# Patient Record
Sex: MALE | Race: BLACK OR AFRICAN AMERICAN | Employment: OTHER | URBAN - METROPOLITAN AREA
[De-identification: names, ages, dates, MRNs, and addresses within clinical notes are randomized per-mention and may not be internally consistent; named-entity substitution may affect disease eponyms.]

---

## 2017-01-06 ENCOUNTER — OFFICE VISIT (OUTPATIENT)
Dept: CARDIOLOGY CLINIC | Age: 72
End: 2017-01-06

## 2017-01-06 VITALS
HEART RATE: 73 BPM | DIASTOLIC BLOOD PRESSURE: 73 MMHG | HEIGHT: 67 IN | SYSTOLIC BLOOD PRESSURE: 159 MMHG | WEIGHT: 209 LBS | BODY MASS INDEX: 32.8 KG/M2

## 2017-01-06 DIAGNOSIS — I45.2 RBBB (RIGHT BUNDLE BRANCH BLOCK WITH LEFT ANTERIOR FASCICULAR BLOCK): ICD-10-CM

## 2017-01-06 DIAGNOSIS — E78.5 DYSLIPIDEMIA: ICD-10-CM

## 2017-01-06 DIAGNOSIS — I25.10 CAD S/P PERCUTANEOUS CORONARY ANGIOPLASTY: ICD-10-CM

## 2017-01-06 DIAGNOSIS — F17.209 TOBACCO USE DISORDER, CONTINUOUS: ICD-10-CM

## 2017-01-06 DIAGNOSIS — I50.9 CHRONIC CONGESTIVE HEART FAILURE, UNSPECIFIED CONGESTIVE HEART FAILURE TYPE: Primary | ICD-10-CM

## 2017-01-06 DIAGNOSIS — I10 ESSENTIAL HYPERTENSION: ICD-10-CM

## 2017-01-06 DIAGNOSIS — Z98.61 CAD S/P PERCUTANEOUS CORONARY ANGIOPLASTY: ICD-10-CM

## 2017-01-06 DIAGNOSIS — Z79.4 TYPE 2 DIABETES MELLITUS WITHOUT COMPLICATION, WITH LONG-TERM CURRENT USE OF INSULIN (HCC): ICD-10-CM

## 2017-01-06 DIAGNOSIS — E11.9 TYPE 2 DIABETES MELLITUS WITHOUT COMPLICATION, WITH LONG-TERM CURRENT USE OF INSULIN (HCC): ICD-10-CM

## 2017-01-06 RX ORDER — MELATONIN
1000 DAILY
COMMUNITY

## 2017-01-06 NOTE — PATIENT INSTRUCTIONS
After the recommended changes have been made in blood pressure medicines, patient advised to keep BP/HR(pulse rate) chart twice daily and bring us results in next 2 weeks or so. Patient may send the results via \"My Chart\" if desired. Please rest for 5-10 minutes before checking blood pressure  Medications Discontinued During This Encounter   Medication Reason    glimepiride (AMARYL) 4 mg tablet Therapy Completed       Orders Placed This Encounter    SCHEDULE NUCLEAR STUDY     exercise    2D ECHO COMPLETE ADULT (TTE)     Standing Status:   Future     Standing Expiration Date:   7/5/2017     Order Specific Question:   Reason for Exam:     Answer:   chf    AMB POC EKG ROUTINE W/ 12 LEADS, INTER & REP     Order Specific Question:   Reason for Exam:     Answer:   new patient          Stopping Smoking: Care Instructions  Your Care Instructions  Cigarette smokers crave the nicotine in cigarettes. Giving it up is much harder than simply changing a habit. Your body has to stop craving the nicotine. It is hard to quit, but you can do it. There are many tools that people use to quit smoking. You may find that combining tools works best for you. There are several steps to quitting. First you get ready to quit. Then you get support to help you. After that, you learn new skills and behaviors to become a nonsmoker. For many people, a necessary step is getting and using medicine. Your doctor will help you set up the plan that best meets your needs. You may want to attend a smoking cessation program to help you quit smoking. When you choose a program, look for one that has proven success. Ask your doctor for ideas. You will greatly increase your chances of success if you take medicine as well as get counseling or join a cessation program.  Some of the changes you feel when you first quit tobacco are uncomfortable. Your body will miss the nicotine at first, and you may feel short-tempered and grumpy.  You may have trouble sleeping or concentrating. Medicine can help you deal with these symptoms. You may struggle with changing your smoking habits and rituals. The last step is the tricky one: Be prepared for the smoking urge to continue for a time. This is a lot to deal with, but keep at it. You will feel better. Follow-up care is a key part of your treatment and safety. Be sure to make and go to all appointments, and call your doctor if you are having problems. Its also a good idea to know your test results and keep a list of the medicines you take. How can you care for yourself at home? · Ask your family, friends, and coworkers for support. You have a better chance of quitting if you have help and support. · Join a support group, such as Nicotine Anonymous, for people who are trying to quit smoking. · Consider signing up for a smoking cessation program, such as the American Lung Association's Freedom from Smoking program.  · Set a quit date. Pick your date carefully so that it is not right in the middle of a big deadline or stressful time. Once you quit, do not even take a puff. Get rid of all ashtrays and lighters after your last cigarette. Clean your house and your clothes so that they do not smell of smoke. · Learn how to be a nonsmoker. Think about ways you can avoid those things that make you reach for a cigarette. ¨ Avoid situations that put you at greatest risk for smoking. For some people, it is hard to have a drink with friends without smoking. For others, they might skip a coffee break with coworkers who smoke. ¨ Change your daily routine. Take a different route to work or eat a meal in a different place. · Cut down on stress. Calm yourself or release tension by doing an activity you enjoy, such as reading a book, taking a hot bath, or gardening. · Talk to your doctor or pharmacist about nicotine replacement therapy, which replaces the nicotine in your body.  You still get nicotine but you do not use tobacco. Nicotine replacement products help you slowly reduce the amount of nicotine you need. These products come in several forms, many of them available over-the-counter:  ¨ Nicotine patches  ¨ Nicotine gum and lozenges  ¨ Nicotine inhaler  · Ask your doctor about bupropion (Wellbutrin) or varenicline (Chantix), which are prescription medicines. They do not contain nicotine. They help you by reducing withdrawal symptoms, such as stress and anxiety. · Some people find hypnosis, acupuncture, and massage helpful for ending the smoking habit. · Eat a healthy diet and get regular exercise. Having healthy habits will help your body move past its craving for nicotine. · Be prepared to keep trying. Most people are not successful the first few times they try to quit. Do not get mad at yourself if you smoke again. Make a list of things you learned and think about when you want to try again, such as next week, next month, or next year. Where can you learn more? Go to http://jerry-shereen.info/. Enter M321 in the search box to learn more about \"Stopping Smoking: Care Instructions. \"  Current as of: May 26, 2016  Content Version: 11.1  © 6182-6897 [employer]. Care instructions adapted under license by [employer] (which disclaims liability or warranty for this information). If you have questions about a medical condition or this instruction, always ask your healthcare professional. [address] any warranty or liability for your use of this information.

## 2017-01-06 NOTE — PROGRESS NOTES
HISTORY OF PRESENT ILLNESS  Gayle Lackey is a 80 y.o. male. HPI Comments: 1/17 seen for establishing/changing cardiologist   history of coronary disease, status post PCI(2010) and hypertension, along with conduction system disease    New Patient   The history is provided by the patient and medical records. Associated symptoms include shortness of breath. Pertinent negatives include no chest pain and no headaches. Cardiomyopathy   The history is provided by the medical records (ischemic). This is a chronic problem. Associated symptoms include shortness of breath. Pertinent negatives include no chest pain and no headaches. Hypertension   The history is provided by the medical records and patient. This is a chronic problem. Associated symptoms include shortness of breath. Pertinent negatives include no chest pain and no headaches. Cholesterol Problem   The history is provided by the medical records. This is a chronic problem. Associated symptoms include shortness of breath. Pertinent negatives include no chest pain and no headaches. Shortness of Breath   The history is provided by the patient. This is a recurrent problem. The problem occurs intermittently. The current episode started more than 1 week ago. Associated symptoms include leg swelling. Pertinent negatives include no fever, no headaches, no cough, no wheezing, no PND, no orthopnea, no chest pain, no vomiting, no rash and no claudication. The problem's precipitants include exercise (10-15 mt walk). Leg Swelling   The history is provided by the patient. This is a recurrent problem. The current episode started more than 1 week ago (6/16). The problem occurs every several days. Associated symptoms include shortness of breath. Pertinent negatives include no chest pain and no headaches. The symptoms are aggravated by standing. The symptoms are relieved by sleep.        Review of Systems   Constitutional: Negative for chills, fever, malaise/fatigue and weight loss. HENT: Negative for nosebleeds. Eyes: Negative for discharge. Respiratory: Positive for shortness of breath. Negative for cough and wheezing. Cardiovascular: Positive for leg swelling. Negative for chest pain, palpitations, orthopnea, claudication and PND. Gastrointestinal: Negative for diarrhea, nausea and vomiting. Genitourinary: Negative for dysuria and hematuria. Musculoskeletal: Negative for joint pain. Skin: Negative for rash. Neurological: Negative for dizziness, seizures, loss of consciousness and headaches. Endo/Heme/Allergies: Negative for polydipsia. Does not bruise/bleed easily. Psychiatric/Behavioral: Negative for depression and substance abuse. The patient does not have insomnia. No Known Allergies    Past Medical History   Diagnosis Date    ACS (acute coronary syndrome) ([facility] [address]     CAD (coronary artery disease), native coronary artery August 2010     s/p non-ST-elevation myocardial infarction, s/p PCI with BMS (Vision 4x18mm) distal RCA with 45% distal LAD  and mild LCx disease. mild-moderate LV systolic dysfunction (76/50).  Diabetes mellitus type II     Dyslipidemia     ED (erectile dysfunction)     GERD (gastroesophageal reflux disease)     History of BPH     History of echocardiogram 5/18/2011     EF 65%. Mod-marked increased wall thickness. Gr 1 DDfx. No significant valvular heart disease.  Hyperlipidemia     Hypertension     Ischemic cardiomyopathy      recovery of LV syst fct  Echo May 2011 LV EF 65%    MI (myocardial infarction) ([facility] [address]     Obesity     RBBB (right bundle branch block with left anterior fascicular block)     S/P cardiac cath 8/26/2010     LM patent. dLAD 45%. CX mild. dRCA 100% thrombotic (S/P cath thrombectomy & Vision 4 x 18-mm BMS). EF 40%. Posterobasal, diaphrag, apical hypk.       Shingles 4/2012    Tobacco use disorder, continuous        Family History   Problem Relation Age of Onset    Diabetes Mother Social History   Substance Use Topics    Smoking status: Current Every Day Smoker     Packs/day: 0.50     Years: 44.00    Smokeless tobacco: Never Used      Comment: per patient stated 4 a day     Alcohol use No        Current Outpatient Prescriptions   Medication Sig    cholecalciferol, vitamin D3, (VITAMIN D3) 2,000 unit tab Take  by mouth.  dutasteride (AVODART) 0.5 mg capsule Take 0.5 mg by mouth daily.  linagliptin (TRADJENTA) 5 mg tablet Take 5 mg by mouth daily.  hydrALAZINE (APRESOLINE) 100 mg tablet Take 100 mg by mouth three (3) times daily.  esomeprazole (NEXIUM) 40 mg capsule Take 40 mg by mouth as needed.  carvedilol (COREG) 12.5 mg tablet Take 25 mg by mouth two (2) times daily (with meals).  simvastatin (ZOCOR) 40 mg tablet Take 1 Tab by mouth nightly.  nitroglycerin (NITROSTAT) 0.4 mg SL tablet 1 Tab by SubLINGual route every five (5) minutes as needed for Chest Pain.  pantoprazole (PROTONIX) 40 mg tablet Take 40 mg by mouth daily.  insulin glargine (LANTUS SOLOSTAR) 100 unit/mL (3 mL) pen 60 Units by SubCUTAneous route daily.  lisinopril-hydrochlorothiazide (PRINZIDE, ZESTORETIC) 20-25 mg per tablet Take 1 Tab by mouth daily.  aspirin 81 mg tablet Take 81 mg by mouth daily. No current facility-administered medications for this visit. Past Surgical History   Procedure Laterality Date    Hx heart catheterization  08/26/10     1. Normal systemic pressure. 2. Moderate elevation of left sided filling pressures. 3. Mild to moderate left ventricular systolic dysfunction distinct regional wall motion abnormalities. 4. Coronary disease with thrombotic occlusion of the distal right coronary artery and moderate left anterior descending disease. 5. Successful percutaneous coronary intervention with catheter thrombectomy.     Hx hemorrhoidectomy      Hx mohs procedure Left 2002    Hx coronary stent placement  2010    Hx cataract removal      Hx trabeculectomy      Hx coronary artery bypass graft         Diagnostic Studies: Old records reviewed and show as follows  EKG tracings reviewed by me today. No flowsheet data found. Visit Vitals    /73    Pulse 73    Ht 5' 7\" (1.702 m)    Wt 94.8 kg (209 lb)    BMI 32.73 kg/m2       Mr. Reinaldo Lane has a reminder for a \"due or due soon\" health maintenance. I have asked that he contact his primary care provider for follow-up on this health maintenance. 9/10 Card Cath  IMPRESSION  1. Normal systemic pressure. 2. Moderate elevation of left-sided filling pressures. 3. Mild to moderate left ventricular systolic dysfunction with distinct  regional wall motion abnormalities. 4. Coronary disease as described above with a thrombotic occlusion of the  distal right coronary artery and moderate distal left anterior descending  disease. 5. Successful percutaneous coronary intervention with catheter  thrombectomy, followed by bare metal stent deployment with a Vision 4 x 18  mm stent in the distal right coronary artery. Physical Exam   Constitutional: He is oriented to person, place, and time. He appears well-developed and well-nourished. No distress. Obese   HENT:   Head: Normocephalic and atraumatic. Mouth/Throat: Normal dentition. Eyes: Right eye exhibits no discharge. Left eye exhibits no discharge. No scleral icterus. Neck: Neck supple. No JVD present. Carotid bruit is not present. No thyromegaly present. Cardiovascular: Normal rate, regular rhythm, S1 normal, S2 normal, normal heart sounds and intact distal pulses. Exam reveals decreased pulses. Exam reveals no gallop and no friction rub. No murmur heard. Pulmonary/Chest: Effort normal and breath sounds normal. He has no wheezes. He has no rales. Abdominal: Soft. He exhibits no mass. There is no tenderness. Musculoskeletal: He exhibits no edema.    Lymphadenopathy:        Right cervical: No superficial cervical adenopathy present. Left cervical: No superficial cervical adenopathy present. Neurological: He is alert and oriented to person, place, and time. Skin: Skin is warm and dry. No rash noted. Psychiatric: He has a normal mood and affect. His behavior is normal.       HETAL and Ching Martereymundo was seen today for new patient, coronary artery disease, cardiomyopathy, hypertension, cholesterol problem, shortness of breath and leg swelling. Diagnoses and all orders for this visit:    Chronic congestive heart failure, unspecified congestive heart failure type ([address]  -     AMB POC EKG ROUTINE W/ 12 LEADS, INTER & REP  -     2D ECHO COMPLETE ADULT (TTE); Future  -     SCHEDULE NUCLEAR STUDY    CAD S/P percutaneous coronary angioplasty  Comments:  s/p PCI with BMS (Vision 4x18mm) distal RCA  Orders:  -     SCHEDULE NUCLEAR STUDY    RBBB (right bundle branch block with left anterior fascicular block)    Type 2 diabetes mellitus without complication, with long-term current use of insulin (HCC)    Tobacco use disorder, continuous  Comments:  Patient advised to stop smoking to reduce future cardiovascular events. Essential hypertension  Comments:  1/17 high- watch home BPs    Dyslipidemia  Comments:  1/17 get report from PCP          Pertinent laboratory and test data reviewed and discussed with patient.   See patient instructions also for other medical advice given    Medications Discontinued During This Encounter   Medication Reason    glimepiride (AMARYL) 4 mg tablet Therapy Completed       Follow-up Disposition:  Return in about 3 months (around 4/6/2017), or if symptoms worsen or fail to improve, for post test.

## 2017-01-06 NOTE — MR AVS SNAPSHOT
Visit Information Date & Time Provider Department Dept. Phone Encounter #  
 1/6/2017 12:15 PM Jenni Syed MD [facility] [address] [place] [address] [id number] Follow-up Instructions Return in about 3 months (around 4/6/2017), or if symptoms worsen or fail to improve, for post test.  
  
Your Appointments 1/18/2017  9:00 AM  
PROCEDURE with CA NUC [facility] ([address]) Appt Note: est echo pearson debra [address] [facility], [address] [facility] [address]  
[address], [address] [facility] [address] 4/10/2017 11:45 AM  
Office Visit with Jenni Syed MD  
[facility]) Appt Note: 3 month post nuc and echo [address] [facility] [address] [facility] [address]  
[address], [address] [facility] [address] [address] 5/24/2017 10:20 AM  
Follow Up with Vasquez Millan MD  
[facility] ([address]) Appt Note: 1 year follow up with an EKG  
 [facility] [id number]  
[phone] [address] [place] [address] [address] Upcoming Health Maintenance Date Due Hepatitis C Screening 1945 FOOT EXAM Q1 9/12/1956 EYE EXAM RETINAL OR DILATED Q1 9/12/1956 DTaP/Tdap/Td series (1 - Tdap) 9/12/1967 FOBT Q 1 YEAR AGE 50-75 9/12/1996 ZOSTER VACCINE AGE 60> 9/12/2006 MICROALBUMIN Q1 1/12/2011 HEMOGLOBIN A1C Q6M 3/29/2011 GLAUCOMA SCREENING Q2Y 9/12/2011 Pneumococcal 65+ Low/Medium Risk (1 of 2 - PCV13) 9/12/2011 MEDICARE YEARLY EXAM 9/12/2011 LIPID PANEL Q1 10/12/2011 INFLUENZA AGE 9 TO ADULT 8/1/2016 Allergies as of 1/6/2017  Review Complete On: 1/6/2017 By: Jenni Syed MD  
 No Known Allergies Current Immunizations  Never Reviewed No immunizations on file. Not reviewed this visit You Were Diagnosed With   
  
 Codes Comments Chronic congestive heart failure, unspecified congestive heart failure type ([facility]    -  Primary ICD-10-CM: I50.9 ICD-9-CM: 428.0   
 CAD S/P percutaneous coronary angioplasty     ICD-10-CM: I25.10, Z98.61 ICD-9-CM: 414.01, V45.82 s/p PCI with BMS (Vision 4x18mm) distal RCA  
 RBBB (right bundle branch block with left anterior fascicular block)     ICD-10-CM: I45.2 ICD-9-CM: 426.52 Type 2 diabetes mellitus without complication, with long-term current use of insulin (HCC)     ICD-10-CM: E11.9, Z79.4 ICD-9-CM: 250.00, V58.67 Tobacco use disorder, continuous     ICD-10-CM: F17.209 ICD-9-CM: 305.1 Patient advised to stop smoking to reduce future cardiovascular events. Essential hypertension     ICD-10-CM: I10 
ICD-9-CM: 401.9 1/17 high- watch home BPs Dyslipidemia     ICD-10-CM: E78.5 ICD-9-CM: 272.4 1/17 get report from PCP Vitals BP Pulse Height(growth percentile) Weight(growth percentile) BMI Smoking Status 159/73 73 5' 7\" (1.702 m) 209 lb (94.8 kg) 32.73 kg/m2 Current Every Day Smoker Vitals History BMI and BSA Data Body Mass Index Body Surface Area 32.73 kg/m 2 2.12 m 2 Preferred Pharmacy Pharmacy Name Phone [id number] [facility]. Milla Awad, [address] [facility] [phone] Your Updated Medication List  
  
   
This list is accurate as of: 1/6/17  1:03 PM.  Always use your most recent med list.  
  
  
  
  
 aspirin 81 mg tablet Take 81 mg by mouth daily. COREG 12.5 mg tablet Generic drug:  carvedilol Take 25 mg by mouth two (2) times daily (with meals). dutasteride 0.5 mg capsule Commonly known as:  AVODART Take 0.5 mg by mouth daily. hydrALAZINE 100 mg tablet Commonly known as:  APRESOLINE Take 100 mg by mouth three (3) times daily. LANTUS SOLOSTAR 100 unit/mL (3 mL) pen Generic drug:  insulin glargine 60 Units by SubCUTAneous route daily. lisinopril-hydroCHLOROthiazide 20-25 mg per tablet Commonly known as:  Onur Lonny Take 1 Tab by mouth daily. NexIUM 40 mg capsule Generic drug:  esomeprazole Take 40 mg by mouth as needed. nitroglycerin 0.4 mg SL tablet Commonly known as:  NITROSTAT  
1 Tab by SubLINGual route every five (5) minutes as needed for Chest Pain.  
  
 pantoprazole 40 mg tablet Commonly known as:  PROTONIX Take 40 mg by mouth daily. simvastatin 40 mg tablet Commonly known as:  ZOCOR Take 1 Tab by mouth nightly. TRADJENTA 5 mg tablet Generic drug:  linagliptin Take 5 mg by mouth daily. VITAMIN D3 2,000 unit Tab Generic drug:  cholecalciferol (vitamin D3) Take  by mouth. We Performed the Following AMB POC EKG ROUTINE W/ 12 LEADS, INTER & REP [57557 CPT(R)] SCHEDULE NUCLEAR STUDY [UTM8127 Custom] Comments:  
 exercise Follow-up Instructions Return in about 3 months (around 4/6/2017), or if symptoms worsen or fail to improve, for post test.  
  
To-Do List   
 Around 01/09/2017 Card[facility] Services:  2D ECHO COMPLETE ADULT (TTE) Patient Instructions After the recommended changes have been made in blood pressure medicines, patient advised to keep BP/HR(pulse rate) chart twice daily and bring us results in next 2 weeks or so. Patient may send the results via \"My Chart\" if desired. Please rest for 5-10 minutes before checking blood pressure Medications Discontinued During This Encounter Medication Reason  glimepiride (AMARYL) 4 mg tablet Therapy Completed Orders Placed This Encounter  SCHEDULE NUCLEAR STUDY  
  exercise  2D ECHO COMPLETE ADULT (TTE) Standing Status:   Future Standing Expiration Date:   7/5/2017 Order Specific Question:   Reason for Exam:   Answer:   chf  
 AMB POC EKG ROUTINE W/ 12 LEADS, INTER & REP  
 Order Specific Question:   Reason for Exam: Answer:   new patient Stopping Smoking: Care Instructions Your Care Instructions Cigarette smokers crave the nicotine in cigarettes. Giving it up is much harder than simply changing a habit. Your body has to stop craving the nicotine. It is hard to quit, but you can do it. There are many tools that people use to quit smoking. You may find that combining tools works best for you. There are several steps to quitting. First you get ready to quit. Then you get support to help you. After that, you learn new skills and behaviors to become a nonsmoker. For many people, a necessary step is getting and using medicine. Your doctor will help you set up the plan that best meets your needs. You may want to attend a smoking cessation program to help you quit smoking. When you choose a program, look for one that has proven success. Ask your doctor for ideas. You will greatly increase your chances of success if you take medicine as well as get counseling or join a cessation program. 
Some of the changes you feel when you first quit tobacco are uncomfortable. Your body will miss the nicotine at first, and you may feel short-tempered and grumpy. You may have trouble sleeping or concentrating. Medicine can help you deal with these symptoms. You may struggle with changing your smoking habits and rituals. The last step is the tricky one: Be prepared for the smoking urge to continue for a time. This is a lot to deal with, but keep at it. You will feel better. Follow-up care is a key part of your treatment and safety. Be sure to make and go to all appointments, and call your doctor if you are having problems. Its also a good idea to know your test results and keep a list of the medicines you take. How can you care for yourself at home? · Ask your family, friends, and coworkers for support. You have a better chance of quitting if you have help and support. · Join a support group, such as Nicotine Anonymous, for people who are trying to quit smoking. · Consider signing up for a smoking cessation program, such as the American Lung Association's Freedom from Smoking program. 
· Set a quit date. Pick your date carefully so that it is not right in the middle of a big deadline or stressful time. Once you quit, do not even take a puff. Get rid of all ashtrays and lighters after your last cigarette. Clean your house and your clothes so that they do not smell of smoke. · Learn how to be a nonsmoker. Think about ways you can avoid those things that make you reach for a cigarette. ¨ Avoid situations that put you at greatest risk for smoking. For some people, it is hard to have a drink with friends without smoking. For others, they might skip a coffee break with coworkers who smoke. ¨ Change your daily routine. Take a different route to work or eat a meal in a different place. · Cut down on stress. Calm yourself or release tension by doing an activity you enjoy, such as reading a book, taking a hot bath, or gardening. · Talk to your doctor or pharmacist about nicotine replacement therapy, which replaces the nicotine in your body. You still get nicotine but you do not use tobacco. Nicotine replacement products help you slowly reduce the amount of nicotine you need. These products come in several forms, many of them available over-the-counter: ¨ Nicotine patches ¨ Nicotine gum and lozenges ¨ Nicotine inhaler · Ask your doctor about bupropion (Wellbutrin) or varenicline (Chantix), which are prescription medicines. They do not contain nicotine. They help you by reducing withdrawal symptoms, such as stress and anxiety. · Some people find hypnosis, acupuncture, and massage helpful for ending the smoking habit. · Eat a healthy diet and get regular exercise. Having healthy habits will help your body move past its craving for nicotine. · Be prepared to keep trying. Most people are not successful the first few times they try to quit. Do not get mad at yourself if you smoke again. Make a list of things you learned and think about when you want to try again, such as next week, next month, or next year. Where can you learn more? Go to http://jerry-shereen.info/. Enter R399 in the search box to learn more about \"Stopping Smoking: Care Instructions. \" Current as of: May 26, 2016 Content Version: 11.1 © 9322-3777 [employer]. Care instructions adapted under license by [employer] (which disclaims liability or warranty for this information). If you have questions about a medical condition or this instruction, always ask your healthcare professional. [address] any warranty or liability for your use of this information. Introducing \[facility]\"" & HEALTH SERVICES! Dear Francoise Oreilly: Thank you for requesting a [employer] account. Our records indicate that you already have an active [employer] account. You can access your account anytime at [email or web address] Did you know that you can access your hospital and ER discharge instructions at any time in [employer]? You can also review all of your test results from your hospital stay or ER visit. Additional Information If you have questions, please visit the Frequently Asked Questions section of the [employer] website at [email or web address] Remember, [employer] is NOT to be used for urgent needs. For medical emergencies, dial 911. Now available from your iPhone and Android! Please provide this summary of care documentation to your next provider. Your primary care clinician is listed as Krystal Isidro. If you have any questions after today's visit, please call [phone].

## 2017-01-06 NOTE — LETTER
Wei Rice. 
1945 1/6/2017 Dear Saturnino Tovar MD 
 
I had the pleasure of evaluating  Mr. Skip Rosenthal in office today. Below are the relevant portions of my assessment and plan of care. ICD-10-CM ICD-9-CM 1. Chronic congestive heart failure, unspecified congestive heart failure type (HCC) I50.9 428.0 AMB POC EKG ROUTINE W/ 12 LEADS, INTER & REP  
   2D ECHO COMPLETE ADULT (TTE) SCHEDULE NUCLEAR STUDY 2. CAD S/P percutaneous coronary angioplasty I25.10 414.01 SCHEDULE NUCLEAR STUDY  
 Z98.61 V45.82   
 s/p PCI with BMS (Vision 4x18mm) distal RCA 3. RBBB (right bundle branch block with left anterior fascicular block) I45.2 426.52   
4. Type 2 diabetes mellitus without complication, with long-term current use of insulin (HCC) E11.9 250.00   
 Z79.4 V58.67   
5. Tobacco use disorder, continuous F17.209 305.1 Patient advised to stop smoking to reduce future cardiovascular events. 6. Essential hypertension I10 401.9 1/17 high- watch home BPs 7. Dyslipidemia E78.5 272.4   
 1/17 get report from PCP Current Outpatient Prescriptions Medication Sig Dispense Refill  cholecalciferol, vitamin D3, (VITAMIN D3) 2,000 unit tab Take  by mouth.  dutasteride (AVODART) 0.5 mg capsule Take 0.5 mg by mouth daily.  linagliptin (TRADJENTA) 5 mg tablet Take 5 mg by mouth daily.  hydrALAZINE (APRESOLINE) 100 mg tablet Take 100 mg by mouth three (3) times daily.  esomeprazole (NEXIUM) 40 mg capsule Take 40 mg by mouth as needed.  carvedilol (COREG) 12.5 mg tablet Take 25 mg by mouth two (2) times daily (with meals).  simvastatin (ZOCOR) 40 mg tablet Take 1 Tab by mouth nightly. 30 Tab 11  
 nitroglycerin (NITROSTAT) 0.4 mg SL tablet 1 Tab by SubLINGual route every five (5) minutes as needed for Chest Pain. 25 Tab 3  pantoprazole (PROTONIX) 40 mg tablet Take 40 mg by mouth daily.  insulin glargine (LANTUS SOLOSTAR) 100 unit/mL (3 mL) pen 60 Units by SubCUTAneous route daily.  lisinopril-hydrochlorothiazide (PRINZIDE, ZESTORETIC) 20-25 mg per tablet Take 1 Tab by mouth daily.  aspirin 81 mg tablet Take 81 mg by mouth daily. Orders Placed This Encounter  SCHEDULE NUCLEAR STUDY  
  exercise  2D ECHO COMPLETE ADULT (TTE) Standing Status:   Future Standing Expiration Date:   7/5/2017 Order Specific Question:   Reason for Exam: Answer:   chf  
 AMB POC EKG ROUTINE W/ 12 LEADS, INTER & REP Order Specific Question:   Reason for Exam: Answer:   new patient  cholecalciferol, vitamin D3, (VITAMIN D3) 2,000 unit tab Sig: Take  by mouth. If you have questions, please do not hesitate to call me. I look forward to following Mr. Melara Query along with you. Sincerely, Lisa Damon MD

## 2017-01-06 NOTE — PROGRESS NOTES
Medications confirmed by patient's medication list     1. Have you been to the ER, urgent care clinic since your last visit? Hospitalized since your last visit? No    2. Have you seen or consulted any other health care providers outside of the [address] [place] [address] since your last visit? Include any pap smears or colon screening. No     3. Since your last visit, have you had any of the following symptoms? Swelling in legs/ SOB    4. Have you had any blood work, X-rays or cardiac testing? None     5. Where do you normally have your labs drawn?  [address] [place] [address]

## 2017-01-18 ENCOUNTER — CLINICAL SUPPORT (OUTPATIENT)
Dept: CARDIOLOGY CLINIC | Age: 72
End: 2017-01-18

## 2017-01-18 DIAGNOSIS — I10 ESSENTIAL HYPERTENSION: ICD-10-CM

## 2017-01-18 DIAGNOSIS — I45.2 RBBB (RIGHT BUNDLE BRANCH BLOCK WITH LEFT ANTERIOR FASCICULAR BLOCK): ICD-10-CM

## 2017-01-18 DIAGNOSIS — E78.5 DYSLIPIDEMIA: ICD-10-CM

## 2017-01-18 DIAGNOSIS — I25.5 ISCHEMIC CARDIOMYOPATHY: Primary | ICD-10-CM

## 2017-01-18 DIAGNOSIS — I50.9 CHRONIC CONGESTIVE HEART FAILURE, UNSPECIFIED CONGESTIVE HEART FAILURE TYPE: ICD-10-CM

## 2017-04-10 ENCOUNTER — OFFICE VISIT (OUTPATIENT)
Dept: CARDIOLOGY CLINIC | Age: 72
End: 2017-04-10

## 2017-04-10 VITALS
HEART RATE: 70 BPM | DIASTOLIC BLOOD PRESSURE: 53 MMHG | BODY MASS INDEX: 32.65 KG/M2 | HEIGHT: 67 IN | WEIGHT: 208 LBS | SYSTOLIC BLOOD PRESSURE: 106 MMHG

## 2017-04-10 DIAGNOSIS — F17.209 TOBACCO USE DISORDER, CONTINUOUS: ICD-10-CM

## 2017-04-10 DIAGNOSIS — E78.5 DYSLIPIDEMIA: ICD-10-CM

## 2017-04-10 DIAGNOSIS — E66.09 NON MORBID OBESITY DUE TO EXCESS CALORIES: ICD-10-CM

## 2017-04-10 DIAGNOSIS — I10 ESSENTIAL HYPERTENSION: ICD-10-CM

## 2017-04-10 DIAGNOSIS — Z98.61 CAD S/P PERCUTANEOUS CORONARY ANGIOPLASTY: Primary | ICD-10-CM

## 2017-04-10 DIAGNOSIS — I25.10 CAD S/P PERCUTANEOUS CORONARY ANGIOPLASTY: Primary | ICD-10-CM

## 2017-04-10 RX ORDER — GLIMEPIRIDE 4 MG/1
4 TABLET ORAL
COMMUNITY
End: 2019-11-18

## 2017-04-10 NOTE — PATIENT INSTRUCTIONS
Medications Discontinued During This Encounter   Medication Reason    lisinopril-hydrochlorothiazide (PRINZIDE, ZESTORETIC) 20-25 mg per tablet Alternate Therapy       No orders of the defined types were placed in this encounter. Try to exercise regularly like walking for 30 minutes a day      High Blood Pressure: Care Instructions  Your Care Instructions  If your blood pressure is usually above 140/90, you have high blood pressure, or hypertension. That means the top number is 140 or higher or the bottom number is 90 or higher, or both. Despite what a lot of people think, high blood pressure usually doesn't cause headaches or make you feel dizzy or lightheaded. It usually has no symptoms. But it does increase your risk for heart attack, stroke, and kidney or eye damage. The higher your blood pressure, the more your risk increases. Your doctor will give you a goal for your blood pressure. Your goal will be based on your health and your age. An example of a goal is to keep your blood pressure below 140/90. Lifestyle changes, such as eating healthy and being active, are always important to help lower blood pressure. You might also take medicine to reach your blood pressure goal.  Follow-up care is a key part of your treatment and safety. Be sure to make and go to all appointments, and call your doctor if you are having problems. It's also a good idea to know your test results and keep a list of the medicines you take. How can you care for yourself at home? Medical treatment  · If you stop taking your medicine, your blood pressure will go back up. You may take one or more types of medicine to lower your blood pressure. Be safe with medicines. Take your medicine exactly as prescribed. Call your doctor if you think you are having a problem with your medicine. · Talk to your doctor before you start taking aspirin every day. Aspirin can help certain people lower their risk of a heart attack or stroke.  But taking aspirin isn't right for everyone, because it can cause serious bleeding. · See your doctor regularly. You may need to see the doctor more often at first or until your blood pressure comes down. · If you are taking blood pressure medicine, talk to your doctor before you take decongestants or anti-inflammatory medicine, such as ibuprofen. Some of these medicines can raise blood pressure. · Learn how to check your blood pressure at home. Lifestyle changes  · Stay at a healthy weight. This is especially important if you put on weight around the waist. Losing even 10 pounds can help you lower your blood pressure. · If your doctor recommends it, get more exercise. Walking is a good choice. Bit by bit, increase the amount you walk every day. Try for at least 30 minutes on most days of the week. You also may want to swim, bike, or do other activities. · Avoid or limit alcohol. Talk to your doctor about whether you can drink any alcohol. · Try to limit how much sodium you eat to less than 2,300 milligrams (mg) a day. Your doctor may ask you to try to eat less than 1,500 mg a day. · Eat plenty of fruits (such as bananas and oranges), vegetables, legumes, whole grains, and low-fat dairy products. · Lower the amount of saturated fat in your diet. Saturated fat is found in animal products such as milk, cheese, and meat. Limiting these foods may help you lose weight and also lower your risk for heart disease. · Do not smoke. Smoking increases your risk for heart attack and stroke. If you need help quitting, talk to your doctor about stop-smoking programs and medicines. These can increase your chances of quitting for good. When should you call for help? Call 911 anytime you think you may need emergency care. This may mean having symptoms that suggest that your blood pressure is causing a serious heart or blood vessel problem. Your blood pressure may be over 180/110.   For example, call 911 if:  · You have symptoms of a heart attack. These may include:  ¨ Chest pain or pressure, or a strange feeling in the chest.  ¨ Sweating. ¨ Shortness of breath. ¨ Nausea or vomiting. ¨ Pain, pressure, or a strange feeling in the back, neck, jaw, or upper belly or in one or both shoulders or arms. ¨ Lightheadedness or sudden weakness. ¨ A fast or irregular heartbeat. · You have symptoms of a stroke. These may include:  ¨ Sudden numbness, tingling, weakness, or loss of movement in your face, arm, or leg, especially on only one side of your body. ¨ Sudden vision changes. ¨ Sudden trouble speaking. ¨ Sudden confusion or trouble understanding simple statements. ¨ Sudden problems with walking or balance. ¨ A sudden, severe headache that is different from past headaches. · You have severe back or belly pain. Do not wait until your blood pressure comes down on its own. Get help right away. Call your doctor now or seek immediate care if:  · Your blood pressure is much higher than normal (such as 180/110 or higher), but you don't have symptoms. · You think high blood pressure is causing symptoms, such as:  ¨ Severe headache. ¨ Blurry vision. Watch closely for changes in your health, and be sure to contact your doctor if:  · Your blood pressure measures 140/90 or higher at least 2 times. That means the top number is 140 or higher or the bottom number is 90 or higher, or both. · You think you may be having side effects from your blood pressure medicine. · Your blood pressure is usually normal, but it goes above normal at least 2 times. Where can you learn more? Go to http://jerry-shereen.info/. Enter I390 in the search box to learn more about \"High Blood Pressure: Care Instructions. \"  Current as of: August 8, 2016  Content Version: 11.2  © 5471-6456 [employer]. Care instructions adapted under license by [employer] (which disclaims liability or warranty for this information).  If you have questions about a medical condition or this instruction, always ask your healthcare professional. [place] [address] any warranty or liability for your use of this information.

## 2017-04-10 NOTE — LETTER
Greer Escalante. 
1945 
 
4/10/2017 Dear MD Amita Guevara MD 
 
I had the pleasure of evaluating  Mr. Senia Chavarria in office today. Below are the relevant portions of my assessment and plan of care. ICD-10-CM ICD-9-CM 1. CAD S/P percutaneous coronary angioplasty I25.10 414.01   
 Z98.61 V45.82   
 8/2010 s/p non-ST-elevation myocardial infarction, s/p PCI with BMS (Vision 4x18mm) distal RCA with 45% distal LAD  and mild LCx disease. mild-moderate LV systol 2. Essential hypertension I10 401.9 4/17 improving since medications by PCP. 3. Dyslipidemia E78.5 272.4   
 3/17 LDL31;   
4. Non morbid obesity due to excess calories E66.09 278.00 Weight loss has been strongly encouraged by following dietary restrictions and an exercise routine. 5. Tobacco use disorder, continuous F17.209 305.1 Patient advised to stop smoking to reduce future cardiovascular events. Current Outpatient Prescriptions Medication Sig Dispense Refill  glimepiride (AMARYL) 4 mg tablet Take  by mouth every morning.  azilsartan med-chlorthalidone (EDARBYCLOR) 40-25 mg per tablet Take  by mouth daily.  cholecalciferol, vitamin D3, (VITAMIN D3) 2,000 unit tab Take  by mouth.  dutasteride (AVODART) 0.5 mg capsule Take 0.5 mg by mouth daily.  linagliptin (TRADJENTA) 5 mg tablet Take 5 mg by mouth daily.  hydrALAZINE (APRESOLINE) 100 mg tablet Take 100 mg by mouth two (2) times a day.  esomeprazole (NEXIUM) 40 mg capsule Take 40 mg by mouth as needed.  carvedilol (COREG) 12.5 mg tablet Take 25 mg by mouth two (2) times daily (with meals).  simvastatin (ZOCOR) 40 mg tablet Take 1 Tab by mouth nightly. 30 Tab 11  
 nitroglycerin (NITROSTAT) 0.4 mg SL tablet 1 Tab by SubLINGual route every five (5) minutes as needed for Chest Pain. 25 Tab 3  pantoprazole (PROTONIX) 40 mg tablet Take 40 mg by mouth daily.  insulin glargine (LANTUS SOLOSTAR) 100 unit/mL (3 mL) pen 60 Units by SubCUTAneous route daily.  aspirin 81 mg tablet Take 81 mg by mouth daily. Orders Placed This Encounter  glimepiride (AMARYL) 4 mg tablet Sig: Take  by mouth every morning.  azilsartan med-chlorthalidone (EDARBYCLOR) 40-25 mg per tablet Sig: Take  by mouth daily. If you have questions, please do not hesitate to call me. I look forward to following Mr. Solorzano Done along with you. Sincerely, Shannan Hernandez MD

## 2017-04-10 NOTE — PROGRESS NOTES
HISTORY OF PRESENT ILLNESS  Joe Ibrahim is a 80 y.o. male. HPI Comments: 4/17 improving blood pressure since meds adjusted by Dr. Alfredo Lafleur;    1/17 seen for establishing/changing cardiologist   history of coronary disease, status post PCI(2010) and hypertension, along with conduction system disease    Cardiomyopathy   The history is provided by the medical records (ischemic). This is a chronic problem. Associated symptoms include shortness of breath. Pertinent negatives include no chest pain and no headaches. Hypertension   The history is provided by the medical records and patient. This is a chronic problem. Associated symptoms include shortness of breath. Pertinent negatives include no chest pain and no headaches. Cholesterol Problem   The history is provided by the medical records. This is a chronic problem. Associated symptoms include shortness of breath. Pertinent negatives include no chest pain and no headaches. Shortness of Breath   The history is provided by the patient. This is a recurrent problem. The problem occurs intermittently. The current episode started more than 1 week ago. The problem has been gradually improving. Associated symptoms include leg swelling. Pertinent negatives include no fever, no headaches, no cough, no wheezing, no PND, no orthopnea, no chest pain, no vomiting, no rash and no claudication. The problem's precipitants include exercise (10-15 mt walk). Leg Swelling   The history is provided by the patient. This is a recurrent problem. The current episode started more than 1 week ago (6/16). The problem occurs every several days. The problem has been gradually improving. Associated symptoms include shortness of breath. Pertinent negatives include no chest pain and no headaches. The symptoms are aggravated by standing. The symptoms are relieved by sleep. Review of Systems   Constitutional: Negative for chills, fever, malaise/fatigue and weight loss.    HENT: Negative for nosebleeds. Eyes: Negative for discharge. Respiratory: Positive for shortness of breath. Negative for cough and wheezing. Cardiovascular: Positive for leg swelling. Negative for chest pain, palpitations, orthopnea, claudication and PND. Gastrointestinal: Negative for diarrhea, nausea and vomiting. Genitourinary: Negative for dysuria and hematuria. Musculoskeletal: Negative for joint pain. Skin: Negative for rash. Neurological: Negative for dizziness, seizures, loss of consciousness and headaches. Endo/Heme/Allergies: Negative for polydipsia. Does not bruise/bleed easily. Psychiatric/Behavioral: Negative for depression and substance abuse. The patient does not have insomnia. 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 mild-moderate LV systolic dysfunction (37/77).  Diabetes mellitus type II     Dyslipidemia     ED (erectile dysfunction)     GERD (gastroesophageal reflux disease)     History of BPH     History of echocardiogram 5/18/2011    EF 65%. Mod-marked increased wall thickness. Gr 1 DDfx. No significant valvular heart disease.  Hyperlipidemia     Hypertension     Ischemic cardiomyopathy     recovery of LV syst fct  Echo May 2011 LV EF 65%    MI (myocardial infarction) ([facility] [address]     Obesity     RBBB (right bundle branch block with left anterior fascicular block)     S/P cardiac cath 8/26/2010    LM patent. dLAD 45%. CX mild. dRCA 100% thrombotic (S/P cath thrombectomy & Vision 4 x 18-mm BMS). EF 40%. Posterobasal, diaphrag, apical hypk.       Shingles 4/2012    Tobacco use disorder, continuous        Family History   Problem Relation Age of Onset    Diabetes Mother        Social History   Substance Use Topics    Smoking status: Current Every Day Smoker     Packs/day: 0.50     Years: 44.00    Smokeless tobacco: Never Used      Comment: per patient  every now and then      Alcohol use No        Current Outpatient Prescriptions   Medication Sig    cholecalciferol, vitamin D3, (VITAMIN D3) 2,000 unit tab Take  by mouth.  dutasteride (AVODART) 0.5 mg capsule Take 0.5 mg by mouth daily.  linagliptin (TRADJENTA) 5 mg tablet Take 5 mg by mouth daily.  hydrALAZINE (APRESOLINE) 100 mg tablet Take 100 mg by mouth three (3) times daily.  esomeprazole (NEXIUM) 40 mg capsule Take 40 mg by mouth as needed.  carvedilol (COREG) 12.5 mg tablet Take 25 mg by mouth two (2) times daily (with meals).  simvastatin (ZOCOR) 40 mg tablet Take 1 Tab by mouth nightly.  nitroglycerin (NITROSTAT) 0.4 mg SL tablet 1 Tab by SubLINGual route every five (5) minutes as needed for Chest Pain.  pantoprazole (PROTONIX) 40 mg tablet Take 40 mg by mouth daily.  insulin glargine (LANTUS SOLOSTAR) 100 unit/mL (3 mL) pen 60 Units by SubCUTAneous route daily.  lisinopril-hydrochlorothiazide (PRINZIDE, ZESTORETIC) 20-25 mg per tablet Take 1 Tab by mouth daily.  aspirin 81 mg tablet Take 81 mg by mouth daily. No current facility-administered medications for this visit. Past Surgical History:   Procedure Laterality Date    HX CATARACT REMOVAL      HX CORONARY ARTERY BYPASS GRAFT      HX CORONARY STENT PLACEMENT  2010    HX HEART CATHETERIZATION  08/26/10    1. Normal systemic pressure. 2. Moderate elevation of left sided filling pressures. 3. Mild to moderate left ventricular systolic dysfunction distinct regional wall motion abnormalities. 4. Coronary disease with thrombotic occlusion of the distal right coronary artery and moderate left anterior descending disease. 5. Successful percutaneous coronary intervention with catheter thrombectomy.     HX HEMORRHOIDECTOMY      HX MOHS PROCEDURES Left 2002    HX TRABECULECTOMY         Diagnostic Studies: Old records reviewed and show as follows  EKG tracings reviewed by me today. No flowsheet data found. 3/17 Nuc Stress  Conclusion:   1. Nondiagnostic EKG changes of ischemia due to abnormal baseline EKG at 89% of predicted maximal heart rate. 2. Normal functional capacity. 3. Severely hypertensive blood pressure response and appropriate heart rate response. 4. No ischemic symptoms or arrhythmias seen. 5. Perfusion image report to follow. Conclusion:   1. Normal perfusion scan. 2. Normal wall motion and ejection fraction. 3. Low risk scan. 3/17 ECHO  SUMMARY:  Left ventricle: Ejection fraction was estimated to be 65 %. There were no  regional wall motion abnormalities. There was severe concentric  hypertrophy. Features were consistent with a pseudonormal left ventricular  filling pattern, with concomitant abnormal relaxation and increased  filling pressure (grade 2 diastolic dysfunction). Visit Vitals    Ht 5' 7\" (1.702 m)    Wt 94.3 kg (208 lb)    BMI 32.58 kg/m2       Mr. Kamron Brown has a reminder for a \"due or due soon\" health maintenance. I have asked that he contact his primary care provider for follow-up on this health maintenance. 9/10 Card Cath  IMPRESSION  1. Normal systemic pressure. 2. Moderate elevation of left-sided filling pressures. 3. Mild to moderate left ventricular systolic dysfunction with distinct  regional wall motion abnormalities. 4. Coronary disease as described above with a thrombotic occlusion of the  distal right coronary artery and moderate distal left anterior descending  disease. 5. Successful percutaneous coronary intervention with catheter  thrombectomy, followed by bare metal stent deployment with a Vision 4 x 18  mm stent in the distal right coronary artery. Physical Exam   Constitutional: He is oriented to person, place, and time. He appears well-developed and well-nourished. No distress.    Obese   HENT: Head: Normocephalic and atraumatic. Mouth/Throat: Normal dentition. Eyes: Right eye exhibits no discharge. Left eye exhibits no discharge. No scleral icterus. Neck: Neck supple. No JVD present. Carotid bruit is not present. No thyromegaly present. Cardiovascular: Normal rate, regular rhythm, S1 normal, S2 normal, normal heart sounds and intact distal pulses. Exam reveals decreased pulses. Exam reveals no gallop and no friction rub. No murmur heard. Pulmonary/Chest: Effort normal and breath sounds normal. He has no wheezes. He has no rales. Abdominal: Soft. He exhibits no mass. There is no tenderness. Musculoskeletal: He exhibits no edema. Lymphadenopathy:        Right cervical: No superficial cervical adenopathy present. Left cervical: No superficial cervical adenopathy present. Neurological: He is alert and oriented to person, place, and time. Skin: Skin is warm and dry. No rash noted. Psychiatric: He has a normal mood and affect. His behavior is normal.       ASSESSMENT and Cheryl Heimlich was seen today for coronary artery disease, cardiomyopathy, hypertension and cholesterol problem. Diagnoses and all orders for this visit:    CAD S/P percutaneous coronary angioplasty  Comments:  8/2010 s/p non-ST-elevation myocardial infarction, s/p PCI with BMS (Vision 4x18mm) distal RCA with 45% distal LAD  and mild LCx disease. mild-moderate LV systol    Essential hypertension  Comments:  4/17 improving since medications by PCP. Dyslipidemia  Comments:  3/17 LDL31;     Non morbid obesity due to excess calories  Comments:  Weight loss has been strongly encouraged by following dietary restrictions and an exercise routine. Tobacco use disorder, continuous  Comments:  Patient advised to stop smoking to reduce future cardiovascular events. Try to exercise regularly like walking for 30 minutes a day   Discussed regarding lipids, exercise, hypertension and diet.       Pertinent laboratory and test data reviewed and discussed with patient. See patient instructions also for other medical advice given    Medications Discontinued During This Encounter   Medication Reason    lisinopril-hydrochlorothiazide (PRINZIDE, ZESTORETIC) 20-25 mg per tablet Alternate Therapy       Follow-up Disposition:  Return in about 6 months (around 10/10/2017), or if symptoms worsen or fail to improve.

## 2017-04-10 NOTE — MR AVS SNAPSHOT
Visit Information Date & Time Provider Department Dept. Phone Encounter #  
 4/10/2017 11:45 AM Yessica Portillo MD [facility] [address] [place] [address] [id number] Follow-up Instructions Return in about 6 months (around 10/10/2017), or if symptoms worsen or fail to improve. Your Appointments 10/23/2017 12:00 PM  
ESTABLISHED PATIENT with Yessica Portillo MD  
[facility]) Appt Note: 6 months [address] [facility], [address] [place] [address]  
[facility] [place], [address] [place] [address] Upcoming Health Maintenance Date Due Hepatitis C Screening 1945 FOOT EXAM Q1 9/12/1956 EYE EXAM RETINAL OR DILATED Q1 9/12/1956 DTaP/Tdap/Td series (1 - Tdap) 9/12/1967 FOBT Q 1 YEAR AGE 50-75 9/12/1996 ZOSTER VACCINE AGE 60> 9/12/2006 MICROALBUMIN Q1 1/12/2011 HEMOGLOBIN A1C Q6M 3/29/2011 GLAUCOMA SCREENING Q2Y 9/12/2011 Pneumococcal 65+ Low/Medium Risk (1 of 2 - PCV13) 9/12/2011 MEDICARE YEARLY EXAM 9/12/2011 LIPID PANEL Q1 10/12/2011 INFLUENZA AGE 9 TO ADULT 8/1/2016 Allergies as of 4/10/2017  Review Complete On: 4/10/2017 By: Yessica Portillo MD  
 No Known Allergies Current Immunizations  Never Reviewed No immunizations on file. Not reviewed this visit You Were Diagnosed With   
  
 Codes Comments CAD S/P percutaneous coronary angioplasty    -  Primary ICD-10-CM: I25.10, Z98.61 ICD-9-CM: 414.01, V45.82 8/2010 s/p non-ST-elevation myocardial infarction, s/p PCI with BMS (Vision 4x18mm) distal RCA with 45% distal LAD  and mild LCx disease. mild-moderate LV systol Essential hypertension     ICD-10-CM: I10 
ICD-9-CM: 401.9 4/17 improving since medications by PCP. Dyslipidemia     ICD-10-CM: E78.5 ICD-9-CM: 272.4 3/17 LDL31;   
 Non morbid obesity due to excess calories     ICD-10-CM: E66.09 
 ICD-9-CM: 278.00 Weight loss has been strongly encouraged by following dietary restrictions and an exercise routine. Tobacco use disorder, continuous     ICD-10-CM: F17.209 ICD-9-CM: 305.1 Patient advised to stop smoking to reduce future cardiovascular events. Vitals BP Pulse Height(growth percentile) Weight(growth percentile) BMI Smoking Status 106/53 70 5' 7\" (1.702 m) 208 lb (94.3 kg) 32.58 kg/m2 Current Every Day Smoker Vitals History BMI and BSA Data Body Mass Index Body Surface Area 32.58 kg/m 2 2.11 m 2 Preferred Pharmacy Pharmacy Name Phone [id number]. [facility], [address] [facility] [phone] Your Updated Medication List  
  
   
This list is accurate as of: 4/10/17 12:58 PM.  Always use your most recent med list.  
  
  
  
  
 aspirin 81 mg tablet Take 81 mg by mouth daily. COREG 12.5 mg tablet Generic drug:  carvedilol Take 25 mg by mouth two (2) times daily (with meals). dutasteride 0.5 mg capsule Commonly known as:  AVODART Take 0.5 mg by mouth daily. EDARBYCLOR 40-25 mg per tablet Generic drug:  azilsartan med-chlorthalidone Take  by mouth daily. glimepiride 4 mg tablet Commonly known as:  AMARYL Take  by mouth every morning. hydrALAZINE 100 mg tablet Commonly known as:  APRESOLINE Take 100 mg by mouth two (2) times a day. LANTUS SOLOSTAR 100 unit/mL (3 mL) pen Generic drug:  insulin glargine 60 Units by SubCUTAneous route daily. NexIUM 40 mg capsule Generic drug:  esomeprazole Take 40 mg by mouth as needed. nitroglycerin 0.4 mg SL tablet Commonly known as:  NITROSTAT  
1 Tab by SubLINGual route every five (5) minutes as needed for Chest Pain.  
  
 pantoprazole 40 mg tablet Commonly known as:  PROTONIX Take 40 mg by mouth daily. simvastatin 40 mg tablet Commonly known as:  ZOCOR Take 1 Tab by mouth nightly. TRADJENTA 5 mg tablet Generic drug:  linagliptin Take 5 mg by mouth daily. VITAMIN D3 2,000 unit Tab Generic drug:  cholecalciferol (vitamin D3) Take  by mouth. Follow-up Instructions Return in about 6 months (around 10/10/2017), or if symptoms worsen or fail to improve. Patient Instructions Medications Discontinued During This Encounter Medication Reason  lisinopril-hydrochlorothiazide (PRINZIDE, ZESTORETIC) 20-25 mg per tablet Alternate Therapy No orders of the defined types were placed in this encounter. Try to exercise regularly like walking for 30 minutes a day High Blood Pressure: Care Instructions Your Care Instructions If your blood pressure is usually above 140/90, you have high blood pressure, or hypertension. That means the top number is 140 or higher or the bottom number is 90 or higher, or both. Despite what a lot of people think, high blood pressure usually doesn't cause headaches or make you feel dizzy or lightheaded. It usually has no symptoms. But it does increase your risk for heart attack, stroke, and kidney or eye damage. The higher your blood pressure, the more your risk increases. Your doctor will give you a goal for your blood pressure. Your goal will be based on your health and your age. An example of a goal is to keep your blood pressure below 140/90. Lifestyle changes, such as eating healthy and being active, are always important to help lower blood pressure. You might also take medicine to reach your blood pressure goal. 
Follow-up care is a key part of your treatment and safety. Be sure to make and go to all appointments, and call your doctor if you are having problems. It's also a good idea to know your test results and keep a list of the medicines you take. How can you care for yourself at home? Medical treatment · If you stop taking your medicine, your blood pressure will go back up. You may take one or more types of medicine to lower your blood pressure. Be safe with medicines. Take your medicine exactly as prescribed. Call your doctor if you think you are having a problem with your medicine. · Talk to your doctor before you start taking aspirin every day. Aspirin can help certain people lower their risk of a heart attack or stroke. But taking aspirin isn't right for everyone, because it can cause serious bleeding. · See your doctor regularly. You may need to see the doctor more often at first or until your blood pressure comes down. · If you are taking blood pressure medicine, talk to your doctor before you take decongestants or anti-inflammatory medicine, such as ibuprofen. Some of these medicines can raise blood pressure. · Learn how to check your blood pressure at home. Lifestyle changes · Stay at a healthy weight. This is especially important if you put on weight around the waist. Losing even 10 pounds can help you lower your blood pressure. · If your doctor recommends it, get more exercise. Walking is a good choice. Bit by bit, increase the amount you walk every day. Try for at least 30 minutes on most days of the week. You also may want to swim, bike, or do other activities. · Avoid or limit alcohol. Talk to your doctor about whether you can drink any alcohol. · Try to limit how much sodium you eat to less than 2,300 milligrams (mg) a day. Your doctor may ask you to try to eat less than 1,500 mg a day. · Eat plenty of fruits (such as bananas and oranges), vegetables, legumes, whole grains, and low-fat dairy products. · Lower the amount of saturated fat in your diet. Saturated fat is found in animal products such as milk, cheese, and meat. Limiting these foods may help you lose weight and also lower your risk for heart disease. · Do not smoke. Smoking increases your risk for heart attack and stroke.  If you need help quitting, talk to your doctor about stop-smoking programs and medicines. These can increase your chances of quitting for good. When should you call for help? Call 911 anytime you think you may need emergency care. This may mean having symptoms that suggest that your blood pressure is causing a serious heart or blood vessel problem. Your blood pressure may be over 180/110. For example, call 911 if: 
· You have symptoms of a heart attack. These may include: ¨ Chest pain or pressure, or a strange feeling in the chest. 
¨ Sweating. ¨ Shortness of breath. ¨ Nausea or vomiting. ¨ Pain, pressure, or a strange feeling in the back, neck, jaw, or upper belly or in one or both shoulders or arms. ¨ Lightheadedness or sudden weakness. ¨ A fast or irregular heartbeat. · You have symptoms of a stroke. These may include: 
¨ Sudden numbness, tingling, weakness, or loss of movement in your face, arm, or leg, especially on only one side of your body. ¨ Sudden vision changes. ¨ Sudden trouble speaking. ¨ Sudden confusion or trouble understanding simple statements. ¨ Sudden problems with walking or balance. ¨ A sudden, severe headache that is different from past headaches. · You have severe back or belly pain. Do not wait until your blood pressure comes down on its own. Get help right away. Call your doctor now or seek immediate care if: 
· Your blood pressure is much higher than normal (such as 180/110 or higher), but you don't have symptoms. · You think high blood pressure is causing symptoms, such as: ¨ Severe headache. ¨ Blurry vision. Watch closely for changes in your health, and be sure to contact your doctor if: 
· Your blood pressure measures 140/90 or higher at least 2 times. That means the top number is 140 or higher or the bottom number is 90 or higher, or both. · You think you may be having side effects from your blood pressure medicine. · Your blood pressure is usually normal, but it goes above normal at least 2 times. Where can you learn more? Go to http://jerry-shereen.info/. Enter W685 in the search box to learn more about \"High Blood Pressure: Care Instructions. \" Current as of: August 8, 2016 Content Version: 11.2 © 3014-9473 [employer]iii[employer]. Care instructions adapted under license by [employer] (which disclaims liability or warranty for this information). If you have questions about a medical condition or this instruction, always ask your healthcare professional. [address] any warranty or liability for your use of this information. Introducing [address]! Dear Sj Ba: Thank you for requesting a [employer] account. Our records indicate that you already have an active [employer] account. You can access your account anytime at [email or web address] Did you know that you can access your hospital and ER discharge instructions at any time in Jotky? You can also review all of your test results from your hospital stay or ER visit. Additional Information If you have questions, please visit the Frequently Asked Questions section of the [employer] website at [email or web address] Remember, [employer]otky is NOT to be used for urgent needs. For medical emergencies, dial 911. Now available from your iPhone and Android! Please provide this summary of care documentation to your next provider. Your primary care clinician is listed as Ulysses Gaskins. If you have any questions after today's visit, please call [phone].

## 2017-04-10 NOTE — PROGRESS NOTES
1. Have you been to the ER, urgent care clinic since your last visit? Hospitalized since your last visit? No    2. Have you seen or consulted any other health care providers outside of the [address] [place] [address] since your last visit? Include any pap smears or colon screening. No     3. Since your last visit, have you had any of the following symptoms? None     4. Have you had any blood work, X-rays or cardiac testing? Lipid and BMP   3/30/17 from PCP / patient has a copy       Requested: NO     In [facility]: NO    5. Where do you normally have your labs drawn? PCP     6. Do you need any refills today?  yes       Medications confirmed by patient verbally and by medication bottles      1/18/17  Echocardiogram   SUMMARY:  Left ventricle: Ejection fraction was estimated to be 65 %. There were no  regional wall motion abnormalities. There was severe concentric  hypertrophy. Features were consistent with a pseudonormal left ventricular  filling pattern, with concomitant abnormal relaxation and increased  filling pressure (grade 2 diastolic dysfunction). Right ventricle: The size was normal.    Left atrium: Size was normal.    Right atrium: Size was normal.    Mitral valve: Normal valve structure. Aortic valve: The valve was trileaflet. Leaflets exhibited sclerosis. Tricuspid valve: Normal valve structure. Pulmonic valve: Leaflets exhibited normal thickness, no calcification, and  normal cuspal separation. There was mild regurgitation. COMPARISONS:  There has been no significant change. Comparison was made with the  previous study of 18-May-2011.

## 2017-10-03 ENCOUNTER — HOSPITAL ENCOUNTER (OUTPATIENT)
Dept: VASCULAR SURGERY | Age: 72
Discharge: HOME | End: 2017-10-03
Attending: INTERNAL MEDICINE
Payer: MEDICARE

## 2017-10-03 DIAGNOSIS — I73.9 PERIPHERAL VASCULAR DISEASE (HCC): ICD-10-CM

## 2017-10-03 PROCEDURE — 93880 EXTRACRANIAL BILAT STUDY: CPT

## 2017-10-03 NOTE — PROCEDURES
[facility]  *** FINAL REPORT ***    Name: Shaw Cade  MRN: [id number]    Outpatient  : 12 Sep 1945  HIS Order #: [id number]  [id number] [facility] Visit #: [id number]  Date: 03 Oct 2017    TYPE OF TEST: Cerebrovascular Duplex    REASON FOR TEST    Right Carotid:-             Proximal               Mid                 Distal  cm/s  Systolic  Diastolic  Systolic  Diastolic  Systolic  Diastolic  CCA:    909.9      21.0       97.0      21.0       68.0      12.0  Bulb:  ECA:    125.0  ICA:     70.0      19.0       91.0      26.0       69.0      22.0  ICA/CCA:  0.8       1.2    ICA Stenosis: <50%    Right Vertebral:-  Finding: Antegrade  Sys:       64.0  Judith:       25.0    Right Subclavian: Normal    Left Carotid:-            Proximal                Mid                 Distal  cm/s  Systolic  Diastolic  Systolic  Diastolic  Systolic  Diastolic  CCA:    554.4      12.0      116.0      16.0       96.0      16.0  Bulb:  ECA:    112.0       0.0  ICA:     61.0      14.0       67.0      14.0       69.0      16.0  ICA/CCA:  0.6       1.0    ICA Stenosis: <50%    Left Vertebral:-  Finding: Antegrade  Sys:       69.0  Judith:       15.0    Left Subclavian: Normal    INTERPRETATION/FINDINGS  Duplex images were obtained using 2-D gray scale, color flow, and  spectral Doppler analysis. 1. Bilateral <50% stenosis of the internal carotid arteries. 2. No significant stenosis in the external carotid arteries  bilaterally. 3. Antegrade flow in both vertebral arteries. 4. Normal flow in both subclavian arteries. Plaque Morphology:  1. Hyperechoic plaque in the bulb and right ICA. 2. Hyperechoic plaque in the bulb and left ICA. ADDITIONAL COMMENTS    I have personally reviewed the data relevant to the interpretation of  this  study. TECHNOLOGIST: Angeline Braswell, [facility], RVT/  Signed: 10/03/2017 09:15 AM    PHYSICIAN: Nancy Avalos D.O.   Signed: 10/03/2017 01:12 PM

## 2017-10-03 NOTE — MR AVS SNAPSHOT
Visit Information Date & Time Provider Department Dept. Phone Encounter #  
 10/3/2017  9:00 AM [facility] 1 [facility] [phone] [id number] Your Appointments 11/6/2017 11:15 AM  
ESTABLISHED PATIENT with Key Georges MD  
[facility]) Appt Note: 6 months; 6 months [address] [facility], [address] [place] [id number], [address] [place], [place] [address] [place] Upcoming Health Maintenance Date Due Hepatitis C Screening 1945 FOOT EXAM Q1 9/12/1956 EYE EXAM RETINAL OR DILATED Q1 9/12/1956 DTaP/Tdap/Td series (1 - Tdap) 9/12/1967 FOBT Q 1 YEAR AGE 50-75 9/12/1996 ZOSTER VACCINE AGE 60> 7/12/2006 MICROALBUMIN Q1 1/12/2011 HEMOGLOBIN A1C Q6M 3/29/2011 GLAUCOMA SCREENING Q2Y 9/12/2011 Pneumococcal 65+ Low/Medium Risk (1 of 2 - PCV13) 9/12/2011 MEDICARE YEARLY EXAM 9/12/2011 LIPID PANEL Q1 10/12/2011 INFLUENZA AGE 9 TO ADULT 8/1/2017 Allergies as of 10/3/2017  Review Complete On: 4/10/2017 By: Key Georges MD  
 No Known Allergies Current Immunizations  Never Reviewed No immunizations on file. Not reviewed this visit Vitals Smoking Status Current Every Day Smoker Your Updated Medication List  
  
   
This list is accurate as of: 10/3/17  8:05 AM.  Always use your most recent med list.  
  
  
  
  
 aspirin 81 mg tablet Take 81 mg by mouth daily. COREG 12.5 mg tablet Generic drug:  carvedilol Take 25 mg by mouth two (2) times daily (with meals). dutasteride 0.5 mg capsule Commonly known as:  AVODART Take 0.5 mg by mouth daily. EDARBYCLOR 40-25 mg per tablet Generic drug:  azilsartan med-chlorthalidone Take  by mouth daily. glimepiride 4 mg tablet Commonly known as:  AMARYL Take  by mouth every morning. hydrALAZINE 100 mg tablet Commonly known as:  APRESOLINE Take 100 mg by mouth two (2) times a day. LANTUS SOLOSTAR 100 unit/mL (3 mL) Inpn Generic drug:  insulin glargine 60 Units by SubCUTAneous route daily. NexIUM 40 mg capsule Generic drug:  esomeprazole Take 40 mg by mouth as needed. nitroglycerin 0.4 mg SL tablet Commonly known as:  NITROSTAT  
1 Tab by SubLINGual route every five (5) minutes as needed for Chest Pain.  
  
 pantoprazole 40 mg tablet Commonly known as:  PROTONIX Take 40 mg by mouth daily. simvastatin 40 mg tablet Commonly known as:  ZOCOR Take 1 Tab by mouth nightly. TRADJENTA 5 mg tablet Generic drug:  linagliptin Take 5 mg by mouth daily. VITAMIN D3 2,000 unit Tab Generic drug:  cholecalciferol (vitamin D3) Take  by mouth. To-Do List   
 10/03/2017 9:00 AM  
  Appointment with [facility] 1 at [facility] ([phone]) No preparation is required for this study. Patient can have their meals and take their medications. Patient should not wear a turtleneck or high neck shirt for this study. Please report to the main location @ [address] [place] [address] approximately 30 minutes prior to your appointment time. The entrance is located on the RIGHT side of the street, immediately adjacent to the Emergency Room. Introducing [facility] & HEALTH SERVICES! Dear Kan Triplett: Thank you for requesting a [employer] account. Our records indicate that you already have an active [employer] account. You can access your account anytime at https://[employer]) Did you know that you can access your hospital and ER discharge instructions at any time in [employer]? You can also review all of your test results from your hospital stay or ER visit. Additional Information If you have questions, please visit the Frequently Asked Questions section of the [employer] website at https://[employer])/. Remember, MyChart is NOT to be used for urgent needs. For medical emergencies, dial 911. Now available from your iPhone and Android! Please provide this summary of care documentation to your next provider. Your primary care clinician is listed as Ralph Self. If you have any questions after today's visit, please call [phone].

## 2017-11-06 ENCOUNTER — OFFICE VISIT (OUTPATIENT)
Dept: CARDIOLOGY CLINIC | Age: 72
End: 2017-11-06

## 2017-11-06 VITALS
HEART RATE: 68 BPM | SYSTOLIC BLOOD PRESSURE: 124 MMHG | DIASTOLIC BLOOD PRESSURE: 53 MMHG | HEIGHT: 67 IN | BODY MASS INDEX: 33.74 KG/M2 | WEIGHT: 215 LBS

## 2017-11-06 DIAGNOSIS — I25.5 ISCHEMIC CARDIOMYOPATHY: ICD-10-CM

## 2017-11-06 DIAGNOSIS — F17.209 TOBACCO USE DISORDER, CONTINUOUS: ICD-10-CM

## 2017-11-06 DIAGNOSIS — I25.10 CORONARY ARTERY DISEASE INVOLVING NATIVE CORONARY ARTERY OF NATIVE HEART WITHOUT ANGINA PECTORIS: Primary | ICD-10-CM

## 2017-11-06 DIAGNOSIS — I10 ESSENTIAL HYPERTENSION: ICD-10-CM

## 2017-11-06 DIAGNOSIS — E78.5 DYSLIPIDEMIA: ICD-10-CM

## 2017-11-06 DIAGNOSIS — E66.09 CLASS 1 OBESITY DUE TO EXCESS CALORIES WITH SERIOUS COMORBIDITY AND BODY MASS INDEX (BMI) OF 33.0 TO 33.9 IN ADULT: ICD-10-CM

## 2017-11-06 RX ORDER — AMLODIPINE BESYLATE 5 MG/1
5 TABLET ORAL DAILY
COMMUNITY
End: 2019-11-18 | Stop reason: SDUPTHER

## 2017-11-06 RX ORDER — NITROGLYCERIN 0.4 MG/1
0.4 TABLET SUBLINGUAL
Qty: 25 TAB | Refills: 0 | Status: ON HOLD | OUTPATIENT
Start: 2017-11-06 | End: 2021-09-10

## 2017-11-06 NOTE — LETTER
Lj Tabor. 
1945 11/6/2017 Dear Tal Dowell MD 
 
I had the pleasure of evaluating  Mr. Nette Sierra in office today. Below are the relevant portions of my assessment and plan of care. ICD-10-CM ICD-9-CM 1. Coronary artery disease involving native coronary artery of native heart without angina pectoris I25.10 414.01 nitroglycerin (NITROSTAT) 0.4 mg SL tablet 2. Ischemic cardiomyopathy I25.5 414.8   
 3/17 65%EF; recovery of LV syst fct  Echo May 2011 LV EF 65% 3. Essential hypertension I10 401.9 amLODIPine (NORVASC) 5 mg tablet 11/17 controlled;   
4. Dyslipidemia E78.5 272.4   
 3/17 LDL31;   
5. Tobacco use disorder, continuous F17.209 305.1 MO SMOKING AND TOBACCO USE CESSATION 3 - 10 MINUTES Patient advised to stop smoking to reduce future cardiovascular events. 6. Class 1 obesity due to excess calories with serious comorbidity and body mass index (BMI) of 33.0 to 33.9 in adult E66.09 278.00   
 Z68.33 V85.33 Weight loss has been strongly encouraged by following dietary restrictions and an exercise routine. Current Outpatient Prescriptions Medication Sig Dispense Refill  amLODIPine (NORVASC) 5 mg tablet Take 5 mg by mouth daily.  nitroglycerin (NITROSTAT) 0.4 mg SL tablet 1 Tab by SubLINGual route every five (5) minutes as needed for Chest Pain for up to 3 doses. Indications: Angina 25 Tab 0  
 glimepiride (AMARYL) 4 mg tablet Take 4 mg by mouth every morning.  azilsartan med-chlorthalidone (EDARBYCLOR) 40-25 mg per tablet Take 1 Tab by mouth daily.  cholecalciferol, vitamin D3, (VITAMIN D3) 2,000 unit tab Take  by mouth.  linagliptin (TRADJENTA) 5 mg tablet Take 5 mg by mouth daily.  hydrALAZINE (APRESOLINE) 100 mg tablet Take 100 mg by mouth two (2) times a day.  esomeprazole (NEXIUM) 40 mg capsule Take 40 mg by mouth as needed.     
 carvedilol (COREG) 12.5 mg tablet Take 25 mg by mouth two (2) times daily (with meals).  simvastatin (ZOCOR) 40 mg tablet Take 1 Tab by mouth nightly. 30 Tab 11  
 pantoprazole (PROTONIX) 40 mg tablet Take 40 mg by mouth daily.  insulin glargine (LANTUS SOLOSTAR) 100 unit/mL (3 mL) pen 60 Units by SubCUTAneous route daily.  aspirin 81 mg tablet Take 81 mg by mouth daily.  dutasteride (AVODART) 0.5 mg capsule Take 0.5 mg by mouth daily. Orders Placed This Encounter  MA SMOKING AND TOBACCO USE CESSATION 3 - 10 MINUTES  amLODIPine (NORVASC) 5 mg tablet Sig: Take 5 mg by mouth daily.  nitroglycerin (NITROSTAT) 0.4 mg SL tablet Si Tab by SubLINGual route every five (5) minutes as needed for Chest Pain for up to 3 doses. Indications: Angina Dispense:  25 Tab Refill:  0 If you have questions, please do not hesitate to call me. I look forward to following Mr. Asencion Nageotte along with you. Sincerely, Jenni Syed MD

## 2017-11-06 NOTE — PATIENT INSTRUCTIONS
Medications Discontinued During This Encounter   Medication Reason    nitroglycerin (NITROSTAT) 0.4 mg SL tablet Not A Current Medication       Orders Placed This Encounter    DE SMOKING AND TOBACCO USE CESSATION 3 - 10 MINUTES    nitroglycerin (NITROSTAT) 0.4 mg SL tablet     Si Tab by SubLINGual route every five (5) minutes as needed for Chest Pain for up to 3 doses. Indications: Angina     Dispense:  25 Tab     Refill:  0          Body Mass Index: Care Instructions  Your Care Instructions    Body mass index (BMI) can help you see if your weight is raising your risk for health problems. It uses a formula to compare how much you weigh with how tall you are. · A BMI lower than 18.5 is considered underweight. · A BMI between 18.5 and 24.9 is considered healthy. · A BMI between 25 and 29.9 is considered overweight. A BMI of 30 or higher is considered obese. If your BMI is in the normal range, it means that you have a lower risk for weight-related health problems. If your BMI is in the overweight or obese range, you may be at increased risk for weight-related health problems, such as high blood pressure, heart disease, stroke, arthritis or joint pain, and diabetes. If your BMI is in the underweight range, you may be at increased risk for health problems such as fatigue, lower protection (immunity) against illness, muscle loss, bone loss, hair loss, and hormone problems. BMI is just one measure of your risk for weight-related health problems. You may be at higher risk for health problems if you are not active, you eat an unhealthy diet, or you drink too much alcohol or use tobacco products. Follow-up care is a key part of your treatment and safety. Be sure to make and go to all appointments, and call your doctor if you are having problems. It's also a good idea to know your test results and keep a list of the medicines you take. How can you care for yourself at home? · Practice healthy eating habits.  This includes eating plenty of fruits, vegetables, whole grains, lean protein, and low-fat dairy. · If your doctor recommends it, get more exercise. Walking is a good choice. Bit by bit, increase the amount you walk every day. Try for at least 30 minutes on most days of the week. · Do not smoke. Smoking can increase your risk for health problems. If you need help quitting, talk to your doctor about stop-smoking programs and medicines. These can increase your chances of quitting for good. · Limit alcohol to 2 drinks a day for men and 1 drink a day for women. Too much alcohol can cause health problems. If you have a BMI higher than 25  · Your doctor may do other tests to check your risk for weight-related health problems. This may include measuring the distance around your waist. A waist measurement of more than 40 inches in men or 35 inches in women can increase the risk of weight-related health problems. · Talk with your doctor about steps you can take to stay healthy or improve your health. You may need to make lifestyle changes to lose weight and stay healthy, such as changing your diet and getting regular exercise. If you have a BMI lower than 18.5  · Your doctor may do other tests to check your risk for health problems. · Talk with your doctor about steps you can take to stay healthy or improve your health. You may need to make lifestyle changes to gain or maintain weight and stay healthy, such as getting more healthy foods in your diet and doing exercises to build muscle. Where can you learn more? Go to http://jerry-shereen.info/. Enter S176 in the search box to learn more about \"Body Mass Index: Care Instructions. \"  Current as of: October 13, 2016  Content Version: 11.4  © 7641-9633 [employer]. Care instructions adapted under license by [employer] (which disclaims liability or warranty for this information).  If you have questions about a medical condition or this instruction, always ask your healthcare professional. [place] [address] any warranty or liability for your use of this information.

## 2017-11-06 NOTE — PROGRESS NOTES
HISTORY OF PRESENT ILLNESS  Ova Credit. is a 71 y.o. male. HPI Comments: 4/17 improving blood pressure since meds adjusted by Dr. John Downing;    1/17 seen for establishing/changing cardiologist   history of coronary disease, status post PCI(2010) and hypertension, along with conduction system disease    Hypertension   The history is provided by the medical records and patient. This is a chronic problem. Associated symptoms include shortness of breath. Pertinent negatives include no chest pain and no headaches. Cardiomyopathy   The history is provided by the medical records (ischemic). This is a chronic problem. Associated symptoms include shortness of breath. Pertinent negatives include no chest pain and no headaches. Cholesterol Problem   The history is provided by the medical records. This is a chronic problem. Associated symptoms include shortness of breath. Pertinent negatives include no chest pain and no headaches. Shortness of Breath   The history is provided by the patient. This is a recurrent problem. The problem occurs intermittently. The current episode started more than 1 week ago. The problem has not changed since onset. Associated symptoms include leg swelling. Pertinent negatives include no fever, no headaches, no cough, no wheezing, no PND, no orthopnea, no chest pain, no vomiting, no rash and no claudication. The problem's precipitants include exercise (10-15 mt walk). Leg Swelling   The history is provided by the patient. This is a recurrent problem. The current episode started more than 1 week ago (6/16). The problem occurs every several days. The problem has been gradually improving. Associated symptoms include shortness of breath. Pertinent negatives include no chest pain and no headaches. The symptoms are aggravated by standing. The symptoms are relieved by sleep. Review of Systems   Constitutional: Negative for chills, fever, malaise/fatigue and weight loss.    HENT: Negative for nosebleeds. Eyes: Negative for discharge. Respiratory: Positive for shortness of breath. Negative for cough and wheezing. Cardiovascular: Positive for leg swelling. Negative for chest pain, palpitations, orthopnea, claudication and PND. Gastrointestinal: Negative for diarrhea, nausea and vomiting. Genitourinary: Negative for dysuria and hematuria. Musculoskeletal: Negative for joint pain. Skin: Negative for rash. Neurological: Negative for dizziness, seizures, loss of consciousness and headaches. Endo/Heme/Allergies: Negative for polydipsia. Does not bruise/bleed easily. Psychiatric/Behavioral: Negative for depression and substance abuse. The patient does not have insomnia. No Known Allergies    Past Medical History:   Diagnosis Date    ACS (acute coronary syndrome) ([facility] [address]     CAD (coronary artery disease), native coronary artery August 2010    s/p non-ST-elevation myocardial infarction, s/p PCI with BMS (Vision 4x18mm) distal RCA with 45% distal LAD  and mild LCx disease. mild-moderate LV systolic dysfunction (04/18).  Diabetes mellitus type II     Dyslipidemia     ED (erectile dysfunction)     GERD (gastroesophageal reflux disease)     History of BPH     History of echocardiogram 5/18/2011    EF 65%. Mod-marked increased wall thickness. Gr 1 DDfx. No significant valvular heart disease.  Hyperlipidemia     Hypertension     Ischemic cardiomyopathy     recovery of LV syst fct  Echo May 2011 LV EF 65%    MI (myocardial infarction)     Obesity     RBBB (right bundle branch block with left anterior fascicular block)     S/P cardiac cath 8/26/2010    LM patent. dLAD 45%. CX mild. dRCA 100% thrombotic (S/P cath thrombectomy & Vision 4 x 18-mm BMS). EF 40%. Posterobasal, diaphrag, apical hypk.       Shingles 4/2012    Tobacco use disorder, continuous        Family History   Problem Relation Age of Onset    Diabetes Mother        Social History   Substance Use Topics    Smoking status: Current Every Day Smoker     Packs/day: 0.15     Years: 44.00    Smokeless tobacco: Never Used      Comment: 0-2 cigs per day     Alcohol use No        Current Outpatient Prescriptions   Medication Sig    amLODIPine (NORVASC) 5 mg tablet Take 5 mg by mouth daily.  glimepiride (AMARYL) 4 mg tablet Take 4 mg by mouth every morning.  azilsartan med-chlorthalidone (EDARBYCLOR) 40-25 mg per tablet Take 1 Tab by mouth daily.  cholecalciferol, vitamin D3, (VITAMIN D3) 2,000 unit tab Take  by mouth.  linagliptin (TRADJENTA) 5 mg tablet Take 5 mg by mouth daily.  hydrALAZINE (APRESOLINE) 100 mg tablet Take 100 mg by mouth two (2) times a day.  esomeprazole (NEXIUM) 40 mg capsule Take 40 mg by mouth as needed.  carvedilol (COREG) 12.5 mg tablet Take 25 mg by mouth two (2) times daily (with meals).  simvastatin (ZOCOR) 40 mg tablet Take 1 Tab by mouth nightly.  pantoprazole (PROTONIX) 40 mg tablet Take 40 mg by mouth daily.  insulin glargine (LANTUS SOLOSTAR) 100 unit/mL (3 mL) pen 60 Units by SubCUTAneous route daily.  aspirin 81 mg tablet Take 81 mg by mouth daily.  dutasteride (AVODART) 0.5 mg capsule Take 0.5 mg by mouth daily. No current facility-administered medications for this visit. Past Surgical History:   Procedure Laterality Date    HX CATARACT REMOVAL      HX CORONARY ARTERY BYPASS GRAFT      HX CORONARY STENT PLACEMENT  2010    HX HEART CATHETERIZATION  08/26/10    1. Normal systemic pressure. 2. Moderate elevation of left sided filling pressures. 3. Mild to moderate left ventricular systolic dysfunction distinct regional wall motion abnormalities. 4. Coronary disease with thrombotic occlusion of the distal right coronary artery and moderate left anterior descending disease. 5. Successful percutaneous coronary intervention with catheter thrombectomy.     HX HEMORRHOIDECTOMY      HX MOHS PROCEDURES Left 2002    HX TRABECULECTOMY Diagnostic Studies: Old records reviewed and show as follows  EKG tracings reviewed by me today. No flowsheet data found. 3/17 Nuc Stress  Conclusion:   1. Nondiagnostic EKG changes of ischemia due to abnormal baseline EKG at 89% of predicted maximal heart rate. 2. Normal functional capacity. 3. Severely hypertensive blood pressure response and appropriate heart rate response. 4. No ischemic symptoms or arrhythmias seen. 5. Perfusion image report to follow. Conclusion:   1. Normal perfusion scan. 2. Normal wall motion and ejection fraction. 3. Low risk scan. 3/17 ECHO  SUMMARY:  Left ventricle: Ejection fraction was estimated to be 65 %. There were no  regional wall motion abnormalities. There was severe concentric  hypertrophy. Features were consistent with a pseudonormal left ventricular  filling pattern, with concomitant abnormal relaxation and increased  filling pressure (grade 2 diastolic dysfunction). Visit Vitals    /53 (BP 1 Location: Left arm, BP Patient Position: Sitting)    Pulse 68    Ht 5' 7\" (1.702 m)    Wt 97.5 kg (215 lb)    BMI 33.67 kg/m2       Mr. Hellen Johnson has a reminder for a \"due or due soon\" health maintenance. I have asked that he contact his primary care provider for follow-up on this health maintenance. 9/10 Card Cath  IMPRESSION  1. Normal systemic pressure. 2. Moderate elevation of left-sided filling pressures. 3. Mild to moderate left ventricular systolic dysfunction with distinct  regional wall motion abnormalities. 4. Coronary disease as described above with a thrombotic occlusion of the  distal right coronary artery and moderate distal left anterior descending  disease. 5. Successful percutaneous coronary intervention with catheter  thrombectomy, followed by bare metal stent deployment with a Vision 4 x 18  mm stent in the distal right coronary artery. Physical Exam   Constitutional: He is oriented to person, place, and time.  He appears well-developed and well-nourished. No distress. Obese   HENT:   Head: Normocephalic and atraumatic. Mouth/Throat: Normal dentition. Eyes: Right eye exhibits no discharge. Left eye exhibits no discharge. No scleral icterus. Neck: Neck supple. No JVD present. Carotid bruit is not present. No thyromegaly present. Cardiovascular: Normal rate, regular rhythm, S1 normal, S2 normal, normal heart sounds and intact distal pulses. Exam reveals decreased pulses. Exam reveals no gallop and no friction rub. No murmur heard. Pulmonary/Chest: Effort normal and breath sounds normal. He has no wheezes. He has no rales. Abdominal: Soft. He exhibits no mass. There is no tenderness. Musculoskeletal: He exhibits no edema. Lymphadenopathy:        Right cervical: No superficial cervical adenopathy present. Left cervical: No superficial cervical adenopathy present. Neurological: He is alert and oriented to person, place, and time. Skin: Skin is warm and dry. No rash noted. Psychiatric: He has a normal mood and affect. His behavior is normal.       ASSESSMENT and PLAN          Diagnoses and all orders for this visit:    1. Coronary artery disease involving native coronary artery of native heart without angina pectoris  -     nitroglycerin (NITROSTAT) 0.4 mg SL tablet; 1 Tab by SubLINGual route every five (5) minutes as needed for Chest Pain for up to 3 doses. Indications: Angina    2. Ischemic cardiomyopathy  Comments:  3/17 65%EF; recovery of LV syst fct  Echo May 2011 LV EF 65%      3. Essential hypertension  Comments:  11/17 controlled;     4. Dyslipidemia  Comments:  3/17 LDL31;     5. Tobacco use disorder, continuous  Comments:  Patient advised to stop smoking to reduce future cardiovascular events.     Orders:  -     RI SMOKING AND TOBACCO USE CESSATION 3 - 10 MINUTES    6. Class 1 obesity due to excess calories with serious comorbidity and body mass index (BMI) of 33.0 to 33.9 in adult  Comments:  Weight loss has been strongly encouraged by following dietary restrictions and an exercise routine. Pertinent laboratory and test data reviewed and discussed with patient. See patient instructions also for other medical advice given    Medications Discontinued During This Encounter   Medication Reason    nitroglycerin (NITROSTAT) 0.4 mg SL tablet Not A Current Medication       Follow-up Disposition:  Return in about 1 year (around 11/6/2018), or if symptoms worsen or fail to improve.

## 2017-11-06 NOTE — PROGRESS NOTES
1. Have you been to the ER, urgent care clinic since your last visit? Hospitalized since your last visit? No     2. Have you seen or consulted any other health care providers outside of the [address] [place] [address] since your last visit? Include any pap smears or colon screening. Yes pcp     3. Since your last visit, have you had any of the following symptoms? No symptoms reported by patient    4. Have you had any blood work, X-rays or cardiac testing? None reported     5. Where do you normally have your labs drawn? PCP     6. Do you need any refills today?   No

## 2017-11-06 NOTE — MR AVS SNAPSHOT
Visit Information Date & Time Provider Department Dept. Phone Encounter #  
 11/6/2017 11:15 AM Lula Carter MD [facility] [address] [place] [address] [id number] Follow-up Instructions Return in about 1 year (around 11/6/2018), or if symptoms worsen or fail to improve. Upcoming Health Maintenance Date Due Hepatitis C Screening 1945 FOOT EXAM Q1 9/12/1956 EYE EXAM RETINAL OR DILATED Q1 9/12/1956 DTaP/Tdap/Td series (1 - Tdap) 9/12/1967 FOBT Q 1 YEAR AGE 50-75 9/12/1996 ZOSTER VACCINE AGE 60> 7/12/2006 MICROALBUMIN Q1 1/12/2011 HEMOGLOBIN A1C Q6M 3/29/2011 GLAUCOMA SCREENING Q2Y 9/12/2011 Pneumococcal 65+ Low/Medium Risk (1 of 2 - PCV13) 9/12/2011 MEDICARE YEARLY EXAM 9/12/2011 LIPID PANEL Q1 10/12/2011 INFLUENZA AGE 9 TO ADULT 8/1/2017 Allergies as of 11/6/2017  Review Complete On: 11/6/2017 By: Lula Carter MD  
 No Known Allergies Current Immunizations  Never Reviewed No immunizations on file. Not reviewed this visit You Were Diagnosed With   
  
 Codes Comments Coronary artery disease involving native coronary artery of native heart without angina pectoris    -  Primary ICD-10-CM: I25.10 ICD-9-CM: 414.01 Ischemic cardiomyopathy     ICD-10-CM: I25.5 ICD-9-CM: 414.8 3/17 65%EF; recovery of LV syst fct  Echo May 2011 LV EF 65% Essential hypertension     ICD-10-CM: I10 
ICD-9-CM: 401.9 11/17 controlled; Dyslipidemia     ICD-10-CM: E78.5 ICD-9-CM: 272.4 3/17 LDL31;   
 Tobacco use disorder, continuous     ICD-10-CM: F17.209 ICD-9-CM: 305.1 Patient advised to stop smoking to reduce future cardiovascular events. Class 1 obesity due to excess calories with serious comorbidity and body mass index (BMI) of 33.0 to 33.9 in adult     ICD-10-CM: E66.09, Z68.33 
ICD-9-CM: 278.00, V85.33 Weight loss has been strongly encouraged by following dietary restrictions and an exercise routine. Vitals BP Pulse Height(growth percentile) Weight(growth percentile) BMI Smoking Status 124/53 (BP 1 Location: Left arm, BP Patient Position: Sitting) 68 5' 7\" (1.702 m) 215 lb (97.5 kg) 33.67 kg/m2 Current Every Day Smoker Vitals History BMI and BSA Data Body Mass Index Body Surface Area  
 33.67 kg/m 2 2.15 m 2 Preferred Pharmacy Pharmacy Name Phone [id number]. [facility], [address] [facility] [phone] Your Updated Medication List  
  
   
This list is accurate as of: 11/6/17 12:13 PM.  Always use your most recent med list. amLODIPine 5 mg tablet Commonly known as:  Almonte Mullet Take 5 mg by mouth daily. aspirin 81 mg tablet Take 81 mg by mouth daily. COREG 12.5 mg tablet Generic drug:  carvedilol Take 25 mg by mouth two (2) times daily (with meals). dutasteride 0.5 mg capsule Commonly known as:  AVODART Take 0.5 mg by mouth daily. EDARBYCLOR 40-25 mg per tablet Generic drug:  azilsartan med-chlorthalidone Take 1 Tab by mouth daily. glimepiride 4 mg tablet Commonly known as:  AMARYL Take 4 mg by mouth every morning. hydrALAZINE 100 mg tablet Commonly known as:  APRESOLINE Take 100 mg by mouth two (2) times a day. LANTUS SOLOSTAR 100 unit/mL (3 mL) Inpn Generic drug:  insulin glargine 60 Units by SubCUTAneous route daily. NexIUM 40 mg capsule Generic drug:  esomeprazole Take 40 mg by mouth as needed. nitroglycerin 0.4 mg SL tablet Commonly known as:  NITROSTAT  
1 Tab by SubLINGual route every five (5) minutes as needed for Chest Pain for up to 3 doses. Indications: Angina  
  
 pantoprazole 40 mg tablet Commonly known as:  PROTONIX Take 40 mg by mouth daily. simvastatin 40 mg tablet Commonly known as:  ZOCOR Take 1 Tab by mouth nightly. TRADJENTA 5 mg tablet Generic drug:  linagliptin Take 5 mg by mouth daily. VITAMIN D3 2,000 unit Tab Generic drug:  cholecalciferol (vitamin D3) Take  by mouth. Prescriptions Sent to Pharmacy Refills  
 nitroglycerin (NITROSTAT) 0.4 mg SL tablet 0 Si Tab by SubLINGual route every five (5) minutes as needed for Chest Pain for up to 3 doses. Indications: Angina Class: Normal  
 Pharmacy: [facility] [place], [address] [facility] #: [phone] Route: SubLINGual  
  
We Performed the Following ID SMOKING AND TOBACCO USE CESSATION 3 - 10 MINUTES [50331 CPT(R)] Follow-up Instructions Return in about 1 year (around 2018), or if symptoms worsen or fail to improve. Patient Instructions Medications Discontinued During This Encounter Medication Reason  nitroglycerin (NITROSTAT) 0.4 mg SL tablet Not A Current Medication Orders Placed This Encounter  ID SMOKING AND TOBACCO USE CESSATION 3 - 10 MINUTES  nitroglycerin (NITROSTAT) 0.4 mg SL tablet Si Tab by SubLINGual route every five (5) minutes as needed for Chest Pain for up to 3 doses. Indications: Angina Dispense:  25 Tab Refill:  0 Body Mass Index: Care Instructions Your Care Instructions Body mass index (BMI) can help you see if your weight is raising your risk for health problems. It uses a formula to compare how much you weigh with how tall you are. · A BMI lower than 18.5 is considered underweight. · A BMI between 18.5 and 24.9 is considered healthy. · A BMI between 25 and 29.9 is considered overweight. A BMI of 30 or higher is considered obese. If your BMI is in the normal range, it means that you have a lower risk for weight-related health problems. If your BMI is in the overweight or obese range, you may be at increased risk for weight-related health problems, such as high blood pressure, heart disease, stroke, arthritis or joint pain, and diabetes.  If your BMI is in the underweight range, you may be at increased risk for health problems such as fatigue, lower protection (immunity) against illness, muscle loss, bone loss, hair loss, and hormone problems. BMI is just one measure of your risk for weight-related health problems. You may be at higher risk for health problems if you are not active, you eat an unhealthy diet, or you drink too much alcohol or use tobacco products. Follow-up care is a key part of your treatment and safety. Be sure to make and go to all appointments, and call your doctor if you are having problems. It's also a good idea to know your test results and keep a list of the medicines you take. How can you care for yourself at home? · Practice healthy eating habits. This includes eating plenty of fruits, vegetables, whole grains, lean protein, and low-fat dairy. · If your doctor recommends it, get more exercise. Walking is a good choice. Bit by bit, increase the amount you walk every day. Try for at least 30 minutes on most days of the week. · Do not smoke. Smoking can increase your risk for health problems. If you need help quitting, talk to your doctor about stop-smoking programs and medicines. These can increase your chances of quitting for good. · Limit alcohol to 2 drinks a day for men and 1 drink a day for women. Too much alcohol can cause health problems. If you have a BMI higher than 25 · Your doctor may do other tests to check your risk for weight-related health problems. This may include measuring the distance around your waist. A waist measurement of more than 40 inches in men or 35 inches in women can increase the risk of weight-related health problems. · Talk with your doctor about steps you can take to stay healthy or improve your health. You may need to make lifestyle changes to lose weight and stay healthy, such as changing your diet and getting regular exercise. If you have a BMI lower than 18.5 · Your doctor may do other tests to check your risk for health problems. · Talk with your doctor about steps you can take to stay healthy or improve your health. You may need to make lifestyle changes to gain or maintain weight and stay healthy, such as getting more healthy foods in your diet and doing exercises to build muscle. Where can you learn more? Go to http://jerry-shereen.info/. Enter S176 in the search box to learn more about \"Body Mass Index: Care Instructions. \" Current as of: October 13, 2016 Content Version: 11.4 © 6250-3963 [employer]. Care instructions adapted under license by [employer] (which disclaims liability or warranty for this information). If you have questions about a medical condition or this instruction, always ask your healthcare professional. [address] any warranty or liability for your use of this information. Introducing hospitals & HEALTH SERVICES! Dear Edgardo Gamboa: Thank you for requesting a [employer] account. Our records indicate that you already have an active [employer] account. You can access your account anytime at [email or web address] Did you know that you can access your hospital and ER discharge instructions at any time in [employer]? You can also review all of your test results from your hospital stay or ER visit. Additional Information If you have questions, please visit the Frequently Asked Questions section of the [employer] website at [email or web address] Remember, [employer] is NOT to be used for urgent needs. For medical emergencies, dial 911. Now available from your iPhone and Android! Please provide this summary of care documentation to your next provider. Your primary care clinician is listed as Jakob Arguello. If you have any questions after today's visit, please call [phone].

## 2018-11-19 ENCOUNTER — OFFICE VISIT (OUTPATIENT)
Dept: CARDIOLOGY CLINIC | Age: 73
End: 2018-11-19

## 2018-11-19 VITALS
WEIGHT: 200 LBS | BODY MASS INDEX: 31.39 KG/M2 | HEART RATE: 69 BPM | SYSTOLIC BLOOD PRESSURE: 124 MMHG | HEIGHT: 67 IN | DIASTOLIC BLOOD PRESSURE: 58 MMHG

## 2018-11-19 DIAGNOSIS — E66.9 OBESITY (BMI 30.0-34.9): ICD-10-CM

## 2018-11-19 DIAGNOSIS — E78.5 DYSLIPIDEMIA: ICD-10-CM

## 2018-11-19 DIAGNOSIS — I25.10 CORONARY ARTERY DISEASE INVOLVING NATIVE CORONARY ARTERY OF NATIVE HEART WITHOUT ANGINA PECTORIS: ICD-10-CM

## 2018-11-19 DIAGNOSIS — F17.209 TOBACCO USE DISORDER, CONTINUOUS: ICD-10-CM

## 2018-11-19 DIAGNOSIS — I10 HYPERTENSION, UNSPECIFIED TYPE: Primary | ICD-10-CM

## 2018-11-19 DIAGNOSIS — I25.5 ISCHEMIC CARDIOMYOPATHY: ICD-10-CM

## 2018-11-19 NOTE — PATIENT INSTRUCTIONS
There are no discontinued medications. Body Mass Index: Care Instructions Your Care Instructions Body mass index (BMI) can help you see if your weight is raising your risk for health problems. It uses a formula to compare how much you weigh with how tall you are. · A BMI lower than 18.5 is considered underweight. · A BMI between 18.5 and 24.9 is considered healthy. · A BMI between 25 and 29.9 is considered overweight. A BMI of 30 or higher is considered obese. If your BMI is in the normal range, it means that you have a lower risk for weight-related health problems. If your BMI is in the overweight or obese range, you may be at increased risk for weight-related health problems, such as high blood pressure, heart disease, stroke, arthritis or joint pain, and diabetes. If your BMI is in the underweight range, you may be at increased risk for health problems such as fatigue, lower protection (immunity) against illness, muscle loss, bone loss, hair loss, and hormone problems. BMI is just one measure of your risk for weight-related health problems. You may be at higher risk for health problems if you are not active, you eat an unhealthy diet, or you drink too much alcohol or use tobacco products. Follow-up care is a key part of your treatment and safety. Be sure to make and go to all appointments, and call your doctor if you are having problems. It's also a good idea to know your test results and keep a list of the medicines you take. How can you care for yourself at home? · Practice healthy eating habits. This includes eating plenty of fruits, vegetables, whole grains, lean protein, and low-fat dairy. · If your doctor recommends it, get more exercise. Walking is a good choice. Bit by bit, increase the amount you walk every day. Try for at least 30 minutes on most days of the week. · Do not smoke. Smoking can increase your risk for health problems.  If you need help quitting, talk to your doctor about stop-smoking programs and medicines. These can increase your chances of quitting for good. · Limit alcohol to 2 drinks a day for men and 1 drink a day for women. Too much alcohol can cause health problems. If you have a BMI higher than 25 · Your doctor may do other tests to check your risk for weight-related health problems. This may include measuring the distance around your waist. A waist measurement of more than 40 inches in men or 35 inches in women can increase the risk of weight-related health problems. · Talk with your doctor about steps you can take to stay healthy or improve your health. You may need to make lifestyle changes to lose weight and stay healthy, such as changing your diet and getting regular exercise. If you have a BMI lower than 18.5 · Your doctor may do other tests to check your risk for health problems. · Talk with your doctor about steps you can take to stay healthy or improve your health. You may need to make lifestyle changes to gain or maintain weight and stay healthy, such as getting more healthy foods in your diet and doing exercises to build muscle. Where can you learn more? Go to http://jerry-shereen.info/. Enter S176 in the search box to learn more about \"Body Mass Index: Care Instructions. \" Current as of: October 13, 2016 Content Version: 11.4 © 2030-2379 [employer]wise[employer]. Care instructions adapted under license by [employer] (which disclaims liability or warranty for this information). If you have questions about a medical condition or this instruction, always ask your healthcare professional. [place] [address] any warranty or liability for your use of this information. Learning About Benefits From Quitting Smoking How does quitting smoking make you healthier?  
 
If you're thinking about quitting smoking, you may have a few reasons to be smoke-free. Your health may be one of them. · When you quit smoking, you lower your risks for cancer, lung disease, heart attack, stroke, blood vessel disease, and blindness from macular degeneration. · When you're smoke-free, you get sick less often, and you heal faster. You are less likely to get colds, flu, bronchitis, and pneumonia. · As a nonsmoker, you may find that your mood is better and you are less stressed. When and how will you feel healthier? Quitting has real health benefits that start from day 1 of being smoke-free. And the longer you stay smoke-free, the healthier you get and the better you feel. The first hours · After just 20 minutes, your blood pressure and heart rate go down. That means there's less stress on your heart and blood vessels. · Within 12 hours, the level of carbon monoxide in your blood drops back to normal. That makes room for more oxygen. With more oxygen in your body, you may notice that you have more energy than when you smoked. After 2 weeks · Your lungs start to work better. · Your risk of heart attack starts to drop. After 1 month · When your lungs are clear, you cough less and breathe deeper, so it's easier to be active. · Your sense of taste and smell return. That means you can enjoy food more than you have since you started smoking. Over the years · After 1 year, your risk of heart disease is half what it would be if you kept smoking. · After 5 years, your risk of stroke starts to shrink. Within a few years after that, it's about the same as if you'd never smoked. · After 10 years, your risk of dying from lung cancer is cut by about half. And your risk for many other types of cancer is lower too. How would quitting help others in your life? When you quit smoking, you improve the health of everyone who now breathes in your smoke. · Their heart, lung, and cancer risks drop, much like yours. · They are sick less. For babies and small children, living smoke-free means they're less likely to have ear infections, pneumonia, and bronchitis. · If you're a woman who is or will be pregnant someday, quitting smoking means a healthier . · Children who are close to you are less likely to become adult smokers. Where can you learn more? Go to http://jerry-shereen.info/. Enter [id number] in the search box to learn more about \"Learning About Benefits From Quitting Smoking. \" Current as of: 2017 Content Version: 11.8 © 9006-6411 Healthwise, [employer]. Care instructions adapted under license by [employer] (which disclaims liability or warranty for this information). If you have questions about a medical condition or this instruction, always ask your healthcare professional. [address] any warranty or liability for your use of this information.

## 2018-11-19 NOTE — PROGRESS NOTES
Patient didn't bring medications, verbally reviewed 1. Have you been to the ER, urgent care clinic since your last visit? Hospitalized since your last visit? No 
 
2. Have you seen or consulted any other health care providers outside of the [address] [place] [address] since your last visit? Include any pap smears or colon screening. Yes Where: PCP/Opthalmology Reason for visit: Routine Visits 3. Since your last visit, have you had any of the following symptoms? shortness of breath and swelling in legs/arms. 4.  Have you had any blood work, X-rays or cardiac testing? Yes Where: PCP Office Blood Work/CXR Requested: NO In [facility]: NO 
 
5. Where do you normally have your labs drawn? PCP Office 6. Do you need any refills today?    No

## 2018-11-19 NOTE — PROGRESS NOTES
HISTORY OF PRESENT ILLNESS Yajaira Houston is a 68 y.o. male. 4/17 improving blood pressure since meds adjusted by Dr. Nelli Yuen;  
 1/17 seen for establishing/changing cardiologist  
history of coronary disease, status post PCI(2010) and hypertension, along with conduction system disease Hypertension The history is provided by the medical records and patient. This is a chronic problem. Associated symptoms include shortness of breath. Pertinent negatives include no chest pain and no headaches. Cardiomyopathy The history is provided by the medical records (ischemic). This is a chronic problem. Associated symptoms include shortness of breath. Pertinent negatives include no chest pain and no headaches. Cholesterol Problem The history is provided by the medical records. This is a chronic problem. Associated symptoms include shortness of breath. Pertinent negatives include no chest pain and no headaches. Shortness of Breath The history is provided by the patient. This is a recurrent problem. The problem occurs intermittently. The current episode started more than 1 week ago. The problem has not changed since onset. Associated symptoms include leg swelling. Pertinent negatives include no fever, no headaches, no cough, no wheezing, no PND, no orthopnea, no chest pain, no vomiting, no rash and no claudication. The problem's precipitants include exercise (10-15 mt walk). Leg Swelling The history is provided by the patient. This is a recurrent problem. The current episode started more than 1 week ago (6/16). The problem occurs every several days. The problem has not changed since onset. Associated symptoms include shortness of breath. Pertinent negatives include no chest pain and no headaches. The symptoms are aggravated by standing. The symptoms are relieved by sleep. Review of Systems Constitutional: Negative for chills, fever, malaise/fatigue and weight loss. HENT: Negative for nosebleeds. Eyes: Negative for discharge. Respiratory: Positive for shortness of breath. Negative for cough and wheezing. Cardiovascular: Positive for leg swelling. Negative for chest pain, palpitations, orthopnea, claudication and PND. Gastrointestinal: Negative for diarrhea, nausea and vomiting. Genitourinary: Negative for dysuria and hematuria. Musculoskeletal: Negative for joint pain. Skin: Negative for rash. Neurological: Negative for dizziness, seizures, loss of consciousness and headaches. Endo/Heme/Allergies: Negative for polydipsia. Does not bruise/bleed easily. Psychiatric/Behavioral: Negative for depression and substance abuse. The patient does not have insomnia. No Known Allergies Past Medical History:  
Diagnosis Date  ACS (acute coronary syndrome) ([address]  CAD (coronary artery disease), native coronary artery August 2010  
 s/p non-ST-elevation myocardial infarction, s/p PCI with BMS (Vision 4x18mm) distal RCA with 45% distal LAD  and mild LCx disease. mild-moderate LV systolic dysfunction (19/86).  Diabetes mellitus type II   
 Dyslipidemia  ED (erectile dysfunction)  GERD (gastroesophageal reflux disease)  History of BPH   
 History of echocardiogram 5/18/2011 EF 65%. Mod-marked increased wall thickness. Gr 1 DDfx. No significant valvular heart disease.  Hyperlipidemia  Hypertension  Ischemic cardiomyopathy   
 recovery of LV syst fct  Echo May 2011 LV EF 65%  MI (myocardial infarction) ([address]  Obesity  RBBB (right bundle branch block with left anterior fascicular block)  S/P cardiac cath 8/26/2010 LM patent. dLAD 45%. CX mild. dRCA 100% thrombotic (S/P cath thrombectomy & Vision 4 x 18-mm BMS). EF 40%. Posterobasal, diaphrag, apical hypk.  Shingles 4/2012  Tobacco use disorder, continuous Family History Problem Relation Age of Onset  Diabetes Mother Social History Tobacco Use  
  Smoking status: Current Every Day Smoker Packs/day: 0.15 Years: 44.00 Pack years: 6.60 Types: Cigarettes  Smokeless tobacco: Never Used  Tobacco comment: 0-2 cigs per day Substance Use Topics  Alcohol use: No  
 Drug use: No  
  
 
Current Outpatient Medications Medication Sig  
 amLODIPine (NORVASC) 5 mg tablet Take 5 mg by mouth daily.  nitroglycerin (NITROSTAT) 0.4 mg SL tablet 1 Tab by SubLINGual route every five (5) minutes as needed for Chest Pain for up to 3 doses. Indications: Angina  glimepiride (AMARYL) 4 mg tablet Take 4 mg by mouth every morning.  azilsartan med-chlorthalidone (EDARBYCLOR) 40-25 mg per tablet Take 1 Tab by mouth daily.  cholecalciferol, vitamin D3, (VITAMIN D3) 2,000 unit tab Take  by mouth.  dutasteride (AVODART) 0.5 mg capsule Take 0.5 mg by mouth daily.  linagliptin (TRADJENTA) 5 mg tablet Take 5 mg by mouth daily.  hydrALAZINE (APRESOLINE) 100 mg tablet Take 100 mg by mouth two (2) times a day.  esomeprazole (NEXIUM) 40 mg capsule Take 40 mg by mouth as needed.  carvedilol (COREG) 12.5 mg tablet Take 25 mg by mouth two (2) times daily (with meals).  simvastatin (ZOCOR) 40 mg tablet Take 1 Tab by mouth nightly.  pantoprazole (PROTONIX) 40 mg tablet Take 40 mg by mouth daily.  insulin glargine (LANTUS SOLOSTAR) 100 unit/mL (3 mL) pen 60 Units by SubCUTAneous route daily.  aspirin 81 mg tablet Take 81 mg by mouth daily. No current facility-administered medications for this visit. Past Surgical History:  
Procedure Laterality Date  HX CATARACT REMOVAL    
 HX CORONARY ARTERY BYPASS GRAFT    
 HX CORONARY STENT PLACEMENT  2010  HX HEART CATHETERIZATION  08/26/10 1. Normal systemic pressure. 2. Moderate elevation of left sided filling pressures. 3. Mild to moderate left ventricular systolic dysfunction distinct regional wall motion abnormalities.  4. Coronary disease with thrombotic occlusion of the distal right coronary artery and moderate left anterior descending disease. 5. Successful percutaneous coronary intervention with catheter thrombectomy.  HX HEMORRHOIDECTOMY  HX MOHS PROCEDURES Left 2002  HX TRABECULECTOMY Diagnostic Studies: Old records reviewed and show as follows EKG tracings reviewed by me today. No flowsheet data found. 3/17 Nuc Stress Conclusion: 1. Nondiagnostic EKG changes of ischemia due to abnormal baseline EKG at 89% of predicted maximal heart rate. 2. Normal functional capacity. 3. Severely hypertensive blood pressure response and appropriate heart rate response. 4. No ischemic symptoms or arrhythmias seen. 5. Perfusion image report to follow. Conclusion: 1. Normal perfusion scan. 2. Normal wall motion and ejection fraction. 3. Low risk scan. 3/17 ECHO 
SUMMARY: 
Left ventricle: Ejection fraction was estimated to be 65 %. There were no 
regional wall motion abnormalities. There was severe concentric 
hypertrophy. Features were consistent with a pseudonormal left ventricular 
filling pattern, with concomitant abnormal relaxation and increased 
filling pressure (grade 2 diastolic dysfunction). Visit Vitals /58 Pulse 69 Ht 5' 7\" (1.702 m) Wt 90.7 kg (200 lb) BMI 31.32 kg/m² Mr. Elmer Shearer has a reminder for a \"due or due soon\" health maintenance. I have asked that he contact his primary care provider for follow-up on this health maintenance. 9/10 Card Cath IMPRESSION 1. Normal systemic pressure. 2. Moderate elevation of left-sided filling pressures. 3. Mild to moderate left ventricular systolic dysfunction with distinct 
regional wall motion abnormalities. 4. Coronary disease as described above with a thrombotic occlusion of the 
distal right coronary artery and moderate distal left anterior descending 
disease. 5. Successful percutaneous coronary intervention with catheter thrombectomy, followed by bare metal stent deployment with a Vision 4 x 18 
mm stent in the distal right coronary artery. Physical Exam  
Constitutional: He is oriented to person, place, and time. He appears well-developed and well-nourished. No distress. Obese HENT:  
Head: Normocephalic and atraumatic. Mouth/Throat: Normal dentition. Eyes: Right eye exhibits no discharge. Left eye exhibits no discharge. No scleral icterus. Neck: Neck supple. No JVD present. Carotid bruit is not present. No thyromegaly present. Cardiovascular: Normal rate, regular rhythm, S1 normal, S2 normal, normal heart sounds and intact distal pulses. Exam reveals decreased pulses. Exam reveals no gallop and no friction rub. No murmur heard. Pulmonary/Chest: Effort normal and breath sounds normal. He has no wheezes. He has no rales. Abdominal: Soft. He exhibits no mass. There is no tenderness. Musculoskeletal: He exhibits no edema. Lymphadenopathy:  
     Right cervical: No superficial cervical adenopathy present. Left cervical: No superficial cervical adenopathy present. Neurological: He is alert and oriented to person, place, and time. Skin: Skin is warm and dry. No rash noted. Psychiatric: He has a normal mood and affect. His behavior is normal.  
 
 
ASSESSMENT and PLAN Patient advised to stop smoking to reduce future cardiovascular events. I have reviewed/discussed the above normal BMI with the patient. I have recommended the following interventions: dietary management education, guidance, and counseling, encourage exercise and monitor weight . Mikayla Perdomo HLD: 3/17 LDL31;  
 
Patient is stable clinically without any CHF or angina. Reinforced smoking cessation and explained the benefits. Detailed discussion on diet and weight reduction. He is able to reduce his weight successfully already and will continue to do that. Diagnoses and all orders for this visit: 
 
1. Hypertension, unspecified type -     AMB POC EKG ROUTINE W/ 12 LEADS, INTER & REP 2. Coronary artery disease involving native coronary artery of native heart without angina pectoris 3. Ischemic cardiomyopathy 4. Tobacco use disorder, continuous 5. Dyslipidemia 6. Obesity (BMI 30.0-34. 9) Pertinent laboratory and test data reviewed and discussed with patient. See patient instructions also for other medical advice given There are no discontinued medications. Follow-up Disposition: 
Return in about 1 year (around 11/19/2019) for get recent labs from PCP, with ekg.

## 2019-03-19 ENCOUNTER — NURSE NAVIGATOR (OUTPATIENT)
Dept: OTHER | Age: 74
End: 2019-03-19

## 2019-03-19 NOTE — NURSE NAVIGATOR
Referring Provider: Noelle Jordan MD 
 
 
Lung Cancer Risk Profile:  
Age: 68 Gender: Male Height: 67\" Weight: 200# Smoking History: 
Smoking Status: current use # years smokin 
# years quit: 0 Packs/day: 0.6 Pack years: 28 Patient discussed smoking cessation with PCP: Yes, per patient report Patient participated in shared decision making process with PCP: Unknown Patient is currently experiencing symptoms: No, per patient report If yes what symptoms:  
 
Co-Morbidities:  COPD, CAD Cancer History:   
 
Additional Risk Factors:    Exposure to second hand smoke Patient's smoking history discussed via phone. Patient meets LDCT lung cancer screening criteria. Call transferred to central scheduling to schedule exam. 
 
 
REGAN GunterN, RN, OCN Lung Health Nurse Navigator

## 2019-03-21 ENCOUNTER — HOSPITAL ENCOUNTER (OUTPATIENT)
Dept: VASCULAR SURGERY | Age: 74
Discharge: HOME | End: 2019-03-21
Attending: INTERNAL MEDICINE
Payer: MEDICARE

## 2019-03-21 DIAGNOSIS — I73.9 PERIPHERAL VASCULAR DISEASE, UNSPECIFIED (HCC): ICD-10-CM

## 2019-03-21 LAB
LEFT ABI: 0.96
LEFT ANTERIOR TIBIAL: 134 MMHG
LEFT ARM BP: 144 MMHG
LEFT CALF PRESSURE: 141 MMHG
LEFT CCA DIST DIAS: 11.7 CM/S
LEFT CCA DIST SYS: 79.8 CM/S
LEFT CCA MID DIAS: 13.72 CM/S
LEFT CCA MID SYS: 106.87 CM/S
LEFT CCA PROX DIAS: 11.1 CM/S
LEFT CCA PROX SYS: 118.5 CM/S
LEFT ECA DIAS: 7.43 CM/S
LEFT ECA SYS: 120.5 CM/S
LEFT ICA DIST DIAS: 20.6 CM/S
LEFT ICA DIST SYS: 72.1 CM/S
LEFT ICA MID DIAS: 19.7 CM/S
LEFT ICA MID SYS: 57.2 CM/S
LEFT ICA PROX DIAS: 11.9 CM/S
LEFT ICA PROX SYS: 54.6 CM/S
LEFT ICA/CCA SYS: 0.9
LEFT POSTERIOR TIBIAL: 138 MMHG
LEFT SUBCLAVIAN SYS: 96.4 CM/S
LEFT VERTEBRAL DIAS: 15.35 CM/S
LEFT VERTEBRAL SYS: 48.5 CM/S
RIGHT ABI: 1.08
RIGHT ANTERIOR TIBIAL: 155 MMHG
RIGHT ARM BP: 142 MMHG
RIGHT CALF PRESSURE: 151 MMHG
RIGHT CCA DIST DIAS: 12.4 CM/S
RIGHT CCA DIST SYS: 78.4 CM/S
RIGHT CCA MID DIAS: 12.42 CM/S
RIGHT CCA MID SYS: 99.13 CM/S
RIGHT CCA PROX DIAS: 9.8 CM/S
RIGHT CCA PROX SYS: 82.3 CM/S
RIGHT ECA DIAS: 9.84 CM/S
RIGHT ECA SYS: 103 CM/S
RIGHT ICA DIST DIAS: 15 CM/S
RIGHT ICA DIST SYS: 57.9 CM/S
RIGHT ICA MID DIAS: 20.2 CM/S
RIGHT ICA MID SYS: 72 CM/S
RIGHT ICA PROX DIAS: 16.3 CM/S
RIGHT ICA PROX SYS: 59 CM/S
RIGHT ICA/CCA SYS: 0.9
RIGHT POSTERIOR TIBIAL: 122 MMHG
RIGHT SUBCLAVIAN SYS: 87.9 CM/S
RIGHT VERTEBRAL DIAS: 16.23 CM/S
RIGHT VERTEBRAL SYS: 48.5 CM/S

## 2019-03-21 PROCEDURE — 93880 EXTRACRANIAL BILAT STUDY: CPT

## 2019-03-21 PROCEDURE — 93923 UPR/LXTR ART STDY 3+ LVLS: CPT

## 2019-03-29 ENCOUNTER — HOSPITAL ENCOUNTER (OUTPATIENT)
Dept: CT IMAGING | Age: 74
Discharge: HOME | End: 2019-03-29
Attending: INTERNAL MEDICINE
Payer: MEDICARE

## 2019-03-29 DIAGNOSIS — Z87.891 PERSONAL HISTORY OF TOBACCO USE, PRESENTING HAZARDS TO HEALTH: ICD-10-CM

## 2019-03-29 PROCEDURE — G0297 LDCT FOR LUNG CA SCREEN: HCPCS

## 2019-04-22 ENCOUNTER — ANESTHESIA EVENT (OUTPATIENT)
Dept: ENDOSCOPY | Age: 74
End: 2019-04-22
Payer: MEDICARE

## 2019-04-22 RX ORDER — INSULIN LISPRO 100 [IU]/ML
INJECTION, SOLUTION INTRAVENOUS; SUBCUTANEOUS ONCE
Status: CANCELLED | OUTPATIENT
Start: 2019-04-22 | End: 2019-04-22

## 2019-04-23 ENCOUNTER — ANESTHESIA (OUTPATIENT)
Dept: ENDOSCOPY | Age: 74
End: 2019-04-23
Payer: MEDICARE

## 2019-04-23 ENCOUNTER — HOSPITAL ENCOUNTER (OUTPATIENT)
Age: 74
Setting detail: OUTPATIENT SURGERY
Discharge: HOME | End: 2019-04-23
Attending: INTERNAL MEDICINE | Admitting: INTERNAL MEDICINE
Payer: MEDICARE

## 2019-04-23 VITALS
BODY MASS INDEX: 30.76 KG/M2 | RESPIRATION RATE: 20 BRPM | HEIGHT: 67 IN | TEMPERATURE: 97.9 F | OXYGEN SATURATION: 98 % | HEART RATE: 67 BPM | DIASTOLIC BLOOD PRESSURE: 62 MMHG | SYSTOLIC BLOOD PRESSURE: 144 MMHG | WEIGHT: 196 LBS

## 2019-04-23 LAB — GLUCOSE BLD STRIP.AUTO-MCNC: 110 MG/DL (ref 70–110)

## 2019-04-23 PROCEDURE — 74011250637 HC RX REV CODE- 250/637: Performed by: INTERNAL MEDICINE

## 2019-04-23 PROCEDURE — 74011250636 HC RX REV CODE- 250/636: Performed by: NURSE ANESTHETIST, CERTIFIED REGISTERED

## 2019-04-23 PROCEDURE — 74011250636 HC RX REV CODE- 250/636

## 2019-04-23 PROCEDURE — 82962 GLUCOSE BLOOD TEST: CPT

## 2019-04-23 PROCEDURE — 76060000032 HC ANESTHESIA 0.5 TO 1 HR: Performed by: INTERNAL MEDICINE

## 2019-04-23 PROCEDURE — 76040000007: Performed by: INTERNAL MEDICINE

## 2019-04-23 RX ORDER — FLUMAZENIL 0.1 MG/ML
0.2 INJECTION INTRAVENOUS
Status: CANCELLED | OUTPATIENT
Start: 2019-04-23 | End: 2019-04-23

## 2019-04-23 RX ORDER — SODIUM CHLORIDE 0.9 % (FLUSH) 0.9 %
5-40 SYRINGE (ML) INJECTION EVERY 8 HOURS
Status: DISCONTINUED | OUTPATIENT
Start: 2019-04-23 | End: 2019-04-23 | Stop reason: HOSPADM

## 2019-04-23 RX ORDER — LABETALOL HYDROCHLORIDE 5 MG/ML
INJECTION, SOLUTION INTRAVENOUS AS NEEDED
Status: DISCONTINUED | OUTPATIENT
Start: 2019-04-23 | End: 2019-04-23 | Stop reason: HOSPADM

## 2019-04-23 RX ORDER — PROPOFOL 10 MG/ML
INJECTION, EMULSION INTRAVENOUS AS NEEDED
Status: DISCONTINUED | OUTPATIENT
Start: 2019-04-23 | End: 2019-04-23 | Stop reason: HOSPADM

## 2019-04-23 RX ORDER — LIDOCAINE HYDROCHLORIDE 20 MG/ML
INJECTION, SOLUTION EPIDURAL; INFILTRATION; INTRACAUDAL; PERINEURAL AS NEEDED
Status: DISCONTINUED | OUTPATIENT
Start: 2019-04-23 | End: 2019-04-23 | Stop reason: HOSPADM

## 2019-04-23 RX ORDER — SODIUM CHLORIDE 0.9 % (FLUSH) 0.9 %
5-40 SYRINGE (ML) INJECTION AS NEEDED
Status: DISCONTINUED | OUTPATIENT
Start: 2019-04-23 | End: 2019-04-23 | Stop reason: HOSPADM

## 2019-04-23 RX ORDER — DEXTROMETHORPHAN/PSEUDOEPHED 2.5-7.5/.8
DROPS ORAL AS NEEDED
Status: DISCONTINUED | OUTPATIENT
Start: 2019-04-23 | End: 2019-04-23 | Stop reason: HOSPADM

## 2019-04-23 RX ORDER — DEXTROMETHORPHAN/PSEUDOEPHED 2.5-7.5/.8
1.2 DROPS ORAL
Status: CANCELLED | OUTPATIENT
Start: 2019-04-23

## 2019-04-23 RX ORDER — SODIUM CHLORIDE, SODIUM LACTATE, POTASSIUM CHLORIDE, CALCIUM CHLORIDE 600; 310; 30; 20 MG/100ML; MG/100ML; MG/100ML; MG/100ML
INJECTION, SOLUTION INTRAVENOUS
Status: DISCONTINUED | OUTPATIENT
Start: 2019-04-23 | End: 2019-04-23 | Stop reason: HOSPADM

## 2019-04-23 RX ORDER — EPINEPHRINE 0.1 MG/ML
1 INJECTION INTRACARDIAC; INTRAVENOUS
Status: CANCELLED | OUTPATIENT
Start: 2019-04-23 | End: 2019-04-24

## 2019-04-23 RX ORDER — NALOXONE HYDROCHLORIDE 0.4 MG/ML
0.4 INJECTION, SOLUTION INTRAMUSCULAR; INTRAVENOUS; SUBCUTANEOUS
Status: CANCELLED | OUTPATIENT
Start: 2019-04-23 | End: 2019-04-23

## 2019-04-23 RX ORDER — SODIUM CHLORIDE 0.9 % (FLUSH) 0.9 %
5-40 SYRINGE (ML) INJECTION EVERY 8 HOURS
Status: CANCELLED | OUTPATIENT
Start: 2019-04-23

## 2019-04-23 RX ORDER — LIDOCAINE HYDROCHLORIDE 10 MG/ML
0.1 INJECTION, SOLUTION EPIDURAL; INFILTRATION; INTRACAUDAL; PERINEURAL AS NEEDED
Status: DISCONTINUED | OUTPATIENT
Start: 2019-04-23 | End: 2019-04-23 | Stop reason: HOSPADM

## 2019-04-23 RX ORDER — SODIUM CHLORIDE, SODIUM LACTATE, POTASSIUM CHLORIDE, CALCIUM CHLORIDE 600; 310; 30; 20 MG/100ML; MG/100ML; MG/100ML; MG/100ML
25 INJECTION, SOLUTION INTRAVENOUS CONTINUOUS
Status: DISCONTINUED | OUTPATIENT
Start: 2019-04-23 | End: 2019-04-23 | Stop reason: HOSPADM

## 2019-04-23 RX ORDER — SODIUM CHLORIDE 0.9 % (FLUSH) 0.9 %
5-40 SYRINGE (ML) INJECTION AS NEEDED
Status: CANCELLED | OUTPATIENT
Start: 2019-04-23

## 2019-04-23 RX ORDER — ATROPINE SULFATE 0.1 MG/ML
0.5 INJECTION INTRAVENOUS
Status: CANCELLED | OUTPATIENT
Start: 2019-04-23 | End: 2019-04-24

## 2019-04-23 RX ADMIN — PROPOFOL 50 MG: 10 INJECTION, EMULSION INTRAVENOUS at 10:44

## 2019-04-23 RX ADMIN — LABETALOL HYDROCHLORIDE 5 MG: 5 INJECTION, SOLUTION INTRAVENOUS at 10:32

## 2019-04-23 RX ADMIN — FAMOTIDINE 20 MG: 10 INJECTION INTRAVENOUS at 10:17

## 2019-04-23 RX ADMIN — LIDOCAINE HYDROCHLORIDE 60 MG: 20 INJECTION, SOLUTION EPIDURAL; INFILTRATION; INTRACAUDAL; PERINEURAL at 10:37

## 2019-04-23 RX ADMIN — SODIUM CHLORIDE, SODIUM LACTATE, POTASSIUM CHLORIDE, AND CALCIUM CHLORIDE 25 ML/HR: 600; 310; 30; 20 INJECTION, SOLUTION INTRAVENOUS at 10:17

## 2019-04-23 RX ADMIN — SODIUM CHLORIDE, SODIUM LACTATE, POTASSIUM CHLORIDE, CALCIUM CHLORIDE: 600; 310; 30; 20 INJECTION, SOLUTION INTRAVENOUS at 10:26

## 2019-04-23 RX ADMIN — PROPOFOL 150 MG: 10 INJECTION, EMULSION INTRAVENOUS at 10:37

## 2019-04-23 NOTE — DISCHARGE INSTRUCTIONS
Colonoscopy: What to Expect at [address] [place] [address]  After you have a colonoscopy, you will stay at the clinic for 1 to 2 hours until the medicines wear off. Then you can go home. But you will need to arrange for a ride. Your doctor will tell you when you can eat and do your other usual activities. Your doctor will talk to you about when you will need your next colonoscopy. Your doctor can help you decide how often you need to be checked. This will depend on the results of your test and your risk for colorectal cancer. After the test, you may be bloated or have gas pains. You may need to pass gas. If a biopsy was done or a polyp was removed, you may have streaks of blood in your stool (feces) for a few days. This care sheet gives you a general idea about how long it will take for you to recover. But each person recovers at a different pace. Follow the steps below to get better as quickly as possible. How can you care for yourself at home? Activity  · Rest when you feel tired. · You can do your normal activities when it feels okay to do so. Diet  · Follow your doctor's directions for eating. · Unless your doctor has told you not to, drink plenty of fluids. This helps to replace the fluids that were lost during the colon prep. · Do not drink alcohol. Medicines  · If polyps were removed or a biopsy was done during the test, your doctor may tell you not to take aspirin or other anti-inflammatory medicines for a few days. These include ibuprofen (Advil, Motrin) and naproxen (Aleve). Other instructions  · For your safety, do not drive or operate machinery until the medicine wears off and you can think clearly. Your doctor may tell you not to drive or operate machinery until the day after your test.  · Do not sign legal documents or make major decisions until the medicine wears off and you can think clearly. The anesthesia can make it hard for you to fully understand what you are agreeing to.   Follow-up care is a key part of your treatment and safety. Be sure to make and go to all appointments, and call your doctor if you are having problems. It's also a good idea to know your test results and keep a list of the medicines you take. When should you call for help? Call 911 anytime you think you may need emergency care. For example, call if:  · You passed out (lost consciousness). · You pass maroon or bloody stools. · You have severe belly pain. Call your doctor now or seek immediate medical care if:  · Your stools are black and tarlike. · Your stools have streaks of blood, but you did not have a biopsy or any polyps removed. · You have belly pain, or your belly is swollen and firm. · You vomit. · You have a fever. · You are very dizzy. Watch closely for changes in your health, and be sure to contact your doctor if you have any problems. Where can you learn more? Go to [employer]  Enter E264 in the search box to learn more about \"Colonoscopy: What to Expect at Home. \"   © 2972-7997 [employer], [employer]. Care instructions adapted under license by [facility] (which disclaims liability or warranty for this information). This care instruction is for use with your licensed healthcare professional. If you have questions about a medical condition or this instruction, always ask your healthcare professional. [place] [address] any warranty or liability for your use of this information. Content Version: 65.4.270336; Current as of: November 14, 2014      DISCHARGE SUMMARY from Nurse     POST-PROCEDURE INSTRUCTIONS:    Call your Physician if you:  ? Observe any excess bleeding. ? Develop a temperature over 100.5o F.  ? Experience abdominal, shoulder or chest pain. ? Notice any signs of decreased circulation or nerve impairment to an extremity such as a change in color, persistent numbness, tingling, coldness or increase in pain. ?  Vomit blood or you have nausea and vomiting lasting longer than 4 hours. ? Are unable to take medications. ? Are unable to urinate within 8 hours after discharge following general anesthesia or intravenous sedation. For the next 24 hours after receiving general anesthesia or intravenous sedation, or while taking prescription Narcotics, limit your activities:  ? Do NOT drive a motor vehicle, operate hazard machinery or power tools, or perform tasks that require coordination. The medication you received during your procedure may have some effect on your mental awareness. ? Do NOT make important personal or business decisions. The medication you received during your procedure may have some effect on your mental awareness. ? Do NOT drink alcoholic beverages. These drinks do not mix well with the medications that have been given to you. ? Upon discharge from the hospital, you must be accompanied by a responsible adult. ? Resume your diet as directed by your physician. ? Resume medications as your physician has prescribed. ? Please give a list of your current medications to your Primary Care Provider. ? Please update this list whenever your medications are discontinued, doses are changed, or new medications (including over-the-counter products) are added. ? Please carry medication information at all times in case of emergency situations. These are general instructions for a healthy lifestyle:    No smoking/ No tobacco products/ Avoid exposure to second hand smoke.  Surgeon General's Warning:  Quitting smoking now greatly reduces serious risk to your health. Obesity, smoking, and a sedentary lifestyle greatly increase your risk for illness.    A healthy diet, regular physical exercise & weight monitoring are important for maintaining a healthy lifestyle   You may be retaining fluid if you have a history of heart failure or if you experience any of the following symptoms:  Weight gain of 3 pounds or more overnight or 5 pounds in a week, increased swelling in our hands or feet or shortness of breath while lying flat in bed. Please call your doctor as soon as you notice any of these symptoms; do not wait until your next office visit. Recognize signs and symptoms of STROKE:  F  -  Face looks uneven  A  -  Arms unable to move or move unevenly  S  -  Speech slurred or non-existent  T  -  Time to call 911 - as soon as signs and symptoms begin - DO NOT go back to bed or wait to see If you get better - TIME IS BRAIN. Colorectal Screening   Colorectal cancer almost always develops from precancerous polyps (abnormal growths) in the colon or rectum. Screening tests can find precancerous polyps, so that they can be removed before they turn into cancer. Screening tests can also find colorectal cancer early, when treatment works best.  [facility] Speak with your physician about when you should begin screening and how often you should be tested. Additional Information    If you have questions, please call [phone]. Remember, [facility] is NOT to be used for urgent needs. For medical emergencies, dial 911. APPOINTMENTS:    Please make a follow-up appointment with your physician. Discharge information has been reviewed with the patient and daughter. The patient and daughter verbalized understanding.

## 2019-04-23 NOTE — ANESTHESIA POSTPROCEDURE EVALUATION
Procedure(s): 
COLONOSCOPY. MAC Anesthesia Post Evaluation Patient location during evaluation: PACU Anesthetic complications: no 
Cardiovascular status: acceptable Respiratory status: acceptable Hydration status: acceptable Post anesthesia nausea and vomiting:  none Vitals Value Taken Time /62 4/23/2019 11:20 AM  
Temp Pulse 67 4/23/2019 11:22 AM  
Resp 21 4/23/2019 11:22 AM  
SpO2 97 % 4/23/2019 11:22 AM  
Vitals shown include unvalidated device data.

## 2019-04-23 NOTE — H&P
History and Physical reviewed; I have examined the patient and there are no pertinent changes. Alpa Blackwell MD, MD  
9:45 AM 4/23/2019 [facility], [address] [facility] 
[email or web address]

## 2019-04-23 NOTE — PROCEDURES
[place]  [facility], [address]      Brief Procedure Note    Lawson Nicole.  1945  [id number]    Date of Procedure: 4/23/2019    Preoperative diagnosis: V12.72 - Z86.010,  Personal history of colon polyps    Postoperative diagnosis:  normal colon    Type of Anesthesia: MAC (monitered anesthesia care)    Description of Findings: same as post op dx    Procedure: Procedure(s):  COLONOSCOPY    :  Dr. Phuong hCavez MD    Assistant(s): [unfilled]    Type of Anesthesia:MAC     EBL:None    Specimens: * No specimens in log *    Findings: See printed and scanned procedure note    Complications: None    Dr. Phuong Chavez MD  4/23/2019  11:00 AM

## 2019-04-23 NOTE — ANESTHESIA PREPROCEDURE EVALUATION
Relevant Problems No relevant active problems Anesthetic History No history of anesthetic complications Review of Systems / Medical History Patient summary reviewed and pertinent labs reviewed Pulmonary Smoker Neuro/Psych Within defined limits Cardiovascular Hypertension Dysrhythmias CAD Comments: Pt did not take meds this am  
GI/Hepatic/Renal 
  
GERD Endo/Other Diabetes: type 2 Morbid obesity Other Findings Physical Exam 
 
Airway Mallampati: II 
TM Distance: 4 - 6 cm Neck ROM: normal range of motion Mouth opening: Normal 
 
 Cardiovascular Rhythm: regular Rate: normal 
 
 
 
 Dental 
No notable dental hx Pulmonary Breath sounds clear to auscultation Abdominal 
 
 
 
 Other Findings Anesthetic Plan ASA: 3 Anesthesia type: MAC Induction: Intravenous Anesthetic plan and risks discussed with: Patient

## 2019-07-12 ENCOUNTER — HOSPITAL ENCOUNTER (OUTPATIENT)
Dept: ULTRASOUND IMAGING | Age: 74
Discharge: HOME | End: 2019-07-12
Attending: INTERNAL MEDICINE
Payer: MEDICARE

## 2019-07-12 DIAGNOSIS — N18.2 CHRONIC KIDNEY DISEASE, STAGE II (MILD): ICD-10-CM

## 2019-07-12 PROCEDURE — 76770 US EXAM ABDO BACK WALL COMP: CPT

## 2019-09-18 ENCOUNTER — ANESTHESIA EVENT (OUTPATIENT)
Dept: ENDOSCOPY | Age: 74
End: 2019-09-18
Payer: MEDICARE

## 2019-09-19 ENCOUNTER — ANESTHESIA (OUTPATIENT)
Dept: ENDOSCOPY | Age: 74
End: 2019-09-19
Payer: MEDICARE

## 2019-09-19 ENCOUNTER — HOSPITAL ENCOUNTER (OUTPATIENT)
Age: 74
Setting detail: OUTPATIENT SURGERY
Discharge: HOME | End: 2019-09-19
Attending: INTERNAL MEDICINE | Admitting: INTERNAL MEDICINE
Payer: MEDICARE

## 2019-09-19 VITALS
WEIGHT: 205 LBS | SYSTOLIC BLOOD PRESSURE: 141 MMHG | HEART RATE: 54 BPM | DIASTOLIC BLOOD PRESSURE: 72 MMHG | BODY MASS INDEX: 32.95 KG/M2 | TEMPERATURE: 96.6 F | HEIGHT: 66 IN | OXYGEN SATURATION: 97 % | RESPIRATION RATE: 20 BRPM

## 2019-09-19 LAB
BUN BLD-MCNC: 25 MG/DL (ref 7–18)
CHLORIDE BLD-SCNC: 108 MMOL/L (ref 100–108)
GLUCOSE BLD STRIP.AUTO-MCNC: 104 MG/DL (ref 70–110)
GLUCOSE BLD STRIP.AUTO-MCNC: 67 MG/DL (ref 74–106)
GLUCOSE BLD STRIP.AUTO-MCNC: 71 MG/DL (ref 70–110)
HCT VFR BLD CALC: 35 % (ref 36–49)
HGB BLD-MCNC: 11.9 G/DL (ref 12–16)
POTASSIUM BLD-SCNC: 3.9 MMOL/L (ref 3.5–5.5)
SODIUM BLD-SCNC: 142 MMOL/L (ref 136–145)

## 2019-09-19 PROCEDURE — 74011250636 HC RX REV CODE- 250/636: Performed by: NURSE ANESTHETIST, CERTIFIED REGISTERED

## 2019-09-19 PROCEDURE — 74011000250 HC RX REV CODE- 250: Performed by: ANESTHESIOLOGY

## 2019-09-19 PROCEDURE — 74011250636 HC RX REV CODE- 250/636

## 2019-09-19 PROCEDURE — 76040000019: Performed by: INTERNAL MEDICINE

## 2019-09-19 PROCEDURE — 77030019988 HC FCPS ENDOSC DISP BSC -B: Performed by: INTERNAL MEDICINE

## 2019-09-19 PROCEDURE — 74011000250 HC RX REV CODE- 250

## 2019-09-19 PROCEDURE — 74011000250 HC RX REV CODE- 250: Performed by: NURSE ANESTHETIST, CERTIFIED REGISTERED

## 2019-09-19 PROCEDURE — 88305 TISSUE EXAM BY PATHOLOGIST: CPT

## 2019-09-19 PROCEDURE — 76060000031 HC ANESTHESIA FIRST 0.5 HR: Performed by: INTERNAL MEDICINE

## 2019-09-19 PROCEDURE — 77030008565 HC TBNG SUC IRR ERBE -B: Performed by: INTERNAL MEDICINE

## 2019-09-19 PROCEDURE — 77030018846 HC SOL IRR STRL H20 ICUM -A: Performed by: INTERNAL MEDICINE

## 2019-09-19 PROCEDURE — 82947 ASSAY GLUCOSE BLOOD QUANT: CPT

## 2019-09-19 PROCEDURE — 82962 GLUCOSE BLOOD TEST: CPT

## 2019-09-19 RX ORDER — SODIUM CHLORIDE, SODIUM LACTATE, POTASSIUM CHLORIDE, CALCIUM CHLORIDE 600; 310; 30; 20 MG/100ML; MG/100ML; MG/100ML; MG/100ML
25 INJECTION, SOLUTION INTRAVENOUS CONTINUOUS
Status: DISCONTINUED | OUTPATIENT
Start: 2019-09-19 | End: 2019-09-19 | Stop reason: HOSPADM

## 2019-09-19 RX ORDER — PROPOFOL 10 MG/ML
INJECTION, EMULSION INTRAVENOUS AS NEEDED
Status: DISCONTINUED | OUTPATIENT
Start: 2019-09-19 | End: 2019-09-19 | Stop reason: HOSPADM

## 2019-09-19 RX ORDER — SODIUM CHLORIDE, SODIUM LACTATE, POTASSIUM CHLORIDE, CALCIUM CHLORIDE 600; 310; 30; 20 MG/100ML; MG/100ML; MG/100ML; MG/100ML
50 INJECTION, SOLUTION INTRAVENOUS CONTINUOUS
Status: CANCELLED | OUTPATIENT
Start: 2019-09-19

## 2019-09-19 RX ORDER — LIDOCAINE HYDROCHLORIDE 20 MG/ML
INJECTION, SOLUTION EPIDURAL; INFILTRATION; INTRACAUDAL; PERINEURAL AS NEEDED
Status: DISCONTINUED | OUTPATIENT
Start: 2019-09-19 | End: 2019-09-19 | Stop reason: HOSPADM

## 2019-09-19 RX ORDER — DEXTROSE 50 % IN WATER (D50W) INTRAVENOUS SYRINGE
25-50 AS NEEDED
Status: CANCELLED | OUTPATIENT
Start: 2019-09-19

## 2019-09-19 RX ORDER — INSULIN LISPRO 100 [IU]/ML
INJECTION, SOLUTION INTRAVENOUS; SUBCUTANEOUS ONCE
Status: DISCONTINUED | OUTPATIENT
Start: 2019-09-19 | End: 2019-09-19 | Stop reason: HOSPADM

## 2019-09-19 RX ORDER — SODIUM CHLORIDE 0.9 % (FLUSH) 0.9 %
5-40 SYRINGE (ML) INJECTION EVERY 8 HOURS
Status: CANCELLED | OUTPATIENT
Start: 2019-09-19

## 2019-09-19 RX ORDER — SODIUM CHLORIDE 0.9 % (FLUSH) 0.9 %
5-40 SYRINGE (ML) INJECTION AS NEEDED
Status: CANCELLED | OUTPATIENT
Start: 2019-09-19

## 2019-09-19 RX ORDER — DEXTROSE 50 % IN WATER (D50W) INTRAVENOUS SYRINGE
12.5 ONCE
Status: COMPLETED | OUTPATIENT
Start: 2019-09-19 | End: 2019-09-19

## 2019-09-19 RX ORDER — MAGNESIUM SULFATE 100 %
4 CRYSTALS MISCELLANEOUS AS NEEDED
Status: CANCELLED | OUTPATIENT
Start: 2019-09-19

## 2019-09-19 RX ORDER — INSULIN LISPRO 100 [IU]/ML
INJECTION, SOLUTION INTRAVENOUS; SUBCUTANEOUS ONCE
Status: CANCELLED | OUTPATIENT
Start: 2019-09-19 | End: 2019-09-19

## 2019-09-19 RX ADMIN — SODIUM CHLORIDE, SODIUM LACTATE, POTASSIUM CHLORIDE, AND CALCIUM CHLORIDE 25 ML/HR: 600; 310; 30; 20 INJECTION, SOLUTION INTRAVENOUS at 08:33

## 2019-09-19 RX ADMIN — LIDOCAINE HYDROCHLORIDE 40 MG: 20 INJECTION, SOLUTION EPIDURAL; INFILTRATION; INTRACAUDAL; PERINEURAL at 09:17

## 2019-09-19 RX ADMIN — FAMOTIDINE 20 MG: 10 INJECTION INTRAVENOUS at 08:42

## 2019-09-19 RX ADMIN — DEXTROSE 50 % IN WATER (D50W) INTRAVENOUS SYRINGE 12.5 G: at 08:51

## 2019-09-19 RX ADMIN — PROPOFOL 60 MG: 10 INJECTION, EMULSION INTRAVENOUS at 09:17

## 2019-09-19 NOTE — ANESTHESIA PREPROCEDURE EVALUATION
Relevant Problems   No relevant active problems       Anesthetic History   No history of anesthetic complications            Review of Systems / Medical History  Patient summary reviewed, nursing notes reviewed and pertinent labs reviewed    Pulmonary  Within defined limits                 Neuro/Psych   Within defined limits           Cardiovascular    Hypertension: well controlled        Dysrhythmias   CAD    Exercise tolerance: >4 METS     GI/Hepatic/Renal     GERD: well controlled           Endo/Other    Diabetes: type 2    Morbid obesity     Other Findings              Physical Exam    Airway  Mallampati: III  TM Distance: 4 - 6 cm  Neck ROM: normal range of motion        Cardiovascular  Regular rate and rhythm,  S1 and S2 normal,  no murmur, click, rub, or gallop  Rhythm: regular  Rate: normal         Dental    Dentition: Poor dentition     Pulmonary  Breath sounds clear to auscultation               Abdominal  Abdominal exam normal       Other Findings            Anesthetic Plan    ASA: 3  Anesthesia type: MAC          Induction: Intravenous  Anesthetic plan and risks discussed with: Patient

## 2019-09-19 NOTE — DISCHARGE INSTRUCTIONS
Gastritis: Care Instructions  Your Care Instructions    Gastritis is a sore and upset stomach. It happens when something irritates the stomach lining. Many things can cause it. These include an infection such as the flu or something you ate or drank. Medicines or a sore on the lining of the stomach (ulcer) also can cause it. Your belly may bloat and ache. You may belch, vomit, and feel sick to your stomach. You should be able to relieve the problem by taking medicine. And it may help to change your diet. If gastritis lasts, your doctor may prescribe medicine. Follow-up care is a key part of your treatment and safety. Be sure to make and go to all appointments, and call your doctor if you are having problems. It's also a good idea to know your test results and keep a list of the medicines you take. How can you care for yourself at home? · If your doctor prescribed antibiotics, take them as directed. Do not stop taking them just because you feel better. You need to take the full course of antibiotics. · Be safe with medicines. If your doctor prescribed medicine to decrease stomach acid, take it as directed. Call your doctor if you think you are having a problem with your medicine. · Do not take any other medicine, including over-the-counter pain relievers, without talking to your doctor first.  · If your doctor recommends over-the-counter medicine to reduce stomach acid, such as Pepcid AC, Prilosec, Tagamet HB, or Zantac 75, follow the directions on the label. · Drink plenty of fluids (enough so that your urine is light yellow or clear like water) to prevent dehydration. Choose water and other caffeine-free clear liquids. If you have kidney, heart, or liver disease and have to limit fluids, talk with your doctor before you increase the amount of fluids you drink. · Limit how much alcohol you drink. · Avoid coffee, tea, cola drinks, chocolate, and other foods with caffeine. They increase stomach acid.   When should you call for help? Call 911 anytime you think you may need emergency care. For example, call if:    · You vomit blood or what looks like coffee grounds.     · You pass maroon or very bloody stools.    Call your doctor now or seek immediate medical care if:    · You start breathing fast and have not produced urine in the last 8 hours.     · You cannot keep fluids down.    Watch closely for changes in your health, and be sure to contact your doctor if:    · You do not get better as expected. Where can you learn more? Go to http://jerry-shereen.info/. Enter [id number] in the search box to learn more about \"Gastritis: Care Instructions. \"  Current as of: November 7, 2018  Content Version: 12.1  © 1676-9019 [employer]. Care instructions adapted under license by [employer] (which disclaims liability or warranty for this information). If you have questions about a medical condition or this instruction, always ask your healthcare professional. [place] [address] any warranty or liability for your use of this information. DISCHARGE SUMMARY from Nurse    PATIENT INSTRUCTIONS:    After general anesthesia or intravenous sedation, for 24 hours or while taking prescription Narcotics:  · Limit your activities  · Do not drive and operate hazardous machinery  · Do not make important personal or business decisions  · Do  not drink alcoholic beverages  · If you have not urinated within 8 hours after discharge, please contact your surgeon on call.     Report the following to your surgeon:  · Excessive pain, swelling, redness or odor of or around the surgical area  · Temperature over 100.5  · Nausea and vomiting lasting longer than 4 hours or if unable to take medications  · Any signs of decreased circulation or nerve impairment to extremity: change in color, persistent  numbness, tingling, coldness or increase pain  · Any questions    *  Please give a list of your current medications to your Primary Care Provider. *  Please update this list whenever your medications are discontinued, doses are      changed, or new medications (including over-the-counter products) are added. *  Please carry medication information at all times in case of emergency situations. These are general instructions for a healthy lifestyle:    No smoking/ No tobacco products/ Avoid exposure to second hand smoke  Surgeon General's Warning:  Quitting smoking now greatly reduces serious risk to your health. Obesity, smoking, and sedentary lifestyle greatly increases your risk for illness    A healthy diet, regular physical exercise & weight monitoring are important for maintaining a healthy lifestyle    You may be retaining fluid if you have a history of heart failure or if you experience any of the following symptoms:  Weight gain of 3 pounds or more overnight or 5 pounds in a week, increased swelling in our hands or feet or shortness of breath while lying flat in bed. Please call your doctor as soon as you notice any of these symptoms; do not wait until your next office visit. The discharge information has been reviewed with the patient/family. The patient/family verbalized understanding. Discharge medications reviewed with the patient and appropriate educational materials and side effects teaching were provided.   ___________________________________________________________________________________________________________________________________

## 2019-09-19 NOTE — H&P
[email or web address]  [phone]      GASTROENTEROLOGY Pre-Procedure H and P      Impression/Plan:   1. This patient is consented for an EGD for abn CT      Chief Complaint: abn CT      HPI:  Dez Kwok. is a 69 y.o. male who is being is having an EGD for abn CT  PMH:   Past Medical History:   Diagnosis Date    ACS [facility]acute coronary syndrome) ([facility] [address]     CAD (coronary artery disease), native coronary artery August 2010    s/p non-ST-elevation myocardial infarction, s/p PCI with BMS (Vision 4x18mm) distal RCA with 45% distal LAD  and mild LCx disease. mild-moderate LV systolic dysfunction (21/04).  Diabetes mellitus type II     Dyslipidemia     ED (erectile dysfunction)     GERD (gastroesophageal reflux disease)     History of BPH     History of echocardiogram 5/18/2011    EF 65%. Mod-marked increased wall thickness. Gr 1 DDfx. No significant valvular heart disease.  Hyperlipidemia     Hypertension     Ischemic cardiomyopathy     recovery of LV syst fct  Echo May 2011 LV EF 65%    MI (myocardial infarction) ([facility] [address]     Obesity     RBBB (right bundle branch block with left anterior fascicular block)     S/P cardiac cath 8/26/2010    LM patent. dLAD 45%. CX mild. dRCA 100% thrombotic (S/P cath thrombectomy & Vision 4 x 18-mm BMS). EF 40%. Posterobasal, diaphrag, apical hypk.  Shingles 4/2012    Tobacco use disorder, continuous        PSH:   Past Surgical History:   Procedure Laterality Date    COLONOSCOPY N/A 4/23/2019    COL[facility] at [facility] ENDOSCOPY    HX CATARACT REMOVAL      HX CORONARY ARTERY BYPASS GRAFT      HX CORONARY STENT PLACEMENT  2010    HX HEART CATHETERIZATION  08/26/10    1. Normal systemic pressure. 2. Moderate elevation of left sided filling pressures. 3. Mild to moderate left ventricular systolic dysfunction distinct regional wall motion abnormalities.  4. Coronary disease with thrombotic occlusion of the distal right coronary artery and moderate left anterior descending disease. 5. Successful percutaneous coronary intervention with catheter thrombectomy.     HX HEMORRHOIDECTOMY      HX MOHS PROCEDURES Left 2002    HX TRABECULECTOMY         Social HX:   Social History     Socioeconomic History    Marital status:      Spouse name: Not on file    Number of children: Not on file    Years of education: Not on file    Highest education level: Not on file   Occupational History    Not on file   Social Needs    Financial resource strain: Not on file    Food insecurity:     Worry: Not on file     Inability: Not on file    Transportation needs:     Medical: Not on file     Non-medical: Not on file   Tobacco Use    Smoking status: Current Every Day Smoker     Packs/day: 0.50     Years: 44.00     Pack years: 22.00     Types: Cigarettes    Smokeless tobacco: Never Used    Tobacco comment: 0-2 cigs per day    Substance and Sexual Activity    Alcohol use: No    Drug use: No    Sexual activity: Not on file   Lifestyle    Physical activity:     Days per week: Not on file     Minutes per session: Not on file    Stress: Not on file   Relationships    Social connections:     Talks on phone: Not on file     Gets together: Not on file     Attends [employer] service: Not on file     Active member of club or organization: Not on file     Attends meetings of clubs or organizations: Not on file     Relationship status: Not on file    Intimate partner violence:     Fear of current or ex partner: Not on file     Emotionally abused: Not on file     Physically abused: Not on file     Forced sexual activity: Not on file   Other Topics Concern    Not on file   Social History Narrative    Not on file       FHX:   Family History   Problem Relation Age of Onset    Diabetes Mother        Allergy:   No Known Allergies    Home Medications:     Medications Prior to Admission   Medication Sig    amLODIPine (NORVASC) 5 mg tablet Take 5 mg by mouth daily.    glimepiride (AMARYL) 4 mg tablet Take 4 mg by mouth every morning.  azilsartan med-chlorthalidone (EDARBYCLOR) 40-25 mg per tablet Take 1 Tab by mouth daily.  cholecalciferol, vitamin D3, (VITAMIN D3) 2,000 unit tab Take  by mouth.  dutasteride (AVODART) 0.5 mg capsule Take 0.5 mg by mouth daily.  linagliptin (TRADJENTA) 5 mg tablet Take 5 mg by mouth daily.  hydrALAZINE (APRESOLINE) 100 mg tablet Take 100 mg by mouth three (3) times daily.  carvedilol (COREG) 12.5 mg tablet Take 25 mg by mouth two (2) times daily (with meals).  simvastatin (ZOCOR) 40 mg tablet Take 1 Tab by mouth nightly.  pantoprazole (PROTONIX) 40 mg tablet Take 40 mg by mouth daily.  insulin glargine (LANTUS SOLOSTAR) 100 unit/mL (3 mL) pen 60 Units by SubCUTAneous route daily.  aspirin 81 mg tablet Take 81 mg by mouth daily.  nitroglycerin (NITROSTAT) 0.4 mg SL tablet 1 Tab by SubLINGual route every five (5) minutes as needed for Chest Pain for up to 3 doses. Indications: Angina       Review of Systems:     Constitutional: No fevers, chills, weight loss, fatigue. Skin: No rashes, pruritis, jaundice, ulcerations, erythema. HENT: No headaches, nosebleeds, sinus pressure, rhinorrhea, sore throat. Eyes: No visual changes, blurred vision, eye pain, photophobia, jaundice. Cardiovascular: No chest pain, heart palpitations. Respiratory: No cough, SOB, wheezing, chest discomfort, orthopnea. Gastrointestinal:    Genitourinary: No dysuria, bleeding, discharge, pyuria. Musculoskeletal: No weakness, arthralgias, wasting. Endo: No sweats. Heme: No bruising, easy bleeding. Allergies: As noted. Neurological: Cranial nerves intact. Alert and oriented. Gait not assessed. Psychiatric:  No anxiety, depression, hallucinations.           Visit Vitals  /75   Pulse 60   Temp 97.7 °F (36.5 °C)   Resp 20   Ht 5' 6\" (1.676 m)   Wt 93 kg (205 lb)   SpO2 99%   BMI 33.09 kg/m²       Physical Assessment:     constitutional: appearance: well developed, well nourished, normal habitus, no deformities, in no acute distress. skin: inspection: no rashes, ulcers, icterus or other lesions; no clubbing or telangiectasias. palpation: no induration or subcutaneos nodules. eyes: inspection: normal conjunctivae and lids; no jaundice pupils: normal  ENMT: mouth: normal oral mucosa,lips and gums; good dentition. oropharynx: normal tongue, hard and soft palate; posterior pharynx without erithema, exudate or lesions. neck: thyroid: normal size, consistency and position; no masses or tenderness. respiratory: effort: normal chest excursion; no intercostal retraction or accessory muscle use. cardiovascular: abdominal aorta: normal size and position; no bruits. palpation: PMI of normal size and position; normal rhythm; no thrill or murmurs. abdominal: abdomen: normal consistency; no tenderness or masses. hernias: no hernias appreciated. liver: normal size and consistency. spleen: not palpable. rectal: hemoccult/guaiac: not performed. musculoskeletal: digits and nails: no clubbing, cyanosis, petechiae or other inflammatory conditions. gait: normal gait and station head and neck: normal range of motion; no pain, crepitation or contracture. spine/ribs/pelvis: normal range of motion; no pain, deformity or contracture. neurologic: cranial nerves: II-XII normal.   psychiatric: judgement/insight: within normal limits. memory: within normal limits for recent and remote events. mood and affect: no evidence of depression, anxiety or agitation. orientation: oriented to time, space and person. Basic Metabolic Profile   No results for input(s): NA, K, CL, CO2, BUN, GLU, CA, MG, PHOS in the last 72 hours. No lab exists for component: CREAT      CBC w/Diff    No results for input(s): WBC, RBC, HGB, HCT, MCV, MCH, MCHC, RDW, PLT, HGBEXT, HCTEXT, PLTEXT in the last 72 hours.     No lab exists for component: MPV No results for input(s): GRANS, LYMPH, EOS, PRO, MYELO, METAS, BLAST in the last 72 hours. No lab exists for component: MONO, BASO     Hepatic Function   No results for input(s): ALB, TP, TBILI, GPT, SGOT, AP, AML, LPSE in the last 72 hours. No lab exists for component: DBILI     Coags   No results for input(s): PTP, INR, APTT in the last 72 hours. No lab exists for component: INREXT        Ravi Salinas MD  [facility] [address]  Cell: [phone]  Direct pager: [phone]  [email or web address]  [email or web address]

## 2019-09-19 NOTE — ANESTHESIA POSTPROCEDURE EVALUATION
Procedure(s):  UPPER ENDOSCOPY, biopsies. MAC    Anesthesia Post Evaluation      Multimodal analgesia: multimodal analgesia not used between 6 hours prior to anesthesia start to PACU discharge  Patient location during evaluation: PACU  Patient participation: complete - patient participated  Level of consciousness: awake and alert  Pain score: 0  Pain management: satisfactory to patient  Airway patency: patent  Anesthetic complications: no  Cardiovascular status: acceptable  Respiratory status: acceptable  Hydration status: acceptable  Post anesthesia nausea and vomiting:  none      Vitals Value Taken Time   /50 9/19/2019  9:48 AM   Temp 36.8 °C (98.2 °F) 9/19/2019  9:30 AM   Pulse 57 9/19/2019  9:54 AM   Resp 21 9/19/2019  9:54 AM   SpO2 100 % 9/19/2019  9:54 AM   Vitals shown include unvalidated device data.

## 2019-11-18 ENCOUNTER — OFFICE VISIT (OUTPATIENT)
Dept: CARDIOLOGY CLINIC | Age: 74
End: 2019-11-18

## 2019-11-18 VITALS
BODY MASS INDEX: 32.82 KG/M2 | HEIGHT: 66 IN | DIASTOLIC BLOOD PRESSURE: 76 MMHG | WEIGHT: 204.2 LBS | SYSTOLIC BLOOD PRESSURE: 153 MMHG | HEART RATE: 80 BPM

## 2019-11-18 DIAGNOSIS — I25.5 ISCHEMIC CARDIOMYOPATHY: ICD-10-CM

## 2019-11-18 DIAGNOSIS — E78.5 DYSLIPIDEMIA: ICD-10-CM

## 2019-11-18 DIAGNOSIS — I10 HYPERTENSION, UNSPECIFIED TYPE: Primary | ICD-10-CM

## 2019-11-18 DIAGNOSIS — I25.10 CORONARY ARTERY DISEASE INVOLVING NATIVE CORONARY ARTERY OF NATIVE HEART WITHOUT ANGINA PECTORIS: ICD-10-CM

## 2019-11-18 DIAGNOSIS — I10 ESSENTIAL HYPERTENSION: ICD-10-CM

## 2019-11-18 DIAGNOSIS — F17.209 TOBACCO USE DISORDER, CONTINUOUS: ICD-10-CM

## 2019-11-18 DIAGNOSIS — I45.2 RBBB (RIGHT BUNDLE BRANCH BLOCK WITH LEFT ANTERIOR FASCICULAR BLOCK): ICD-10-CM

## 2019-11-18 RX ORDER — GABAPENTIN 100 MG/1
100 CAPSULE ORAL 2 TIMES DAILY
COMMUNITY
End: 2022-05-02 | Stop reason: ALTCHOICE

## 2019-11-18 RX ORDER — AMLODIPINE BESYLATE 2.5 MG/1
2.5 TABLET ORAL DAILY
Qty: 90 TAB | Refills: 3 | Status: SHIPPED | OUTPATIENT
Start: 2019-11-18 | End: 2020-04-24

## 2019-11-18 NOTE — PATIENT INSTRUCTIONS

## 2019-11-18 NOTE — PROGRESS NOTES
1. Have you been to the ER, urgent care clinic since your last visit? Hospitalized since your last visit? No    2. Have you seen or consulted any other health care providers outside of the [address] [place] [address] since your last visit? Include any pap smears or colon screening. No     3. Since your last visit, have you had any of the following symptoms? shortness of breath and swelling in legs/arms. Explain: bilateral foot sweilling    4. Have you had any blood work, X-rays or cardiac testing? Yes Where: PCP Reason for visit: Labs/CXR     Requested: NO     In [facility]: YES    5. Where do you normally have your labs drawn? [facility]    6. Do you need any refills today?    No

## 2019-11-18 NOTE — PROGRESS NOTES
HISTORY OF PRESENT ILLNESS  Bettina Crowe is a 69 y.o. male. 4/17 improving blood pressure since meds adjusted by Dr. Katiana Salcido;    1/17 seen for establishing/changing cardiologist   history of coronary disease, status post PCI(2010) and hypertension, along with conduction system disease    Cardiomyopathy   The history is provided by the medical records (ischemic). This is a chronic problem. Associated symptoms include shortness of breath. Pertinent negatives include no chest pain and no headaches. Hypertension   The history is provided by the medical records and patient. This is a chronic problem. Associated symptoms include shortness of breath. Pertinent negatives include no chest pain and no headaches. Cholesterol Problem   The history is provided by the medical records. This is a chronic problem. Associated symptoms include shortness of breath. Pertinent negatives include no chest pain and no headaches. Shortness of Breath   The history is provided by the patient. This is a recurrent problem. The problem occurs intermittently. The current episode started more than 1 week ago. The problem has not changed since onset. Associated symptoms include leg swelling. Pertinent negatives include no fever, no headaches, no cough, no wheezing, no PND, no orthopnea, no chest pain, no vomiting, no rash and no claudication. The problem's precipitants include exercise (10-15 mt walk). Leg Swelling   The history is provided by the patient. This is a recurrent problem. The current episode started more than 1 week ago (6/16). The problem occurs every several days. The problem has not changed since onset. Associated symptoms include shortness of breath. Pertinent negatives include no chest pain and no headaches. The symptoms are aggravated by standing. The symptoms are relieved by sleep. Review of Systems   Constitutional: Negative for chills, fever, malaise/fatigue and weight loss. HENT: Negative for nosebleeds. Eyes: Negative for discharge. Respiratory: Positive for shortness of breath. Negative for cough and wheezing. Cardiovascular: Positive for leg swelling. Negative for chest pain, palpitations, orthopnea, claudication and PND. Gastrointestinal: Negative for diarrhea, nausea and vomiting. Genitourinary: Negative for dysuria and hematuria. Musculoskeletal: Negative for joint pain. Skin: Negative for rash. Neurological: Negative for dizziness, seizures, loss of consciousness and headaches. Endo/Heme/Allergies: Negative for polydipsia. Does not bruise/bleed easily. Psychiatric/Behavioral: Negative for depression and substance abuse. The patient does not have insomnia. No Known Allergies    Past Medical History:   Diagnosis Date    ACS [facility] coronary syndrome) [address] [address]     CAD (coronary artery disease), native coronary artery August 2010    s/p non-ST-elevation myocardial infarction, s/p PCI with BMS (Vision 4x18mm) distal RCA with 45% distal LAD  and mild LCx disease. mild-moderate LV systolic dysfunction (87/48).  Diabetes mellitus type II     Dyslipidemia     ED (erectile dysfunction)     GERD (gastroesophageal reflux disease)     History of BPH     History of echocardiogram 5/18/2011    EF 65%. Mod-marked increased wall thickness. Gr 1 DDfx. No significant valvular heart disease.  Hyperlipidemia     Hypertension     Ischemic cardiomyopathy     recovery of LV syst fct  Echo May 2011 LV EF 65%    MI (myocardial infarction) ([facility] [address]     Obesity     RBBB (right bundle branch block with left anterior fascicular block)     S/P cardiac cath 8/26/2010    LM patent. dLAD 45%. CX mild. dRCA 100% thrombotic (S/P cath thrombectomy & Vision 4 x 18-mm BMS). EF 40%. Posterobasal, diaphrag, apical hypk.       Shingles 4/2012    Tobacco use disorder, continuous        Family History   Problem Relation Age of Onset    Diabetes Mother        Social History     Tobacco Use    Smoking status: Current Every Day Smoker     Packs/day: 0.50     Years: 44.00     Pack years: 22.00     Types: Cigarettes    Smokeless tobacco: Never Used    Tobacco comment: 2-3 cigs per day   Substance Use Topics    Alcohol use: No    Drug use: No        Current Outpatient Medications   Medication Sig    gabapentin (NEURONTIN) 100 mg capsule Take 100 mg by mouth two (2) times a day.  nitroglycerin (NITROSTAT) 0.4 mg SL tablet 1 Tab by SubLINGual route every five (5) minutes as needed for Chest Pain for up to 3 doses. Indications: Angina    azilsartan med-chlorthalidone (EDARBYCLOR) 40-25 mg per tablet Take 1 Tab by mouth daily.  cholecalciferol, vitamin D3, (VITAMIN D3) 2,000 unit tab Take  by mouth.  dutasteride (AVODART) 0.5 mg capsule Take 0.5 mg by mouth daily.  linagliptin (TRADJENTA) 5 mg tablet Take 5 mg by mouth daily.  hydrALAZINE (APRESOLINE) 100 mg tablet Take 100 mg by mouth three (3) times daily.  carvedilol (COREG) 12.5 mg tablet Take 25 mg by mouth two (2) times daily (with meals).  simvastatin (ZOCOR) 40 mg tablet Take 1 Tab by mouth nightly.  pantoprazole (PROTONIX) 40 mg tablet Take 40 mg by mouth daily.  insulin glargine (LANTUS SOLOSTAR) 100 unit/mL (3 mL) pen 60 Units by SubCUTAneous route daily.  aspirin 81 mg tablet Take 81 mg by mouth daily.  amLODIPine (NORVASC) 5 mg tablet Take 5 mg by mouth daily. No current facility-administered medications for this visit. Past Surgical History:   Procedure Laterality Date    COLONOSCOPY N/A 4/23/2019    COLONOSCOPY performed by Genie Desouza MD at [facility] ENDOSCOPY    HX CATARACT REMOVAL      HX CORONARY ARTERY BYPASS GRAFT      HX CORONARY STENT PLACEMENT  2010    HX HEART CATHETERIZATION  08/26/10    1. Normal systemic pressure. 2. Moderate elevation of left sided filling pressures. 3. Mild to moderate left ventricular systolic dysfunction distinct regional wall motion abnormalities.  4. Coronary disease with thrombotic occlusion of the distal right coronary artery and moderate left anterior descending disease. 5. Successful percutaneous coronary intervention with catheter thrombectomy.  HX HEMORRHOIDECTOMY      HX MOHS PROCEDURES Left 2002    HX TRABECULECTOMY         Diagnostic Studies: Old records reviewed and show as follows  EKG tracings reviewed by me today. No flowsheet data found. 3/17 Nuc Stress  Conclusion:   1. Nondiagnostic EKG changes of ischemia due to abnormal baseline EKG at 89% of predicted maximal heart rate. 2. Normal functional capacity. 3. Severely hypertensive blood pressure response and appropriate heart rate response. 4. No ischemic symptoms or arrhythmias seen. 5. Perfusion image report to follow. Conclusion:   1. Normal perfusion scan. 2. Normal wall motion and ejection fraction. 3. Low risk scan. 3/17 ECHO  SUMMARY:  Left ventricle: Ejection fraction was estimated to be 65 %. There were no  regional wall motion abnormalities. There was severe concentric  hypertrophy. Features were consistent with a pseudonormal left ventricular  filling pattern, with concomitant abnormal relaxation and increased  filling pressure (grade 2 diastolic dysfunction). Visit Vitals  /76   Pulse 80   Ht 5' 6\" (1.676 m)   Wt 92.6 kg (204 lb 3.2 oz)   BMI 32.96 kg/m²       Mr. Juliet Wills has a reminder for a \"due or due soon\" health maintenance. I have asked that he contact his primary care provider for follow-up on this health maintenance. 9/10 Card Cath  IMPRESSION  1. Normal systemic pressure. 2. Moderate elevation of left-sided filling pressures. 3. Mild to moderate left ventricular systolic dysfunction with distinct  regional wall motion abnormalities. 4. Coronary disease as described above with a thrombotic occlusion of the  distal right coronary artery and moderate distal left anterior descending  disease.   5. Successful percutaneous coronary intervention with catheter  thrombectomy, followed by bare metal stent deployment with a Vision 4 x 18  mm stent in the distal right coronary artery. Physical Exam   Constitutional: He is oriented to person, place, and time. He appears well-developed and well-nourished. No distress. Obese   HENT:   Head: Normocephalic and atraumatic. Mouth/Throat: Normal dentition. Eyes: Right eye exhibits no discharge. Left eye exhibits no discharge. No scleral icterus. Neck: Neck supple. No JVD present. Carotid bruit is not present. No thyromegaly present. Cardiovascular: Normal rate, regular rhythm, S1 normal, S2 normal, normal heart sounds and intact distal pulses. Exam reveals decreased pulses. Exam reveals no gallop and no friction rub. No murmur heard. Pulmonary/Chest: Effort normal and breath sounds normal. He has no wheezes. He has no rales. Abdominal: Soft. He exhibits no mass. There is no tenderness. Musculoskeletal: He exhibits no edema. Lymphadenopathy:        Right cervical: No superficial cervical adenopathy present. Left cervical: No superficial cervical adenopathy present. Neurological: He is alert and oriented to person, place, and time. Skin: Skin is warm and dry. No rash noted. Psychiatric: He has a normal mood and affect. His behavior is normal.       ASSESSMENT and PLAN    Patient advised to stop smoking to reduce future cardiovascular events. I have reviewed/discussed the above normal BMI with the patient. I have recommended the following interventions: dietary management education, guidance, and counseling, encourage exercise and monitor weight . [facility] HLD: 3/17 LDL31;             Diagnoses and all orders for this visit:    1. Hypertension, unspecified type  -     AMB POC EKG ROUTINE W/ 12 LEADS, INTER & REP    2. Coronary artery disease involving native coronary artery of native heart without angina pectoris    3. Ischemic cardiomyopathy    4. Dyslipidemia    5.  Tobacco use disorder, continuous    6. RBBB (right bundle branch block with left anterior fascicular block)    7. Essential hypertension  Comments:  11/17 controlled;         Pertinent laboratory and test data reviewed and discussed with patient. See patient instructions also for other medical advice given    Medications Discontinued During This Encounter   Medication Reason    glimepiride (AMARYL) 4 mg tablet        Follow-up and Dispositions    · Return in about 1 year (around 11/18/2020), or if symptoms worsen or fail to improve, for Obtain labs with lipids from PCP. I have independently evaluated and examined the patient. Patient is stable clinically without any CHF or angina. Reinforced smoking cessation and explained the benefits. Detailed discussion on diet and weight reduction. Blood pressure is mildly elevated and do not know why Norvasc was missing. It will be represcribed in the lower dose. Follow home chart. Regular exercise and diet was discussed in detail. All relevant labs and testing data's are reviewed. Care plan discussed and updated after review.   Renetta Padron MD

## 2020-02-19 ENCOUNTER — HOSPITAL ENCOUNTER (OUTPATIENT)
Dept: LAB | Age: 75
Discharge: HOME | End: 2020-02-19
Payer: MEDICARE

## 2020-02-19 DIAGNOSIS — N18.2 CHRONIC KIDNEY DISEASE, STAGE II (MILD): ICD-10-CM

## 2020-02-19 LAB
ALBUMIN SERPL-MCNC: 3.2 G/DL (ref 3.4–5)
ANION GAP SERPL CALC-SCNC: 5 MMOL/L (ref 3–18)
BUN SERPL-MCNC: 27 MG/DL (ref 7–18)
BUN/CREAT SERPL: 12 (ref 12–20)
CALCIUM SERPL-MCNC: 8.5 MG/DL (ref 8.5–10.1)
CALCIUM SERPL-MCNC: 8.7 MG/DL (ref 8.5–10.1)
CHLORIDE SERPL-SCNC: 106 MMOL/L (ref 100–111)
CO2 SERPL-SCNC: 27 MMOL/L (ref 21–32)
CREAT SERPL-MCNC: 2.3 MG/DL (ref 0.6–1.3)
CREAT UR-MCNC: 122 MG/DL (ref 30–125)
ERYTHROCYTE [DISTWIDTH] IN BLOOD BY AUTOMATED COUNT: 14.3 % (ref 11.6–14.5)
GLUCOSE SERPL-MCNC: 173 MG/DL (ref 74–99)
HCT VFR BLD AUTO: 36.4 % (ref 36–48)
HGB BLD-MCNC: 12.3 G/DL (ref 13–16)
MCH RBC QN AUTO: 26.2 PG (ref 24–34)
MCHC RBC AUTO-ENTMCNC: 33.8 G/DL (ref 31–37)
MCV RBC AUTO: 77.6 FL (ref 74–97)
PHOSPHATE SERPL-MCNC: 3.9 MG/DL (ref 2.5–4.9)
PLATELET # BLD AUTO: 254 K/UL (ref 135–420)
PMV BLD AUTO: 11 FL (ref 9.2–11.8)
POTASSIUM SERPL-SCNC: 4.9 MMOL/L (ref 3.5–5.5)
PROT UR-MCNC: 351 MG/DL
PTH-INTACT SERPL-MCNC: 125.1 PG/ML (ref 18.4–88)
RBC # BLD AUTO: 4.69 M/UL (ref 4.7–5.5)
SODIUM SERPL-SCNC: 138 MMOL/L (ref 136–145)
WBC # BLD AUTO: 8.4 K/UL (ref 4.6–13.2)

## 2020-02-19 PROCEDURE — 80069 RENAL FUNCTION PANEL: CPT

## 2020-02-19 PROCEDURE — 83970 ASSAY OF PARATHORMONE: CPT

## 2020-02-19 PROCEDURE — 85027 COMPLETE CBC AUTOMATED: CPT

## 2020-02-19 PROCEDURE — 82570 ASSAY OF URINE CREATININE: CPT

## 2020-02-19 PROCEDURE — 36415 COLL VENOUS BLD VENIPUNCTURE: CPT

## 2020-02-19 PROCEDURE — 84156 ASSAY OF PROTEIN URINE: CPT

## 2020-04-20 ENCOUNTER — APPOINTMENT (OUTPATIENT)
Dept: GENERAL RADIOLOGY | Age: 75
DRG: 247 | End: 2020-04-20
Attending: EMERGENCY MEDICINE
Payer: MEDICARE

## 2020-04-20 ENCOUNTER — HOSPITAL ENCOUNTER (INPATIENT)
Age: 75
LOS: 4 days | Discharge: HOME | DRG: 247 | End: 2020-04-24
Attending: EMERGENCY MEDICINE | Admitting: INTERNAL MEDICINE
Payer: MEDICARE

## 2020-04-20 DIAGNOSIS — I21.4 NSTEMI (NON-ST ELEVATED MYOCARDIAL INFARCTION) (HCC): Primary | ICD-10-CM

## 2020-04-20 PROBLEM — I16.0 HYPERTENSIVE URGENCY: Status: ACTIVE | Noted: 2020-04-20

## 2020-04-20 PROBLEM — I24.9 ACS (ACUTE CORONARY SYNDROME) (HCC): Status: ACTIVE | Noted: 2020-04-20

## 2020-04-20 LAB
ALBUMIN SERPL-MCNC: 2.9 G/DL (ref 3.4–5)
ALBUMIN/GLOB SERPL: 0.6 {RATIO} (ref 0.8–1.7)
ALP SERPL-CCNC: 98 U/L (ref 45–117)
ALT SERPL-CCNC: 17 U/L (ref 16–61)
AMORPH CRY URNS QL MICRO: ABNORMAL
ANION GAP SERPL CALC-SCNC: 7 MMOL/L (ref 3–18)
APPEARANCE UR: CLEAR
APTT PPP: 31.9 SEC (ref 23–36.4)
APTT PPP: 53.2 SEC (ref 23–36.4)
APTT PPP: 73.5 SEC (ref 23–36.4)
AST SERPL-CCNC: 20 U/L (ref 10–38)
ATRIAL RATE: 64 BPM
ATRIAL RATE: 79 BPM
ATRIAL RATE: 80 BPM
BACTERIA URNS QL MICRO: ABNORMAL /HPF
BASOPHILS # BLD: 0 K/UL (ref 0–0.1)
BASOPHILS # BLD: 0 K/UL (ref 0–0.1)
BASOPHILS NFR BLD: 0 % (ref 0–2)
BASOPHILS NFR BLD: 0 % (ref 0–2)
BILIRUB SERPL-MCNC: 0.2 MG/DL (ref 0.2–1)
BILIRUB UR QL: NEGATIVE
BNP SERPL-MCNC: 1958 PG/ML (ref 0–900)
BUN SERPL-MCNC: 27 MG/DL (ref 7–18)
BUN/CREAT SERPL: 10 (ref 12–20)
CALCIUM SERPL-MCNC: 8.2 MG/DL (ref 8.5–10.1)
CALCULATED P AXIS, ECG09: 47 DEGREES
CALCULATED P AXIS, ECG09: 66 DEGREES
CALCULATED P AXIS, ECG09: 68 DEGREES
CALCULATED R AXIS, ECG10: -84 DEGREES
CALCULATED R AXIS, ECG10: -84 DEGREES
CALCULATED R AXIS, ECG10: -86 DEGREES
CALCULATED T AXIS, ECG11: 169 DEGREES
CALCULATED T AXIS, ECG11: 90 DEGREES
CALCULATED T AXIS, ECG11: 91 DEGREES
CHLORIDE SERPL-SCNC: 104 MMOL/L (ref 100–111)
CHOLEST SERPL-MCNC: 122 MG/DL
CK MB CFR SERPL CALC: 3.9 % (ref 0–4)
CK MB SERPL-MCNC: 5.7 NG/ML (ref 5–25)
CK SERPL-CCNC: 146 U/L (ref 39–308)
CO2 SERPL-SCNC: 23 MMOL/L (ref 21–32)
COLOR UR: YELLOW
CREAT SERPL-MCNC: 2.59 MG/DL (ref 0.6–1.3)
DIAGNOSIS, 93000: NORMAL
DIFFERENTIAL METHOD BLD: ABNORMAL
DIFFERENTIAL METHOD BLD: ABNORMAL
EOSINOPHIL # BLD: 0.8 K/UL (ref 0–0.4)
EOSINOPHIL # BLD: 0.9 K/UL (ref 0–0.4)
EOSINOPHIL NFR BLD: 9 % (ref 0–5)
EOSINOPHIL NFR BLD: 9 % (ref 0–5)
EPITH CASTS URNS QL MICRO: ABNORMAL /LPF (ref 0–5)
ERYTHROCYTE [DISTWIDTH] IN BLOOD BY AUTOMATED COUNT: 14.6 % (ref 11.6–14.5)
ERYTHROCYTE [DISTWIDTH] IN BLOOD BY AUTOMATED COUNT: 14.6 % (ref 11.6–14.5)
GLOBULIN SER CALC-MCNC: 5.1 G/DL (ref 2–4)
GLUCOSE BLD STRIP.AUTO-MCNC: 116 MG/DL (ref 70–110)
GLUCOSE BLD STRIP.AUTO-MCNC: 183 MG/DL (ref 70–110)
GLUCOSE BLD STRIP.AUTO-MCNC: 76 MG/DL (ref 70–110)
GLUCOSE BLD STRIP.AUTO-MCNC: 80 MG/DL (ref 70–110)
GLUCOSE BLD STRIP.AUTO-MCNC: 88 MG/DL (ref 70–110)
GLUCOSE SERPL-MCNC: 266 MG/DL (ref 74–99)
GLUCOSE UR STRIP.AUTO-MCNC: NEGATIVE MG/DL
HBA1C MFR BLD: 8 % (ref 4.2–5.6)
HCT VFR BLD AUTO: 33.9 % (ref 36–48)
HCT VFR BLD AUTO: 34.5 % (ref 36–48)
HDLC SERPL-MCNC: 23 MG/DL (ref 40–60)
HDLC SERPL: 5.3 {RATIO} (ref 0–5)
HGB BLD-MCNC: 11.7 G/DL (ref 13–16)
HGB BLD-MCNC: 12 G/DL (ref 13–16)
HGB UR QL STRIP: NEGATIVE
KETONES UR QL STRIP.AUTO: NEGATIVE MG/DL
LDLC SERPL CALC-MCNC: 62.2 MG/DL (ref 0–100)
LEUKOCYTE ESTERASE UR QL STRIP.AUTO: NEGATIVE
LIPID PROFILE,FLP: ABNORMAL
LYMPHOCYTES # BLD: 1.3 K/UL (ref 0.9–3.6)
LYMPHOCYTES # BLD: 1.7 K/UL (ref 0.9–3.6)
LYMPHOCYTES NFR BLD: 14 % (ref 21–52)
LYMPHOCYTES NFR BLD: 16 % (ref 21–52)
MCH RBC QN AUTO: 26.3 PG (ref 24–34)
MCH RBC QN AUTO: 26.6 PG (ref 24–34)
MCHC RBC AUTO-ENTMCNC: 34.5 G/DL (ref 31–37)
MCHC RBC AUTO-ENTMCNC: 34.8 G/DL (ref 31–37)
MCV RBC AUTO: 76.2 FL (ref 74–97)
MCV RBC AUTO: 76.5 FL (ref 74–97)
MONOCYTES # BLD: 0.8 K/UL (ref 0.05–1.2)
MONOCYTES # BLD: 0.9 K/UL (ref 0.05–1.2)
MONOCYTES NFR BLD: 10 % (ref 3–10)
MONOCYTES NFR BLD: 8 % (ref 3–10)
NEUTS SEG # BLD: 6.1 K/UL (ref 1.8–8)
NEUTS SEG # BLD: 6.9 K/UL (ref 1.8–8)
NEUTS SEG NFR BLD: 67 % (ref 40–73)
NEUTS SEG NFR BLD: 67 % (ref 40–73)
NITRITE UR QL STRIP.AUTO: NEGATIVE
P-R INTERVAL, ECG05: 296 MS
P-R INTERVAL, ECG05: 304 MS
P-R INTERVAL, ECG05: 304 MS
PH UR STRIP: 5.5 [PH] (ref 5–8)
PLATELET # BLD AUTO: 239 K/UL (ref 135–420)
PLATELET # BLD AUTO: 245 K/UL (ref 135–420)
PMV BLD AUTO: 10.3 FL (ref 9.2–11.8)
PMV BLD AUTO: 10.3 FL (ref 9.2–11.8)
POTASSIUM SERPL-SCNC: 3.9 MMOL/L (ref 3.5–5.5)
PROT SERPL-MCNC: 8 G/DL (ref 6.4–8.2)
PROT UR STRIP-MCNC: 100 MG/DL
Q-T INTERVAL, ECG07: 416 MS
Q-T INTERVAL, ECG07: 418 MS
Q-T INTERVAL, ECG07: 434 MS
QRS DURATION, ECG06: 178 MS
QRS DURATION, ECG06: 178 MS
QRS DURATION, ECG06: 182 MS
QTC CALCULATION (BEZET), ECG08: 447 MS
QTC CALCULATION (BEZET), ECG08: 479 MS
QTC CALCULATION (BEZET), ECG08: 479 MS
RBC # BLD AUTO: 4.45 M/UL (ref 4.7–5.5)
RBC # BLD AUTO: 4.51 M/UL (ref 4.7–5.5)
RBC #/AREA URNS HPF: ABNORMAL /HPF (ref 0–5)
SODIUM SERPL-SCNC: 134 MMOL/L (ref 136–145)
SP GR UR REFRACTOMETRY: 1.01 (ref 1–1.03)
TRIGL SERPL-MCNC: 184 MG/DL (ref ?–150)
TROPONIN I SERPL-MCNC: 1.6 NG/ML (ref 0–0.04)
TROPONIN I SERPL-MCNC: 4.34 NG/ML (ref 0–0.04)
TROPONIN I SERPL-MCNC: 5.48 NG/ML (ref 0–0.04)
UROBILINOGEN UR QL STRIP.AUTO: 0.2 EU/DL (ref 0.2–1)
VENTRICULAR RATE, ECG03: 64 BPM
VENTRICULAR RATE, ECG03: 79 BPM
VENTRICULAR RATE, ECG03: 80 BPM
VLDLC SERPL CALC-MCNC: 36.8 MG/DL
WBC # BLD AUTO: 10.3 K/UL (ref 4.6–13.2)
WBC # BLD AUTO: 9.1 K/UL (ref 4.6–13.2)
WBC URNS QL MICRO: ABNORMAL /HPF (ref 0–4)

## 2020-04-20 PROCEDURE — 82550 ASSAY OF CK (CPK): CPT

## 2020-04-20 PROCEDURE — 93005 ELECTROCARDIOGRAM TRACING: CPT

## 2020-04-20 PROCEDURE — 74011636637 HC RX REV CODE- 636/637: Performed by: INTERNAL MEDICINE

## 2020-04-20 PROCEDURE — 36415 COLL VENOUS BLD VENIPUNCTURE: CPT

## 2020-04-20 PROCEDURE — 83036 HEMOGLOBIN GLYCOSYLATED A1C: CPT

## 2020-04-20 PROCEDURE — 77030027138 HC INCENT SPIROMETER -A

## 2020-04-20 PROCEDURE — 99285 EMERGENCY DEPT VISIT HI MDM: CPT

## 2020-04-20 PROCEDURE — 74011250636 HC RX REV CODE- 250/636: Performed by: EMERGENCY MEDICINE

## 2020-04-20 PROCEDURE — 80053 COMPREHEN METABOLIC PANEL: CPT

## 2020-04-20 PROCEDURE — 77010033678 HC OXYGEN DAILY

## 2020-04-20 PROCEDURE — 74011000250 HC RX REV CODE- 250: Performed by: INTERNAL MEDICINE

## 2020-04-20 PROCEDURE — 80061 LIPID PANEL: CPT

## 2020-04-20 PROCEDURE — 74011250637 HC RX REV CODE- 250/637: Performed by: INTERNAL MEDICINE

## 2020-04-20 PROCEDURE — 82962 GLUCOSE BLOOD TEST: CPT

## 2020-04-20 PROCEDURE — 65660000004 HC RM CVT STEPDOWN

## 2020-04-20 PROCEDURE — 94762 N-INVAS EAR/PLS OXIMTRY CONT: CPT

## 2020-04-20 PROCEDURE — 85025 COMPLETE CBC W/AUTO DIFF WBC: CPT

## 2020-04-20 PROCEDURE — 74011250636 HC RX REV CODE- 250/636: Performed by: INTERNAL MEDICINE

## 2020-04-20 PROCEDURE — 74011250637 HC RX REV CODE- 250/637: Performed by: NURSE PRACTITIONER

## 2020-04-20 PROCEDURE — 71045 X-RAY EXAM CHEST 1 VIEW: CPT

## 2020-04-20 PROCEDURE — 74011250636 HC RX REV CODE- 250/636: Performed by: HOSPITALIST

## 2020-04-20 PROCEDURE — 85730 THROMBOPLASTIN TIME PARTIAL: CPT

## 2020-04-20 PROCEDURE — 83880 ASSAY OF NATRIURETIC PEPTIDE: CPT

## 2020-04-20 PROCEDURE — 81001 URINALYSIS AUTO W/SCOPE: CPT

## 2020-04-20 RX ORDER — INSULIN GLARGINE 100 [IU]/ML
30 INJECTION, SOLUTION SUBCUTANEOUS DAILY
Status: DISCONTINUED | OUTPATIENT
Start: 2020-04-20 | End: 2020-04-24 | Stop reason: HOSPADM

## 2020-04-20 RX ORDER — DUTASTERIDE 0.5 MG/1
0.5 CAPSULE, LIQUID FILLED ORAL DAILY
Status: DISCONTINUED | OUTPATIENT
Start: 2020-04-20 | End: 2020-04-24 | Stop reason: HOSPADM

## 2020-04-20 RX ORDER — LOSARTAN POTASSIUM AND HYDROCHLOROTHIAZIDE 25; 100 MG/1; MG/1
1 TABLET ORAL DAILY
Status: DISCONTINUED | OUTPATIENT
Start: 2020-04-20 | End: 2020-04-20

## 2020-04-20 RX ORDER — AMLODIPINE BESYLATE 5 MG/1
5 TABLET ORAL
Status: COMPLETED | OUTPATIENT
Start: 2020-04-20 | End: 2020-04-20

## 2020-04-20 RX ORDER — ASPIRIN 81 MG/1
81 TABLET ORAL DAILY
Status: DISCONTINUED | OUTPATIENT
Start: 2020-04-20 | End: 2020-04-24 | Stop reason: HOSPADM

## 2020-04-20 RX ORDER — NITROGLYCERIN 40 MG/100ML
0-20 INJECTION INTRAVENOUS
Status: DISCONTINUED | OUTPATIENT
Start: 2020-04-20 | End: 2020-04-20

## 2020-04-20 RX ORDER — LOSARTAN POTASSIUM 50 MG/1
100 TABLET ORAL DAILY
Status: DISCONTINUED | OUTPATIENT
Start: 2020-04-20 | End: 2020-04-20

## 2020-04-20 RX ORDER — HEPARIN SODIUM 10000 [USP'U]/100ML
10-25 INJECTION, SOLUTION INTRAVENOUS
Status: DISCONTINUED | OUTPATIENT
Start: 2020-04-20 | End: 2020-04-22

## 2020-04-20 RX ORDER — INSULIN LISPRO 100 [IU]/ML
INJECTION, SOLUTION INTRAVENOUS; SUBCUTANEOUS EVERY 6 HOURS
Status: DISCONTINUED | OUTPATIENT
Start: 2020-04-20 | End: 2020-04-20

## 2020-04-20 RX ORDER — LOSARTAN POTASSIUM 25 MG/1
100 TABLET ORAL DAILY
Status: DISCONTINUED | OUTPATIENT
Start: 2020-04-20 | End: 2020-04-20

## 2020-04-20 RX ORDER — HEPARIN SODIUM 1000 [USP'U]/ML
INJECTION, SOLUTION INTRAVENOUS; SUBCUTANEOUS
Status: DISPENSED
Start: 2020-04-20 | End: 2020-04-20

## 2020-04-20 RX ORDER — PANTOPRAZOLE SODIUM 40 MG/1
40 TABLET, DELAYED RELEASE ORAL DAILY
Status: DISCONTINUED | OUTPATIENT
Start: 2020-04-20 | End: 2020-04-24 | Stop reason: HOSPADM

## 2020-04-20 RX ORDER — ENALAPRILAT 1.25 MG/ML
0.62 INJECTION INTRAVENOUS
Status: DISCONTINUED | OUTPATIENT
Start: 2020-04-20 | End: 2020-04-20

## 2020-04-20 RX ORDER — CARVEDILOL 25 MG/1
25 TABLET ORAL 2 TIMES DAILY WITH MEALS
Status: DISCONTINUED | OUTPATIENT
Start: 2020-04-20 | End: 2020-04-24 | Stop reason: HOSPADM

## 2020-04-20 RX ORDER — SODIUM CHLORIDE 9 MG/ML
60 INJECTION, SOLUTION INTRAVENOUS CONTINUOUS
Status: DISCONTINUED | OUTPATIENT
Start: 2020-04-20 | End: 2020-04-24 | Stop reason: HOSPADM

## 2020-04-20 RX ORDER — HYDRALAZINE HYDROCHLORIDE 50 MG/1
100 TABLET, FILM COATED ORAL 3 TIMES DAILY
Status: DISCONTINUED | OUTPATIENT
Start: 2020-04-20 | End: 2020-04-24 | Stop reason: HOSPADM

## 2020-04-20 RX ORDER — HEPARIN SODIUM 1000 [USP'U]/ML
3000 INJECTION, SOLUTION INTRAVENOUS; SUBCUTANEOUS
Status: COMPLETED | OUTPATIENT
Start: 2020-04-20 | End: 2020-04-20

## 2020-04-20 RX ORDER — GABAPENTIN 100 MG/1
100 CAPSULE ORAL 2 TIMES DAILY
Status: DISCONTINUED | OUTPATIENT
Start: 2020-04-20 | End: 2020-04-24 | Stop reason: HOSPADM

## 2020-04-20 RX ORDER — MAGNESIUM SULFATE 100 %
4 CRYSTALS MISCELLANEOUS AS NEEDED
Status: DISCONTINUED | OUTPATIENT
Start: 2020-04-20 | End: 2020-04-24 | Stop reason: HOSPADM

## 2020-04-20 RX ORDER — ATORVASTATIN CALCIUM 40 MG/1
40 TABLET, FILM COATED ORAL
Status: DISCONTINUED | OUTPATIENT
Start: 2020-04-20 | End: 2020-04-24 | Stop reason: HOSPADM

## 2020-04-20 RX ORDER — AMLODIPINE BESYLATE 10 MG/1
10 TABLET ORAL DAILY
Status: DISCONTINUED | OUTPATIENT
Start: 2020-04-21 | End: 2020-04-24 | Stop reason: HOSPADM

## 2020-04-20 RX ORDER — HYDRALAZINE HYDROCHLORIDE 20 MG/ML
10 INJECTION INTRAMUSCULAR; INTRAVENOUS
Status: DISCONTINUED | OUTPATIENT
Start: 2020-04-20 | End: 2020-04-24 | Stop reason: HOSPADM

## 2020-04-20 RX ORDER — AMLODIPINE BESYLATE 5 MG/1
2.5 TABLET ORAL DAILY
Status: DISCONTINUED | OUTPATIENT
Start: 2020-04-20 | End: 2020-04-20

## 2020-04-20 RX ORDER — HEPARIN SODIUM 1000 [USP'U]/ML
4000 INJECTION, SOLUTION INTRAVENOUS; SUBCUTANEOUS ONCE
Status: COMPLETED | OUTPATIENT
Start: 2020-04-20 | End: 2020-04-20

## 2020-04-20 RX ORDER — DEXTROSE 50 % IN WATER (D50W) INTRAVENOUS SYRINGE
25-50 AS NEEDED
Status: DISCONTINUED | OUTPATIENT
Start: 2020-04-20 | End: 2020-04-22

## 2020-04-20 RX ADMIN — GABAPENTIN 100 MG: 100 CAPSULE ORAL at 08:45

## 2020-04-20 RX ADMIN — LOSARTAN POTASSIUM 100 MG: 50 TABLET, FILM COATED ORAL at 09:00

## 2020-04-20 RX ADMIN — CARVEDILOL 25 MG: 25 TABLET, FILM COATED ORAL at 08:46

## 2020-04-20 RX ADMIN — AMLODIPINE BESYLATE 5 MG: 5 TABLET ORAL at 12:56

## 2020-04-20 RX ADMIN — GABAPENTIN 100 MG: 100 CAPSULE ORAL at 17:34

## 2020-04-20 RX ADMIN — ASPIRIN 81 MG: 81 TABLET, COATED ORAL at 08:47

## 2020-04-20 RX ADMIN — AMLODIPINE BESYLATE 2.5 MG: 5 TABLET ORAL at 08:47

## 2020-04-20 RX ADMIN — HEPARIN SODIUM 10 UNITS/KG/HR: 10000 INJECTION, SOLUTION INTRAVENOUS at 01:55

## 2020-04-20 RX ADMIN — SODIUM CHLORIDE 50 ML/HR: 900 INJECTION, SOLUTION INTRAVENOUS at 03:55

## 2020-04-20 RX ADMIN — INSULIN LISPRO 2 UNITS: 100 INJECTION, SOLUTION INTRAVENOUS; SUBCUTANEOUS at 06:27

## 2020-04-20 RX ADMIN — PANTOPRAZOLE SODIUM 40 MG: 40 TABLET, DELAYED RELEASE ORAL at 08:46

## 2020-04-20 RX ADMIN — SODIUM CHLORIDE 50 ML/HR: 900 INJECTION, SOLUTION INTRAVENOUS at 14:11

## 2020-04-20 RX ADMIN — HEPARIN SODIUM 4000 UNITS: 1000 INJECTION, SOLUTION INTRAVENOUS; SUBCUTANEOUS at 01:54

## 2020-04-20 RX ADMIN — HYDRALAZINE HYDROCHLORIDE 100 MG: 50 TABLET, FILM COATED ORAL at 22:12

## 2020-04-20 RX ADMIN — CARVEDILOL 25 MG: 25 TABLET, FILM COATED ORAL at 17:34

## 2020-04-20 RX ADMIN — ATORVASTATIN CALCIUM 40 MG: 40 TABLET, FILM COATED ORAL at 22:12

## 2020-04-20 RX ADMIN — NITROGLYCERIN 10 MCG/MIN: 40 INJECTION INTRAVENOUS at 03:53

## 2020-04-20 RX ADMIN — HYDRALAZINE HYDROCHLORIDE 100 MG: 50 TABLET, FILM COATED ORAL at 17:34

## 2020-04-20 RX ADMIN — HYDRALAZINE HYDROCHLORIDE 100 MG: 50 TABLET, FILM COATED ORAL at 08:46

## 2020-04-20 RX ADMIN — HEPARIN SODIUM 3000 UNITS: 1000 INJECTION, SOLUTION INTRAVENOUS; SUBCUTANEOUS at 09:57

## 2020-04-20 RX ADMIN — HEPARIN SODIUM 1130 UNITS/HR: 10000 INJECTION, SOLUTION INTRAVENOUS at 09:59

## 2020-04-20 RX ADMIN — DUTASTERIDE 0.5 MG: 0.5 CAPSULE, LIQUID FILLED ORAL at 10:25

## 2020-04-20 NOTE — PROGRESS NOTES
Patient admitted this morning, seen for follow-up. Patient feeling better, denies any chest pain or shortness of breath. No fevers or cough at home. Visit Vitals  /86   Pulse 65   Temp 98.2 °F (36.8 °C)   Resp 16   Ht 5' 6\" (1.676 m)   Wt 93 kg (205 lb)   SpO2 94%   BMI 33.09 kg/m²       Exam  General:  Alert, cooperative, no acute distress    Pulmonary:  CTA Bilaterally. No Wheezing/Rales. Cardiovascular: Regular rate and Rhythm. GI:  Soft, Non distended, Non tender. + Bowel sounds. Extremities:  No edema. No calf tenderness. Psych: Good insight. Not anxious or agitated. Neurologic: Alert and oriented X 3. No acute neuro deficits. Labs, chart, and vitals noted    1. NSTEMI: Continue IV heparin, aspirin, BB, and statin. Discussed with cardiology team, plan for cardiac cath once creatinine slightly improves, given CKD and no chest pain. 2. Hypertensive urgency: BP stable, off nitroglycerin drip, continue p.o. medications amlodipine, Coreg, hydralazine. We will also continue IV hydralazine PRN. 3. CKD stage III: We will monitor creatinine, nephrology consult, continue IV fluids per nephrology. 4. Diabetes mellitus type 2: BS stable, continue Lantus and SSI. Discussed with the patient  Discussed with RN. Disclaimer: Sections of this note are dictated using utilizing voice recognition software. Minor typographical errors may be present. If questions arise, please do not hesitate to contact me or call our department.

## 2020-04-20 NOTE — Clinical Note
TRANSFER - IN REPORT:     Verbal report received from: Shawn Dos Santos RN. Report consisted of patient's Situation, Background, Assessment and   Recommendations(SBAR). Opportunity for questions and clarification was provided. Assessment completed upon patient's arrival to unit and care assumed. Patient transported with a Registered Nurse.

## 2020-04-20 NOTE — H&P
Consult Note    Consult requested by: Yudelka Cardenas MD    ADMIT DATE: 4/20/2020    CONSULT DATE: April 20, 2020           Admission diagnosis: chest pain   Reason for Nephrology Consultation: CKD3 , risk for CANDIE    Assessment: 1. 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 High risk for progression of disease. Volume status appears okay. Electrolytes and acid-base also in good range. Baseline creatinine is in 2 range. Noted plans for cardiac cath per cardiology due to NSTEMI, moderate risk for contrast-induced nephropathy. Discussed with patient. #2 NSTEMI, per cardiology team  #3 hypertensive urgency, overnight was on nitro drip but now transition to p.o. antihypertensives, better controlled, goal would be systolic 306U to 380D in the first 24 hours #4  #4 nephrotic proteinuria with hypoalbuminemia  #5 diabetes mellitus with complications  #6 mild hyponatremia  #7 dyslipidemia    Plan:  1) continue NS @ 50 cc/hrs until 12 hrs after cath   Risk for CANDIE discussed with patient with contrast exposure   2) hold ACE/ARB  3) continue home BP med , goal SBP as above     Please call with questions    Roxy Cisse MD FASN  Cell [phone]  Pager: [phone]    HPI:   Patient is a 60-year-old -American male with longstanding history of diabetes inadequately controlled with hemoglobin A1c of 8.3, hypertension for the last 10 years uncontrolled, history of diastolic dysfunction, obesity, also has ischemic cardiomyopathy, tobacco abuse, chronic kidney disease stage IV who presented to my review emergency room with complaints of left-sided chest pain over the last couple of days. Patient noted that he was feeling palpitations over the last few days. He also had pain after eating and thought that it was his stomach and therefore took antacid with no significant improvement.   Over the weekend his pain worsened and therefore he was brought into the emergency room by EMS. On presentation to the emergency room patient had blood pressures in the range of 021C to 969K systolic. He was not hypoxic. Patient had blood tests which showed white count of 10,300 hemoglobin of 11.7 platelet count of 2 43,161. Sodium was 134 potassium 3.9 BUN of 27 creatinine of 2.59 EGFR of 30. Liver enzymes were in normal range. Patient's troponins were elevated at 1.6 which trended up to 5.4 this morning. BNP was elevated at 1958. Cardiology team was consulted and  and is planning to do a cardiac cath. Nephrology was consulted due to the risk for contrast-induced nephropathy, his CKD 4 as well as his uncontrolled hypertension. Past Medical History:   Diagnosis Date    ACS (acute coronary syndrome) ([address]     CAD (coronary artery disease), native coronary artery August 2010    s/p non-ST-elevation myocardial infarction, s/p PCI with BMS (Vision 4x18mm) distal RCA with 45% distal LAD  and mild LCx disease. mild-moderate LV systolic dysfunction (37/80).  Diabetes mellitus type II     Dyslipidemia     ED (erectile dysfunction)     GERD (gastroesophageal reflux disease)     History of BPH     History of echocardiogram 5/18/2011    EF 65%. Mod-marked increased wall thickness. Gr 1 DDfx. No significant valvular heart disease.  Hyperlipidemia     Hypertension     Ischemic cardiomyopathy     recovery of LV syst fct  Echo May 2011 LV EF 65%    MI (myocardial infarction) ([facility] [address]     Obesity     RBBB (right bundle branch block with left anterior fascicular block)     S/P cardiac cath 8/26/2010    LM patent. dLAD 45%. CX mild. dRCA 100% thrombotic (S/P cath thrombectomy & Vision 4 x 18-mm BMS). EF 40%. Posterobasal, diaphrag, apical hypk.       Shingles 4/2012    Tobacco use disorder, continuous       Past Surgical History:   Procedure Laterality Date    COLONOSCOPY N/A 4/23/2019    COLONOSCOPY performed by Alaina Nunez MD at [facility] ENDOSCOPY    HX CATARACT REMOVAL      HX CORONARY ARTERY BYPASS GRAFT      HX CORONARY STENT PLACEMENT  2010    HX HEART CATHETERIZATION  08/26/10    1. Normal systemic pressure. 2. Moderate elevation of left sided filling pressures. 3. Mild to moderate left ventricular systolic dysfunction distinct regional wall motion abnormalities. 4. Coronary disease with thrombotic occlusion of the distal right coronary artery and moderate left anterior descending disease. 5. Successful percutaneous coronary intervention with catheter thrombectomy.     HX HEMORRHOIDECTOMY      HX MOHS PROCEDURES Left 2002    HX TRABECULECTOMY         Social History     Socioeconomic History    Marital status:      Spouse name: Not on file    Number of children: Not on file    Years of education: Not on file    Highest education level: Not on file   Occupational History    Not on file   Social Needs    Financial resource strain: Not on file    Food insecurity     Worry: Not on file     Inability: Not on file    Transportation needs     Medical: Not on file     Non-medical: Not on file   Tobacco Use    Smoking status: Current Every Day Smoker     Packs/day: 0.50     Years: 44.00     Pack years: 22.00     Types: Cigarettes    Smokeless tobacco: Never Used    Tobacco comment: 2-3 cigs per day   Substance and Sexual Activity    Alcohol use: No    Drug use: No    Sexual activity: Not on file   Lifestyle    Physical activity     Days per week: Not on file     Minutes per session: Not on file    Stress: Not on file   Relationships    Social connections     Talks on phone: Not on file     Gets together: Not on file     Attends Shinto service: Not on file     Active member of club or organization: Not on file     Attends meetings of clubs or organizations: Not on file     Relationship status: Not on file    Intimate partner violence     Fear of current or ex partner: Not on file Emotionally abused: Not on file     Physically abused: Not on file     Forced sexual activity: Not on file   Other Topics Concern    Not on file   Social History Narrative    Not on file       Family History   Problem Relation Age of Onset    Diabetes Mother      No Known Allergies     Home Medications:     Medications Prior to Admission   Medication Sig    gabapentin (NEURONTIN) 100 mg capsule Take 100 mg by mouth two (2) times a day.  amLODIPine (NORVASC) 2.5 mg tablet Take 1 Tab by mouth daily.  nitroglycerin (NITROSTAT) 0.4 mg SL tablet 1 Tab by SubLINGual route every five (5) minutes as needed for Chest Pain for up to 3 doses. Indications: Angina    azilsartan med-chlorthalidone (EDARBYCLOR) 40-25 mg per tablet Take 1 Tab by mouth daily.  cholecalciferol, vitamin D3, (VITAMIN D3) 2,000 unit tab Take  by mouth.  dutasteride (AVODART) 0.5 mg capsule Take 0.5 mg by mouth daily.  linagliptin (TRADJENTA) 5 mg tablet Take 5 mg by mouth daily.  hydrALAZINE (APRESOLINE) 100 mg tablet Take 100 mg by mouth three (3) times daily.  carvedilol (COREG) 12.5 mg tablet Take 25 mg by mouth two (2) times daily (with meals).  simvastatin (ZOCOR) 40 mg tablet Take 1 Tab by mouth nightly.  pantoprazole (PROTONIX) 40 mg tablet Take 40 mg by mouth daily.  insulin glargine (LANTUS SOLOSTAR) 100 unit/mL (3 mL) pen 60 Units by SubCUTAneous route daily.  aspirin 81 mg tablet Take 81 mg by mouth daily.        Current Inpatient Medications:     Current Facility-Administered Medications   Medication Dose Route Frequency    heparin 25,000 units in D5W 250 ml infusion  10-25 Units/kg/hr IntraVENous TITRATE    aspirin delayed-release tablet 81 mg  81 mg Oral DAILY    pantoprazole (PROTONIX) tablet 40 mg  40 mg Oral DAILY    insulin glargine (LANTUS) injection 30 Units  30 Units SubCUTAneous DAILY    hydrALAZINE (APRESOLINE) tablet 100 mg  100 mg Oral TID    carvediloL (COREG) tablet 25 mg  25 mg Oral BID WITH MEALS    atorvastatin (LIPITOR) tablet 40 mg  40 mg Oral QHS    dutasteride (AVODART) capsule 0.5 mg  0.5 mg Oral DAILY    gabapentin (NEURONTIN) capsule 100 mg  100 mg Oral BID    insulin lispro (HUMALOG) injection   SubCUTAneous Q6H    glucose chewable tablet 16 g  4 Tab Oral PRN    glucagon (GLUCAGEN) injection 1 mg  1 mg IntraMUSCular PRN    dextrose (D50W) injection syrg 12.5-25 g  25-50 mL IntraVENous PRN    0.9% sodium chloride infusion  50 mL/hr IntraVENous CONTINUOUS    heparin (porcine) 1,000 unit/mL injection        hydrALAZINE (APRESOLINE) 20 mg/mL injection 10 mg  10 mg IntraVENous Q6H PRN    [START ON 4/21/2020] amLODIPine (NORVASC) tablet 10 mg  10 mg Oral DAILY       Review of Systems:   No fever or chills. No sore throat. No cough or hemoptysis. . No nausea, vomiting, abdominal pain, melena or hematochezia. No constipation or diarrhea. No dysuria, no gross hematuria of voiding difficulties. No ankle swelling, no joint paints. No muscle aches. No skin changes. No dizziness or lightheadedness. No headaches. Physical Assessment:     Vitals:    04/20/20 1245 04/20/20 1245 04/20/20 1315 04/20/20 1354   BP: 159/82  163/62 140/86   Pulse: (!) 54  (!) 57 65   Resp:  16     Temp:    98.2 °F (36.8 °C)   SpO2: 100%  100% 94%   Weight:       Height:         Last 3 Recorded Weights in this Encounter    04/20/20 0045   Weight: 93 kg (205 lb)     Admission weight: Weight: 93 kg (205 lb) (04/20/20 0045)      Intake/Output Summary (Last 24 hours) at 4/20/2020 1444  Last data filed at 4/20/2020 1409  Gross per 24 hour   Intake 7.43 ml   Output 2000 ml   Net -1992.57 ml     Mild distress  HEENT: mmm  Lungs: good air entry, clear to auscultation bilaterally. Cardiovascular system: S1, S2, wnl tachy  Abdomen: soft, non tender, non distended. Positive bowel sounds. Extremities: no clubbing, cyanosis or edema. Integumentary: skin is grossly intact. Neurologic: Alert, oriented time three. Data Review:    Labs: Results:       Chemistry Recent Labs     04/20/20  0030   *   *   K 3.9      CO2 23   BUN 27*   CREA 2.59*   CA 8.2*   AGAP 7   BUCR 10*   AP 98   TP 8.0   ALB 2.9*   GLOB 5.1*   AGRAT 0.6*         CBC w/Diff Recent Labs     04/20/20  0554 04/20/20  0030   WBC 10.3 9.1   RBC 4.45* 4.51*   HGB 11.7* 12.0*   HCT 33.9* 34.5*    245   GRANS 67 67   LYMPH 16* 14*   EOS 9* 9*         Iron/Ferritin No results for input(s): IRON in the last 72 hours. No lab exists for component: TIBCCALC   PTH/VIT D No results for input(s): PTH in the last 72 hours.     No lab exists for component: JERMAINE Villasenor MD  4/20/2020  2:44 PM      April 20, 2020

## 2020-04-20 NOTE — H&P
GENERAL GENERIC H&P/CONSULT    Subjective:  81-year-old male with past medical history including DM type II, CKD stage III, hypertension, CAD status post PCI. Patient presented to the emergency department with a complaint of intermittent left-sided chest pain that started about 2 to 3 days ago. Usually pain would start about 20 minutes after eating therefore patient felt it is \"gas pain\" and was taking antacid was no relief. Pain continued to persist subsequently called the EMS to bring him to the ED. Patient was given aspirin by EMS and chest pain has eased off in route to the ED. Currently pain-free. Reports longstanding occasional shortness of breath, chronic dry cough which he attributes to smoking. Denies orthopnea, PND, lower extremity edema. Otherwise denies fever, chills, recent travel or sick contact. In the ED elevated blood pressure with peak SBP of 208 and peak DBP of 95. Saturating 100% on room air. Pertinent blood work finding includes elevated troponin of 1.60. EKG is sinus rhythm, first-degree AV block, RBB otherwise no significant ST-T abnormality. Cardiology was consulted in the ED, heparin drip initiated. Past Medical History:   Diagnosis Date    ACS (acute coronary syndrome) ([facility] [address]     CAD (coronary artery disease), native coronary artery August 2010    s/p non-ST-elevation myocardial infarction, s/p PCI with BMS (Vision 4x18mm) distal RCA with 45% distal LAD  and mild LCx disease. mild-moderate LV systolic dysfunction (73/73).  Diabetes mellitus type II     Dyslipidemia     ED (erectile dysfunction)     GERD (gastroesophageal reflux disease)     History of BPH     History of echocardiogram 5/18/2011    EF 65%. Mod-marked increased wall thickness. Gr 1 DDfx. No significant valvular heart disease.     Hyperlipidemia     Hypertension     Ischemic cardiomyopathy     recovery of LV syst fct  Echo May 2011 LV EF 65%    MI (myocardial infarction) ([facility] [address]     Obesity     RBBB (right bundle branch block with left anterior fascicular block)     S/P cardiac cath 8/26/2010    LM patent. dLAD 45%. CX mild. dRCA 100% thrombotic (S/P cath thrombectomy & Vision 4 x 18-mm BMS). EF 40%. Posterobasal, diaphrag, apical hypk.  Shingles 4/2012    Tobacco use disorder, continuous       Past Surgical History:   Procedure Laterality Date    COLONOSCOPY N/A 4/23/2019    COLONOSCOPY performed by Toan Mix MD at [facility] ENDOSCOPY    HX CATARACT REMOVAL      HX CORONARY ARTERY BYPASS GRAFT      HX CORONARY STENT PLACEMENT  2010    HX HEART CATHETERIZATION  08/26/10    1. Normal systemic pressure. 2. Moderate elevation of left sided filling pressures. 3. Mild to moderate left ventricular systolic dysfunction distinct regional wall motion abnormalities. 4. Coronary disease with thrombotic occlusion of the distal right coronary artery and moderate left anterior descending disease. 5. Successful percutaneous coronary intervention with catheter thrombectomy.  HX HEMORRHOIDECTOMY      HX MOHS PROCEDURES Left 2002    HX TRABECULECTOMY        Prior to Admission medications    Medication Sig Start Date End Date Taking? Authorizing Provider   gabapentin (NEURONTIN) 100 mg capsule Take 100 mg by mouth two (2) times a day. Provider, Historical   amLODIPine (NORVASC) 2.5 mg tablet Take 1 Tab by mouth daily. 11/18/19   Iris Guillen NP   nitroglycerin (NITROSTAT) 0.4 mg SL tablet 1 Tab by SubLINGual route every five (5) minutes as needed for Chest Pain for up to 3 doses. Indications: Angina 11/6/17   Julio Brice MD   azilsartan med-chlorthalidone (EDARBYCLOR) 40-25 mg per tablet Take 1 Tab by mouth daily. Provider, Historical   cholecalciferol, vitamin D3, (VITAMIN D3) 2,000 unit tab Take  by mouth. Provider, Historical   dutasteride (AVODART) 0.5 mg capsule Take 0.5 mg by mouth daily. Provider, Historical   linagliptin (TRADJENTA) 5 mg tablet Take 5 mg by mouth daily. Provider, Historical   hydrALAZINE (APRESOLINE) 100 mg tablet Take 100 mg by mouth three (3) times daily. Provider, Historical   carvedilol (COREG) 12.5 mg tablet Take 25 mg by mouth two (2) times daily (with meals). Provider, Historical   simvastatin (ZOCOR) 40 mg tablet Take 1 Tab by mouth nightly. 5/27/15   Vasquez Wynn MD   pantoprazole (PROTONIX) 40 mg tablet Take 40 mg by mouth daily. Provider, Historical   insulin glargine (LANTUS SOLOSTAR) 100 unit/mL (3 mL) pen 60 Units by SubCUTAneous route daily. Provider, Historical   aspirin 81 mg tablet Take 81 mg by mouth daily. Provider, Historical     No Known Allergies   Social History     Tobacco Use    Smoking status: Current Every Day Smoker     Packs/day: 0.50     Years: 44.00     Pack years: 22.00     Types: Cigarettes    Smokeless tobacco: Never Used    Tobacco comment: 2-3 cigs per day   Substance Use Topics    Alcohol use: No      Family History   Problem Relation Age of Onset    Diabetes Mother       Review of Systems   Constitutional: Negative for appetite change, chills, diaphoresis and fever. HENT: Negative. Eyes: Negative for pain and visual disturbance. Respiratory:        As stated in the HPI   Cardiovascular: Negative. As stated in the HPI   Gastrointestinal: Negative. Endocrine: Negative. Genitourinary: Negative. Musculoskeletal: Negative. Skin: Negative. Neurological: Negative. Psychiatric/Behavioral: Negative. Objective:    No intake/output data recorded. No intake/output data recorded.   Patient Vitals for the past 8 hrs:   BP Temp Pulse Resp SpO2 Height Weight   04/20/20 0215 (!) 208/95    100 %     04/20/20 0200 (!) 198/91  64 24 98 %     04/20/20 0130 182/87  68 20 99 %     04/20/20 0115 182/83  69 25 99 %     04/20/20 0100 181/82  74 24 99 %     04/20/20 0046 (!) 203/85  79 19 100 %     04/20/20 0045 (!) 206/91 98.3 °F (36.8 °C) 79 23 99 % 5' 6\" (1.676 m) 93 kg (205 lb)     Physical Exam  Constitutional:       General: He is not in acute distress. Appearance: Normal appearance. HENT:      Head: Normocephalic and atraumatic. Mouth/Throat:      Mouth: Mucous membranes are moist.      Pharynx: Oropharynx is clear. Eyes:      Extraocular Movements: Extraocular movements intact. Conjunctiva/sclera: Conjunctivae normal.      Pupils: Pupils are equal, round, and reactive to light. Neck:      Musculoskeletal: Neck supple. Cardiovascular:      Rate and Rhythm: Normal rate. Pulses: Normal pulses. Heart sounds: Normal heart sounds. No murmur. No friction rub. No gallop. Pulmonary:      Effort: Pulmonary effort is normal.      Breath sounds: Normal breath sounds. Abdominal:      General: Bowel sounds are normal.      Palpations: Abdomen is soft. Musculoskeletal: Normal range of motion. Skin:     General: Skin is warm and dry. Neurological:      General: No focal deficit present. Mental Status: He is alert and oriented to person, place, and time. Mental status is at baseline. Cranial Nerves: No cranial nerve deficit. Motor: No weakness. Psychiatric:         Mood and Affect: Mood normal.         Thought Content:  Thought content normal.          Labs:    Recent Results (from the past 24 hour(s))   EKG, 12 LEAD, INITIAL    Collection Time: 04/20/20 12:19 AM   Result Value Ref Range    Ventricular Rate 80 BPM    Atrial Rate 80 BPM    P-R Interval 304 ms    QRS Duration 178 ms    Q-T Interval 416 ms    QTC Calculation (Bezet) 479 ms    Calculated P Axis 66 degrees    Calculated R Axis -84 degrees    Calculated T Axis 91 degrees    Diagnosis       Sinus rhythm with 1st degree AV block  Right bundle branch block  Left anterior fascicular block  Bifascicular block  Left ventricular hypertrophy with repolarization abnormality  Cannot rule out Septal infarct , age undetermined  Abnormal ECG  When compared with ECG of 27-AUG-2010 03:37,  T wave inversion no longer evident in Inferior leads  T wave inversion now evident in Lateral leads     CBC WITH AUTOMATED DIFF    Collection Time: 04/20/20 12:30 AM   Result Value Ref Range    WBC 9.1 4.6 - 13.2 K/uL    RBC 4.51 (L) 4.70 - 5.50 M/uL    HGB 12.0 (L) 13.0 - 16.0 g/dL    HCT 34.5 (L) 36.0 - 48.0 %    MCV 76.5 74.0 - 97.0 FL    MCH 26.6 24.0 - 34.0 PG    MCHC 34.8 31.0 - 37.0 g/dL    RDW 14.6 (H) 11.6 - 14.5 %    PLATELET 855 678 - 938 K/uL    MPV 10.3 9.2 - 11.8 FL    NEUTROPHILS 67 40 - 73 %    LYMPHOCYTES 14 (L) 21 - 52 %    MONOCYTES 10 3 - 10 %    EOSINOPHILS 9 (H) 0 - 5 %    BASOPHILS 0 0 - 2 %    ABS. NEUTROPHILS 6.1 1.8 - 8.0 K/UL    ABS. LYMPHOCYTES 1.3 0.9 - 3.6 K/UL    ABS. MONOCYTES 0.9 0.05 - 1.2 K/UL    ABS. EOSINOPHILS 0.8 (H) 0.0 - 0.4 K/UL    ABS. BASOPHILS 0.0 0.0 - 0.1 K/UL    DF AUTOMATED     METABOLIC PANEL, COMPREHENSIVE    Collection Time: 04/20/20 12:30 AM   Result Value Ref Range    Sodium 134 (L) 136 - 145 mmol/L    Potassium 3.9 3.5 - 5.5 mmol/L    Chloride 104 100 - 111 mmol/L    CO2 23 21 - 32 mmol/L    Anion gap 7 3.0 - 18 mmol/L    Glucose 266 (H) 74 - 99 mg/dL    BUN 27 (H) 7.0 - 18 MG/DL    Creatinine 2.59 (H) 0.6 - 1.3 MG/DL    BUN/Creatinine ratio 10 (L) 12 - 20      GFR est AA 30 (L) >60 ml/min/1.73m2    GFR est non-AA 24 (L) >60 ml/min/1.73m2    Calcium 8.2 (L) 8.5 - 10.1 MG/DL    Bilirubin, total 0.2 0.2 - 1.0 MG/DL    ALT (SGPT) 17 16 - 61 U/L    AST (SGOT) 20 10 - 38 U/L    Alk.  phosphatase 98 45 - 117 U/L    Protein, total 8.0 6.4 - 8.2 g/dL    Albumin 2.9 (L) 3.4 - 5.0 g/dL    Globulin 5.1 (H) 2.0 - 4.0 g/dL    A-G Ratio 0.6 (L) 0.8 - 1.7     CARDIAC PANEL,(CK, CKMB & TROPONIN)    Collection Time: 04/20/20 12:30 AM   Result Value Ref Range     39 - 308 U/L    CK - MB 5.7 (H) <3.6 ng/ml    CK-MB Index 3.9 0.0 - 4.0 %    Troponin-I, QT 1.60 (HH) 0.0 - 0.045 NG/ML   PTT    Collection Time: 04/20/20 12:30 AM   Result Value Ref Range    aPTT 31.9 23.0 - 36.4 SEC       ECG: First-degree AV block, RBBB  Chest X-Ray: normal    Assessment:  Active Problems:    NSTEMI (non-ST elevated myocardial infarction) ([facility]) (4/20/2020)    NSTE-ASC  -Chest pain, elevated troponin  -Type I MI versus demand ischemia from elevated BP  -Serial troponin, telemetry monitoring, repeat EKG  -Lipid panel, hemoglobin A1c  -Heparin drip  -Continue aspirin, statin, beta-blocker  -BP control  -Dr. Feliciano is consulted, cardiology service will follow      Hypertensive urgency rule out emergency  -Elevated BP, troponin leak  -Small dose of IV enalaprilat followed by home p.o. antihypertensives including ACE/thiazide, amlodipine, carvedilol    CAD  -PCI in 2010  -See above    CKD stage III  -Noted gradual progression over the past several years, heading to stage IV CKD  -May benefit from follow-up with nephrology    DM type II  -Basal and bolus insulin.   Fingerstick monitoring    DVT prophylaxis: Therapeutically anticoagulated    Full code        Signed:  Chuy Noonan MD 4/20/2020

## 2020-04-20 NOTE — Clinical Note
TRANSFER - OUT REPORT:     Verbal report given to: Odalys Farfan RN. Report consisted of patient's Situation, Background, Assessment and   Recommendations(SBAR). Opportunity for questions and clarification was provided. Patient transported with a Registered Nurse. Patient transported to: [address]

## 2020-04-20 NOTE — Clinical Note
Balloon inflated using multiple inflations inflation technique. Lesion #1: Pressure = 18 syd; Duration = 13 sec. Inflation 2: Pressure = 15 syd; Duration = 8 sec. Inflation 3: Pressure = 18 syd; Duration = 8 sec.

## 2020-04-20 NOTE — PROGRESS NOTES
Problem: Diabetes Self-Management  Goal: *Disease process and treatment process  Description: Define diabetes and identify own type of diabetes; list 3 options for treating diabetes. Outcome: Progressing Towards Goal  Goal: *Incorporating nutritional management into lifestyle  Description: Describe effect of type, amount and timing of food on blood glucose; list 3 methods for planning meals. Outcome: Progressing Towards Goal  Goal: *Incorporating physical activity into lifestyle  Description: State effect of exercise on blood glucose levels. Outcome: Progressing Towards Goal  Goal: *Developing strategies to promote health/change behavior  Description: Define the ABC's of diabetes; identify appropriate screenings, schedule and personal plan for screenings. Outcome: Progressing Towards Goal  Goal: *Using medications safely  Description: State effect of diabetes medications on diabetes; name diabetes medication taking, action and side effects. Outcome: Progressing Towards Goal  Goal: *Monitoring blood glucose, interpreting and using results  Description: Identify recommended blood glucose targets  and personal targets. Outcome: Progressing Towards Goal  Goal: *Prevention, detection, treatment of acute complications  Description: List symptoms of hyper- and hypoglycemia; describe how to treat low blood sugar and actions for lowering  high blood glucose level. Outcome: Progressing Towards Goal  Goal: *Prevention, detection and treatment of chronic complications  Description: Define the natural course of diabetes and describe the relationship of blood glucose levels to long term complications of diabetes.   Outcome: Progressing Towards Goal  Goal: *Developing strategies to address psychosocial issues  Description: Describe feelings about living with diabetes; identify support needed and support network  Outcome: Progressing Towards Goal  Goal: *Insulin pump training  Outcome: Progressing Towards Goal  Goal: *Sick day guidelines  Outcome: Progressing Towards Goal  Goal: *Patient Specific Goal (EDIT GOAL, INSERT TEXT)  Outcome: Progressing Towards Goal

## 2020-04-20 NOTE — Clinical Note
Lesion located in the Mid LAD. Balloon inflated using single inflation technique. Lesion #1: Pressure = 12 syd; Duration = 14 sec.

## 2020-04-20 NOTE — ED PROVIDER NOTES
Dariel Kumar. is a 69 y.o. male with past medical history of diabetes, hypertension, hyperlipidemia, and coronary disease coming in complaining of chest pain. Patient states for last 2 to 3 days he been having intermittent chest pain. He states is on the left side of his chest and is a constant achy pain. He states that he thought it was heartburn and because of this he took an acid reducer after the symptoms to start. He states it would generally resolve after about 20 to 30 minutes. He states tonight after dinner the pain was much worse. He states he last about 30 minutes and then resolved. He states he was brought here by an ambulance. He states that he received 324 mg of chewable aspirin in route by EMS. He denies taking any blood thinners. He denies any cough, shortness of breath, hemoptysis, or syncope. He states he does get diaphoretic when the symptoms start. He denies any nausea or vomiting. Denies any leg swelling orthopnea. Patient has no other complaints at this time. States currently his pain is 0 out of 10 and he feels much better. Past Medical History:   Diagnosis Date    ACS (acute coronary syndrome) ([facility]     CAD (coronary artery disease), native coronary artery August 2010    s/p non-ST-elevation myocardial infarction, s/p PCI with BMS (Vision 4x18mm) distal RCA with 45% distal LAD  and mild LCx disease. mild-moderate LV systolic dysfunction (93/73).  Diabetes mellitus type II     Dyslipidemia     ED (erectile dysfunction)     GERD (gastroesophageal reflux disease)     History of BPH     History of echocardiogram 5/18/2011    EF 65%. Mod-marked increased wall thickness. Gr 1 DDfx. No significant valvular heart disease.     Hyperlipidemia     Hypertension     Ischemic cardiomyopathy     recovery of LV syst fct  Echo May 2011 LV EF 65%    MI (myocardial infarction) ([facility]     Obesity     RBBB (right bundle branch block with left anterior fascicular block)  S/P cardiac cath 8/26/2010    LM patent. dLAD 45%. CX mild. dRCA 100% thrombotic (S/P cath thrombectomy & Vision 4 x 18-mm BMS). EF 40%. Posterobasal, diaphrag, apical hypk.  Shingles 4/2012    Tobacco use disorder, continuous        Past Surgical History:   Procedure Laterality Date    COLONOSCOPY N/A 4/23/2019    COLONOSCOPY performed by Chitra Tobin MD at [facility]    HX CATARACT REMOVAL      HX CORONARY ARTERY BYPASS GRAFT      HX CORONARY STENT PLACEMENT  2010    HX HEART CATHETERIZATION  08/26/10    1. Normal systemic pressure. 2. Moderate elevation of left sided filling pressures. 3. Mild to moderate left ventricular systolic dysfunction distinct regional wall motion abnormalities. 4. Coronary disease with thrombotic occlusion of the distal right coronary artery and moderate left anterior descending disease. 5. Successful percutaneous coronary intervention with catheter thrombectomy.     HX HEMORRHOIDECTOMY      HX MOHS PROCEDURES Left 2002    HX TRABECULECTOMY           Family History:   Problem Relation Age of Onset    Diabetes Mother        Social History     Socioeconomic History    Marital status:      Spouse name: Not on file    Number of children: Not on file    Years of education: Not on file    Highest education level: Not on file   Occupational History    Not on file   Social Needs    Financial resource strain: Not on file    Food insecurity     Worry: Not on file     Inability: Not on file    Transportation needs     Medical: Not on file     Non-medical: Not on file   Tobacco Use    Smoking status: Current Every Day Smoker     Packs/day: 0.50     Years: 44.00     Pack years: 22.00     Types: Cigarettes    Smokeless tobacco: Never Used    Tobacco comment: 2-3 cigs per day   Substance and Sexual Activity    Alcohol use: No    Drug use: No    Sexual activity: Not on file   Lifestyle    Physical activity     Days per week: Not on file     Minutes per session: Not on file    Stress: Not on file   Relationships    Social connections     Talks on phone: Not on file     Gets together: Not on file     Attends Restorationist service: Not on file     Active member of club or organization: Not on file     Attends meetings of clubs or organizations: Not on file     Relationship status: Not on file    Intimate partner violence     Fear of current or ex partner: Not on file     Emotionally abused: Not on file     Physically abused: Not on file     Forced sexual activity: Not on file   Other Topics Concern    Not on file   Social History Narrative    Not on file         ALLERGIES: Patient has no known allergies. Review of Systems   Constitutional: Positive for diaphoresis. Negative for chills and fever. Respiratory: Negative. Negative for shortness of breath. Cardiovascular: Positive for chest pain. Negative for leg swelling. Gastrointestinal: Negative. Negative for nausea and vomiting. Musculoskeletal: Negative. Negative for myalgias. Skin: Negative. Negative for rash. Neurological: Negative. Negative for dizziness, weakness and light-headedness. All other systems reviewed and are negative. Vitals:    04/20/20 0046 04/20/20 0100 04/20/20 0115 04/20/20 0130   BP: (!) 203/85 181/82 182/83 182/87   Pulse: 79 74 69 68   Resp: 19 24 25 20   Temp:       SpO2: 100% 99% 99% 99%   Weight:       Height:                Physical Exam  Vitals signs reviewed. Constitutional:       General: He is not in acute distress. Appearance: Normal appearance. He is well-developed. He is obese. HENT:      Head: Normocephalic and atraumatic. Eyes:      Extraocular Movements: Extraocular movements intact. Conjunctiva/sclera: Conjunctivae normal.      Pupils: Pupils are equal, round, and reactive to light. Neck:      Musculoskeletal: Normal range of motion and neck supple. Cardiovascular:      Rate and Rhythm: Normal rate and regular rhythm. Heart sounds: S1 normal and S2 normal. No murmur. No friction rub. No gallop. Pulmonary:      Effort: Pulmonary effort is normal. No accessory muscle usage or respiratory distress. Breath sounds: Normal breath sounds. Abdominal:      General: There is no distension. Palpations: Abdomen is not rigid. Musculoskeletal: Normal range of motion. General: No tenderness. Skin:     General: Skin is warm. Findings: No rash. Neurological:      General: No focal deficit present. Mental Status: He is alert and oriented to person, place, and time. Psychiatric:         Speech: Speech normal.          MDM  Number of Diagnoses or Management Options  NSTEMI (non-ST elevated myocardial infarction) [facility]):   Diagnosis management comments: Felecia Javier is a 69 y.o. male coming in complaining of intermittent chest pain for last 2 to 3 days. Patient currently asymptomatic. Did receive aspirin in route. Patient's EKG shows a bifascicular block consistent with previous EKGs. No obvious acute ischemic changes. Patient's initial troponin elevated, concerning for non-ST segment elevation MI. Will start on heparin and plan for admission for serial cardiac enzymes and cardiology evaluation.          Procedures      Vitals:  Patient Vitals for the past 12 hrs:   Temp Pulse Resp BP SpO2   04/20/20 0130  68 20 182/87 99 %   04/20/20 0115  69 25 182/83 99 %   04/20/20 0100  74 24 181/82 99 %   04/20/20 0046  79 19 (!) 203/85 100 %   04/20/20 0045 98.3 °F (36.8 °C) 79 23 (!) 206/91 99 %       Medications ordered:   Medications   heparin (porcine) 1,000 unit/mL injection 4,000 Units (has no administration in time range)   heparin 25,000 units in D5W 250 ml infusion (has no administration in time range)         Lab findings:  Recent Results (from the past 12 hour(s))   EKG, 12 LEAD, INITIAL    Collection Time: 04/20/20 12:19 AM   Result Value Ref Range    Ventricular Rate 80 BPM    Atrial Rate 80 BPM    P-R Interval 304 ms    QRS Duration 178 ms    Q-T Interval 416 ms    QTC Calculation (Bezet) 479 ms    Calculated P Axis 66 degrees    Calculated R Axis -84 degrees    Calculated T Axis 91 degrees    Diagnosis       Sinus rhythm with 1st degree AV block  Right bundle branch block  Left anterior fascicular block  Bifascicular block  Left ventricular hypertrophy with repolarization abnormality  Cannot rule out Septal infarct , age undetermined  Abnormal ECG  When compared with ECG of 27-AUG-2010 03:37,  T wave inversion no longer evident in Inferior leads  T wave inversion now evident in Lateral leads     CBC WITH AUTOMATED DIFF    Collection Time: 04/20/20 12:30 AM   Result Value Ref Range    WBC 9.1 4.6 - 13.2 K/uL    RBC 4.51 (L) 4.70 - 5.50 M/uL    HGB 12.0 (L) 13.0 - 16.0 g/dL    HCT 34.5 (L) 36.0 - 48.0 %    MCV 76.5 74.0 - 97.0 FL    MCH 26.6 24.0 - 34.0 PG    MCHC 34.8 31.0 - 37.0 g/dL    RDW 14.6 (H) 11.6 - 14.5 %    PLATELET 941 859 - 058 K/uL    MPV 10.3 9.2 - 11.8 FL    NEUTROPHILS 67 40 - 73 %    LYMPHOCYTES 14 (L) 21 - 52 %    MONOCYTES 10 3 - 10 %    EOSINOPHILS 9 (H) 0 - 5 %    BASOPHILS 0 0 - 2 %    ABS. NEUTROPHILS 6.1 1.8 - 8.0 K/UL    ABS. LYMPHOCYTES 1.3 0.9 - 3.6 K/UL    ABS. MONOCYTES 0.9 0.05 - 1.2 K/UL    ABS. EOSINOPHILS 0.8 (H) 0.0 - 0.4 K/UL    ABS.  BASOPHILS 0.0 0.0 - 0.1 K/UL    DF AUTOMATED     METABOLIC PANEL, COMPREHENSIVE    Collection Time: 04/20/20 12:30 AM   Result Value Ref Range    Sodium 134 (L) 136 - 145 mmol/L    Potassium 3.9 3.5 - 5.5 mmol/L    Chloride 104 100 - 111 mmol/L    CO2 23 21 - 32 mmol/L    Anion gap 7 3.0 - 18 mmol/L    Glucose 266 (H) 74 - 99 mg/dL    BUN 27 (H) 7.0 - 18 MG/DL    Creatinine 2.59 (H) 0.6 - 1.3 MG/DL    BUN/Creatinine ratio 10 (L) 12 - 20      GFR est AA 30 (L) >60 ml/min/1.73m2    GFR est non-AA 24 (L) >60 ml/min/1.73m2    Calcium 8.2 (L) 8.5 - 10.1 MG/DL    Bilirubin, total 0.2 0.2 - 1.0 MG/DL    ALT (SGPT) 17 16 - 61 U/L    AST (SGOT) 20 10 - 38 U/L    Alk. phosphatase 98 45 - 117 U/L    Protein, total 8.0 6.4 - 8.2 g/dL    Albumin 2.9 (L) 3.4 - 5.0 g/dL    Globulin 5.1 (H) 2.0 - 4.0 g/dL    A-G Ratio 0.6 (L) 0.8 - 1.7     CARDIAC PANEL,(CK, CKMB & TROPONIN)    Collection Time: 04/20/20 12:30 AM   Result Value Ref Range     39 - 308 U/L    CK - MB 5.7 (H) <3.6 ng/ml    CK-MB Index 3.9 0.0 - 4.0 %    Troponin-I, QT 1.60 (HH) 0.0 - 0.045 NG/ML       EKG interpretation by ED Physician: Sinus rhythm rate of 80 bpm.  Bifascicular block with repolarization abnormality consistent with prior EKGs. No STEMI. X-Ray, CT or other radiology findings or impressions:  XR CHEST PORT    (Results Pending)       Progress notes, Consult notes or additional Procedure notes:     Consult: Spoke to Dr. Katherine Arroyo, cardiology, regarding patient's symptoms, results, and concern for NSTEMI. Agrees with plan for heparinization and admission. Recommended adding Dr. Milton Augustin to do the treatment team and they will consult. Consult: Spoke to Dr. Elvira Erazo, hospitalist, regarding patient symptoms, elevated troponin, need for admission. He agrees to admit to telemetry for non-ST segment elevation MI. Critical Care Time:  The services I provided to this patient were to treat and/or prevent clinically significant deterioration that could result in the failure of one or more body systems and/or organ systems due to acute myocardial infarction. Services included the following:  -reviewing nursing notes and old charts  -vital sign assessments  -direct patient care  -medication orders and management  -interpreting and reviewing diagnostic studies/labs  -re-evaluations  -documentation time    Aggregate critical care time was 30 minutes, which includes only time during which I was engaged in work directly related to the patient's care as described above, whether I was at bedside or elsewhere in the Emergency Department.  It did not include time spent performing other reported procedures or the services of residents, students, nurses, or advance practice providers. Slade Soriano MD    1:45 AM        Disposition:  Diagnosis:   1. [facility]STEM[facility] (non[facility]ST elevated [facility] ([facility]        Disposition: Admit to telemetry    Follow-up Information    None          Patient's Medications   Start Taking    No medications on file   Continue Taking    AMLODIPINE (NORVASC) 2.5 MG TABLET    Take 1 Tab by mouth daily. ASPIRIN 81 MG TABLET    Take 81 mg by mouth daily. AZILSARTAN MED-CHLORTHALIDONE (EDARBYCLOR) 40-25 MG PER TABLET    Take 1 Tab by mouth daily. CARVEDILOL (COREG) 12.5 MG TABLET    Take 25 mg by mouth two (2) times daily (with meals). CHOLECALCIFEROL, VITAMIN D3, (VITAMIN D3) 2,000 UNIT TAB    Take  by mouth. DUTASTERIDE (AVODART) 0.5 MG CAPSULE    Take 0.5 mg by mouth daily. GABAPENTIN (NEURONTIN) 100 MG CAPSULE    Take 100 mg by mouth two (2) times a day. HYDRALAZINE (APRESOLINE) 100 MG TABLET    Take 100 mg by mouth three (3) times daily. INSULIN GLARGINE (LANTUS SOLOSTAR) 100 UNIT/ML (3 ML) PEN    60 Units by SubCUTAneous route daily. LINAGLIPTIN (TRADJENTA) 5 MG TABLET    Take 5 mg by mouth daily. NITROGLYCERIN (NITROSTAT) 0.4 MG SL TABLET    1 Tab by SubLINGual route every five (5) minutes as needed for Chest Pain for up to 3 doses. Indications: Angina    PANTOPRAZOLE (PROTONIX) 40 MG TABLET    Take 40 mg by mouth daily. SIMVASTATIN (ZOCOR) 40 MG TABLET    Take 1 Tab by mouth nightly.    These Medications have changed    No medications on file   Stop Taking    No medications on file

## 2020-04-20 NOTE — Clinical Note
Bilateral groin prepped with ChloraPrep and draped. Wet prep solution applied at: 1055. Wet prep solution dried at: 1058. Wet prep elapsed drying time: 3 mins.

## 2020-04-20 NOTE — CONSULTS
[facility] - Consult Note    Date of  Admission: 4/20/2020 12:33 AM     Primary Care Physician:  Rajni Baxter MD     Plan:       1. NSTEMI- with positive troponin and up trending- ekg showed leadsT wave inversion in Lateral leads- currently chest pain free,no worsening in SOB. No BLE edema. Continue Heparin drip, asa, statin, bb. Follow up to reassess lvf, rvf wma. Needs cardiac w/u LHC when creatinine improves/stablizes. Per renal note moderate risk for contrast-induced nephropathy. 2. Uncontrolled Hypertension- on admission with 's  Was placed on NTG drip- BP is improving now on home medications. Increased Norvasc. 3. Coronary- Cath 2010-  Showed thrombotic occlusion of the distal right coronary artery and moderate distal left anterior descending disease. Successful percutaneous coronary intervention with catheter thrombectomy, followed by bare metal stent deployment with a Vision 4 x 18  mm stent in the distal RCA  4. Hx of Ischemic cardiomyopathy last echo 2017 revealed EF% 65 %  5. Dyslipidemia- continue statin   6. Tobacco use disorder-discussed cessation   7. 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 Diabetes- poorly controlled with A1c 8.0   9. JOHNIE on CKD- baseline 1.8 -2.0 since 2018 - on gently hydration Renal following      Patient with NSTEMI- currently chest pain free. Will continue gentle hydration and proceed with LHC once renal indices improve. Continue heparin drip.               XR Results (most recent):  Results from Hospital Encounter encounter on 04/20/20   XR CHEST PORT    Narrative Examination: Portable AP chest     History: Chest pain    Comparison: August 26, 2010    Findings: Lungs are clear. Heart is top normal in size. There is no acute  osseous abnormality. Impression Impression:  1. No acute process.               Assessment:     Hospital Problems  Date Reviewed: 9/19/2019          Codes Class Noted POA    NSTEMI (non-ST elevated myocardial infarction) [facility]) ICD-10-CM: I21.4  ICD-9-CM: 410.70  4/20/2020 Unknown        ACS (acute coronary syndrome) ([facility] ICD-10-CM: I24.9  ICD-9-CM: 411.1  4/20/2020 Unknown        Hypertensive urgency ICD-10-CM: I16.0  ICD-9-CM: 401.9  4/20/2020 Unknown                   History of Present Illness: This is a 69 y.o. male admitted for NSTEMI (non-ST elevated myocardial infarction) ([facility] [I21.4]. patient with PMHx of DM type II, CKD stage III, hypertension, CAD status post PCI. Patient presented to the emergency department with a complaint of intermittent left-sided chest pain that started about 2 to 3 days ago. Usually pain would start about 20 minutes after eating therefore patient felt it is \"gas pain\" and was taking antacid was no relief. Pain continued to persist subsequently called the EMS to bring him to the ED. Patient was given aspirin by EMS and chest pain has eased off in route to the ED. Currently pain-free. Reports longstanding occasional shortness of breath, chronic dry cough which he attributes to smoking. Denies orthopnea, PND, lower extremity edema. In the ED elevated blood pressure with peak SBP of 208 and peak DBP of 95. Saturating 100% on room air. Pertinent blood work finding includes elevated troponin of 1.60. EKG is sinus rhythm, first-degree AV block, RBB. On my exam patient resting in bed comfortable no chest pain no chest pressure no palpitations. No recent CVA. Has chronic HURT htat is stable. Reports compliance with medications. Continues to smoke. Denies any alcohol use. Past Medical History:     Past Medical History:   Diagnosis Date    ACS (acute coronary syndrome) ([facility]     CAD (coronary artery disease), native coronary artery August 2010    s/p non-ST-elevation myocardial infarction, s/p PCI with BMS (Vision 4x18mm) distal RCA with 45% distal LAD  and mild LCx disease. mild-moderate LV systolic dysfunction (98/46).     Diabetes mellitus type II     Dyslipidemia     ED (erectile dysfunction)     GERD (gastroesophageal reflux disease)     History of BPH     History of echocardiogram 5/18/2011    EF 65%. Mod-marked increased wall thickness. Gr 1 DDfx. No significant valvular heart disease.  Hyperlipidemia     Hypertension     Ischemic cardiomyopathy     recovery of LV syst fct  Echo May 2011 LV EF 65%    [facility]     Obesity     RBBB (right bundle branch block with left anterior fascicular block)     S/P cardiac cath 8/26/2010    LM patent. dLAD 45%. CX mild. dRCA 100% thrombotic (S/P cath thrombectomy & Vision 4 x 18-mm BMS). EF 40%. Posterobasal, diaphrag, apical hypk.       Shingles 4/2012    Tobacco use disorder, continuous          Social History:     Social History     Socioeconomic History    Marital status:      Spouse name: Not on file    Number of children: Not on file    Years of education: Not on file    Highest education level: Not on file   Tobacco Use    Smoking status: Current Every Day Smoker     Packs/day: 0.50     Years: 44.00     Pack years: 22.00     Types: Cigarettes    Smokeless tobacco: Never Used    Tobacco comment: 2-3 cigs per day   Substance and Sexual Activity    Alcohol use: No    Drug use: No        Family History:     Family History   Problem Relation Age of Onset    Diabetes Mother         Medications:   No Known Allergies     Current Facility-Administered Medications   Medication Dose Route Frequency    heparin 25,000 units in D5W 250 ml infusion  10-25 Units/kg/hr IntraVENous TITRATE    aspirin delayed-release tablet 81 mg  81 mg Oral DAILY    pantoprazole (PROTONIX) tablet 40 mg  40 mg Oral DAILY    insulin glargine (LANTUS) injection 30 Units  30 Units SubCUTAneous DAILY    hydrALAZINE (APRESOLINE) tablet 100 mg  100 mg Oral TID    carvediloL (COREG) tablet 25 mg  25 mg Oral BID WITH MEALS    atorvastatin (LIPITOR) tablet 40 mg  40 mg Oral QHS  dutasteride (AVODART) capsule 0.5 mg  0.5 mg Oral DAILY    gabapentin (NEURONTIN) capsule 100 mg  100 mg Oral BID    amLODIPine (NORVASC) tablet 2.5 mg  2.5 mg Oral DAILY    insulin lispro (HUMALOG) injection   SubCUTAneous Q6H    glucose chewable tablet 16 g  4 Tab Oral PRN    glucagon (GLUCAGEN) injection 1 mg  1 mg IntraMUSCular PRN    dextrose (D50W) injection syrg 12.5-25 g  25-50 mL IntraVENous PRN    nitroglycerin (TRIDIL) 400 mcg/ml infusion  0-20 mcg/min IntraVENous TITRATE    0.9% sodium chloride infusion  50 mL/hr IntraVENous CONTINUOUS    losartan (COZAAR) tablet 100 mg  100 mg Oral DAILY    heparin (porcine) 1,000 unit/mL injection         Current Outpatient Medications   Medication Sig    gabapentin (NEURONTIN) 100 mg capsule Take 100 mg by mouth two (2) times a day.  amLODIPine (NORVASC) 2.5 mg tablet Take 1 Tab by mouth daily.  nitroglycerin (NITROSTAT) 0.4 mg SL tablet 1 Tab by SubLINGual route every five (5) minutes as needed for Chest Pain for up to 3 doses. Indications: Angina    azilsartan med-chlorthalidone (EDARBYCLOR) 40-25 mg per tablet Take 1 Tab by mouth daily.  cholecalciferol, vitamin D3, (VITAMIN D3) 2,000 unit tab Take  by mouth.  dutasteride (AVODART) 0.5 mg capsule Take 0.5 mg by mouth daily.  linagliptin (TRADJENTA) 5 mg tablet Take 5 mg by mouth daily.  hydrALAZINE (APRESOLINE) 100 mg tablet Take 100 mg by mouth three (3) times daily.  carvedilol (COREG) 12.5 mg tablet Take 25 mg by mouth two (2) times daily (with meals).  simvastatin (ZOCOR) 40 mg tablet Take 1 Tab by mouth nightly.  pantoprazole (PROTONIX) 40 mg tablet Take 40 mg by mouth daily.  insulin glargine (LANTUS SOLOSTAR) 100 unit/mL (3 mL) pen 60 Units by SubCUTAneous route daily.  aspirin 81 mg tablet Take 81 mg by mouth daily.         Review Of Systems:         Constitutional: No fever, no chills, no weight loss, no night sweats   HEENT: No epistaxis, no nasal drainage, no difficulty in swallowing, no redness in eyes  Respiratory:dyspnea on exertion   Cardiovascular:  chest pain,  chest pressure,dyspnea,  Gastrointestinal: no abd pain, no vomiting, no diarrhea, no bleeding symptoms  Genitourinary: No urinary symptoms or hematuria  Integument/breast: No ulcers or rashes  Musculoskeletal: no muscle pain, no weakness  Neurological: No focal weakness, no seizures, no headaches  Behvioral/Psych: No anxiety, no depression         Physical Exam:     Visit Vitals  /72   Pulse 61   Temp 98.3 °F (36.8 °C)   Resp 22   Ht 5' 6\" (1.676 m)   Wt 93 kg (205 lb)   SpO2 99%   BMI 33.09 kg/m²     BP Readings from Last 3 Encounters:   04/20/20 177/72   11/18/19 153/76   09/19/19 141/72     Pulse Readings from Last 3 Encounters:   04/20/20 61   11/18/19 80   09/19/19 (!) 54     Wt Readings from Last 3 Encounters:   04/20/20 93 kg (205 lb)   11/18/19 92.6 kg (204 lb 3.2 oz)   09/19/19 93 kg (205 lb)       General:  alert, cooperative, no distress, appears stated age, moderately obese  Skin: Warm and dry, acyanotic, normal color. Head: Normocephalic, atraumatic. Eyes: Sclerae anicteric, conjunctivae without injection. Neck:  nontender, no nuchal rigidity, no masses, no stridor, no carotid bruit, no JVD  Lungs: diminished breath sounds b/l. Heart:  regular rate and rhythm, S1, S2 normal, no S3 or S4, no click  Abdomen:  abdomen is soft without significant tenderness, masses, organomegaly or guarding  Extremities:  extremities normal, atraumatic, no cyanosis or edema. Peripheral pulses present   Neurological: grossly intact. No focal abnormalities, moves all extremities well. Psychiatric Affect: The patient is awake, alert and oriented x3. Jason Camera is interactive and appropriate.      Data Review:     Recent Results (from the past 48 hour(s))   EKG, 12 LEAD, INITIAL    Collection Time: 04/20/20 12:19 AM   Result Value Ref Range    Ventricular Rate 80 BPM    Atrial Rate 80 BPM    P-R Interval 304 ms    QRS Duration 178 ms    Q-T Interval 416 ms    QTC Calculation (Bezet) 479 ms    Calculated P Axis 66 degrees    Calculated R Axis -84 degrees    Calculated T Axis 91 degrees    Diagnosis       Sinus rhythm with 1st degree AV block  Right bundle branch block  Left anterior fascicular block  Bifascicular block  Left ventricular hypertrophy with repolarization abnormality  Cannot rule out Septal infarct , age undetermined  Abnormal ECG  When compared with ECG of 27-AUG-2010 03:37,  T wave inversion no longer evident in Inferior leads  T wave inversion now evident in Lateral leads     EKG, 12 LEAD, SUBSEQUENT    Collection Time: 04/20/20 12:20 AM   Result Value Ref Range    Ventricular Rate 79 BPM    Atrial Rate 79 BPM    P-R Interval 304 ms    QRS Duration 178 ms    Q-T Interval 418 ms    QTC Calculation (Bezet) 479 ms    Calculated P Axis 68 degrees    Calculated R Axis -84 degrees    Calculated T Axis 90 degrees    Diagnosis       Sinus rhythm with 1st degree AV block with occasional premature ventricular   complexes  Right bundle branch block  Left anterior fascicular block  Bifascicular block  Left ventricular hypertrophy with repolarization abnormality  Anteroseptal infarct (cited on or before 20-APR-2020)  Abnormal ECG  When compared with ECG of 20-APR-2020 00:19,  premature ventricular complexes are now present  Serial changes of Anteroseptal infarct present     CBC WITH AUTOMATED DIFF    Collection Time: 04/20/20 12:30 AM   Result Value Ref Range    WBC 9.1 4.6 - 13.2 K/uL    RBC 4.51 (L) 4.70 - 5.50 M/uL    HGB 12.0 (L) 13.0 - 16.0 g/dL    HCT 34.5 (L) 36.0 - 48.0 %    MCV 76.5 74.0 - 97.0 FL    MCH 26.6 24.0 - 34.0 PG    MCHC 34.8 31.0 - 37.0 g/dL    RDW 14.6 (H) 11.6 - 14.5 %    PLATELET 415 568 - 536 K/uL    MPV 10.3 9.2 - 11.8 FL    NEUTROPHILS 67 40 - 73 %    LYMPHOCYTES 14 (L) 21 - 52 %    MONOCYTES 10 3 - 10 %    EOSINOPHILS 9 (H) 0 - 5 %    BASOPHILS 0 0 - 2 %    ABS.  NEUTROPHILS 6.1 1.8 - 8.0 K/UL    ABS. LYMPHOCYTES 1.3 0.9 - 3.6 K/UL    ABS. MONOCYTES 0.9 0.05 - 1.2 K/UL    ABS. EOSINOPHILS 0.8 (H) 0.0 - 0.4 K/UL    ABS. BASOPHILS 0.0 0.0 - 0.1 K/UL    DF AUTOMATED     METABOLIC PANEL, COMPREHENSIVE    Collection Time: 04/20/20 12:30 AM   Result Value Ref Range    Sodium 134 (L) 136 - 145 mmol/L    Potassium 3.9 3.5 - 5.5 mmol/L    Chloride 104 100 - 111 mmol/L    CO2 23 21 - 32 mmol/L    Anion gap 7 3.0 - 18 mmol/L    Glucose 266 (H) 74 - 99 mg/dL    BUN 27 (H) 7.0 - 18 MG/DL    Creatinine 2.59 (H) 0.6 - 1.3 MG/DL    BUN/Creatinine ratio 10 (L) 12 - 20      GFR est AA 30 (L) >60 ml/min/1.73m2    GFR est non-AA 24 (L) >60 ml/min/1.73m2    Calcium 8.2 (L) 8.5 - 10.1 MG/DL    Bilirubin, total 0.2 0.2 - 1.0 MG/DL    ALT (SGPT) 17 16 - 61 U/L    AST (SGOT) 20 10 - 38 U/L    Alk.  phosphatase 98 45 - 117 U/L    Protein, total 8.0 6.4 - 8.2 g/dL    Albumin 2.9 (L) 3.4 - 5.0 g/dL    Globulin 5.1 (H) 2.0 - 4.0 g/dL    A-G Ratio 0.6 (L) 0.8 - 1.7     CARDIAC PANEL,(CK, CKMB & TROPONIN)    Collection Time: 04/20/20 12:30 AM   Result Value Ref Range     39 - 308 U/L    CK - MB 5.7 (H) <3.6 ng/ml    CK-MB Index 3.9 0.0 - 4.0 %    Troponin-I, QT 1.60 (HH) 0.0 - 0.045 NG/ML   PTT    Collection Time: 04/20/20 12:30 AM   Result Value Ref Range    aPTT 31.9 23.0 - 36.4 SEC   HEMOGLOBIN A1C W/O EAG    Collection Time: 04/20/20 12:30 AM   Result Value Ref Range    Hemoglobin A1c 8.0 (H) 4.2 - 5.6 %   TROPONIN I    Collection Time: 04/20/20  4:54 AM   Result Value Ref Range    Troponin-I, QT 4.34 (HH) 0.0 - 0.045 NG/ML   LIPID PANEL    Collection Time: 04/20/20  4:54 AM   Result Value Ref Range    LIPID PROFILE          Cholesterol, total 122 <200 MG/DL    Triglyceride 184 (H) <150 MG/DL    HDL Cholesterol 23 (L) 40 - 60 MG/DL    LDL, calculated 62.2 0 - 100 MG/DL    VLDL, calculated 36.8 MG/DL    CHOL/HDL Ratio 5.3 (H) 0 - 5.0     CBC WITH AUTOMATED DIFF    Collection Time: 04/20/20  5:54 AM   Result Value Ref Range    WBC 10.3 4.6 - 13.2 K/uL    RBC 4.45 (L) 4.70 - 5.50 M/uL    HGB 11.7 (L) 13.0 - 16.0 g/dL    HCT 33.9 (L) 36.0 - 48.0 %    MCV 76.2 74.0 - 97.0 FL    MCH 26.3 24.0 - 34.0 PG    MCHC 34.5 31.0 - 37.0 g/dL    RDW 14.6 (H) 11.6 - 14.5 %    PLATELET 775 559 - 505 K/uL    MPV 10.3 9.2 - 11.8 FL    NEUTROPHILS 67 40 - 73 %    LYMPHOCYTES 16 (L) 21 - 52 %    MONOCYTES 8 3 - 10 %    EOSINOPHILS 9 (H) 0 - 5 %    BASOPHILS 0 0 - 2 %    ABS. NEUTROPHILS 6.9 1.8 - 8.0 K/UL    ABS. LYMPHOCYTES 1.7 0.9 - 3.6 K/UL    ABS. MONOCYTES 0.8 0.05 - 1.2 K/UL    ABS. EOSINOPHILS 0.9 (H) 0.0 - 0.4 K/UL    ABS.  BASOPHILS 0.0 0.0 - 0.1 K/UL    DF AUTOMATED     GLUCOSE, POC    Collection Time: 04/20/20  6:25 AM   Result Value Ref Range    Glucose (POC) 183 (H) 70 - 110 mg/dL   PTT    Collection Time: 04/20/20  8:00 AM   Result Value Ref Range    aPTT 53.2 (H) 23.0 - 36.4 SEC   GLUCOSE, POC    Collection Time: 04/20/20  9:05 AM   Result Value Ref Range    Glucose (POC) 116 (H) 70 - 110 mg/dL         Intake/Output Summary (Last 24 hours) at 4/20/2020 1008  Last data filed at 4/20/2020 0359  Gross per 24 hour   Intake    Output 600 ml   Net -600 ml       Cardiographics:       EKG Results     Procedure 720 Value Units Date/Time    EKG, 12 LEAD, SUBSEQUENT [[id number]] Collected:  04/20/20 0020    Order Status:  Completed Updated:  04/20/20 0731     Ventricular Rate 79 BPM      Atrial Rate 79 BPM      P-R Interval 304 ms      QRS Duration 178 ms      Q-T Interval 418 ms      QTC Calculation (Bezet) 479 ms      Calculated P Axis 68 degrees      Calculated R Axis -84 degrees      Calculated T Axis 90 degrees      Diagnosis --     Sinus rhythm with 1st degree AV block with occasional premature ventricular   complexes  Right bundle branch block  Left anterior fascicular block  Bifascicular block  Left ventricular hypertrophy with repolarization abnormality  Anteroseptal infarct (cited on or before 20-APR-2020)  Abnormal ECG  When compared with ECG of 20-APR-2020 00:19,  premature ventricular complexes are now present  Serial changes of Anteroseptal infarct present      EKG, 12 LEAD, INITIAL [[id number]] Collected:  04/20/20 0019    Order Status:  Completed Updated:  04/20/20 0023     Ventricular Rate 80 BPM      Atrial Rate 80 BPM      P-R Interval 304 ms      QRS Duration 178 ms      Q-T Interval 416 ms      QTC Calculation (Bezet) 479 ms      Calculated P Axis 66 degrees      Calculated R Axis -84 degrees      Calculated T Axis 91 degrees      Diagnosis --     Sinus rhythm with 1st degree AV block  Right bundle branch block  Left anterior fascicular block  Bifascicular block  Left ventricular hypertrophy with repolarization abnormality  Cannot rule out Septal infarct , age undetermined  Abnormal ECG  When compared with ECG of 27-AUG-2010 03:37,  T wave inversion no longer evident in Inferior leads  T wave inversion now evident in Lateral leads                 Signed By: Genevieve LEACH Phone [phone] supervised    April 20, 2020      I have independently evaluated and examined the patient. All relevant labs and testing data's are reviewed. Care plan discussed and updated after review.     Miladis Stark MD

## 2020-04-20 NOTE — ED NOTES
TRANSFER - OUT REPORT:    Verbal report given to Minal(name) on Twana Cheadle.  being transferred to [facility](unit) for routine progression of care       Report consisted of patients Situation, Background, Assessment and   Recommendations(SBAR). Information from the following report(s) SBAR, ED Summary and MAR was reviewed with the receiving nurse. Lines:   Peripheral IV 04/20/20 Right Antecubital (Active)        Opportunity for questions and clarification was provided.       Patient transported with:   Monitor  O2 @ 2 liters  Registered Nurse

## 2020-04-20 NOTE — PROGRESS NOTES
TRANSFER - IN REPORT:    Verbal report received from [facility] AT [facility]) on Alissa Montano.  being received from ED(unit) for routine progression of care      Report consisted of patients Situation, Background, Assessment and   Recommendations(SBAR). Information from the following report(s) SBAR, Kardex and Cardiac Rhythm SR on monitor was reviewed with the receiving nurse. Opportunity for questions and clarification was provided. Assessment completed upon patients arrival to unit and care assumed. 1600:BS reported as 76, gave patient 118ml of OJ and rechecked BS in 15mins. Will continue to monitor. 1620: repeated BS was 88.

## 2020-04-20 NOTE — Clinical Note
Contrast Dose Calculator:   Patient's age: 69.   Patient's sex: Male. Patient weight (kg) = 95.3. Creatinine level (mg/dL) = 2.19. Creatinine clearance (mL/min): 40.   Max Contrast dose per Creatinine Cl calculator = 90 mL.

## 2020-04-20 NOTE — ED NOTES
Received report from outgoing RN; pt chest pain free currently; NTG drip stopped and pt to be monitored; pt notified to let nurse know if having chest pain

## 2020-04-20 NOTE — Clinical Note
Lesion: Located in the Mid LAD. Single technique used. First inflation pressure = 13 syd; inflation time: 10 sec.

## 2020-04-21 ENCOUNTER — APPOINTMENT (OUTPATIENT)
Dept: NON INVASIVE DIAGNOSTICS | Age: 75
DRG: 247 | End: 2020-04-21
Attending: NURSE PRACTITIONER
Payer: MEDICARE

## 2020-04-21 LAB
ALBUMIN SERPL-MCNC: 2.6 G/DL (ref 3.4–5)
ALBUMIN/GLOB SERPL: 0.6 {RATIO} (ref 0.8–1.7)
ALP SERPL-CCNC: 92 U/L (ref 45–117)
ALT SERPL-CCNC: 13 U/L (ref 16–61)
ANION GAP SERPL CALC-SCNC: 4 MMOL/L (ref 3–18)
APTT PPP: 81.1 SEC (ref 23–36.4)
AST SERPL-CCNC: 20 U/L (ref 10–38)
BASOPHILS # BLD: 0 K/UL (ref 0–0.1)
BASOPHILS NFR BLD: 0 % (ref 0–2)
BILIRUB SERPL-MCNC: 0.4 MG/DL (ref 0.2–1)
BUN SERPL-MCNC: 27 MG/DL (ref 7–18)
BUN/CREAT SERPL: 13 (ref 12–20)
CALCIUM SERPL-MCNC: 8.5 MG/DL (ref 8.5–10.1)
CHLORIDE SERPL-SCNC: 111 MMOL/L (ref 100–111)
CO2 SERPL-SCNC: 24 MMOL/L (ref 21–32)
CREAT SERPL-MCNC: 2.02 MG/DL (ref 0.6–1.3)
DIFFERENTIAL METHOD BLD: ABNORMAL
ECHO AO ROOT DIAM: 3.37 CM
ECHO LA AREA 4C: 14.4 CM2
ECHO LA VOL 2C: 36.44 ML (ref 18–58)
ECHO LA VOL 4C: 35.45 ML (ref 18–58)
ECHO LA VOL BP: 38.78 ML (ref 18–58)
ECHO LA VOL/BSA BIPLANE: 19.23 ML/M2 (ref 16–28)
ECHO LA VOLUME INDEX A2C: 18.07 ML/M2 (ref 16–28)
ECHO LA VOLUME INDEX A4C: 17.58 ML/M2 (ref 16–28)
ECHO LV EDV TEICHHOLZ: 0.41 ML
ECHO LV ESV TEICHHOLZ: 0.15 ML
ECHO LV GLOBAL LONGITUDINAL STRAIN (GLS): -14.8 %
ECHO LV INTERNAL DIMENSION DIASTOLIC: 3.97 CM (ref 4.2–5.9)
ECHO LV INTERNAL DIMENSION SYSTOLIC: 2.66 CM
ECHO LV IVSD: 1.46 CM (ref 0.6–1)
ECHO LV MASS 2D: 263.3 G (ref 88–224)
ECHO LV MASS INDEX 2D: 130.5 G/M2 (ref 49–115)
ECHO LV POSTERIOR WALL DIASTOLIC: 1.47 CM (ref 0.6–1)
ECHO LVOT DIAM: 2.14 CM
ECHO LVOT PEAK GRADIENT: 4.2 MMHG
ECHO LVOT PEAK VELOCITY: 102.18 CM/S
ECHO LVOT VTI: 21.37 CM
ECHO MV A VELOCITY: 84.83 CM/S
ECHO MV E DECELERATION TIME (DT): 244.7 MS
ECHO MV E VELOCITY: 101.33 CM/S
ECHO MV E/A RATIO: 1.19
ECHO RV INTERNAL DIMENSION: 3.54 CM
EOSINOPHIL # BLD: 0.9 K/UL (ref 0–0.4)
EOSINOPHIL NFR BLD: 11 % (ref 0–5)
ERYTHROCYTE [DISTWIDTH] IN BLOOD BY AUTOMATED COUNT: 14.7 % (ref 11.6–14.5)
GLOBULIN SER CALC-MCNC: 4.6 G/DL (ref 2–4)
GLUCOSE BLD STRIP.AUTO-MCNC: 106 MG/DL (ref 70–110)
GLUCOSE BLD STRIP.AUTO-MCNC: 160 MG/DL (ref 70–110)
GLUCOSE BLD STRIP.AUTO-MCNC: 163 MG/DL (ref 70–110)
GLUCOSE BLD STRIP.AUTO-MCNC: 234 MG/DL (ref 70–110)
GLUCOSE BLD STRIP.AUTO-MCNC: 251 MG/DL (ref 70–110)
GLUCOSE SERPL-MCNC: 96 MG/DL (ref 74–99)
HCT VFR BLD AUTO: 33.6 % (ref 36–48)
HGB BLD-MCNC: 11.3 G/DL (ref 13–16)
LVFS 2D: 33.11 %
LVOT MG: 2.17 MMHG
LVOT MV: 0.69 CM/S
LVSV (TEICH): 20.61 ML
LYMPHOCYTES # BLD: 1.5 K/UL (ref 0.9–3.6)
LYMPHOCYTES NFR BLD: 17 % (ref 21–52)
MCH RBC QN AUTO: 25.5 PG (ref 24–34)
MCHC RBC AUTO-ENTMCNC: 33.6 G/DL (ref 31–37)
MCV RBC AUTO: 75.8 FL (ref 74–97)
MONOCYTES # BLD: 0.6 K/UL (ref 0.05–1.2)
MONOCYTES NFR BLD: 7 % (ref 3–10)
MV DEC SLOPE: 4.14
NEUTS SEG # BLD: 5.6 K/UL (ref 1.8–8)
NEUTS SEG NFR BLD: 65 % (ref 40–73)
PLATELET # BLD AUTO: 249 K/UL (ref 135–420)
PMV BLD AUTO: 10.3 FL (ref 9.2–11.8)
POTASSIUM SERPL-SCNC: 4 MMOL/L (ref 3.5–5.5)
PROT SERPL-MCNC: 7.2 G/DL (ref 6.4–8.2)
RBC # BLD AUTO: 4.43 M/UL (ref 4.7–5.5)
SODIUM SERPL-SCNC: 139 MMOL/L (ref 136–145)
TROPONIN I SERPL-MCNC: 2.2 NG/ML (ref 0–0.04)
WBC # BLD AUTO: 8.6 K/UL (ref 4.6–13.2)

## 2020-04-21 PROCEDURE — 94762 N-INVAS EAR/PLS OXIMTRY CONT: CPT

## 2020-04-21 PROCEDURE — 74011250636 HC RX REV CODE- 250/636: Performed by: INTERNAL MEDICINE

## 2020-04-21 PROCEDURE — 84484 ASSAY OF TROPONIN QUANT: CPT

## 2020-04-21 PROCEDURE — 74011636637 HC RX REV CODE- 636/637: Performed by: INTERNAL MEDICINE

## 2020-04-21 PROCEDURE — 85025 COMPLETE CBC W/AUTO DIFF WBC: CPT

## 2020-04-21 PROCEDURE — 77010033678 HC OXYGEN DAILY

## 2020-04-21 PROCEDURE — 74011250637 HC RX REV CODE- 250/637: Performed by: INTERNAL MEDICINE

## 2020-04-21 PROCEDURE — 85730 THROMBOPLASTIN TIME PARTIAL: CPT

## 2020-04-21 PROCEDURE — 36415 COLL VENOUS BLD VENIPUNCTURE: CPT

## 2020-04-21 PROCEDURE — 65660000004 HC RM CVT STEPDOWN

## 2020-04-21 PROCEDURE — 74011250637 HC RX REV CODE- 250/637: Performed by: NURSE PRACTITIONER

## 2020-04-21 PROCEDURE — 80053 COMPREHEN METABOLIC PANEL: CPT

## 2020-04-21 PROCEDURE — 82962 GLUCOSE BLOOD TEST: CPT

## 2020-04-21 PROCEDURE — 93306 TTE W/DOPPLER COMPLETE: CPT

## 2020-04-21 RX ORDER — DEXTROSE 50 % IN WATER (D50W) INTRAVENOUS SYRINGE
25-50 AS NEEDED
Status: DISCONTINUED | OUTPATIENT
Start: 2020-04-21 | End: 2020-04-24 | Stop reason: HOSPADM

## 2020-04-21 RX ORDER — INSULIN LISPRO 100 [IU]/ML
INJECTION, SOLUTION INTRAVENOUS; SUBCUTANEOUS
Status: DISCONTINUED | OUTPATIENT
Start: 2020-04-21 | End: 2020-04-24 | Stop reason: HOSPADM

## 2020-04-21 RX ADMIN — GABAPENTIN 100 MG: 100 CAPSULE ORAL at 17:41

## 2020-04-21 RX ADMIN — CARVEDILOL 25 MG: 25 TABLET, FILM COATED ORAL at 09:36

## 2020-04-21 RX ADMIN — ASPIRIN 81 MG: 81 TABLET, COATED ORAL at 09:36

## 2020-04-21 RX ADMIN — INSULIN LISPRO 6 UNITS: 100 INJECTION, SOLUTION INTRAVENOUS; SUBCUTANEOUS at 17:41

## 2020-04-21 RX ADMIN — DUTASTERIDE 0.5 MG: 0.5 CAPSULE, LIQUID FILLED ORAL at 09:46

## 2020-04-21 RX ADMIN — SODIUM CHLORIDE 100 ML/HR: 900 INJECTION, SOLUTION INTRAVENOUS at 14:39

## 2020-04-21 RX ADMIN — ATORVASTATIN CALCIUM 40 MG: 40 TABLET, FILM COATED ORAL at 21:16

## 2020-04-21 RX ADMIN — CARVEDILOL 25 MG: 25 TABLET, FILM COATED ORAL at 17:41

## 2020-04-21 RX ADMIN — HYDRALAZINE HYDROCHLORIDE 100 MG: 50 TABLET, FILM COATED ORAL at 09:36

## 2020-04-21 RX ADMIN — HYDRALAZINE HYDROCHLORIDE 100 MG: 50 TABLET, FILM COATED ORAL at 21:16

## 2020-04-21 RX ADMIN — PANTOPRAZOLE SODIUM 40 MG: 40 TABLET, DELAYED RELEASE ORAL at 09:36

## 2020-04-21 RX ADMIN — AMLODIPINE BESYLATE 10 MG: 10 TABLET ORAL at 09:36

## 2020-04-21 RX ADMIN — INSULIN GLARGINE 30 UNITS: 100 INJECTION, SOLUTION SUBCUTANEOUS at 09:39

## 2020-04-21 RX ADMIN — HYDRALAZINE HYDROCHLORIDE 100 MG: 50 TABLET, FILM COATED ORAL at 17:41

## 2020-04-21 RX ADMIN — GABAPENTIN 100 MG: 100 CAPSULE ORAL at 09:36

## 2020-04-21 RX ADMIN — INSULIN LISPRO 4 UNITS: 100 INJECTION, SOLUTION INTRAVENOUS; SUBCUTANEOUS at 21:56

## 2020-04-21 NOTE — PROGRESS NOTES
Problem: Diabetes Self-Management  Goal: *Disease process and treatment process  Description: Define diabetes and identify own type of diabetes; list 3 options for treating diabetes. Outcome: Progressing Towards Goal  Goal: *Incorporating nutritional management into lifestyle  Description: Describe effect of type, amount and timing of food on blood glucose; list 3 methods for planning meals. Outcome: Progressing Towards Goal  Goal: *Incorporating physical activity into lifestyle  Description: State effect of exercise on blood glucose levels. Outcome: Progressing Towards Goal  Goal: *Developing strategies to promote health/change behavior  Description: Define the ABC's of diabetes; identify appropriate screenings, schedule and personal plan for screenings. Outcome: Progressing Towards Goal  Goal: *Using medications safely  Description: State effect of diabetes medications on diabetes; name diabetes medication taking, action and side effects. Outcome: Progressing Towards Goal  Goal: *Monitoring blood glucose, interpreting and using results  Description: Identify recommended blood glucose targets  and personal targets. Outcome: Progressing Towards Goal  Goal: *Prevention, detection, treatment of acute complications  Description: List symptoms of hyper- and hypoglycemia; describe how to treat low blood sugar and actions for lowering  high blood glucose level. Outcome: Progressing Towards Goal  Goal: *Prevention, detection and treatment of chronic complications  Description: Define the natural course of diabetes and describe the relationship of blood glucose levels to long term complications of diabetes.   Outcome: Progressing Towards Goal  Goal: *Developing strategies to address psychosocial issues  Description: Describe feelings about living with diabetes; identify support needed and support network  Outcome: Progressing Towards Goal  Goal: *Insulin pump training  Outcome: Progressing Towards Goal  Goal: *Sick day guidelines  Outcome: Progressing Towards Goal  Goal: *Patient Specific Goal (EDIT GOAL, INSERT TEXT)  Outcome: Progressing Towards Goal     Problem: Patient Education: Go to Patient Education Activity  Goal: Patient/Family Education  Outcome: Progressing Towards Goal     Problem: Falls - Risk of  Goal: *Absence of Falls  Description: Document Leafy Warner Fall Risk and appropriate interventions in the flowsheet.   Outcome: Progressing Towards Goal  Note: Fall Risk Interventions:            Medication Interventions: Evaluate medications/consider consulting pharmacy, Patient to call before getting OOB, Teach patient to arise slowly                   Problem: Patient Education: Go to Patient Education Activity  Goal: Patient/Family Education  Outcome: Progressing Towards Goal

## 2020-04-21 NOTE — CDMP QUERY
Pt admitted with  an  NSTEMI   noted to have elevated creat levels . Alondra Toledo If possible, please document in the progress notes and d/c summary if you are evaluating and / or treating any of the following:  Acute kidney failure  Acute kidney injury  Other type of acute kidney failure  Other cause (please specify)  Unable to determine   Unknown The medical record reflects the following: 
 
  Risk Factors: HTN;    CKD  stage 3-4  per renal  
 
  Clinical Indicators:  Creat  2.59 on admit     today  2.02  
 per renal  Baseline creatinine is in 2 range 
hypertensive urgency Treatment: renal consult 
                    daily  BMP Thank you,    Trevor Monzon RN  CCDS  [phone]

## 2020-04-21 NOTE — PROGRESS NOTES
conducted an initial consultation and Spiritual Assessment for Adrianne Barragan, who is a 69 y. o.,male. Patient's Primary Language is: [place]. According to the patient's EMR Sabianist Affiliation is: No Oriental orthodox. The reason the Patient came to the hospital is:   Patient Active Problem List    Diagnosis Date Noted    NSTEMI (non-ST elevated myocardial infarction) ([facility] [address] 04/20/2020    [facility]acute coronary syndrome) [facility] 04/20/2020    Hypertensive urgency 04/20/2020    Obesity (BMI 30.0-34.9) 11/19/2018    Ischemic cardiomyopathy     Hypertension     Dyslipidemia     Diabetes mellitus, type 2 ([facility] [address]     Obesity     RBBB (right bundle branch block with left anterior fascicular block)     Tobacco use disorder, continuous     CAD S/P percutaneous coronary angioplasty 08/01/2010        The  provided the following Interventions:  Initiated a relationship of care and support. Explored issues of yesenia, belief, spirituality and Taoist/ritual needs while hospitalized. Listened empathically. Provided chaplaincy education. Provided information about Spiritual Care Services. Offered assurance of continued prayers on patient's behalf. Chart reviewed. The following outcomes where achieved:  Patient shared limited information about both their medical narrative and spiritual journey/beliefs. Patient processed feeling about current hospitalization. Patient expressed gratitude for 's visit. Assessment:  Patient does not have any Taoist/cultural needs that will affect patient's preferences in health care. There are no spiritual or Taoist issues which require intervention at this time. Plan:  Chaplains will continue to follow and will provide pastoral care on an as needed/requested basis.  recommends bedside caregivers page  on duty if patient shows signs of acute spiritual or emotional distress. Chaplain Maya MOSELEY  [address]   [phone]

## 2020-04-21 NOTE — CDMP QUERY
After further study, do you concur with the findings of  noted in the    Chronic kidney disease stage III/IV  in the renal Consultation note? CKD  stage 3  only CKD  stage 3-4 Other Explanation of the clinical findings Clinically Undetermined (no explanation for clinical findings) The medical record reflects the following clinical findings, risk factors and treatment:  
 
Risk Factors:  HTN; smoker; diabetes Clinical Indicators:   per renal  \" . 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  
orders  for  kidney protection  after cath Thank you,   Romana Cornelia RN   CCDS  [phone]

## 2020-04-21 NOTE — PROGRESS NOTES
Reason for Admission:  NSTEMI (non-ST elevated myocardial infarction) ([facility] [address] [I21.4]                 RRAT Score:    13%            Plan for utilizing home health:    None ordered at this time                      Likelihood of Readmission:   LOW                         Transition of Care Plan:              Initial assessment completed with patient. Cognitive status of patient: oriented to time, place, person and situation. Face sheet information confirmed:  yes. The patient designates wife Becky Costello to participate in his discharge plan and to receive any needed information. This patient lives in a single family home with wife. Patient is able to navigate steps as needed. Prior to hospitalization, patient was considered to be independent with ADLs/IADLS : yes . Patient has a current ACP document on file: no  The wife or daughter will be available to transport patient home upon discharge. The patient does not have any medical equipment available in the home. Patient is not currently active with home health. Patient has not stayed in a skilled nursing facility or rehab. This patient is on dialysis :no    Currently, the discharge plan is Home. The patient states that he can obtain his medications from the pharmacy, and take his medications as directed. Patient's current insurance is Medicare and [employer]. Care Management Interventions  PCP Verified by CM:  Yes  Mode of Transport at Discharge: Self  Transition of Care Consult (CM Consult): Discharge Planning  Discharge Durable Medical Equipment: No  Physical Therapy Consult: No  Occupational Therapy Consult: No  Current Support Network: Lives with Spouse  Confirm Follow Up Transport: Family(wife or daughter will transport pt home)  Discharge Location  Discharge Placement: Home with family assistance        Beverley Drummond RN BSN  Care Manager  [phone]

## 2020-04-21 NOTE — PROGRESS NOTES
[facility]      CARDIOLOGY PROGRESS NOTE  RECS:    1. NSTEMI- with positive troponin and up trending-currently chest pain free. Continue Heparin drip, asa, statin, bb. Follow up Echo to  reassess lvf, rvf wma. Needs cardiac w/u LHC when creatinine improves/stablizes. Per renal note moderate risk for contrast-induced nephropathy. 2. Uncontrolled Hypertension- on admission with 's  Was placed on NTG drip- BP is improving now on home medications. 3. Coronary- Cath 2010-  Showed thrombotic occlusion of the distal right coronary artery and moderate distal left anterior descending disease. Successful percutaneous coronary intervention with catheter thrombectomy, followed by bare metal stent deployment with a Vision 4 x 18  mm stent in the distal RCA  4. Hx of Ischemic cardiomyopathy last echo 2017 revealed EF% 65 %  5. Dyslipidemia- continue statin   6. Tobacco use disorder-discussed cessation   7. 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 Diabetes- poorly controlled with A1c 8.0   9. JOHNIE on CKD- baseline 1.8 -2.0 since 2018 - on gently hydration Renal following      Echo showing normal overall ejection fraction. Increase IV fluids to 100 cc an hour. Will proceed with LHC once renal indices improve, hopefully by tomorrow. Discussed with patient.       ASSESSMENT:  Hospital Problems  Date Reviewed: 9/19/2019          Codes Class Noted POA    NSTEMI (non-ST elevated myocardial infarction) [facility]) ICD-10-CM: I21.4  ICD-9-CM: 410.70  4/20/2020 Unknown        ACS (acute coronary syndrome) ([facility] [address] ICD-10-CM: I24.9  ICD-9-CM: 411.1  4/20/2020 Unknown        Hypertensive urgency ICD-10-CM: I16.0  ICD-9-CM: 401.9  4/20/2020 Unknown                SUBJECTIVE:  No CP  Had chronic HURT that is stable      OBJECTIVE:    VS:   Visit Vitals  /73   Pulse 62   Temp 98.4 °F (36.9 °C)   Resp 18   Ht 5' 6\" (1.676 m)   Wt 92.5 kg (204 lb)   SpO2 98%   BMI 32.93 kg/m²         Intake/Output Summary (Last 24 hours) at 4/21/2020 1004  Last data filed at 4/21/2020 0943  Gross per 24 hour   Intake 2232.36 ml   Output 2850 ml   Net -617.64 ml     TELE: normal sinus rhythm    General: alert, well nourished, pleasant and in no apparent distress  HENT: Normocephalic, atraumatic. Normal external eye. Neck :  no bruit, no JVD  Cardiac:  regular rate and rhythm, S1, S2 normal, no murmur, click, rub or gallop  Lungs: rales R base, L base, wheezes upper lung area diminished breath sounds b/l. Abdomen: Soft, nontender, no masses  Extremities:  No c/c/e, peripheral pulses present      Labs: Results:       Chemistry Recent Labs     04/21/20  0536 04/20/20  0030   GLU 96 266*    134*   K 4.0 3.9    104   CO2 24 23   BUN 27* 27*   CREA 2.02* 2.59*   CA 8.5 8.2*   AGAP 4 7   BUCR 13 10*   AP 92 98   TP 7.2 8.0   ALB 2.6* 2.9*   GLOB 4.6* 5.1*   AGRAT 0.6* 0.6*      CBC w/Diff Recent Labs     04/21/20  0536 04/20/20  0554 04/20/20  0030   WBC 8.6 10.3 9.1   RBC 4.43* 4.45* 4.51*   HGB 11.3* 11.7* 12.0*   HCT 33.6* 33.9* 34.5*    239 245   GRANS 65 67 67   LYMPH 17* 16* 14*   EOS 11* 9* 9*      Cardiac Enzymes Recent Labs     04/20/20  0030      CKND1 3.9      Coagulation Recent Labs     04/21/20  0536 04/20/20  1600   APTT 81.1* 73.5*       Lipid Panel Lab Results   Component Value Date/Time    Cholesterol, total 122 04/20/2020 04:54 AM    HDL Cholesterol 23 (L) 04/20/2020 04:54 AM    LDL, calculated 62.2 04/20/2020 04:54 AM    VLDL, calculated 36.8 04/20/2020 04:54 AM    Triglyceride 184 (H) 04/20/2020 04:54 AM    CHOL/HDL Ratio 5.3 (H) 04/20/2020 04:54 AM      BNP No results for input(s): BNPP in the last 72 hours.    Liver Enzymes Recent Labs     04/21/20  0536   TP 7.2   ALB 2.6*   AP 92   SGOT 20      Thyroid Studies No results found for: T4, T3U, TSH, TSHEXT           Jsesica Alvarez NP-C Yessi:[phone]  I have independently evaluated and examined the patient. All relevant labs and testing data are reviewed. Care plan discussed and updated after review.   Sakshi Kuhn MD

## 2020-04-21 NOTE — PROGRESS NOTES
Problem: Diabetes Self-Management  Goal: *Disease process and treatment process  Description: Define diabetes and identify own type of diabetes; list 3 options for treating diabetes. Outcome: Progressing Towards Goal  Goal: *Incorporating nutritional management into lifestyle  Description: Describe effect of type, amount and timing of food on blood glucose; list 3 methods for planning meals. Outcome: Progressing Towards Goal  Goal: *Incorporating physical activity into lifestyle  Description: State effect of exercise on blood glucose levels. Outcome: Progressing Towards Goal  Goal: *Developing strategies to promote health/change behavior  Description: Define the ABC's of diabetes; identify appropriate screenings, schedule and personal plan for screenings. Outcome: Progressing Towards Goal  Goal: *Using medications safely  Description: State effect of diabetes medications on diabetes; name diabetes medication taking, action and side effects. Outcome: Progressing Towards Goal  Goal: *Monitoring blood glucose, interpreting and using results  Description: Identify recommended blood glucose targets  and personal targets. Outcome: Progressing Towards Goal  Goal: *Prevention, detection, treatment of acute complications  Description: List symptoms of hyper- and hypoglycemia; describe how to treat low blood sugar and actions for lowering  high blood glucose level. Outcome: Progressing Towards Goal  Goal: *Prevention, detection and treatment of chronic complications  Description: Define the natural course of diabetes and describe the relationship of blood glucose levels to long term complications of diabetes.   Outcome: Progressing Towards Goal  Goal: *Developing strategies to address psychosocial issues  Description: Describe feelings about living with diabetes; identify support needed and support network  Outcome: Progressing Towards Goal  Goal: *Insulin pump training  Outcome: Progressing Towards Goal  Goal: *Sick day guidelines  Outcome: Progressing Towards Goal  Goal: *Patient Specific Goal (EDIT GOAL, INSERT TEXT)  Outcome: Progressing Towards Goal     Problem: Patient Education: Go to Patient Education Activity  Goal: Patient/Family Education  Outcome: Progressing Towards Goal     Problem: Falls - Risk of  Goal: *Absence of Falls  Description: Document Clydene Bone Fall Risk and appropriate interventions in the flowsheet.   Outcome: Progressing Towards Goal  Note: Fall Risk Interventions:            Medication Interventions: Evaluate medications/consider consulting pharmacy, Patient to call before getting OOB, Teach patient to arise slowly                   Problem: Patient Education: Go to Patient Education Activity  Goal: Patient/Family Education  Outcome: Progressing Towards Goal

## 2020-04-21 NOTE — PROGRESS NOTES
0715: Bedside and Verbal shift change report given to Ananya RN (oncoming nurse) by Tony Durán RN (offgoing nurse). Report included the following information SBAR, Kardex, Intake/Output, MAR, Recent Results and Cardiac Rhythm Sinus Bearl Hoist. Dual RN verify of heparin drip and rate of 1130 units/hr. 7029: Pt left the floor with transport for Echo.     0906: Pt returned to room via transport. 8999: RN admin morning medications and pt ate all of breakfast. No c/o pain at this time. 1015: RN assist pt to bathroom. Pt did produce a bowel moment. PT assisted back to the chair. PIV LAC site unocclusive. 1150: Dr. Buck at bedside with pt. Cardiology discuss plan of care with pt and RN. 1200: RN piggyback NS IVF and heparin drip in RAC IV site per Cardiology until new access is gained. Will continue to monitor. PT sitting up to chair, eating his lunch. No c/o pain at this time. 1443: Pt resting in recliner at this time. No signs of distress noted. 1915: Bedside and Verbal shift change report given to Lesa Burkett RN (oncoming nurse) by Maria Antonia Yeager RN (offgoing nurse). Report included the following information SBAR, Kardex, Intake/Output, MAR, Recent Results, Med Rec Status and Cardiac Rhythm NSR.

## 2020-04-21 NOTE — PROGRESS NOTES
Admit Date: 2020  Date of Service: 2020    Reason for follow-up: chest pain      Assessment:         NSTEMI: prior CAD and RBBB; on heparin drip; plans for cardiac cath when Cr improves. Uncontrolled HTN  Acute on chronic renal failure stage 3-4: nephrotic range proteinuria  Ischemic cardiomyopathy  DM type 2: Hb A1c 8.0    Plan:   Discussed with cardiology- possible cardiac cath when Cr improves. - continue with Norvasc, Hydralazine, lipitor, Coreg, ASA   -continue heparin drip  Discussed with Nephrology- continue gentle hydration. CBC, BMP, Mg in am  Tight glucose control. Accuchecks and SSI    Patient did not want me to call and update family today. Current Antibtiocs:   none    Lines:   Peripheral    Case discussed with:  [x]Patient  []Family  [x]Nursing  [x]Case Management  DVT Prophylaxis:  []Lovenox  []Hep SQ  []SCDs  []Coumadin   [x]On Heparin gtt    I have independently examined the patient and reviewed all lab studies and imgaing as well as review of nursing notes and physican notes from the past 24 hours. Domingo Davidson D.O. Pager [phone]      No Known Allergies        Subjective:      Pt seen and examined. OOB in chair and eating lunch at the time of by evaluation. No current chest pain. No SOB. NO n.v.d. No particular complaints or questions about his plan of care.     Objective:        Visit Vitals  /84   Pulse 61   Temp 98.4 °F (36.9 °C)   Resp 18   Ht 5' 6\" (1.676 m)   Wt 92.5 kg (204 lb)   SpO2 98%   BMI 32.93 kg/m²     Temp (24hrs), Av.2 °F (36.8 °C), Min:97.8 °F (36.6 °C), Max:98.6 °F (37 °C)        General:   awake alert and oriented, non-toxic   Skin:   no rashes or skin lesions noted on limited exam, dry and warm   HEENT:  No scleral icterus or pallor; oral mucosa moist, lips moist   Lymph Nodes:   not assessed today   Lungs:   non, labored; bilaterally clear to aspiration- no crackles wheezes rales or rhonchi   Heart:  RRR, s1 and s2; no murmurs rubs or gallops; no edema, + pedal pulses   Abdomen:  soft, non-distended, active bowel sounds, non-tender   Genitourinary:  deferred   Extremities:   average muscle tone; no contractures, no joint effusions   Neurologic:  No gross focal motor or sensory abnormalities; CN 2-12 intact; Follows commands. Psychiatric:   appropriate and interactive. Labs: Results:   Chemistry Recent Labs     04/21/20  0536 04/20/20  0030   GLU 96 266*    134*   K 4.0 3.9    104   CO2 24 23   BUN 27* 27*   CREA 2.02* 2.59*   CA 8.5 8.2*   AGAP 4 7   BUCR 13 10*   AP 92 98   TP 7.2 8.0   ALB 2.6* 2.9*   GLOB 4.6* 5.1*   AGRAT 0.6* 0.6*      CBC w/Diff Recent Labs     04/21/20  0536 04/20/20  0554 04/20/20  0030   WBC 8.6 10.3 9.1   RBC 4.43* 4.45* 4.51*   HGB 11.3* 11.7* 12.0*   HCT 33.6* 33.9* 34.5*    239 245   GRANS 65 67 67   LYMPH 17* 16* 14*   EOS 11* 9* 9*        No results found for: SDES No results found for: CULT     Results     ** No results found for the last 336 hours. **          Imaging:     Xr Chest Port    Result Date: 4/20/2020  Impression: 1. No acute process.

## 2020-04-21 NOTE — DIABETES MGMT
Diabetes Patient/Family Education Record  Factors That  May Influence Patients Ability  to Learn or  Comply with Recommendations   []   Language barrier    []   Cultural needs   []   Motivation    []   Cognitive limitation    []   Physical   [x]   Education    []   Physiological factors   []   Hearing/vision/speaking impairment   []   Nondenominational beliefs    []   Financial factors   []  Other:   []  No factors identified at this time. Person Instructed:   [x]   Patient   []   Family   []  Other     Preference for Learning:   [x]   Verbal   []   Written   []  Demonstration     Level of Comprehension & Competence:    [x]  Good                                      [] Fair                                     []  Poor                             []  Needs Reinforcement   [x]  Teachback completed    Education Component:   [x]  Medication management, including how to administer insulin (if appropriate) and potential medication interactions: Yes. Patient reported list of home diabetes medications:  Tradjenta (linagliptin) 5 mg yoly. Lantus (SoloStar) pen insulin 50 units daily. Patient cannot remember his last A1c. Informed patient that his current A1c level is 8.0% (4/20/2020) and stated, \"Oh, that's high. It has never been that high before. \"     [x]  Nutritional management -obtain usual meal pattern: Yes. Patient stated, \"I have been very bad lately. I have been eating sugary snacks in the last couple months and it's not right because I have diabetes. \"  Patient is willing change eating habits by not consuming cakes, pies, cookies on a daily basis to help lower his A1c back to 7% or even lower. [x]  Exercise: Yes. [x]  Signs, symptoms, and treatment of hyperglycemia and hypoglycemia: Yes. Patient verbalized understanding about the importance of diabetes treatment compliance by including healthy eating. He knows that diabetes mediations alone will not work.      [x] Prevention, recognition and treatment of hyperglycemia and hypoglycemia: Yes. Patient verbalized understanding including symptoms of low blood sugar, treatment, causes, and prevention. [x]  Importance of blood glucose monitoring and how to obtain a blood glucose meter: Yes.     []  Instruction on use of the blood glucose meter   [x]  Discuss the importance of HbA1C monitoring: Yes. Discussed and explained to patient that his current A1c level of 8.0% (4/20/2020) is equivalent to estimated average blood glucose of 183 mg/dL during the past 2-3 months. See notes above regarding patient's plan to lower his A1c.     []  Sick day guidelines   [x]  Proper use and disposal of lancets, needles, syringes or insulin pens (if appropriate): Yes. [x]  Potential long-term complications (retinopathy, kidney disease, neuropathy, foot care):Yes. [x] Information about whom to contact in case of emergency or for more information: Yes. [x]  Goal:  Patient/family will demonstrate understanding of Diabetes Self Management Skills by: 04/28/2020  Plan for post-discharge education or self-management support:    [x] Outpatient class schedule provided            [] Patient Declined    [] Scheduled for outpatient classes (date) _______  Verify:  Does patient understand how diabetes medications work? Yes. Does patient know what their most recent A1c is? Yes. Does patient monitor glucose at home? Yes  Does patient have difficulty obtaining diabetes medications and testing supplies?  No.       Tommy Nesbitt RN [facility]  Pager: [phone]

## 2020-04-21 NOTE — PROGRESS NOTES
1900 Bedside and Verbal shift change report given to Pearl Parks RN (oncoming nurse) by My Hagan RN and Imani Johnson RN (offgoing nurse). Report included the following information SBAR, Kardex, Intake/Output, MAR and Cardiac Rhythm sinus/sinus ren. 2000 Patient assessment performed at this time. Patient in bed talking on phone. He has no complaints of SOB or chest pain at this time. Patient is on 3L NC. Bed is in lowest position, wheels are locked and call light is within reach. Patient education provided on the purpose of heparin and NS at this time. Will continue to monitor. 0700 Bedside and Verbal shift change report given to JOHNSON Hare and TXU Ishmael, RN (oncoming nurse) by Pearl Parks RN (offgoing nurse). Report included the following information SBAR, Kardex, Intake/Output, MAR and Cardiac Rhythm 1 Degree AV Block .

## 2020-04-21 NOTE — DIABETES MGMT
Glycemic Control Plan of Care    T2DM with current A1c level of 8.0% (4/20/2020). See separate notes, 04/21/2020, for assessment of home diabetes management and educational needs. Home diabetes medications: Patient reported on 04/21/2020:  Tradjenta (linagliptin) 5 mg yoly. Lantus (SoloStar) pen insulin 50 units daily. POC BG range on 04/20/2020: . POC BG report on 04/21/2020 at time of review: 163, 106, 160, 251. Seen patient this evening and stated, \"my sugar is high. \"    Current hospital diabetes medications:  Basal lantus insulin 30 units daily, first dose ordered 04/21/2020. Called Dr. Campuzano January and obtained order for correctional lispro insulin ACHS. Normal sensitivity dose. Ordered 04/21/2020 starting before dinner. Total daily dose insulin requirement previous day: 04/20/2020:  Lispro: 2 units    Recommendation(s):  1.) cont glycemic monitoring and intervention. Assessment:  Patient is 69year old with past medical history including type 2 diabetes mellitus, CAD, dyslipidemia, GERD, hypertension, ischemic cardiomyopathy, shingles, and tobacco use (continuous) - was admitted on 04/20/2020 with report of chest pain past 3 days. Noted:  NSTEMI. Acute on chronic renal failure stage 3-4. T2DM. Most recent blood glucose values:        Current A1C:      Lab Results   Component Value Date/Time    Hemoglobin A1c 8.0 (H) 04/20/2020 12:30 AM     Patient's  A1c level is elevated. This lab test reflects an estimated average blood blood glucose of 183 mg/dL over the past 3 months. Diet: Cardiac regular consistent carb 1800kcal.    Goals:  Blood glucose will be within target range of  mg/dL by 04/24/2020.     Education:  _X__  Refer to Diabetes Education Record: 04/21/2020.             ___  Education not indicated at this time    Jazmine Medina RN [facility]  Pager: [phone]

## 2020-04-22 ENCOUNTER — HOSPITAL ENCOUNTER (INPATIENT)
Dept: NON INVASIVE DIAGNOSTICS | Age: 75
Discharge: HOME | DRG: 247 | End: 2020-04-22
Attending: NURSE PRACTITIONER | Admitting: INTERNAL MEDICINE
Payer: MEDICARE

## 2020-04-22 VITALS — DIASTOLIC BLOOD PRESSURE: 58 MMHG | SYSTOLIC BLOOD PRESSURE: 126 MMHG | HEART RATE: 71 BPM

## 2020-04-22 LAB
ALBUMIN SERPL-MCNC: 2.5 G/DL (ref 3.4–5)
ANION GAP SERPL CALC-SCNC: 6 MMOL/L (ref 3–18)
APTT PPP: 90.4 SEC (ref 23–36.4)
BASOPHILS # BLD: 0 K/UL (ref 0–0.1)
BASOPHILS NFR BLD: 0 % (ref 0–2)
BUN SERPL-MCNC: 28 MG/DL (ref 7–18)
BUN/CREAT SERPL: 13 (ref 12–20)
CALCIUM SERPL-MCNC: 8.4 MG/DL (ref 8.5–10.1)
CHLORIDE SERPL-SCNC: 114 MMOL/L (ref 100–111)
CO2 SERPL-SCNC: 24 MMOL/L (ref 21–32)
CREAT SERPL-MCNC: 2.16 MG/DL (ref 0.6–1.3)
DIFFERENTIAL METHOD BLD: ABNORMAL
EOSINOPHIL # BLD: 0.7 K/UL (ref 0–0.4)
EOSINOPHIL NFR BLD: 9 % (ref 0–5)
ERYTHROCYTE [DISTWIDTH] IN BLOOD BY AUTOMATED COUNT: 14.7 % (ref 11.6–14.5)
GLUCOSE BLD STRIP.AUTO-MCNC: 182 MG/DL (ref 70–110)
GLUCOSE BLD STRIP.AUTO-MCNC: 71 MG/DL (ref 70–110)
GLUCOSE BLD STRIP.AUTO-MCNC: 86 MG/DL (ref 70–110)
GLUCOSE BLD STRIP.AUTO-MCNC: 94 MG/DL (ref 70–110)
GLUCOSE SERPL-MCNC: 87 MG/DL (ref 74–99)
HCT VFR BLD AUTO: 32.5 % (ref 36–48)
HGB BLD-MCNC: 11 G/DL (ref 13–16)
LYMPHOCYTES # BLD: 1.4 K/UL (ref 0.9–3.6)
LYMPHOCYTES NFR BLD: 18 % (ref 21–52)
MCH RBC QN AUTO: 25.9 PG (ref 24–34)
MCHC RBC AUTO-ENTMCNC: 33.8 G/DL (ref 31–37)
MCV RBC AUTO: 76.5 FL (ref 74–97)
MONOCYTES # BLD: 0.6 K/UL (ref 0.05–1.2)
MONOCYTES NFR BLD: 8 % (ref 3–10)
NEUTS SEG # BLD: 5 K/UL (ref 1.8–8)
NEUTS SEG NFR BLD: 65 % (ref 40–73)
PHOSPHATE SERPL-MCNC: 3.2 MG/DL (ref 2.5–4.9)
PLATELET # BLD AUTO: 238 K/UL (ref 135–420)
PMV BLD AUTO: 10.5 FL (ref 9.2–11.8)
POTASSIUM SERPL-SCNC: 3.9 MMOL/L (ref 3.5–5.5)
RBC # BLD AUTO: 4.25 M/UL (ref 4.7–5.5)
SODIUM SERPL-SCNC: 144 MMOL/L (ref 136–145)
STRESS BASELINE DIAS BP: 66 MMHG
STRESS BASELINE HR: 59 BPM
STRESS BASELINE SYS BP: 155 MMHG
STRESS ESTIMATED WORKLOAD: 1 METS
STRESS EXERCISE DUR MIN: NORMAL
STRESS PEAK DIAS BP: 66 MMHG
STRESS PEAK SYS BP: 155 MMHG
STRESS PERCENT HR ACHIEVED: 51 %
STRESS POST PEAK HR: 75 BPM
STRESS RATE PRESSURE PRODUCT: NORMAL BPM*MMHG
STRESS ST DEPRESSION: 0 MM
STRESS ST ELEVATION: 0 MM
STRESS TARGET HR: 147 BPM
WBC # BLD AUTO: 7.7 K/UL (ref 4.6–13.2)

## 2020-04-22 PROCEDURE — 74011250636 HC RX REV CODE- 250/636: Performed by: NURSE PRACTITIONER

## 2020-04-22 PROCEDURE — 74011250637 HC RX REV CODE- 250/637: Performed by: INTERNAL MEDICINE

## 2020-04-22 PROCEDURE — 82040 ASSAY OF SERUM ALBUMIN: CPT

## 2020-04-22 PROCEDURE — 84100 ASSAY OF PHOSPHORUS: CPT

## 2020-04-22 PROCEDURE — 74011636637 HC RX REV CODE- 636/637: Performed by: INTERNAL MEDICINE

## 2020-04-22 PROCEDURE — 82962 GLUCOSE BLOOD TEST: CPT

## 2020-04-22 PROCEDURE — 36415 COLL VENOUS BLD VENIPUNCTURE: CPT

## 2020-04-22 PROCEDURE — 85730 THROMBOPLASTIN TIME PARTIAL: CPT

## 2020-04-22 PROCEDURE — 85025 COMPLETE CBC W/AUTO DIFF WBC: CPT

## 2020-04-22 PROCEDURE — 93017 CV STRESS TEST TRACING ONLY: CPT

## 2020-04-22 PROCEDURE — 80048 BASIC METABOLIC PNL TOTAL CA: CPT

## 2020-04-22 PROCEDURE — 74011250637 HC RX REV CODE- 250/637: Performed by: NURSE PRACTITIONER

## 2020-04-22 PROCEDURE — 93005 ELECTROCARDIOGRAM TRACING: CPT

## 2020-04-22 PROCEDURE — 74011250637 HC RX REV CODE- 250/637

## 2020-04-22 PROCEDURE — 65660000004 HC RM CVT STEPDOWN

## 2020-04-22 PROCEDURE — 74011250636 HC RX REV CODE- 250/636: Performed by: EMERGENCY MEDICINE

## 2020-04-22 PROCEDURE — 74011250636 HC RX REV CODE- 250/636: Performed by: INTERNAL MEDICINE

## 2020-04-22 RX ORDER — SODIUM CHLORIDE 9 MG/ML
250 INJECTION, SOLUTION INTRAVENOUS CONTINUOUS
Status: DISCONTINUED | OUTPATIENT
Start: 2020-04-22 | End: 2020-04-26 | Stop reason: HOSPADM

## 2020-04-22 RX ORDER — CLONIDINE HYDROCHLORIDE 0.1 MG/1
0.1 TABLET ORAL 2 TIMES DAILY
Status: DISCONTINUED | OUTPATIENT
Start: 2020-04-22 | End: 2020-04-22

## 2020-04-22 RX ORDER — NITROGLYCERIN 0.4 MG/1
TABLET SUBLINGUAL
Status: COMPLETED
Start: 2020-04-22 | End: 2020-04-22

## 2020-04-22 RX ORDER — DOXAZOSIN 4 MG/1
2 TABLET ORAL EVERY EVENING
Status: DISCONTINUED | OUTPATIENT
Start: 2020-04-22 | End: 2020-04-24 | Stop reason: HOSPADM

## 2020-04-22 RX ADMIN — DOXAZOSIN 2 MG: 4 TABLET ORAL at 18:06

## 2020-04-22 RX ADMIN — GABAPENTIN 100 MG: 100 CAPSULE ORAL at 08:44

## 2020-04-22 RX ADMIN — HYDRALAZINE HYDROCHLORIDE 100 MG: 50 TABLET, FILM COATED ORAL at 21:46

## 2020-04-22 RX ADMIN — HYDRALAZINE HYDROCHLORIDE 100 MG: 50 TABLET, FILM COATED ORAL at 08:44

## 2020-04-22 RX ADMIN — REGADENOSON 0.4 MG: 0.08 INJECTION, SOLUTION INTRAVENOUS at 13:00

## 2020-04-22 RX ADMIN — CARVEDILOL 25 MG: 25 TABLET, FILM COATED ORAL at 08:44

## 2020-04-22 RX ADMIN — HEPARIN SODIUM 1130 UNITS/HR: 10000 INJECTION, SOLUTION INTRAVENOUS at 04:20

## 2020-04-22 RX ADMIN — SODIUM CHLORIDE 60 ML/HR: 900 INJECTION, SOLUTION INTRAVENOUS at 16:48

## 2020-04-22 RX ADMIN — DUTASTERIDE 0.5 MG: 0.5 CAPSULE, LIQUID FILLED ORAL at 08:50

## 2020-04-22 RX ADMIN — PANTOPRAZOLE SODIUM 40 MG: 40 TABLET, DELAYED RELEASE ORAL at 08:49

## 2020-04-22 RX ADMIN — NITROGLYCERIN 0.8 MG: 0.4 TABLET SUBLINGUAL at 13:46

## 2020-04-22 RX ADMIN — SODIUM CHLORIDE 100 ML/HR: 900 INJECTION, SOLUTION INTRAVENOUS at 02:04

## 2020-04-22 RX ADMIN — HYDRALAZINE HYDROCHLORIDE 10 MG: 20 INJECTION INTRAMUSCULAR; INTRAVENOUS at 04:19

## 2020-04-22 RX ADMIN — SODIUM CHLORIDE 250 ML/HR: 900 INJECTION, SOLUTION INTRAVENOUS at 13:00

## 2020-04-22 RX ADMIN — AMLODIPINE BESYLATE 10 MG: 10 TABLET ORAL at 08:44

## 2020-04-22 RX ADMIN — GABAPENTIN 100 MG: 100 CAPSULE ORAL at 18:06

## 2020-04-22 RX ADMIN — INSULIN LISPRO 2 UNITS: 100 INJECTION, SOLUTION INTRAVENOUS; SUBCUTANEOUS at 21:44

## 2020-04-22 RX ADMIN — CARVEDILOL 25 MG: 25 TABLET, FILM COATED ORAL at 18:07

## 2020-04-22 RX ADMIN — HYDRALAZINE HYDROCHLORIDE 100 MG: 50 TABLET, FILM COATED ORAL at 18:08

## 2020-04-22 RX ADMIN — ATORVASTATIN CALCIUM 40 MG: 40 TABLET, FILM COATED ORAL at 21:46

## 2020-04-22 RX ADMIN — INSULIN GLARGINE 30 UNITS: 100 INJECTION, SOLUTION SUBCUTANEOUS at 08:45

## 2020-04-22 NOTE — CDMP QUERY
Pt admitted with  an   NSTEMI    Pt noted to have    BNP   1957. Pt noted on echo this admit  to have  EF  60-65%  with h/o cardiomyopathy. . If possible, please document in progress notes and d/c summary if you are evaluating and /or treating any of the following:  Acute on Chronic Diastolic CHF  Acute Diastolic CHF  Chronic Diastolic CHF  Other, please specify  Clinically unable to determine The medical record reflects the following: 
 
  Risk Factors:   CAD;   ischemic cardiomyopathy; dyslipidemia Clinical Indicators: BNP   1957 Treatment:    coreg   25 mg po BID 
                        coreg  25 mg po in ER Thank you,   Thaddeus Spatz RN   CCDS   [phone]

## 2020-04-22 NOTE — ROUTINE PROCESS
2000 - Bedside shift change report given to Dedra Bosworth, RN (oncoming nurse) by Jeaneth Kendrick RN (offgoing nurse). Report included the following information SBAR, Kardex, Intake/Output, MAR, Recent Results and Cardiac Rhythm NSR.  
 
0700 - Bedside shift change report given to Tono Fong RN (oncoming nurse) by Dedra Bosworth, RN (offgoing nurse). Report included the following information SBAR, Kardex, Intake/Output, MAR, Recent Results and Cardiac Rhythm NSR.

## 2020-04-22 NOTE — PROGRESS NOTES
Admit Date: 2020  Date of Service: 2020    Reason for follow-up: chest pain      Assessment:         NSTEMI: prior CAD and RBBB; on heparin drip; plans for cardiac cath when Cr improves. 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 Further decision regarding cath pending renal function   -continue with Norvasc, Hydralazine, lipitor, Coreg, ASA   -continue heparin drip  Discussed with Nephrology- continue gentle hydration. CBC, BMP, Mg in am  Tight glucose control. Accuchecks and SSI    Patient did not want me to call and update family today. Current Antibtiocs:   none    Lines:   Peripheral    Case discussed with:  [x]Patient  []Family  [x]Nursing  [x]Case Management  DVT Prophylaxis:  []Lovenox  []Hep SQ  []SCDs  []Coumadin   [x]On Heparin gtt    I have independently examined the patient and reviewed all lab studies and imgaing as well as review of nursing notes and physican notes from the past 24 hours. Robert Gamble D.O. Pager [phone]      No Known Allergies        Subjective:      Pt seen and examined. Walking around in room. Brushing his teeth. Hungry because he was NPO. No current CP or SOB. No other complaints today.     Objective:        Visit Vitals  /73   Pulse 66   Temp 98.2 °F (36.8 °C)   Resp 20   Ht 5' 6\" (1.676 m)   Wt 95.3 kg (210 lb 3.2 oz)   SpO2 99%   BMI 33.93 kg/m²     Temp (24hrs), Av.1 °F (36.7 °C), Min:97.7 °F (36.5 °C), Max:98.3 °F (36.8 °C)        General:   awake alert and oriented, non-toxic   Skin:   no rashes or skin lesions noted on limited exam, dry and warm   HEENT:  No scleral icterus or pallor; oral mucosa moist, lips moist   Lymph Nodes:   not assessed today   Lungs:   non, labored; bilaterally clear to aspiration- no crackles wheezes rales or rhonchi   Heart: RRR, s1 and s2; no murmurs rubs or gallops; no edema, + pedal pulses   Abdomen:  soft, non-distended, active bowel sounds, non-tender   Genitourinary:  deferred   Extremities:   average muscle tone; no contractures, no joint effusions   Neurologic:  No gross focal motor or sensory abnormalities; CN 2-12 intact; Follows commands. Psychiatric:   appropriate and interactive. Labs: Results:   Chemistry Recent Labs     04/22/20  0529 04/21/20  0536 04/20/20  0030   GLU 87 96 266*    139 134*   K 3.9 4.0 3.9   * 111 104   CO2 24 24 23   BUN 28* 27* 27*   CREA 2.16* 2.02* 2.59*   CA 8.4* 8.5 8.2*   AGAP 6 4 7   BUCR 13 13 10*   AP  --  92 98   TP  --  7.2 8.0   ALB 2.5* 2.6* 2.9*   GLOB  --  4.6* 5.1*   AGRAT  --  0.6* 0.6*      CBC w/Diff Recent Labs     04/22/20  0529 04/21/20  0536 04/20/20  0554   WBC 7.7 8.6 10.3   RBC 4.25* 4.43* 4.45*   HGB 11.0* 11.3* 11.7*   HCT 32.5* 33.6* 33.9*    249 239   GRANS 65 65 67   LYMPH 18* 17* 16*   EOS 9* 11* 9*        No results found for: SDES No results found for: CULT     Results     ** No results found for the last 336 hours. **          Imaging:     Xr Chest Port    Result Date: 4/20/2020  Impression: 1. No acute process.

## 2020-04-22 NOTE — PROGRESS NOTES
Interdisciplinary Round Note   Patient Information: Patient Information: Octavio Mooney.                                      [birth year]   Reason for Admission: NSTEMI (non-ST elevated myocardial infarction) [facility]) [I21.4]   Attending Provider:   Willard Sierra MD   Past Medical History:   Past Medical History:   Diagnosis Date    ACS [facility]acute [facility] syndrome) [facility] [address]     CAD (coronary artery disease), native coronary artery August 2010    s/p non-ST-elevation myocardial infarction, s/p PCI with BMS (Vision 4x18mm) distal RCA with 45% distal LAD  and mild LCx disease. mild-moderate LV systolic dysfunction (80/52).  Diabetes mellitus type II     Dyslipidemia     ED (erectile dysfunction)     GERD (gastroesophageal reflux disease)     History of BPH     History of echocardiogram 5/18/2011    EF 65%. Mod-marked increased wall thickness. Gr 1 DDfx. No significant valvular heart disease.  Hyperlipidemia     Hypertension     Ischemic cardiomyopathy     recovery of LV syst fct  Echo May 2011 LV EF 65%    MI (myocardial infarction) ([facility] [address]     Obesity     RBBB (right bundle branch block with left anterior fascicular block)     S/P cardiac cath 8/26/2010    LM patent. dLAD 45%. CX mild. dRCA 100% thrombotic (S/P cath thrombectomy & Vision 4 x 18-mm BMS). EF 40%. Posterobasal, diaphrag, apical hypk.  Shingles 4/2012    Tobacco use disorder, continuous       Hospital day: 2  Estimated discharge date:   >2 days  RRAT Score: Medium Risk            15       Total Score        2 . Living with Significant Other. Assisted Living. LTAC. SNF. or   Rehab    5 Pt.  Coverage (Medicare=5 , Medicaid, or Self-Pay=4)    8 Charlson Comorbidity Score (Age + Comorbid Conditions)        Criteria that do not apply:    Has Seen PCP in Last 6 Months (Yes=3, No=0)    Patient Length of Stay (>5 days = 3)    IP Visits Last 12 Months (1-3=4, 4=9, >4=11)       Goals for Today: Stress test           VITAL SIGNS  Vitals: 04/22/20 0000 04/22/20 0415 04/22/20 0721 04/22/20 1148   BP: 143/68 170/74 164/73 149/61   Pulse: 70 63 66 (!) 56   Resp: 18 18 20 19   Temp: 98.1 °F (36.7 °C) 98.3 °F (36.8 °C) 98.2 °F (36.8 °C) 96.8 °F (36 °C)   SpO2: 95% 95% 99% 98%   Weight:  95.3 kg (210 lb 3.2 oz)     Height:              Lines, Drains, & Airways    Peripheral IV 04/20/20 Right Antecubital-Site Assessment: Clean, dry, & intact  [REMOVED] Peripheral IV 04/20/20 Left Antecubital-Site Assessment: Other (Comment)(site discontinued)  Peripheral IV 04/21/20 Anterior;Right Wrist-Site Assessment: Clean, dry, & intact       VTE Prophylaxis                                   Intake and Output:   04/20 1901 - 04/22 0700  In: 2573.1 [P.O.:1080; I.V.:1493.1]  Out: 2150 [Urine:2150]  04/22 0701 - 04/22 1900  In: -   Out: 800 [Urine:800] Current Diet: DIET NPO       Abdominal   Last Bowel Movement Date: 04/21/20  Stool Appearance: Formed     Recent Glucose Results:   Lab Results   Component Value Date/Time    GLU 87 04/22/2020 05:29 AM    GLUCPOC 94 04/22/2020 11:44 AM    GLUCPOC 71 04/22/2020 07:33 AM    GLUCPOC 234 (H) 04/21/2020 09:56 PM          IV Antibiotics? no         Activity Level: Activity Level: Up with Assistance  Needs assistance with ADLs: no  PT Consult Status: NO Current Immunizations: There is no immunization history on file for this patient.    Recommendations:   Discharge Disposition: Home    Medication Reconciliation Completed: NO    Cardiac/Pulmonary Rehab Ordered:  NO    Needs for Discharge: none currently identified Recommendations from IDR team: awaiting stress test and cath       TRISTAN Melgar  Case Management  [phone]

## 2020-04-22 NOTE — DIABETES MGMT
Glycemic Control Plan of Care    T2DM with current A1c level of 8.0% (4/20/2020). See separate notes, 04/21/2020, for assessment of home diabetes management and educational needs. Home diabetes medications: Patient reported on 04/21/2020:  Tradjenta (linagliptin) 5 mg yoly. Lantus (SoloStar) pen insulin 50 units daily. POC BG range on 04/21/2020: 106-251. Started lantus insulin 30 units daily and added sliding scale insulin. POC BG report on 04/22/2020 at time of review: 70.    Patient NPO since past midnight for testing. Current hospital diabetes medications:  Basal lantus insulin 30 units daily. Correctional lispro insulin ACHS. Normal sensitivity dose. Total daily dose insulin requirement previous day: 04/21/2020:  Lantus: 30 units  Lispro: 10 units  TDD insulin: 40 units    Recommendation(s):  1.) cont glycemic monitoring and intervention. Assessment:  Patient is 69year old with past medical history including type 2 diabetes mellitus, CAD, dyslipidemia, GERD, hypertension, ischemic cardiomyopathy, shingles, and tobacco use (continuous) - was admitted on 04/20/2020 with report of chest pain past 3 days. Noted:  NSTEMI. Acute on chronic renal failure stage 3-4. T2DM. Most recent blood glucose values:        Current A1C:      Lab Results   Component Value Date/Time    Hemoglobin A1c 8.0 (H) 04/20/2020 12:30 AM     Patient's  A1c level is elevated. This lab test reflects an estimated average blood blood glucose of 183 mg/dL over the past 3 months. Diet: NPO. Goals:  Blood glucose will be within target range of  mg/dL by 04/25/2020.     Education:  _X__  Refer to Diabetes Education Record: 04/21/2020.             ___  Education not indicated at this time    Alma Delia Le RN [facility] [place]  Pager: [phone]

## 2020-04-22 NOTE — ROUTINE PROCESS
Bedside and Verbal shift change report given to 2[address] [facility] (oncoming nurse) by Trisha Rico (offgoing nurse). Report included the following information SBAR, Kardex, Intake/Output, MAR, Recent Results and Cardiac Rhythm SR with 1st degree AVB. 1000 Pt resting in bed with no complaints of pain and no change to condition. Cardiac cath has been cancelled. Pt to have stress test instead. 1200 Pt transported for Stress test. Pt stable at time of transport. 1400 Pt returned to room. Pt is stable at this time. Will continue to monitor. 1650 Pt resting in bed with no complaints of pain and no change to condition. Will continue to monitor. Bedside and Verbal shift change report given to Trisha Rico  (oncoming nurse) by Jonathon Madrid RN (offgoing nurse). Report included the following information SBAR, Kardex, Intake/Output, MAR, Recent Results and Cardiac Rhythm SR with 1st degree AVB.

## 2020-04-22 NOTE — PROGRESS NOTES
[facility]      CARDIOLOGY PROGRESS NOTE  RECS:    1. NSTEMI- with positive troponin and up trending-currently chest pain free. Continue Heparin drip, asa, statin, bb. Echo showing normal overall ejection fraction. NUC stress test today as creatinine remains elevated. 2. Uncontrolled Hypertension- on admission with 's- High BP this am.  added Clonidine 0.1 mg bid. 3. Coronary- Cath 2010-  Showed thrombotic occlusion of the distal right coronary artery and moderate distal left anterior descending disease. Successful percutaneous coronary intervention with catheter thrombectomy, followed by bare metal stent deployment with a Vision 4 x 18  mm stent in the distal RCA  4. Hx of Ischemic cardiomyopathy last echo 2017 revealed EF% 65 %  5. Dyslipidemia- continue statin   6. Tobacco use disorder-discussed cessation   7. 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 Diabetes- poorly controlled with A1c 8.0   9. JOHNIE on CKD- baseline 1.8 -2.0 since 2018 - creatinine levels slightly increased today. on gently hydration Renal following     Creatinine is not improving and patient may have CKD. Would recommend a stress test to evaluate ischemia prior to cardiac catheterization. Discussed with patient and he agrees. If stress test does not show any significant ischemia, possible discharge on medications. Add clonidine for hypertension but watch for bradycardia.     ASSESSMENT:  Hospital Problems  Date Reviewed: 9/19/2019          Codes Class Noted POA    NSTEMI (non-ST elevated myocardial infarction) [facility]) ICD-10-CM: I21.4  ICD-9-CM: 410.70  4/20/2020 Unknown        ACS (acute coronary syndrome) ([facility] [address] ICD-10-CM: I24.9  ICD-9-CM: 411.1  4/20/2020 Unknown        Hypertensive urgency ICD-10-CM: I16.0  ICD-9-CM: 401.9  4/20/2020 Unknown                SUBJECTIVE:  No CP  Has chronic HURT that is stable      OBJECTIVE:    VS:   Visit Vitals  /73   Pulse 66 Temp 98.2 °F (36.8 °C)   Resp 20   Ht 5' 6\" (1.676 m)   Wt 95.3 kg (210 lb 3.2 oz)   SpO2 99%   BMI 33.93 kg/m²         Intake/Output Summary (Last 24 hours) at 4/22/2020 0946  Last data filed at 4/22/2020 5154  Gross per 24 hour   Intake 1852.58 ml   Output 1700 ml   Net 152.58 ml     TELE: normal sinus rhythm    General: alert, well nourished, pleasant and in no apparent distress  HENT: Normocephalic, atraumatic. Normal external eye. Neck :  no bruit, no JVD  Cardiac:  regular rate and rhythm, S1, S2 normal, no murmur, click, rub or gallop  Lungs: Few scattered rales R base, L base, wheezes upper lung area diminished breath sounds b/l. Abdomen: Soft, nontender, no masses  Extremities:  No c/c/e, peripheral pulses present      Labs: Results:       Chemistry Recent Labs     04/22/20  0529 04/21/20  0536 04/20/20  0030   GLU 87 96 266*    139 134*   K 3.9 4.0 3.9   * 111 104   CO2 24 24 23   BUN 28* 27* 27*   CREA 2.16* 2.02* 2.59*   CA 8.4* 8.5 8.2*   AGAP 6 4 7   BUCR 13 13 10*   AP  --  92 98   TP  --  7.2 8.0   ALB 2.5* 2.6* 2.9*   GLOB  --  4.6* 5.1*   AGRAT  --  0.6* 0.6*      CBC w/Diff Recent Labs     04/22/20  0529 04/21/20  0536 04/20/20  0554   WBC 7.7 8.6 10.3   RBC 4.25* 4.43* 4.45*   HGB 11.0* 11.3* 11.7*   HCT 32.5* 33.6* 33.9*    249 239   GRANS 65 65 67   LYMPH 18* 17* 16*   EOS 9* 11* 9*      Cardiac Enzymes Recent Labs     04/20/20  0030      CKND1 3.9      Coagulation Recent Labs     04/22/20  0529 04/21/20  0536   APTT 90.4* 81.1*       Lipid Panel Lab Results   Component Value Date/Time    Cholesterol, total 122 04/20/2020 04:54 AM    HDL Cholesterol 23 (L) 04/20/2020 04:54 AM    LDL, calculated 62.2 04/20/2020 04:54 AM    VLDL, calculated 36.8 04/20/2020 04:54 AM    Triglyceride 184 (H) 04/20/2020 04:54 AM    CHOL/HDL Ratio 5.3 (H) 04/20/2020 04:54 AM      BNP No results for input(s): BNPP in the last 72 hours.    Liver Enzymes Recent Labs     04/22/20  0512 04/21/20  0536   TP  --  7.2   ALB 2.5* 2.6*   AP  --  92   SGOT  --  20      Thyroid Studies No results found for: T4, T3U, TSH, TSHEXT, TSHEXT           Jessica LEACH Yessi:[phone]  I have independently evaluated and examined the patient. All relevant labs and testing data are reviewed. Care plan discussed and updated after review.   Ariadne Griggs MD

## 2020-04-22 NOTE — CDMP QUERY
Pt admitted with   an acute  MI .    noted to have   elevated  SBP's Joselyn Vazquez If possible, please document in progress notes and d/c summary if you are evaluating and /or treating any of the following:  Hypertensive Emergency  Other, please specify  Clinically unable to determine The medical record reflects the following: 
 
  Risk Factors:    uncontrolled  HTN;   acute  NSTEMI;    CKD  stage 3-4 Clinical Indicators: BP's in ER   206/91;    203/85;   198/91;   208/95;   201/73 Treatment: nitro gtt begun in ER Norvasc  5mg po  
                    losartan   100mg po 
                    apresoline  100mg po Thank you   Denisse Castle  RN  CCDS   [phone]

## 2020-04-22 NOTE — PROGRESS NOTES
In Patient Progress note      Admit Date: 4/20/2020     Impression: 1. 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 High risk for progression of disease. Volume status appears okay. Electrolytes and acid-base also in good range. Baseline creatinine is in 2 range.   Noted plans for possible cardiac cath based on his renal functions  #2 NSTEMI, stress test per cardiology team  #3 hypertensive urgency, overnight was on nitro drip but now transition to p.o. antihypertensives, better controlled, goal would be systolic 205K to 601J in the first 24 hours #4  #4 nephrotic proteinuria with hypoalbuminemia  #5 diabetes mellitus with complications  #6 mild hyponatremia  #7 dyslipidemia     Plan:  1) continue NS @ 50 cc/hrs  Risk for CANDIE discussed with patient with contrast exposure   2) hold ACE/ARB  3) continue home BP med , goal SBP as above      Please call with questions     Nora Wan MD FASN  Cell [phone]  Pager: [phone]    Subjective:     Feels better  Denies SOB/CP      Current Facility-Administered Medications:     doxazosin (CARDURA) tablet 2 mg, 2 mg, Oral, QPM, Jessica Alvarez, NP    insulin lispro (HUMALOG) injection, , SubCUTAneous, AC&HS, Sander Palma MD, Stopped at 04/22/20 0844    dextrose (D50W) injection syrg 12.5-25 g, 25-50 mL, IntraVENous, PRN, Sander Palma MD    aspirin delayed-release tablet 81 mg, 81 mg, Oral, DAILY, Grant Cool MD, Stopped at 04/22/20 0900    pantoprazole (PROTONIX) tablet 40 mg, 40 mg, Oral, DAILY, Grant Cool MD, 40 mg at 04/22/20 0849    insulin glargine (LANTUS) injection 30 Units, 30 Units, SubCUTAneous, DAILY, Grant Cool MD, 30 Units at 04/22/20 0845    hydrALAZINE (APRESOLINE) tablet 100 mg, 100 mg, Oral, TID, Grant Cool MD, 100 mg at 04/22/20 0844    carvediloL (COREG) tablet 25 mg, 25 mg, Oral, BID WITH MEALS, Grant Cool MD, 25 mg at 04/22/20 0844    atorvastatin (LIPITOR) tablet 40 mg, 40 mg, Oral, QHS, Grant Cool MD, 40 mg at 04/21/20 2116    dutasteride (AVODART) capsule 0.5 mg, 0.5 mg, Oral, DAILY, Grant Cool MD, 0.5 mg at 04/22/20 0850    gabapentin (NEURONTIN) capsule 100 mg, 100 mg, Oral, BID, Grant Cool MD, 100 mg at 04/22/20 0844    glucose chewable tablet 16 g, 4 Tab, Oral, PRN, Grant Cool MD    glucagon (GLUCAGEN) injection 1 mg, 1 mg, IntraMUSCular, PRN, Grant Cool MD    0.9% sodium chloride infusion, 60 mL/hr, IntraVENous, CONTINUOUS, Jessica Alvarez, NP, Last Rate: 60 mL/hr at 04/22/20 1649, 60 mL/hr at 04/22/20 1649    hydrALAZINE (APRESOLINE) 20 mg/mL injection 10 mg, 10 mg, IntraVENous, Q6H PRN, Jessica Alvarez, NP, 10 mg at 04/22/20 0419    amLODIPine (NORVASC) tablet 10 mg, 10 mg, Oral, DAILY, Jessica Alvarez, NP, 10 mg at 04/22/20 0844    Facility-Administered Medications Ordered in Other Encounters:     0.9% sodium chloride infusion, 250 mL/hr, IntraVENous, CONTINUOUS, Jessica Alvarez, NP, Stopped at 04/22/20 1330        Objective:     Visit Vitals  /82 Comment: 156/76 repeat   Pulse (!) 58   Temp 97.3 °F (36.3 °C)   Resp 20   Ht 5' 6\" (1.676 m)   Wt 95.3 kg (210 lb 3.2 oz)   SpO2 97%   BMI 33.93 kg/m²         Intake/Output Summary (Last 24 hours) at 4/22/2020 1656  Last data filed at 4/22/2020 1040  Gross per 24 hour   Intake 240 ml   Output 1900 ml   Net -1660 ml       Physical Exam:     Mild distress  HEENT: mmm  Lungs: good air entry, clear to auscultation bilaterally. Cardiovascular system: S1, S2, wnl tachy  Abdomen: soft, non tender, non distended. Positive bowel sounds. Extremities: no clubbing, cyanosis or edema. Integumentary: skin is grossly intact. Neurologic: Alert, oriented time three.      Data Review:    Recent Labs     04/22/20  0529   WBC 7.7   RBC 4.25*   HCT 32.5*   MCV 76.5   MCH 25.9   MCHC 33.8   RDW 14.7*     Recent Labs     04/22/20  0529 04/21/20  0536 04/20/20  0030   BUN 28* 27* 27*   CREA 2.16* 2.02* 2.59*   CA 8.4* 8.5 8.2*   ALB 2.5* 2.6* 2.9*   K 3.9 4.0 3.9    139 134*   * 111 104   CO2 24 24 23   PHOS 3.2  --   --    GLU 87 96 266*       Shabnam Stinson MD

## 2020-04-22 NOTE — PROGRESS NOTES
In Patient Progress note      Admit Date: 4/20/2020     Impression: 1. 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 High risk for progression of disease. Volume status appears okay. Electrolytes and acid-base also in good range. Baseline creatinine is in 2 range.   Noted plans for stress test per cardiology  , decision on cath based on the stress test  #2 NSTEMI, stress test per cardiology team  #3 hypertensive urgency, overnight was on nitro drip but now transition to p.o. antihypertensives, better controlled, goal would be systolic 054R to 999C in the first 24 hours #4  #4 nephrotic proteinuria with hypoalbuminemia  #5 diabetes mellitus with complications  #6 mild hyponatremia  #7 dyslipidemia     Plan:  1) continue NS @ 60 cc/hrs  Risk for CANDIE discussed with patient with contrast exposure   2) hold ACE/ARB  3) continue home BP med , goal SBP as above      Please call with questions     Roxy Cisse MD FASN  Cell [phone]  Pager: [phone]    Subjective:     Feels better  Denies SOB/CP      Current Facility-Administered Medications:     doxazosin (CARDURA) tablet 2 mg, 2 mg, Oral, QPM, Jessica Alvarez, NP    insulin lispro (HUMALOG) injection, , SubCUTAneous, AC&HS, Dilshad Roberts MD, Stopped at 04/22/20 0844    dextrose (D50W) injection syrg 12.5-25 g, 25-50 mL, IntraVENous, PRN, Dilshad Roberts MD    aspirin delayed-release tablet 81 mg, 81 mg, Oral, DAILY, Grant Cool MD, Stopped at 04/22/20 0900    pantoprazole (PROTONIX) tablet 40 mg, 40 mg, Oral, DAILY, Grant Cool MD, 40 mg at 04/22/20 0849    insulin glargine (LANTUS) injection 30 Units, 30 Units, SubCUTAneous, DAILY, Grant Cool MD, 30 Units at 04/22/20 0845    hydrALAZINE (APRESOLINE) tablet 100 mg, 100 mg, Oral, TID, Grant Cool MD, 100 mg at 04/22/20 0844    carvediloL (COREG) tablet 25 mg, 25 mg, Oral, BID WITH MEALS, Aletha Brooke MD, 25 mg at 04/22/20 0844    atorvastatin (LIPITOR) tablet 40 mg, 40 mg, Oral, QHS, Grant Cool MD, 40 mg at 04/21/20 2116    dutasteride (AVODART) capsule 0.5 mg, 0.5 mg, Oral, DAILY, Grant Cool MD, 0.5 mg at 04/22/20 0850    gabapentin (NEURONTIN) capsule 100 mg, 100 mg, Oral, BID, Grant Cool MD, 100 mg at 04/22/20 0844    glucose chewable tablet 16 g, 4 Tab, Oral, PRN, Grant Cool MD    glucagon (GLUCAGEN) injection 1 mg, 1 mg, IntraMUSCular, PRN, Grant Cool MD    0.9% sodium chloride infusion, 60 mL/hr, IntraVENous, CONTINUOUS, Jessica Alvarez, NP, Last Rate: 60 mL/hr at 04/22/20 1649, 60 mL/hr at 04/22/20 1649    hydrALAZINE (APRESOLINE) 20 mg/mL injection 10 mg, 10 mg, IntraVENous, Q6H PRN, Jessica Alvarez, NP, 10 mg at 04/22/20 0419    amLODIPine (NORVASC) tablet 10 mg, 10 mg, Oral, DAILY, HaleckJessica dominguez, NP, 10 mg at 04/22/20 0844    Facility-Administered Medications Ordered in Other Encounters:     0.9% sodium chloride infusion, 250 mL/hr, IntraVENous, CONTINUOUS, Jessica Alvarez, NP, Stopped at 04/22/20 1330        Objective:     Visit Vitals  /82 Comment: 156/76 repeat   Pulse (!) 58   Temp 97.3 °F (36.3 °C)   Resp 20   Ht 5' 6\" (1.676 m)   Wt 95.3 kg (210 lb 3.2 oz)   SpO2 97%   BMI 33.93 kg/m²         Intake/Output Summary (Last 24 hours) at 4/22/2020 1653  Last data filed at 4/22/2020 1040  Gross per 24 hour   Intake 240 ml   Output 1900 ml   Net -1660 ml       Physical Exam:     Mild distress  HEENT: mmm  Lungs: good air entry, clear to auscultation bilaterally. Cardiovascular system: S1, S2, wnl tachy  Abdomen: soft, non tender, non distended. Positive bowel sounds. Extremities: no clubbing, cyanosis or edema. Integumentary: skin is grossly intact. Neurologic: Alert, oriented time three.      Data Review:    Recent Labs     04/22/20  0529   WBC 7.7   RBC 4.25*   HCT 32.5*   MCV 76.5   MCH 25.9   MCHC 33.8   RDW 14.7*     Recent Labs 04/22/20  0529 04/21/20  0536 04/20/20  0030   BUN 28* 27* 27*   CREA 2.16* 2.02* 2.59*   CA 8.4* 8.5 8.2*   ALB 2.5* 2.6* 2.9*   K 3.9 4.0 3.9    139 134*   * 111 104   CO2 24 24 23   PHOS 3.2  --   --    GLU 87 96 266*       Suzy Chawla MD

## 2020-04-22 NOTE — PROGRESS NOTES
Interdisciplinary team rounds were held 4/22/2020 with the following team members:Care Management, Nursing and Physician and the patient. Plan of care discussed. See clinical pathway and/or care plan for interventions and desired outcomes. No needs at this time, awaiting stress test.     Plan: return home when work up is completed.

## 2020-04-22 NOTE — PROGRESS NOTES
Patient was given 11. 0mCi of 99mTc Sestamibi for the resting images. Patient was also given 33. 0mCi 99mTc Sestamibi for the stress images  Injected 0.4mg Lexiscan.

## 2020-04-23 LAB
ACT BLD: 268 SECS (ref 79–138)
ALBUMIN SERPL-MCNC: 2.7 G/DL (ref 3.4–5)
ANION GAP SERPL CALC-SCNC: 6 MMOL/L (ref 3–18)
APTT PPP: 32.2 SEC (ref 23–36.4)
ATRIAL RATE: 71 BPM
BUN SERPL-MCNC: 25 MG/DL (ref 7–18)
BUN/CREAT SERPL: 11 (ref 12–20)
CALCIUM SERPL-MCNC: 8.1 MG/DL (ref 8.5–10.1)
CALCULATED P AXIS, ECG09: 43 DEGREES
CALCULATED R AXIS, ECG10: -83 DEGREES
CALCULATED T AXIS, ECG11: 100 DEGREES
CHLORIDE SERPL-SCNC: 115 MMOL/L (ref 100–111)
CO2 SERPL-SCNC: 23 MMOL/L (ref 21–32)
CREAT SERPL-MCNC: 2.19 MG/DL (ref 0.6–1.3)
DIAGNOSIS, 93000: NORMAL
GLUCOSE BLD STRIP.AUTO-MCNC: 105 MG/DL (ref 70–110)
GLUCOSE BLD STRIP.AUTO-MCNC: 107 MG/DL (ref 70–110)
GLUCOSE BLD STRIP.AUTO-MCNC: 255 MG/DL (ref 70–110)
GLUCOSE BLD STRIP.AUTO-MCNC: 276 MG/DL (ref 70–110)
GLUCOSE BLD STRIP.AUTO-MCNC: 70 MG/DL (ref 70–110)
GLUCOSE SERPL-MCNC: 121 MG/DL (ref 74–99)
P-R INTERVAL, ECG05: 294 MS
PHOSPHATE SERPL-MCNC: 3 MG/DL (ref 2.5–4.9)
POTASSIUM SERPL-SCNC: 3.9 MMOL/L (ref 3.5–5.5)
Q-T INTERVAL, ECG07: 444 MS
QRS DURATION, ECG06: 168 MS
QTC CALCULATION (BEZET), ECG08: 482 MS
SODIUM SERPL-SCNC: 144 MMOL/L (ref 136–145)
VENTRICULAR RATE, ECG03: 71 BPM

## 2020-04-23 PROCEDURE — 77030012468 HC VLV BLEEDBK CNTRL ABBT -B: Performed by: INTERNAL MEDICINE

## 2020-04-23 PROCEDURE — 80069 RENAL FUNCTION PANEL: CPT

## 2020-04-23 PROCEDURE — 85730 THROMBOPLASTIN TIME PARTIAL: CPT

## 2020-04-23 PROCEDURE — 77030013797 HC KT TRNSDUC PRSSR EDWD -A: Performed by: INTERNAL MEDICINE

## 2020-04-23 PROCEDURE — 74011250637 HC RX REV CODE- 250/637: Performed by: INTERNAL MEDICINE

## 2020-04-23 PROCEDURE — 74011636637 HC RX REV CODE- 636/637: Performed by: INTERNAL MEDICINE

## 2020-04-23 PROCEDURE — C1874 STENT, COATED/COV W/DEL SYS: HCPCS | Performed by: INTERNAL MEDICINE

## 2020-04-23 PROCEDURE — 65660000004 HC RM CVT STEPDOWN

## 2020-04-23 PROCEDURE — 93454 CORONARY ARTERY ANGIO S&I: CPT | Performed by: INTERNAL MEDICINE

## 2020-04-23 PROCEDURE — B2111ZZ FLUOROSCOPY OF MULTIPLE CORONARY ARTERIES USING LOW OSMOLAR CONTRAST: ICD-10-PCS | Performed by: INTERNAL MEDICINE

## 2020-04-23 PROCEDURE — C1887 CATHETER, GUIDING: HCPCS | Performed by: INTERNAL MEDICINE

## 2020-04-23 PROCEDURE — 99153 MOD SED SAME PHYS/QHP EA: CPT | Performed by: INTERNAL MEDICINE

## 2020-04-23 PROCEDURE — C1760 CLOSURE DEV, VASC: HCPCS | Performed by: INTERNAL MEDICINE

## 2020-04-23 PROCEDURE — 99152 MOD SED SAME PHYS/QHP 5/>YRS: CPT | Performed by: INTERNAL MEDICINE

## 2020-04-23 PROCEDURE — 74011000250 HC RX REV CODE- 250: Performed by: INTERNAL MEDICINE

## 2020-04-23 PROCEDURE — 027034Z DILATION OF CORONARY ARTERY, ONE ARTERY WITH DRUG-ELUTING INTRALUMINAL DEVICE, PERCUTANEOUS APPROACH: ICD-10-PCS | Performed by: INTERNAL MEDICINE

## 2020-04-23 PROCEDURE — 74011250637 HC RX REV CODE- 250/637: Performed by: NURSE PRACTITIONER

## 2020-04-23 PROCEDURE — 36415 COLL VENOUS BLD VENIPUNCTURE: CPT

## 2020-04-23 PROCEDURE — 4A023N7 MEASUREMENT OF CARDIAC SAMPLING AND PRESSURE, LEFT HEART, PERCUTANEOUS APPROACH: ICD-10-PCS | Performed by: INTERNAL MEDICINE

## 2020-04-23 PROCEDURE — 74011636320 HC RX REV CODE- 636/320: Performed by: INTERNAL MEDICINE

## 2020-04-23 PROCEDURE — 77030016699 HC CATH ANGI DX INFN1 CARD -A: Performed by: INTERNAL MEDICINE

## 2020-04-23 PROCEDURE — C1769 GUIDE WIRE: HCPCS | Performed by: INTERNAL MEDICINE

## 2020-04-23 PROCEDURE — 82962 GLUCOSE BLOOD TEST: CPT

## 2020-04-23 PROCEDURE — C1894 INTRO/SHEATH, NON-LASER: HCPCS | Performed by: INTERNAL MEDICINE

## 2020-04-23 PROCEDURE — 77030013519 HC DEV INFL BASIX MRTM -B: Performed by: INTERNAL MEDICINE

## 2020-04-23 PROCEDURE — 74011250636 HC RX REV CODE- 250/636: Performed by: INTERNAL MEDICINE

## 2020-04-23 PROCEDURE — 85347 COAGULATION TIME ACTIVATED: CPT

## 2020-04-23 PROCEDURE — 92928 PRQ TCAT PLMT NTRAC ST 1 LES: CPT | Performed by: INTERNAL MEDICINE

## 2020-04-23 PROCEDURE — C1725 CATH, TRANSLUMIN NON-LASER: HCPCS | Performed by: INTERNAL MEDICINE

## 2020-04-23 PROCEDURE — B41F1ZZ FLUOROSCOPY OF RIGHT LOWER EXTREMITY ARTERIES USING LOW OSMOLAR CONTRAST: ICD-10-PCS | Performed by: INTERNAL MEDICINE

## 2020-04-23 DEVICE — XIENCE SIERRA™ EVEROLIMUS ELUTING CORONARY STENT SYSTEM 3.00 MM X 28 MM / RAPID-EXCHANGE
Type: IMPLANTABLE DEVICE | Status: FUNCTIONAL
Brand: XIENCE SIERRA™

## 2020-04-23 RX ORDER — LIDOCAINE HYDROCHLORIDE 10 MG/ML
INJECTION, SOLUTION EPIDURAL; INFILTRATION; INTRACAUDAL; PERINEURAL AS NEEDED
Status: DISCONTINUED | OUTPATIENT
Start: 2020-04-23 | End: 2020-04-23 | Stop reason: HOSPADM

## 2020-04-23 RX ORDER — FENTANYL CITRATE 50 UG/ML
INJECTION, SOLUTION INTRAMUSCULAR; INTRAVENOUS AS NEEDED
Status: DISCONTINUED | OUTPATIENT
Start: 2020-04-23 | End: 2020-04-23 | Stop reason: HOSPADM

## 2020-04-23 RX ORDER — SODIUM CHLORIDE 0.9 % (FLUSH) 0.9 %
5-40 SYRINGE (ML) INJECTION EVERY 8 HOURS
Status: DISCONTINUED | OUTPATIENT
Start: 2020-04-23 | End: 2020-04-24 | Stop reason: HOSPADM

## 2020-04-23 RX ORDER — SODIUM CHLORIDE 0.9 % (FLUSH) 0.9 %
5-40 SYRINGE (ML) INJECTION AS NEEDED
Status: DISCONTINUED | OUTPATIENT
Start: 2020-04-23 | End: 2020-04-24 | Stop reason: HOSPADM

## 2020-04-23 RX ORDER — MIDAZOLAM HYDROCHLORIDE 1 MG/ML
INJECTION, SOLUTION INTRAMUSCULAR; INTRAVENOUS AS NEEDED
Status: DISCONTINUED | OUTPATIENT
Start: 2020-04-23 | End: 2020-04-23 | Stop reason: HOSPADM

## 2020-04-23 RX ORDER — HEPARIN SODIUM 1000 [USP'U]/ML
INJECTION, SOLUTION INTRAVENOUS; SUBCUTANEOUS AS NEEDED
Status: DISCONTINUED | OUTPATIENT
Start: 2020-04-23 | End: 2020-04-23 | Stop reason: HOSPADM

## 2020-04-23 RX ADMIN — INSULIN GLARGINE 30 UNITS: 100 INJECTION, SOLUTION SUBCUTANEOUS at 08:33

## 2020-04-23 RX ADMIN — INSULIN LISPRO 6 UNITS: 100 INJECTION, SOLUTION INTRAVENOUS; SUBCUTANEOUS at 21:55

## 2020-04-23 RX ADMIN — PANTOPRAZOLE SODIUM 40 MG: 40 TABLET, DELAYED RELEASE ORAL at 08:33

## 2020-04-23 RX ADMIN — GABAPENTIN 100 MG: 100 CAPSULE ORAL at 18:08

## 2020-04-23 RX ADMIN — ATORVASTATIN CALCIUM 40 MG: 40 TABLET, FILM COATED ORAL at 21:55

## 2020-04-23 RX ADMIN — Medication 10 ML: at 21:00

## 2020-04-23 RX ADMIN — DOXAZOSIN 2 MG: 4 TABLET ORAL at 18:08

## 2020-04-23 RX ADMIN — GABAPENTIN 100 MG: 100 CAPSULE ORAL at 08:33

## 2020-04-23 RX ADMIN — ASPIRIN 81 MG: 81 TABLET, COATED ORAL at 08:33

## 2020-04-23 RX ADMIN — HYDRALAZINE HYDROCHLORIDE 100 MG: 50 TABLET, FILM COATED ORAL at 18:08

## 2020-04-23 RX ADMIN — CARVEDILOL 25 MG: 25 TABLET, FILM COATED ORAL at 08:32

## 2020-04-23 RX ADMIN — CARVEDILOL 25 MG: 25 TABLET, FILM COATED ORAL at 18:08

## 2020-04-23 RX ADMIN — HYDRALAZINE HYDROCHLORIDE 100 MG: 50 TABLET, FILM COATED ORAL at 21:55

## 2020-04-23 RX ADMIN — DUTASTERIDE 0.5 MG: 0.5 CAPSULE, LIQUID FILLED ORAL at 08:40

## 2020-04-23 RX ADMIN — HYDRALAZINE HYDROCHLORIDE 100 MG: 50 TABLET, FILM COATED ORAL at 08:32

## 2020-04-23 RX ADMIN — Medication 10 ML: at 14:00

## 2020-04-23 RX ADMIN — AMLODIPINE BESYLATE 10 MG: 10 TABLET ORAL at 08:33

## 2020-04-23 RX ADMIN — INSULIN LISPRO 6 UNITS: 100 INJECTION, SOLUTION INTRAVENOUS; SUBCUTANEOUS at 18:08

## 2020-04-23 NOTE — PROGRESS NOTES
Progressing towards goals. Problem: Diabetes Self-Management  Goal: *Disease process and treatment process  Description: Define diabetes and identify own type of diabetes; list 3 options for treating diabetes. Outcome: Progressing Towards Goal  Goal: *Incorporating nutritional management into lifestyle  Description: Describe effect of type, amount and timing of food on blood glucose; list 3 methods for planning meals. Outcome: Progressing Towards Goal  Goal: *Incorporating physical activity into lifestyle  Description: State effect of exercise on blood glucose levels. Outcome: Progressing Towards Goal  Goal: *Developing strategies to promote health/change behavior  Description: Define the ABC's of diabetes; identify appropriate screenings, schedule and personal plan for screenings. Outcome: Progressing Towards Goal  Goal: *Using medications safely  Description: State effect of diabetes medications on diabetes; name diabetes medication taking, action and side effects. Outcome: Progressing Towards Goal  Goal: *Developing strategies to address psychosocial issues  Description: Describe feelings about living with diabetes; identify support needed and support network  Outcome: Progressing Towards Goal     Problem: Falls - Risk of  Goal: *Absence of Falls  Description: Document Jesse Elizondo Fall Risk and appropriate interventions in the flowsheet.   Outcome: Progressing Towards Goal  Note: Fall Risk Interventions:     Medication Interventions: Patient to call before getting OOB, Teach patient to arise slowly    Problem: Patient Education: Go to Patient Education Activity  Goal: Patient/Family Education  Outcome: Progressing Towards Goal     Problem: Diabetes Maintenance:Admission  Goal: *Blood glucose 80 to 180 mg/dl  Outcome: Progressing Towards Goal     Problem: Pain  Goal: *Control of Pain  Outcome: Progressing Towards Goal     Problem: Patient Education: Go to Patient Education Activity  Goal: Patient/Family Education  Outcome: Progressing Towards Goal

## 2020-04-23 NOTE — PROGRESS NOTES
TRANSFER - IN REPORT:    Verbal report received from [place] (name) on Kiko Gonzalez.  being received from cath lab(unit) for routine post - op      Report consisted of patients Situation, Background, Assessment and   Recommendations(SBAR). Information from the following report(s) SBAR, Procedure Summary and MAR was reviewed with the receiving nurse. Opportunity for questions and clarification was provided. Assessment completed upon patients arrival to unit and care assumed. Angioseal to R groin.

## 2020-04-23 NOTE — PROGRESS NOTES
TRANSFER - OUT REPORT:    Verbal report given to Sima Bell RN(name) on Felecia Rodriguez.  being transferred to CVT stepdown(unit) for routine progression of care       Report consisted of patients Situation, Background, Assessment and   Recommendations(SBAR). Information from the following report(s) SBAR, Procedure Summary, Intake/Output, MAR and Recent Results was reviewed with the receiving nurse. Lines:   Peripheral IV 04/20/20 Right Antecubital (Active)   Site Assessment Clean, dry, & intact 4/23/2020  8:19 AM   Phlebitis Assessment 0 4/23/2020  8:19 AM   Infiltration Assessment 0 4/23/2020  8:19 AM   Dressing Status Clean, dry, & intact 4/23/2020  8:19 AM   Dressing Type Transparent 4/23/2020  8:19 AM   Hub Color/Line Status Patent 4/23/2020  8:19 AM   Action Taken Open ports on tubing capped 4/23/2020  8:19 AM   Alcohol Cap Used Yes 4/23/2020  8:19 AM       Peripheral IV 04/21/20 Anterior;Right Wrist (Active)   Site Assessment Clean, dry, & intact 4/23/2020  8:19 AM   Phlebitis Assessment 0 4/23/2020  8:19 AM   Infiltration Assessment 0 4/23/2020  8:19 AM   Dressing Status Clean, dry, & intact 4/23/2020  8:19 AM   Dressing Type Transparent 4/23/2020  8:19 AM   Hub Color/Line Status Patent 4/23/2020  8:19 AM   Action Taken Open ports on tubing capped 4/23/2020  8:19 AM   Alcohol Cap Used Yes 4/23/2020  8:19 AM        Opportunity for questions and clarification was provided.

## 2020-04-23 NOTE — PROGRESS NOTES
Admit Date: 2020  Date of Service: 2020    Reason for follow-up: chest pain      Assessment:         NSTEMI: prior CAD and RBBB; on heparin drip; prior cath in      - TTE: EF 60-65%, no wall motion abnormalities   -  nuc stress test: + for ischemia  Uncontrolled HTN with hypertensive emergency  Moderate LV diastolic dysfunction:  Noted on echo  Acute on chronic renal failure stage 3-4: nephrotic range proteinuria; Cr stable in last 24 hours  Ischemic cardiomyopathy  DM type 2: Hb A1c 8.0    Plan:   Discussed with cardiology- going for cardiac cath this am   -continue with Norvasc, Hydralazine, lipitor, Coreg, ASA; Cardura added    -continue heparin drip  Discussed with Nephrology- continue gentle hydration. CBC, BMP, Mg in am  Tight glucose control. Accuchecks and SSI    Patient did not want me to call and update family today. Current Antibtiocs:   none    Lines:   Peripheral    Case discussed with:  [x]Patient  []Family  [x]Nursing  [x]Case Management  DVT Prophylaxis:  []Lovenox  []Hep SQ  []SCDs  []Coumadin   [x]On Heparin gtt    I have independently examined the patient and reviewed all lab studies and imgaing as well as review of nursing notes and physican notes from the past 24 hours. Atul Watkins D.O. Pager [phone]      No Known Allergies        Subjective:      Pt seen and examined. Walking around in room. Getting ready to go to cath lab. No current chest pain. No n/v/d. Overnight events and events in stress lab noted and discussed with Cardiology.      Objective:        Visit Vitals  /65 (BP 1 Location: Left arm, BP Patient Position: At rest)   Pulse (!) 59   Temp 97.4 °F (36.3 °C)   Resp 18   Ht 5' 6\" (1.676 m)   Wt 95.3 kg (210 lb 3.2 oz)   SpO2 100%   BMI 33.93 kg/m²     Temp (24hrs), Av.5 °F (36.4 °C), Min:96.8 °F (36 °C), Max:98.3 °F (36.8 °C)        General:   awake alert and oriented, non-toxic   Skin:   no rashes or skin lesions noted on limited exam, dry and warm   HEENT:  No scleral icterus or pallor; oral mucosa moist, lips moist   Lymph Nodes:   not assessed today   Lungs:   non, labored; bilaterally clear to aspiration- no crackles wheezes rales or rhonchi   Heart:  RRR, s1 and s2; no murmurs rubs or gallops; no edema, + pedal pulses   Abdomen:  soft, protuberant, non-distended, active bowel sounds, non-tender   Genitourinary:  deferred   Extremities:   average muscle tone; no contractures, no joint effusions   Neurologic:  No gross focal motor or sensory abnormalities; CN 2-12 intact; Follows commands. Psychiatric:   appropriate and interactive. Labs: Results:   Chemistry Recent Labs     04/23/20 0315 04/22/20  0529 04/21/20  0536   * 87 96    144 139   K 3.9 3.9 4.0   * 114* 111   CO2 23 24 24   BUN 25* 28* 27*   CREA 2.19* 2.16* 2.02*   CA 8.1* 8.4* 8.5   AGAP 6 6 4   BUCR 11* 13 13   AP  --   --  92   TP  --   --  7.2   ALB 2.7* 2.5* 2.6*   GLOB  --   --  4.6*   AGRAT  --   --  0.6*      CBC w/Diff Recent Labs     04/22/20  0529 04/21/20  0536   WBC 7.7 8.6   RBC 4.25* 4.43*   HGB 11.0* 11.3*   HCT 32.5* 33.6*    249   GRANS 65 65   LYMPH 18* 17*   EOS 9* 11*        No results found for: SDES No results found for: CULT     Results     ** No results found for the last 336 hours. **          Imaging:     No results found.

## 2020-04-23 NOTE — PROGRESS NOTES
Cath holding summary     Patient escorted to cath holding from CVT stepdown, alert and oriented x 4, voicing no complaints, and placed on monitor. NPO since MN. Lab results, med rec and H&P reviewed on chart. PIV x 2 flushed without difficulty. Bilateral groins prepped.

## 2020-04-23 NOTE — PROGRESS NOTES
TRANSFER - IN REPORT:    Verbal report received from Cheryle Christians, RN(name) on [facility].  being received from CVT stepdown(unit) for ordered procedure      Report consisted of patients Situation, Background, Assessment and   Recommendations(SBAR). Information from the following report(s) SBAR, Intake/Output, MAR and Recent Results was reviewed with the receiving nurse. Opportunity for questions and clarification was provided. Assessment completed upon patients arrival to unit and care assumed. Patient is A&Ox4, has been NPO since midnight.

## 2020-04-23 NOTE — ROUTINE PROCESS
0730 - Bedside and Verbal shift change report given to Akash Sargent RN (oncoming nurse) by Soraya Chatterjee RN (offgoing nurse). Report included the following information SBAR, Kardex, Procedure Summary, Intake/Output, MAR, Recent Results, Med Rec Status and Cardiac Rhythm Sinus Trey, NSR, AV 1st degree, BBB. 9056 - Administered scheduled meds w/ sips of water. Pt to remain NPO for cardiac cath procedure. NAD, no c/o pain or discomfort. 0935 - Pt off the floor for cardiac catheterization procedure. 1330 - Pt back on the floor. 1409 - BS checked, pt on bed rest until 1700. Assisted pt with getting into bed.  
 
1650 - Vitals taken, BS checked. NAD, pt resting quietly in bed, dressing clean, dry and intact. 1900 - Made multiple unsuccessful attempts to start IV line. Pt appears to have dependent edema on right arm r/t continuous fluids. IV located on [facility] leaking. Will report to noc RN. 1930 - Bedside and Verbal shift change report given to Kiel Julio RN (oncoming nurse) by Brandon Garcia RN (offgoing nurse). Report included the following information SBAR, Kardex, Procedure Summary, Intake/Output, MAR, Recent Results, Med Rec Status and Cardiac Rhythm Sinus trey, NSR, AV 1st degree, BBB. Maria G Jamison

## 2020-04-23 NOTE — PROGRESS NOTES
[facility]      CARDIOLOGY PROGRESS NOTE  RECS:    1. NSTEMI- with positive troponin and up trending-currently chest pain free. Continue Heparin drip, asa, statin, bb. Echo showing normal overall ejection fraction. NUC stress + for ischemia. Will proceed with cardiac cath today discussed with patient risks and benefits   2. Uncontrolled Hypertension- on admission with 's- High BP this am.  Added Cardura. 3. Coronary- Cath 2010-  Showed thrombotic occlusion of the distal right coronary artery and moderate distal left anterior descending disease. Successful percutaneous coronary intervention with catheter thrombectomy, followed by bare metal stent deployment with a Vision 4 x 18  mm stent in the distal RCA  4. Hx of Ischemic cardiomyopathy last echo 2017 revealed EF% 65 %  5. Dyslipidemia- continue statin   6. Tobacco use disorder-discussed cessation   7. 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 Diabetes- poorly controlled with A1c 8.0   9. JOHNIE on CKD- baseline 1.8 -2.0 since 2018 - creatinine levels slightly increased today. on gentle hydration Renal following       The benefits and risks of cardiac catheterization have been discussed in detail with the patient present at the time. Patient understands risk of potential cath complications including but not limited to bleeding, infection, difficulty healing the arteriotomy access site(2 chances in 100) which may require surgical repair, potential thromboembolic complications which could result in stroke, myocardial infarction, loss of limb or organ function and/or death( 1 chance in 1000)and potential allergic reaction to contrast dye or other medication used during the procedure. Patient is also aware of the therapeutic implications for medical management vs coronary artery bypass surgery vs percutaneous coronary intervention in treatment of coronary artery disease.  The additional risks for percutaneous coronary artery intervention include the need for emergent bypass surgery at 1 chance in 100 and for restenosis which may range from 35% for plain balloon angioplasty, 15% for bare metal stent, and 5% for drug eluting stent implants. The need for mandatory uninterrupted dual antiplatelet therapy with lifelong Aspirin combined with Plavix for up to 12 months following drug eluting stents, and minimum 1 month following bare metal stents to prevent stent thrombosis which is the equivalent of acute heart attack has been reviewed in detail. No contraindications to use of drug eluting stents has been discovered.     ASSESSMENT:  Hospital Problems  Date Reviewed: 9/19/2019          Codes Class Noted POA    NSTEMI (non-ST elevated myocardial infarction) [facility]) ICD-10-CM: I21.4  ICD-9-CM: 410.70  4/20/2020 Unknown        ACS (acute coronary syndrome) ([facility] [address] ICD-10-CM: I24.9  ICD-9-CM: 411.1  4/20/2020 Unknown        Hypertensive urgency ICD-10-CM: I16.0  ICD-9-CM: 401.9  4/20/2020 Unknown                SUBJECTIVE:  No CP  Has chronic HURT that is stable      OBJECTIVE:    VS:   Visit Vitals  /66   Pulse (!) 59   Temp 97.4 °F (36.3 °C)   Resp 18   Ht 5' 6\" (1.676 m)   Wt 95.3 kg (210 lb 3.2 oz)   SpO2 98%   BMI 33.93 kg/m²         Intake/Output Summary (Last 24 hours) at 4/23/2020 0859  Last data filed at 4/23/2020 0920  Gross per 24 hour   Intake 2820.06 ml   Output 1875 ml   Net 945.06 ml     TELE: normal sinus rhythm    General: alert, well nourished, pleasant and in no apparent distress  HENT: Normocephalic, atraumatic. Normal external eye. Neck :  no bruit, no JVD  Cardiac:  regular rate and rhythm, S1, S2 normal, no murmur, click, rub or gallop  Lungs: Few scattered rales R base, L base, wheezes upper lung area diminished breath sounds b/l.    Abdomen: Soft, nontender, no masses  Extremities:  No c/c/e, peripheral pulses present      Labs: Results:       Chemistry Recent Labs     04/23/20  1976 04/22/20  0529 04/21/20  0536   * 87 96    144 139   K 3.9 3.9 4.0   * 114* 111   CO2 23 24 24   BUN 25* 28* 27*   CREA 2.19* 2.16* 2.02*   CA 8.1* 8.4* 8.5   AGAP 6 6 4   BUCR 11* 13 13   AP  --   --  92   TP  --   --  7.2   ALB 2.7* 2.5* 2.6*   GLOB  --   --  4.6*   AGRAT  --   --  0.6*      CBC w/Diff Recent Labs     04/22/20 0529 04/21/20  0536   WBC 7.7 8.6   RBC 4.25* 4.43*   HGB 11.0* 11.3*   HCT 32.5* 33.6*    249   GRANS 65 65   LYMPH 18* 17*   EOS 9* 11*      Cardiac Enzymes No results for input(s): CPK, CKND1, MARIBEL in the last 72 hours. No lab exists for component: CKRMB, TROIP   Coagulation Recent Labs     04/23/20 0315 04/22/20  0529   APTT 32.2 90.4*       Lipid Panel Lab Results   Component Value Date/Time    Cholesterol, total 122 04/20/2020 04:54 AM    HDL Cholesterol 23 (L) 04/20/2020 04:54 AM    LDL, calculated 62.2 04/20/2020 04:54 AM    VLDL, calculated 36.8 04/20/2020 04:54 AM    Triglyceride 184 (H) 04/20/2020 04:54 AM    CHOL/HDL Ratio 5.3 (H) 04/20/2020 04:54 AM      BNP No results for input(s): BNPP in the last 72 hours. Liver Enzymes Recent Labs     04/23/20 0315 04/21/20  0536   TP  --   --  7.2   ALB 2.7*   < > 2.6*   AP  --   --  92   SGOT  --   --  20    < > = values in this interval not displayed. Thyroid Studies No results found for: T4, T3U, TSH, TSHEXT, TSHEXT           Jessica Alvarez NP-C Yessi:[phone] supervised    I have independently evaluated and examined the patient. All relevant labs and testing data's are reviewed. Care plan discussed and updated after review.     Joby Brizuela MD

## 2020-04-23 NOTE — PROGRESS NOTES
TRANSFER - OUT REPORT:    Verbal report given to Bettye Tejeda (name) on [facility].  being transferred to cath lab(unit) for ordered procedure       Report consisted of patients Situation, Background, Assessment and   Recommendations(SBAR). Information from the following report(s) SBAR, Intake/Output, MAR and Recent Results was reviewed with the receiving nurse. Lines:   Peripheral IV 04/20/20 Right Antecubital (Active)   Site Assessment Clean, dry, & intact 4/23/2020  8:19 AM   Phlebitis Assessment 0 4/23/2020  8:19 AM   Infiltration Assessment 0 4/23/2020  8:19 AM   Dressing Status Clean, dry, & intact 4/23/2020  8:19 AM   Dressing Type Transparent 4/23/2020  8:19 AM   Hub Color/Line Status Patent 4/23/2020  8:19 AM   Action Taken Open ports on tubing capped 4/23/2020  8:19 AM   Alcohol Cap Used Yes 4/23/2020  8:19 AM       Peripheral IV 04/21/20 Anterior;Right Wrist (Active)   Site Assessment Clean, dry, & intact 4/23/2020  8:19 AM   Phlebitis Assessment 0 4/23/2020  8:19 AM   Infiltration Assessment 0 4/23/2020  8:19 AM   Dressing Status Clean, dry, & intact 4/23/2020  8:19 AM   Dressing Type Transparent 4/23/2020  8:19 AM   Hub Color/Line Status Patent 4/23/2020  8:19 AM   Action Taken Open ports on tubing capped 4/23/2020  8:19 AM   Alcohol Cap Used Yes 4/23/2020  8:19 AM        Opportunity for questions and clarification was provided.       Patient transported with:   Registered Nurse

## 2020-04-24 VITALS
HEIGHT: 66 IN | SYSTOLIC BLOOD PRESSURE: 159 MMHG | DIASTOLIC BLOOD PRESSURE: 69 MMHG | BODY MASS INDEX: 36.1 KG/M2 | OXYGEN SATURATION: 100 % | TEMPERATURE: 96.7 F | WEIGHT: 224.6 LBS | RESPIRATION RATE: 18 BRPM | HEART RATE: 60 BPM

## 2020-04-24 LAB
ALBUMIN SERPL-MCNC: 2.7 G/DL (ref 3.4–5)
ANION GAP SERPL CALC-SCNC: 5 MMOL/L (ref 3–18)
ATRIAL RATE: 62 BPM
BUN SERPL-MCNC: 26 MG/DL (ref 7–18)
BUN/CREAT SERPL: 12 (ref 12–20)
CALCIUM SERPL-MCNC: 8.3 MG/DL (ref 8.5–10.1)
CALCULATED P AXIS, ECG09: 18 DEGREES
CALCULATED R AXIS, ECG10: -85 DEGREES
CALCULATED T AXIS, ECG11: 115 DEGREES
CHLORIDE SERPL-SCNC: 114 MMOL/L (ref 100–111)
CO2 SERPL-SCNC: 24 MMOL/L (ref 21–32)
CREAT SERPL-MCNC: 2.23 MG/DL (ref 0.6–1.3)
DIAGNOSIS, 93000: NORMAL
ERYTHROCYTE [DISTWIDTH] IN BLOOD BY AUTOMATED COUNT: 14.8 % (ref 11.6–14.5)
GLUCOSE BLD STRIP.AUTO-MCNC: 179 MG/DL (ref 70–110)
GLUCOSE BLD STRIP.AUTO-MCNC: 262 MG/DL (ref 70–110)
GLUCOSE SERPL-MCNC: 81 MG/DL (ref 74–99)
HCT VFR BLD AUTO: 31.2 % (ref 36–48)
HGB BLD-MCNC: 10.4 G/DL (ref 13–16)
MAGNESIUM SERPL-MCNC: 2 MG/DL (ref 1.6–2.6)
MCH RBC QN AUTO: 25.7 PG (ref 24–34)
MCHC RBC AUTO-ENTMCNC: 33.3 G/DL (ref 31–37)
MCV RBC AUTO: 77.2 FL (ref 74–97)
P-R INTERVAL, ECG05: 294 MS
PHOSPHATE SERPL-MCNC: 3.4 MG/DL (ref 2.5–4.9)
PLATELET # BLD AUTO: 219 K/UL (ref 135–420)
PMV BLD AUTO: 10.5 FL (ref 9.2–11.8)
POTASSIUM SERPL-SCNC: 4.1 MMOL/L (ref 3.5–5.5)
Q-T INTERVAL, ECG07: 466 MS
QRS DURATION, ECG06: 168 MS
QTC CALCULATION (BEZET), ECG08: 472 MS
RBC # BLD AUTO: 4.04 M/UL (ref 4.7–5.5)
SODIUM SERPL-SCNC: 143 MMOL/L (ref 136–145)
VENTRICULAR RATE, ECG03: 62 BPM
WBC # BLD AUTO: 8.3 K/UL (ref 4.6–13.2)

## 2020-04-24 PROCEDURE — 74011636637 HC RX REV CODE- 636/637: Performed by: INTERNAL MEDICINE

## 2020-04-24 PROCEDURE — 74011250637 HC RX REV CODE- 250/637: Performed by: NURSE PRACTITIONER

## 2020-04-24 PROCEDURE — 77030027138 HC INCENT SPIROMETER -A

## 2020-04-24 PROCEDURE — 93005 ELECTROCARDIOGRAM TRACING: CPT

## 2020-04-24 PROCEDURE — 74011250637 HC RX REV CODE- 250/637: Performed by: INTERNAL MEDICINE

## 2020-04-24 PROCEDURE — 80069 RENAL FUNCTION PANEL: CPT

## 2020-04-24 PROCEDURE — 74011250636 HC RX REV CODE- 250/636: Performed by: NURSE PRACTITIONER

## 2020-04-24 PROCEDURE — 85027 COMPLETE CBC AUTOMATED: CPT

## 2020-04-24 PROCEDURE — 36415 COLL VENOUS BLD VENIPUNCTURE: CPT

## 2020-04-24 PROCEDURE — 83735 ASSAY OF MAGNESIUM: CPT

## 2020-04-24 PROCEDURE — 82962 GLUCOSE BLOOD TEST: CPT

## 2020-04-24 RX ORDER — AMLODIPINE BESYLATE 10 MG/1
10 TABLET ORAL DAILY
Qty: 30 TAB | Refills: 0 | Status: SHIPPED | OUTPATIENT
Start: 2020-04-25 | End: 2020-07-02

## 2020-04-24 RX ORDER — CARVEDILOL 25 MG/1
25 TABLET ORAL 2 TIMES DAILY WITH MEALS
Qty: 60 TAB | Refills: 0 | Status: ON HOLD | OUTPATIENT
Start: 2020-04-24 | End: 2022-08-08 | Stop reason: SDUPTHER

## 2020-04-24 RX ORDER — DOXAZOSIN 2 MG/1
2 TABLET ORAL EVERY EVENING
Qty: 30 TAB | Refills: 0 | Status: SHIPPED | OUTPATIENT
Start: 2020-04-24 | End: 2020-05-18

## 2020-04-24 RX ADMIN — PANTOPRAZOLE SODIUM 40 MG: 40 TABLET, DELAYED RELEASE ORAL at 08:51

## 2020-04-24 RX ADMIN — Medication 10 ML: at 05:20

## 2020-04-24 RX ADMIN — TICAGRELOR 90 MG: 90 TABLET ORAL at 01:26

## 2020-04-24 RX ADMIN — AMLODIPINE BESYLATE 10 MG: 10 TABLET ORAL at 08:51

## 2020-04-24 RX ADMIN — INSULIN GLARGINE 30 UNITS: 100 INJECTION, SOLUTION SUBCUTANEOUS at 08:51

## 2020-04-24 RX ADMIN — HYDRALAZINE HYDROCHLORIDE 10 MG: 20 INJECTION INTRAMUSCULAR; INTRAVENOUS at 01:26

## 2020-04-24 RX ADMIN — INSULIN LISPRO 3 UNITS: 100 INJECTION, SOLUTION INTRAVENOUS; SUBCUTANEOUS at 08:51

## 2020-04-24 RX ADMIN — INSULIN LISPRO 9 UNITS: 100 INJECTION, SOLUTION INTRAVENOUS; SUBCUTANEOUS at 12:40

## 2020-04-24 RX ADMIN — HYDRALAZINE HYDROCHLORIDE 100 MG: 50 TABLET, FILM COATED ORAL at 08:51

## 2020-04-24 RX ADMIN — ASPIRIN 81 MG: 81 TABLET, COATED ORAL at 08:51

## 2020-04-24 RX ADMIN — CARVEDILOL 25 MG: 25 TABLET, FILM COATED ORAL at 08:51

## 2020-04-24 RX ADMIN — TICAGRELOR 90 MG: 90 TABLET ORAL at 12:41

## 2020-04-24 RX ADMIN — Medication 10 ML: at 14:00

## 2020-04-24 RX ADMIN — GABAPENTIN 100 MG: 100 CAPSULE ORAL at 08:51

## 2020-04-24 RX ADMIN — DUTASTERIDE 0.5 MG: 0.5 CAPSULE, LIQUID FILLED ORAL at 08:51

## 2020-04-24 RX ADMIN — SODIUM CHLORIDE 60 ML/HR: 900 INJECTION, SOLUTION INTRAVENOUS at 04:50

## 2020-04-24 NOTE — PROGRESS NOTES
Patient out for cardiac cath  Post cath continue gentle hydration with NS @ 60 cc/hrs   Follow renal parameters in [facility] call with questions    Dominik Sinclair MD FASN  Cell [phone]  Pager: [phone]

## 2020-04-24 NOTE — PROGRESS NOTES
[facility]      CARDIOLOGY PROGRESS NOTE  RECS:    1. NSTEMI- with positive troponin and up trending-currently chest pain free. Continue Heparin drip, asa, statin, bb. Echo showing normal overall ejection fraction. NUC stress + for ischemia. S/p cardiac cath 4/23/20 Double vessel coronary artery disease. S/p ptca/stent to mid LAD. Known BMS to distal RCA with moderate lesion- stable no chest pain chest pressure. No worsening SOB- continue asa,Brilinta. 2. Uncontrolled Hypertension- on admission with 's- High BP this am.  Continue  Cardura. 3. Coronary- Cath 2010-  Showed thrombotic occlusion of the distal right coronary artery and moderate distal left anterior descending disease. Successful percutaneous coronary intervention with catheter thrombectomy, followed by bare metal stent deployment with a Vision 4 x 18  mm stent in the distal RCA  4. Hx of Ischemic cardiomyopathy last echo 2017 revealed EF% 65 %  5. Dyslipidemia- continue statin   6. Tobacco use disorder-discussed cessation   7. 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 Diabetes- poorly controlled with A1c 8.0   9. JOHNIE on CKD- baseline 1.8 -2.0 since 2018 - creatinine levels stable post cath    Patient s/p PCI to LAD- stable renal indices.   Continue DAPT and current meds  Stable for discharge from cardiac standpoint.       ASSESSMENT:  Hospital Problems  Date Reviewed: 9/19/2019          Codes Class Noted POA    NSTEMI (non-ST elevated myocardial infarction) [facility]) ICD-10-CM: I21.4  ICD-9-CM: 410.70  4/20/2020 Unknown        ACS (acute coronary syndrome) ([facility] [address] ICD-10-CM: I24.9  ICD-9-CM: 411.1  4/20/2020 Unknown        Hypertensive urgency ICD-10-CM: I16.0  ICD-9-CM: 401.9  4/20/2020 Unknown                SUBJECTIVE:  No CP  Has chronic HURT that is stable      OBJECTIVE:    VS:   Visit Vitals  /71 (BP 1 Location: Right arm, BP Patient Position: At rest)   Pulse 67   Temp 97.5 °F (36.4 °C)   Resp 18   Ht 5' 6\" (1.676 m)   Wt 101.9 kg (224 lb 9.6 oz)   SpO2 99%   BMI 36.25 kg/m²         Intake/Output Summary (Last 24 hours) at 4/24/2020 0905  Last data filed at 4/24/2020 0858  Gross per 24 hour   Intake 1379 ml   Output 2425 ml   Net -1046 ml     TELE: normal sinus rhythm    General: alert, well nourished, pleasant and in no apparent distress  HENT: Normocephalic, atraumatic. Normal external eye. Neck :  no bruit, no JVD  Cardiac:  regular rate and rhythm, S1, S2 normal, no murmur, click, rub or gallop  Lungs: Few scattered rales R base, L base, wheezes upper lung area diminished breath sounds b/l. Abdomen: Soft, nontender, no masses  Extremities:  No c/c/e, peripheral pulses present. groin site w/o hematoma or s/s infection       Labs: Results:       Chemistry Recent Labs     04/24/20  0513 04/23/20 0315 04/22/20  0529   GLU 81 121* 87    144 144   K 4.1 3.9 3.9   * 115* 114*   CO2 24 23 24   BUN 26* 25* 28*   CREA 2.23* 2.19* 2.16*   CA 8.3* 8.1* 8.4*   AGAP 5 6 6   BUCR 12 11* 13   ALB 2.7* 2.7* 2.5*      CBC w/Diff Recent Labs     04/24/20  0513 04/22/20  0529   WBC 8.3 7.7   RBC 4.04* 4.25*   HGB 10.4* 11.0*   HCT 31.2* 32.5*    238   GRANS  --  65   LYMPH  --  18*   EOS  --  9*      Cardiac Enzymes No results for input(s): CPK, CKND1, MARIBEL in the last 72 hours. No lab exists for component: CKRMB, TROIP   Coagulation Recent Labs     04/23/20 0315 04/22/20  0529   APTT 32.2 90.4*       Lipid Panel Lab Results   Component Value Date/Time    Cholesterol, total 122 04/20/2020 04:54 AM    HDL Cholesterol 23 (L) 04/20/2020 04:54 AM    LDL, calculated 62.2 04/20/2020 04:54 AM    VLDL, calculated 36.8 04/20/2020 04:54 AM    Triglyceride 184 (H) 04/20/2020 04:54 AM    CHOL/HDL Ratio 5.3 (H) 04/20/2020 04:54 AM      BNP No results for input(s): BNPP in the last 72 hours.    Liver Enzymes Recent Labs     04/24/20  0513   ALB 2.7*      Thyroid Studies No results found for: T4, T3U, TSH, TSHEXT, TSHEXT           Jessica Alvarez NP-C Yessi:[phone] supervised    I have independently evaluated and examined the patient. All relevant labs and testing data's are reviewed. Care plan discussed and updated after review.     Pito Becerra MD

## 2020-04-24 NOTE — PROGRESS NOTES
1900 - Bedside shift change report given to Soraya Zamora RN (oncoming nurse) by Akash Henderson RN (offgoing nurse). Report included the following information SBAR, Procedure Summary, Intake/Output and MAR.     8219 - pt called for help stating that he was bleeding, upon entering room pt was sitting on bed, with iv laying on the bed next to him and blood running down his arm. IV fluids held. Instructed pt to apply pressure to arm. Assisted pt with bathing for the morning. Educated pt about bleeding precautions while on blood thinner    0730 - Bedside shift change report given to Akash Henderson RN (oncoming nurse) by Soraya Zamora RN (offgoing nurse). Report included the following information SBAR, Kardex, Intake/Output, MAR and Recent Results.

## 2020-04-24 NOTE — PROGRESS NOTES
Progressing towards goals. Problem: Diabetes Self-Management  Goal: *Disease process and treatment process  Description: Define diabetes and identify own type of diabetes; list 3 options for treating diabetes. Outcome: Progressing Towards Goal  Goal: *Incorporating nutritional management into lifestyle  Description: Describe effect of type, amount and timing of food on blood glucose; list 3 methods for planning meals. Outcome: Progressing Towards Goal  Goal: *Incorporating physical activity into lifestyle  Description: State effect of exercise on blood glucose levels. Outcome: Progressing Towards Goal  Goal: *Developing strategies to promote health/change behavior  Description: Define the ABC's of diabetes; identify appropriate screenings, schedule and personal plan for screenings. Outcome: Progressing Towards Goal  Goal: *Using medications safely  Description: State effect of diabetes medications on diabetes; name diabetes medication taking, action and side effects. Outcome: Progressing Towards Goal  Goal: *Monitoring blood glucose, interpreting and using results  Description: Identify recommended blood glucose targets  and personal targets. Outcome: Progressing Towards Goal  Goal: *Prevention, detection, treatment of acute complications  Description: List symptoms of hyper- and hypoglycemia; describe how to treat low blood sugar and actions for lowering  high blood glucose level. Outcome: Progressing Towards Goal  Goal: *Prevention, detection and treatment of chronic complications  Description: Define the natural course of diabetes and describe the relationship of blood glucose levels to long term complications of diabetes.   Outcome: Progressing Towards Goal  Goal: *Developing strategies to address psychosocial issues  Description: Describe feelings about living with diabetes; identify support needed and support network  Outcome: Progressing Towards Goal  Goal: *Insulin pump training  Outcome: Progressing Towards Goal  Goal: *Sick day guidelines  Outcome: Progressing Towards Goal  Goal: *Patient Specific Goal (EDIT GOAL, INSERT TEXT)  Outcome: Progressing Towards Goal     Problem: Patient Education: Go to Patient Education Activity  Goal: Patient/Family Education  Outcome: Progressing Towards Goal     Problem: Falls - Risk of  Goal: *Absence of Falls  Description: Document Clydene Bone Fall Risk and appropriate interventions in the flowsheet.   Outcome: Progressing Towards Goal  Note: Fall Risk Interventions:    Medication Interventions: Patient to call before getting OOB, Teach patient to arise slowly    Problem: Patient Education: Go to Patient Education Activity  Goal: Patient/Family Education  Outcome: Progressing Towards Goal     Problem: Diabetes Maintenance:Admission  Goal: *Blood glucose 80 to 180 mg/dl  Outcome: Progressing Towards Goal     Problem: Pain  Goal: *Control of Pain  Outcome: Progressing Towards Goal     Problem: Patient Education: Go to Patient Education Activity  Goal: Patient/Family Education  Outcome: Progressing Towards Goal

## 2020-04-24 NOTE — DISCHARGE SUMMARY
Discharge Summary    Patient: Boris Blake MRN: [id number]  CSN: [id number]    YOB: 1945  Age: 69 y.o. Sex: male    DOA: 4/20/2020 LOS:  LOS: 4 days   Discharge Date:      Admission Diagnoses: NSTEMI (non-ST elevated myocardial infarction) [facility]) [I21.4]    Discharge Diagnoses:    Problem List as of 4/24/2020 Date Reviewed: 9/19/2019          Codes Class Noted - Resolved    NSTEMI (non-ST elevated myocardial infarction) ([facility] ICD-10-CM: I21.4  ICD-9-CM: 410.70  4/20/2020 - Present        ACS (acute coronary syndrome) ([facility] ICD-10-CM: I24.9  ICD-9-CM: 411.1  4/20/2020 - Present        Hypertensive urgency ICD-10-CM: I16.0  ICD-9-CM: 401.9  4/20/2020 - Present        Obesity (BMI 30.0-34.9) ICD-10-CM: E66.9  ICD-9-CM: 278.00  11/19/2018 - Present        Ischemic cardiomyopathy ICD-10-CM: I25.5  ICD-9-CM: 414.8  Unknown - Present    Overview Addendum 11/6/2017 12:06 PM by Toma Reyes MD     3/17 65%EF; recovery of LV syst fct  Echo May 2011 LV EF 65%             Hypertension ICD-10-CM: I10  ICD-9-CM: 401.9  Unknown - Present    Overview Addendum 11/6/2017 12:06 PM by Toma Reyes MD     11/17 controlled;   4/17 improving since medications by PCP. Has severe hypertensive response with exercise during stress test  1/17 high- watch home BPs             Dyslipidemia ICD-10-CM: E78.5  ICD-9-CM: 272.4  Unknown - Present    Overview Addendum 4/10/2017 12:51 PM by Toma Reyes MD     3/17 LDL31;   1/17 get report from PCP             Diabetes mellitus, type 2 ([facility] ICD-10-CM: E11.9  ICD-9-CM: 250.00  Unknown - Present        Obesity ICD-10-CM: E66.9  ICD-9-CM: 278.00  Unknown - Present    Overview Signed 4/10/2017 12:56 PM by Toma Reyes MD     Weight loss has been strongly encouraged by following dietary restrictions and an exercise routine.              RBBB (right bundle branch block with left anterior fascicular block) ICD-10-CM: I45.2  ICD-9-CM: 426.52  Unknown - Present Overview Signed 6/21/2016  3:00 PM by Vasquez Cortes     No higher grade block (holter April 2016)             Tobacco use disorder, continuous ICD-10-CM: F17.209  ICD-9-CM: 305.1  Unknown - Present    Overview Signed 1/6/2017 12:51 PM by Denman Najjar, MD     Patient advised to stop smoking to reduce future cardiovascular events. CAD S/P percutaneous coronary angioplasty ICD-10-CM: I25.10, Z98.61  ICD-9-CM: 414.01, V45.82  8/1/2010 - Present    Overview Addendum 4/10/2017 12:54 PM by Denman Najjar, MD     4/7428 s/p non-ST-elevation myocardial infarction, s/p PCI with BMS (Vision 4x18mm) distal RCA with 45% distal LAD  and mild LCx disease. mild-moderate LV systolic dysfunction (LV angio 08/10). -> LV EF 65% (echo May 2011)                   Discharge Condition: Stable    PHYSICAL EXAM   Visit Vitals  /69   Pulse 60   Temp 96.7 °F (35.9 °C)   Resp 18   Ht 5' 6\" (1.676 m)   Wt 101.9 kg (224 lb 9.6 oz)   SpO2 100%   BMI 36.25 kg/m²     General: WD, WN. Alert, cooperative, no acute distress    HEENT: NC, Atraumatic. PERRLA, EOMI. Anicteric sclerae. Lungs:  CTA Bilaterally. No Wheezing/Rhonchi/Rales. Heart:  Regular  rhythm,  No murmur, No Rubs, No Gallops  Abdomen: Soft, Non distended, Non tender.  +Bowel sounds, no HSM  Extremities: No c/c/e  Psych:   Good insight. Not anxious or agitated. Neurologic:  CN 2-12 grossly intact, oriented X 3. No acute neurological                                 Deficits,     Hospital Course: Presented to the ED on 4/20/2020 with left sided CP with elevated troponin. Patient was seen by cardiology and he was admitted due to NSTEMI with plan for cardiac catheter which had to be delayed due to poor renal function resulting from CKD3. Patient was admitted to SDU with HTN emergency and placed on Nitroglycerin drip. He was seen by nephrology for renal function and determined to be placed on low flow fluid resuscitation.   Patient is also diabetic and was started on Lantus and SSI.    4/21/2020: patient renal function has not improved. Echocardiogram was completed which showed 60-65% EF. BP is improving on home medications. Nephrology continues to hydrated patient with low flow. 4/22/2020: Troponin continues trending upward. Creatinine is still elevated so NUC stress test was ordered which was positive for ischemia. BP was elevated and clonidine was added BID. Nitroglycerin drip was discontinued. 4/23/2020: Cardiac catheter was completed with ptca/stent to mid LAD. Cardura was added. 4/24/2020: Patient remains stable. Continue DAPT and current medications. Patient is stable for discharge. Consults: Cardiology and Nephrology    Significant Diagnostic Studies: labs: Results for Waldron Merlin (MRN [id number]) as of 4/24/2020 15:07   Ref. Range 4/20/2020 00:30 4/20/2020 00:53 4/20/2020 04:54   WBC Latest Ref Range: 4.6 - 13.2 K/uL 9.1     RBC Latest Ref Range: 4.70 - 5.50 M/uL 4.51 (L)     HGB Latest Ref Range: 13.0 - 16.0 g/dL 12.0 (L)     HCT Latest Ref Range: 36.0 - 48.0 % 34.5 (L)     MCV Latest Ref Range: 74.0 - 97.0 FL 76.5     MCH Latest Ref Range: 24.0 - 34.0 PG 26.6     MCHC Latest Ref Range: 31.0 - 37.0 g/dL 34.8     RDW Latest Ref Range: 11.6 - 14.5 % 14.6 (H)     PLATELET Latest Ref Range: 135 - 420 K/uL 245     MPV Latest Ref Range: 9.2 - 11.8 FL 10.3     NEUTROPHILS Latest Ref Range: 40 - 73 % 67     LYMPHOCYTES Latest Ref Range: 21 - 52 % 14 (L)     MONOCYTES Latest Ref Range: 3 - 10 % 10     EOSINOPHILS Latest Ref Range: 0 - 5 % 9 (H)     BASOPHILS Latest Ref Range: 0 - 2 % 0     DF Latest Units:   AUTOMATED     ABS. NEUTROPHILS Latest Ref Range: 1.8 - 8.0 K/UL 6.1     ABS. LYMPHOCYTES Latest Ref Range: 0.9 - 3.6 K/UL 1.3     ABS. MONOCYTES Latest Ref Range: 0.05 - 1.2 K/UL 0.9     ABS. EOSINOPHILS Latest Ref Range: 0.0 - 0.4 K/UL 0.8 (H)     ABS.  BASOPHILS Latest Ref Range: 0.0 - 0.1 K/UL 0.0     aPTT Latest Ref Range: 23.0 - 36.4 SEC 31.9     Sodium Latest Ref Range: 136 - 145 mmol/L 134 (L)     Potassium Latest Ref Range: 3.5 - 5.5 mmol/L 3.9     Chloride Latest Ref Range: 100 - 111 mmol/L 104     CO2 Latest Ref Range: 21 - 32 mmol/L 23     Anion gap Latest Ref Range: 3.0 - 18 mmol/L 7     Glucose Latest Ref Range: 74 - 99 mg/dL 266 (H)     BUN Latest Ref Range: 7.0 - 18 MG/DL 27 (H)     Creatinine Latest Ref Range: 0.6 - 1.3 MG/DL 2.59 (H)     BUN/Creatinine ratio Latest Ref Range: 12 - 20   10 (L)     Calcium Latest Ref Range: 8.5 - 10.1 MG/DL 8.2 (L)     GFR est non-AA Latest Ref Range: >60 ml/min/1.73m2 24 (L)     GFR est AA Latest Ref Range: >60 ml/min/1.73m2 30 (L)     Bilirubin, total Latest Ref Range: 0.2 - 1.0 MG/DL 0.2     Protein, total Latest Ref Range: 6.4 - 8.2 g/dL 8.0     Albumin Latest Ref Range: 3.4 - 5.0 g/dL 2.9 (L)     Globulin Latest Ref Range: 2.0 - 4.0 g/dL 5.1 (H)     A-G Ratio Latest Ref Range: 0.8 - 1.7   0.6 (L)     ALT (SGPT) Latest Ref Range: 16 - 61 U/L 17     AST Latest Ref Range: 10 - 38 U/L 20     Alk. phosphatase Latest Ref Range: 45 - 117 U/L 98     Triglyceride Latest Ref Range: <150 MG/DL   184 (H)   Cholesterol, total Latest Ref Range: <200 MG/DL   122   HDL Cholesterol Latest Ref Range: 40 - 60 MG/DL   23 (L)   CHOL/HDL Ratio Latest Ref Range: 0 - 5.0     5.3 (H)   VLDL, calculated Latest Units: MG/DL   36.8   LDL, calculated Latest Ref Range: 0 - 100 MG/DL   62.2   CK Latest Ref Range: 39 - 308 U/L 146     CK-MB Index Latest Ref Range: 0.0 - 4.0 % 3.9     CK - MB Latest Ref Range: <3.6 ng/ml 5.7 (H)     Troponin-I, Qt. Latest Ref Range: 0.0 - 0.045 NG/ML 1.60 (HH)  4.34 (HH)   Hemoglobin A1c, (calculated) Latest Ref Range: 4.2 - 5.6 % 8.0 (H)       Results for Jeffry Harden (MRN [id number]) as of 4/24/2020 15:07   Ref.  Range 4/21/2020 05:36   Sodium Latest Ref Range: 136 - 145 mmol/L 139   Potassium Latest Ref Range: 3.5 - 5.5 mmol/L 4.0   Chloride Latest Ref Range: 100 - 111 mmol/L 111 CO2 Latest Ref Range: 21 - 32 mmol/L 24   Anion gap Latest Ref Range: 3.0 - 18 mmol/L 4   Glucose Latest Ref Range: 74 - 99 mg/dL 96   BUN Latest Ref Range: 7.0 - 18 MG/DL 27 (H)   Creatinine Latest Ref Range: 0.6 - 1.3 MG/DL 2.02 (H)   BUN/Creatinine ratio Latest Ref Range: 12 - 20   13   Calcium Latest Ref Range: 8.5 - 10.1 MG/DL 8.5   GFR est non-AA Latest Ref Range: >60 ml/min/1.73m2 33 (L)   GFR est AA Latest Ref Range: >60 ml/min/1.73m2 39 (L)   Bilirubin, total Latest Ref Range: 0.2 - 1.0 MG/DL 0.4   Protein, total Latest Ref Range: 6.4 - 8.2 g/dL 7.2   Albumin Latest Ref Range: 3.4 - 5.0 g/dL 2.6 (L)   Globulin Latest Ref Range: 2.0 - 4.0 g/dL 4.6 (H)   A-G Ratio Latest Ref Range: 0.8 - 1.7   0.6 (L)   ALT (SGPT) Latest Ref Range: 16 - 61 U/L 13 (L)   AST Latest Ref Range: 10 - 38 U/L 20   Alk. phosphatase Latest Ref Range: 45 - 117 U/L 92   Troponin-I, Qt. Latest Ref Range: 0.0 - 0.045 NG/ML 2.20 [facility])     Results for Kaitlin Moss (MRN [id number]) as of 4/24/2020 15:07   Ref. Range 4/22/2020 05:29   aPTT Latest Ref Range: 23.0 - 36.4 SEC 90.4 (H)   Sodium Latest Ref Range: 136 - 145 mmol/L 144   Potassium Latest Ref Range: 3.5 - 5.5 mmol/L 3.9   Chloride Latest Ref Range: 100 - 111 mmol/L 114 (H)   CO2 Latest Ref Range: 21 - 32 mmol/L 24   Anion gap Latest Ref Range: 3.0 - 18 mmol/L 6   Glucose Latest Ref Range: 74 - 99 mg/dL 87   BUN Latest Ref Range: 7.0 - 18 MG/DL 28 (H)   Creatinine Latest Ref Range: 0.6 - 1.3 MG/DL 2.16 (H)   BUN/Creatinine ratio Latest Ref Range: 12 - 20   13   Calcium Latest Ref Range: 8.5 - 10.1 MG/DL 8.4 (L)   Phosphorus Latest Ref Range: 2.5 - 4.9 MG/DL 3.2   GFR est non-AA Latest Ref Range: >60 ml/min/1.73m2 30 (L)   GFR est AA Latest Ref Range: >60 ml/min/1.73m2 37 (L)   Albumin Latest Ref Range: 3.4 - 5.0 g/dL 2.5 (L)     Results for Kaitlin Moss (MRN [id number]) as of 4/24/2020 15:07   Ref.  Range 4/23/2020 03:15   Sodium Latest Ref Range: 136 - 145 mmol/L 144 Potassium Latest Ref Range: 3.5 - 5.5 mmol/L 3.9   Chloride Latest Ref Range: 100 - 111 mmol/L 115 (H)   CO2 Latest Ref Range: 21 - 32 mmol/L 23   Anion gap Latest Ref Range: 3.0 - 18 mmol/L 6   Glucose Latest Ref Range: 74 - 99 mg/dL 121 (H)   BUN Latest Ref Range: 7.0 - 18 MG/DL 25 (H)   Creatinine Latest Ref Range: 0.6 - 1.3 MG/DL 2.19 (H)   BUN/Creatinine ratio Latest Ref Range: 12 - 20   11 (L)   Calcium Latest Ref Range: 8.5 - 10.1 MG/DL 8.1 (L)   Phosphorus Latest Ref Range: 2.5 - 4.9 MG/DL 3.0   GFR est non-AA Latest Ref Range: >60 ml/min/1.73m2 30 (L)   GFR est AA Latest Ref Range: >60 ml/min/1.73m2 36 (L)   Albumin Latest Ref Range: 3.4 - 5.0 g/dL 2.7 (L)     Results for Hazel Victor (MRN [id number]) as of 4/24/2020 15:07   Ref. Range 4/24/2020 05:13   Sodium Latest Ref Range: 136 - 145 mmol/L 143   Potassium Latest Ref Range: 3.5 - 5.5 mmol/L 4.1   Chloride Latest Ref Range: 100 - 111 mmol/L 114 (H)   CO2 Latest Ref Range: 21 - 32 mmol/L 24   Anion gap Latest Ref Range: 3.0 - 18 mmol/L 5   Glucose Latest Ref Range: 74 - 99 mg/dL 81   BUN Latest Ref Range: 7.0 - 18 MG/DL 26 (H)   Creatinine Latest Ref Range: 0.6 - 1.3 MG/DL 2.23 (H)   BUN/Creatinine ratio Latest Ref Range: 12 - 20   12   Calcium Latest Ref Range: 8.5 - 10.1 MG/DL 8.3 (L)   Phosphorus Latest Ref Range: 2.5 - 4.9 MG/DL 3.4   Magnesium Latest Ref Range: 1.6 - 2.6 mg/dL 2.0   GFR est non-AA Latest Ref Range: >60 ml/min/1.73m2 29 (L)   GFR est AA Latest Ref Range: >60 ml/min/1.73m2 35 (L)   Albumin Latest Ref Range: 3.4 - 5.0 g/dL 2.7 (L)     CXR: 4/20  Findings: Lungs are clear. Heart is top normal in size. There is no acute  osseous abnormality.     IMPRESSION  Impression:  1. No acute process.     4/20 EKG:  Sinus rhythm with 1st degree AV block   Right bundle branch block   Left anterior fascicular block   Bifascicular block   Left ventricular hypertrophy with repolarization abnormality   Cannot rule out Septal infarct , age undetermined   Abnormal ECG   When compared with ECG of 27-AUG-2010 03:37,   T wave inversion no longer evident in Inferior leads   T wave inversion now evident in Lateral leads     4/20 EKG subsequent:  Sinus rhythm with 1st degree AV block with occasional premature ventricular   complexes   Right bundle branch block   Left anterior fascicular block   Bifascicular block   Left ventricular hypertrophy with repolarization abnormality   Anteroseptal infarct (cited on or before 20-APR-2020)   Abnormal ECG   When compared with ECG of 20-APR-2020 00:19,   premature ventricular complexes are now present   Serial changes of Anteroseptal infarct present   Confirmed by Linda Alva MD, --- ([id number]) on 4/20/2020 11:49:21 AM     4/20 EKG subsequent:  Sinus rhythm with 1st degree AV block   Left axis deviation   Right bundle branch block   Left anterior fascicular block   Moderate voltage criteria for LVH, may be normal variant   Anteroseptal infarct (cited on or before 20-APR-2020)   T wave abnormality, consider lateral ischemia   Abnormal ECG   When compared with ECG of 20-APR-2020 00:20,   premature ventricular complexes are no longer present   Serial changes of Anteroseptal infarct present   Confirmed by Linda Alva MD, --- ([id number]) on 4/20/2020 11:53:08 AM     4/21 Echocardiogram:  · Normal cavity size and systolic function (ejection fraction normal). Moderately increased wall thickness. Estimated left ventricular ejection fraction is 60 - 65%. Visually measured ejection fraction. No regional wall motion abnormality noted. Normal left ventricular strain. Moderate (grade 2) left ventricular diastolic dysfunction. · Mitral valve non-specific thickening. Trace mitral valve regurgitation is present. Finalized by Naveen Pierre MD on Tue Apr 21, 2020 11:21 AM   · Normal cavity size and systolic function (ejection fraction normal). Moderately increased wall thickness. Estimated left ventricular ejection fraction is 60 - 65%. Visually measured ejection fraction. No regional wall motion abnormality noted. Normal left ventricular strain. Moderate (grade 2) left ventricular diastolic dysfunction. · Mitral valve non-specific thickening and thickening. Trace mitral valve regurgitation is present. 4/22 EKG:  Sinus rhythm with 1st degree AV block   Right bundle branch block   Left anterior fascicular block   Bifascicular block   Left ventricular hypertrophy with QRS widening and repolarization abnormality   Abnormal ECG     Confirmed by Delmy Bills MD, Nithin Koo ([id number]) on 4/22/2020 2:19:28 PM   Also confirmed by Delmy Bills MD, Patrice Molt [phone]),  Juan Gilmore [phone] 4/23/2020 9:51:38 AM     4/22 NUC stress test:  · Baseline ECG: Sinus bradycardia, right bundle branch blockLeft anterior bifascicular block Left ventricular hypertrophy with wide QRS widening Cannot rule out septal infarct, age undetermined T wave abnormality, consider inferolateral ischemia . With 1st degree AV block  · Gated SPECT: Left ventricular function post-stress was abnormal. Calculated ejection fraction is 42%. There is no evidence of transient ischemic dilation (TID). The TID ratio is 1.07.  · Inconclusive stress test.  · Left ventricular perfusion is abnormal.  · Myocardial perfusion imaging defect 1: There is a defect that is moderate to large in size with a moderate reduction in uptake present in the mid-apical anterior and anteroseptal location(s) that is predominantly reversible. There is abnormal wall motion in the defect area. Viability in the area is good. The defect appears to be infarction with maria e-infarct ischemia. Perfusion defect was visually present without quantitative evidence. · Myocardial perfusion imaging defect 2: There is a defect that is small in size with a mild reduction in uptake affecting the apical to mid inferior location(s) that is predominantly reversible. There is normal wall motion in the defect area.  Viability in the area is good. The defect appears to be ischemia. Perfusion defect was visually present without quantitative evidence. · Myocardial perfusion imaging defect 1: caused by soft tissue. · Positive myocardial perfusion imaging. Myocardial perfusion imaging supports a high risk stress test.      4/23 cardiac catheter:  Left Main   The vessel was visualized by angiography. The vessel is angiographically normal.   Left Anterior Descending   Ost LAD lesion 40% stenosed. .   Mid LAD lesion 90% stenosed. . Lesion is the culprit lesion. The lesion is type B2. The lesion is moderately calcified. The lesion was not previously treated. Left Circumflex   The vessel exhibits minimal luminal irregularities. Right Coronary Artery   Mid RCA to Dist RCA lesion 70% stenosed. . The lesion was previously treated using a bare metal stent. Previous treatment took place >2 years ago. The strut appears well apposed. Intervention     Mid LAD lesion   Angioplasty   Angioplasty using a standard balloon was performed prior to stent deployment. The balloon used was a CATH BLLN RX TREK [id number] -- .   Stent   A single stent was placed. Drug-eluting stent was successfully placed. The stent used was a STENT SYS COR [id number] -- XIENCE ELBA. Inflation 1 - Maximum pressure: 14 syd; Time: 10 sec. The strut is well apposed. Angioplasty   Angioplasty using a standard balloon was performed following stent deployment. The balloon used was a CATH BLLN COR DIL [id number] -- NC TREK RAPID-EXCHANGE. Post-Intervention Lesion Assessment   The intervention was successful. Intentional subintimal strategy was not used. Embolic protection device was not deployed. The guidewire was not threaded through a graft to reach the lesion. The guidewire crossed the lesion. Device was deployed. The pre-interventional distal flow is normal (PASHA 3). Post-intervention PASHA flow is 3. Lesion had 25 mm of its length treated. There were no complications.    There is a 0% residual stenosis post intervention. Discharge Medications:  [email or web address]     Current Discharge Medication List      START taking these medications    Details   ticagrelor (BRILINTA) 90 mg tablet Take 1 Tab by mouth every twelve (12) hours every twelve (12) hours. Qty: 60 Tab, Refills: 3      doxazosin (CARDURA) 2 mg tablet Take 1 Tab by mouth every evening. Qty: 30 Tab, Refills: 0         CONTINUE these medications which have CHANGED    Details   carvediloL (COREG) 25 mg tablet Take 1 Tab by mouth two (2) times daily (with meals). Qty: 60 Tab, Refills: 0      amLODIPine (NORVASC) 10 mg tablet Take 1 Tab by mouth daily. Qty: 30 Tab, Refills: 0         CONTINUE these medications which have NOT CHANGED    Details   hydrALAZINE (APRESOLINE) 100 mg tablet Take 100 mg by mouth three (3) times daily. gabapentin (NEURONTIN) 100 mg capsule Take 100 mg by mouth two (2) times a day. nitroglycerin (NITROSTAT) 0.4 mg SL tablet 1 Tab by SubLINGual route every five (5) minutes as needed for Chest Pain for up to 3 doses. Indications: Angina  Qty: 25 Tab, Refills: 0    Associated Diagnoses: Coronary artery disease involving native coronary artery of native heart without angina pectoris      cholecalciferol, vitamin D3, (VITAMIN D3) 2,000 unit tab Take  by mouth. dutasteride (AVODART) 0.5 mg capsule Take 0.5 mg by mouth daily. linagliptin (TRADJENTA) 5 mg tablet Take 5 mg by mouth daily. simvastatin (ZOCOR) 40 mg tablet Take 1 Tab by mouth nightly. Qty: 30 Tab, Refills: 11      pantoprazole (PROTONIX) 40 mg tablet Take 40 mg by mouth daily. insulin glargine (LANTUS SOLOSTAR) 100 unit/mL (3 mL) pen 60 Units by SubCUTAneous route daily. aspirin 81 mg tablet Take 81 mg by mouth daily.          STOP taking these medications       azilsartan med-chlorthalidone (EDARBYCLOR) 40-25 mg per tablet Comments:   Reason for Stopping:               Activity: Activity as tolerated    Diet: Regular Diet    Wound Care: None needed    Follow-up: In one week with Cardiology and PCP    Minutes spent on discharge: 63

## 2020-04-24 NOTE — DISCHARGE INSTRUCTIONS
Your A1C  was   Lab Results   Component Value Date/Time    Hemoglobin A1c 8.0 (H) 04/20/2020 12:30 AM     Your A1c is elevated. This lab reflects your estimated average blood sugar of 183 mg/dL over the past 3 months and should be evaluated by your primary care provider. It is important to follow up with your provider on a routine basis to continue to evaluate your blood sugar discuss any necessary changes in treatment. Patient Education     Patient Education        Counting Carbohydrates: Care Instructions  Your Care Instructions    You don't have to eat special foods when you have diabetes. You just have to be careful to eat healthy foods. Carbohydrates (carbs) raise blood sugar higher and quicker than any other nutrient. Carbs are found in desserts, breads and cereals, and fruit. They're also in starchy vegetables. These include potatoes, corn, and grains such as rice and pasta. Carbs are also in milk and yogurt. The more carbs you eat at one time, the higher your blood sugar will rise. Spreading carbs all through the day helps keep your blood sugar levels within your target range. Counting carbs is one of the best ways to keep your blood sugar under control. If you use insulin, counting carbs helps you match the right amount of insulin to the number of grams of carbs in a meal. Then you can change your diet and insulin dose as needed. Testing your blood sugar several times a day can help you learn how carbs affect your blood sugar. A registered dietitian or certified diabetes educator can help you plan meals and snacks. Follow-up care is a key part of your treatment and safety. Be sure to make and go to all appointments, and call your doctor if you are having problems. It's also a good idea to know your test results and keep a list of the medicines you take. How can you care for yourself at home?   Know your daily amount of carbohydrates  Your daily amount depends on several things, such as your weight, how active you are, which diabetes medicines you take, and what your goals are for your blood sugar levels. A registered dietitian or certified diabetes educator can help you plan how many carbs to include in each meal and snack. For most adults, a guideline for the daily amount of carbs is:  · 45 to 60 grams at each meal. That's about the same as 3 to 4 carbohydrate servings. · 15 to 20 grams at each snack. That's about the same as 1 carbohydrate serving. Count carbs  Counting carbs lets you know how much rapid-acting insulin to take before you eat. If you use an insulin pump, you get a constant rate of insulin during the day. So the pump must be programmed at meals. This gives you extra insulin to cover the rise in blood sugar after meals. If you take insulin:  · Learn your own insulin-to-carb ratio. You and your diabetes health professional will figure out the ratio. You can do this by testing your blood sugar after meals. For example, you may need a certain amount of insulin for every 15 grams of carbs. · Add up the carb grams in a meal. Then you can figure out how many units of insulin to take based on your insulin-to-carb ratio. · Exercise lowers blood sugar. You can use less insulin than you would if you were not doing exercise. Keep in mind that timing matters. If you exercise within 1 hour after a meal, your body may need less insulin for that meal than it would if you exercised 3 hours after the meal. Test your blood sugar to find out how exercise affects your need for insulin. If you do or don't take insulin:  · Look at labels on packaged foods. This can tell you how many carbs are in a serving. You can also use guides from the American Diabetes Association. · Be aware of portions, or serving sizes. If a package has two servings and you eat the whole package, you need to double the number of grams of carbohydrate listed for one serving.   · Protein, fat, and fiber do not raise blood sugar as much as carbs do. If you eat a lot of these nutrients in a meal, your blood sugar will rise more slowly than it would otherwise. Eat from all food groups  · Eat at least three meals a day. · Plan meals to include food from all the food groups. The food groups include grains, fruits, dairy, proteins, and vegetables. · Talk to your dietitian or diabetes educator about ways to add limited amounts of sweets into your meal plan. · If you drink alcohol, talk to your doctor. It may not be recommended when you are taking certain diabetes medicines. Where can you learn more? Go to http://jerryBryn [email or web address] Collegeshereen.info/  Enter G703 in the search box to learn more about \"Counting Carbohydrates: Care Instructions. \"  Current as of: December 19, 2019Content Version: 12.4  © 0045-2831 [employer]. Care instructions adapted under license by [employer] (which disclaims liability or warranty for this information). If you have questions about a medical condition or this instruction, always ask your healthcare professional. [place] [address] any warranty or liability for your use of this information. Home Blood Pressure Test: About This Test  What is it? A home blood pressure test allows you to keep track of your blood pressure at home. Blood pressure is a measure of the force of blood against the walls of your arteries. Blood pressure readings include two numbers, such as 130/80 (say \"130 over 80\"). The first number is the systolic pressure. The second number is the diastolic pressure. Why is this test done? You may do this test at home to:  · Find out if you have high blood pressure. · Track your blood pressure if you have high blood pressure. · Track how well medicine is working to reduce high blood pressure. · Check how lifestyle changes, such as weight loss and exercise, are affecting blood pressure.   How do you prepare for the test?  For at least 30 minutes before you take your blood pressure, don't exercise or use caffeine, tobacco, or medicines that raise blood pressure. Take your blood pressure while you feel comfortable and relaxed. Sit quietly with both feet on the floor for at least 5 minutes before the test.  How is the test done? · Sit with your arm slightly bent and resting on a table so that your upper arm is at the same level as your heart. · Roll up your sleeve or take off your shirt to expose your upper arm. · Wrap the blood pressure cuff around your upper arm so that the lower edge of the cuff is about 1 inch above the bend of your elbow. Proceed with the following steps depending on if you are using an automatic or manual pressure monitor. Automatic blood pressure monitors  · Press the on/off button on the automatic monitor and wait until the ready-to-measure \"heart\" symbol appears next to zero in the display window. · Press the start button. The cuff will inflate and deflate by itself. · Your blood pressure numbers will appear on the screen. · Write your numbers in your log book, along with the date and time. Manual blood pressure monitors  · Place the earpieces of a stethoscope in your ears, and place the bell of the stethoscope over the artery, just below the cuff. · Close the valve on the rubber inflating bulb. · Squeeze the bulb rapidly with your opposite hand to inflate the cuff until the dial or column of mercury reads about 30 mm Hg higher than your usual systolic pressure. If you do not know your usual pressure, inflate the cuff to 210 mm Hg or until the pulse at your wrist disappears. · Open the pressure valve just slightly by twisting or pressing the valve on the bulb. · As you watch the pressure slowly fall, note the level on the dial at which you first start to hear a pulsing or tapping sound through the stethoscope. This is your systolic blood pressure. · Continue letting the air out slowly.  The sounds will become muffled and will finally disappear. Note the pressure when the sounds completely disappear. This is your diastolic blood pressure. Let out all the remaining air. · Write your numbers in your log book, along with the date and time. Follow-up care is a key part of your treatment and safety. Be sure to make and go to all appointments, and call your doctor if you are having problems. It's also a good idea to keep a list of the medicines you take. Where can you learn more? Go to http://jerry-shereen.info/  Enter C427 in the search box to learn more about \"Home Blood Pressure Test: About This Test.\"  Current as of: December 15, 2019Content Version: 12.4  © 0986-6352 [employer]. Care instructions adapted under license by [employer] (which disclaims liability or warranty for this information). If you have questions about a medical condition or this instruction, always ask your healthcare professional. [place] [address] any warranty or liability for your use of this information. Patient Education        Acute High Blood Pressure: Care Instructions  Your Care Instructions    Acute high blood pressure is very high blood pressure. It's a serious problem. Very high blood pressure can damage your brain, heart, eyes, and kidneys. You may have been given medicines to lower your blood pressure. You may have gotten them as pills or through a needle in one of your veins. This is called an IV. And maybe you were given other medicines too. These can be needed when high blood pressure causes other problems. To keep your blood pressure at a lower level, you may need to make healthy lifestyle changes. And you will probably need to take medicines. Be sure to follow up with your doctor about your blood pressure and what you can do about it. Follow-up care is a key part of your treatment and safety.  Be sure to make and go to all appointments, and call your doctor if you are having problems. It's also a good idea to know your test results and keep a list of the medicines you take. How can you care for yourself at home? · See your doctor as often as he or she recommends. This is to make sure your blood pressure is under control. You will probably go at least 2 times a year. But it may be more often at first.  · Take your blood pressure medicine exactly as prescribed. You may take one or more types. They include diuretics, beta-blockers, ACE inhibitors, calcium channel blockers, and angiotensin II receptor blockers. Call your doctor if you think you are having a problem with your medicine. · If you take blood pressure medicine, talk to your doctor before you take decongestants or anti-inflammatory medicine, such as ibuprofen. These can raise blood pressure. · Learn how to check your blood pressure at home. Check it often. · Ask your doctor if it's okay to drink alcohol. · Talk to your doctor about lifestyle changes that can help blood pressure. These include being active and managing your weight. · Don't smoke. Smoking increases your risk for heart attack and stroke. When should you call for help? Call  911 anytime you think you may need emergency care. This may mean having symptoms that suggest that your blood pressure is causing a serious heart or blood vessel problem. Your blood pressure may be over 180/120.   For example, call  911 if:    · You have symptoms of a heart attack. These may include:  ? Chest pain or pressure, or a strange feeling in the chest.  ? Sweating. ? Shortness of breath. ? Nausea or vomiting. ? Pain, pressure, or a strange feeling in the back, neck, jaw, or upper belly or in one or both shoulders or arms. ? Lightheadedness or sudden weakness. ? A fast or irregular heartbeat.     · You have symptoms of a stroke.  These may include:  ? Sudden numbness, tingling, weakness, or loss of movement in your face, arm, or leg, especially on only one side of your body. ? Sudden vision changes. ? Sudden trouble speaking. ? Sudden confusion or trouble understanding simple statements. ? Sudden problems with walking or balance. ? A sudden, severe headache that is different from past headaches.     · You have severe back or belly pain.    Do not wait until your blood pressure comes down on its own. Get help right away.   Call your doctor now or seek immediate care if:    · Your blood pressure is much higher than normal (such as 180/120 or higher), but you don't have symptoms.     · You think high blood pressure is causing symptoms, such as:  ? Severe headache.  ? Blurry vision.    Watch closely for changes in your health, and be sure to contact your doctor if:    · Your blood pressure measures higher than your doctor recommends at least 2 times. That means the top number is higher or the bottom number is higher, or both.     · You think you may be having side effects from your blood pressure medicine. Where can you learn more? Go to http://jerry-shereen.info/  Enter H919 in the search box to learn more about \"Acute High Blood Pressure: Care Instructions. \"  Current as of: December 15, 2019Content Version: 12.4  © 1061-1138 [employer]. Care instructions adapted under license by [employer] (which disclaims liability or warranty for this information). If you have questions about a medical condition or this instruction, always ask your healthcare professional. [place] [address] any warranty or liability for your use of this information. Patient Education        Learning About Percutaneous Coronary Intervention  What is percutaneous coronary intervention? Percutaneous coronary intervention (PCI) is the name for procedures to open a blocked coronary artery. The two most common are coronary angioplasty and coronary stent placement.   A PCI is a way to open a blocked coronary artery before, during, or after a heart attack. It gets blood flowing to your heart. And it can help prevent heart problems by widening an artery that has been narrowed by fatty buildup (plaque). This also may be called balloon angioplasty. Before a PCI, a doctor does a test to find blocked arteries. The test is called cardiac catheterization. The doctor puts a tiny tube called a catheter into an artery in your groin or arm. The doctor moves the catheter through the artery to your heart. Then he or she puts a dye into the catheter. This makes your heart's arteries show up on a screen so the doctor can see any blockages. The test also can measure the pressure inside your heart's chambers. If you have a blocked artery, the doctor may do an angioplasty or a coronary stent procedure. In an angioplasty, the doctor uses a catheter with a tiny balloon at the tip. He or she puts it into the blocked area and inflates it. The balloon presses the plaque against the walls of the artery. This makes more room for blood to flow. In most cases, the doctor then puts a stent in the artery. A stent is a small, expandable tube that presses against the walls of the artery. The stent is left in the artery to keep the artery open. This helps blood flow. It also may keep small pieces of plaque from breaking off and causing a heart attack. How is PCI done? PCI is done in a cardiac catheterization laboratory (\"cath lab\"). It is done by a heart specialist called a cardiologist. The whole procedure may take 1½ to 3 hours. You lie on a table under a large X-ray machine. You will get medicine through an IV in one of your veins. It helps you relax and not feel pain. You will be awake during the procedure. But you may not be able to remember much about it. The doctor will inject some medicine into your arm or groin to numb the skin. You will feel a small needle stick. It's like having a blood test. You may feel some pressure when the doctor puts in the catheter.  But you will not feel pain. The doctor will look at X-ray pictures on a monitor (like a TV set) to move the catheter to your heart. You may feel warm or flushed for a short time when the doctor injects dye into your artery. The doctor then will inflate a tiny balloon at the end of the catheter. The balloon is inflated for a brief time. Then it is deflated and removed. The doctor also may use the catheter to put a stent in the artery. What can you expect after PCI? The catheter will be removed. A nurse or doctor may press on a bandage on the opening. Then a bandage or a compression device may be placed on your groin or wrist at the catheter insertion site. This prevents bleeding. After the test, you will be taken to a room where the catheter site and your heart rate, blood pressure, and temperature will be checked several times. If the catheter was put in your groin, you will need to lie still and keep your leg straight for several hours. If the catheter was put in your arm, you may be able to sit up and get out of bed right away. But you will need to keep your arm still for at least one hour. You may stay 1 night in the hospital. When you go home, you will get instructions from your doctor to help you recover well and prevent problems. Make sure to drink plenty of fluids (unless your doctor tells you not to) for several hours after the test. This will help flush the dye out of your body. Your doctor will prescribe blood-thinning medicines. You will likely take aspirin plus another blood thinner. It is very important that you take these medicines exactly as directed. They help keep the coronary artery open and reduce your risk of a heart attack. If you have this procedure, you will still need to make lifestyle changes like eating healthy, being active, and not smoking. This will give you the best chance for a longer, healthier life. Follow-up care is a key part of your treatment and safety.  Be sure to make and go to all appointments, and call your doctor if you are having problems. It's also a good idea to know your test results and keep a list of the medicines you take. Where can you learn more? Go to http://jerry-shereen.info/  Enter M058 in the search box to learn more about \"Learning About Percutaneous Coronary Intervention. \"  Current as of: December 15, 2019Content Version: 12.4  © 2265-0608 [employer]. Care instructions adapted under license by [employer] (which disclaims liability or warranty for this information). If you have questions about a medical condition or this instruction, always ask your healthcare professional. [address] any warranty or liability for your use of this information. Patient Education   Doxazosin (By mouth)   Doxazosin ([id number]-sin)  Treats problems with urination caused by benign prostatic hyperplasia (enlarged prostate). Also treats high blood pressure. Brand Name(s): Cardura, Cardura XL   There may be other brand names for this medicine. When This Medicine Should Not Be Used: This medicine is not right for everyone. Do not use it if you had an allergic reaction to doxazosin or to similar medicines. How to Use This Medicine:   Tablet, Long Acting Tablet  · Take your medicine as directed. Your dose may need to be changed several times to find what works best for you. · The extended-release tablet should be taken each day with breakfast. The regular-release tablet can be taken in the morning or in the evening. · Swallow the extended-release tablet whole. Do not crush, break, or chew it. · If you take the extended-release tablet, part of the tablet may pass into your stools. This is normal and is nothing to worry about. · Read and follow the patient instructions that come with this medicine. Talk to your doctor or pharmacist if you have any questions. · Missed dose: Take a dose as soon as you remember.  If it is almost time for your next dose, wait until then and take a regular dose. Do not take extra medicine to make up for a missed dose. · Store the medicine in a closed container at room temperature, away from heat, moisture, and direct light. Drugs and Foods to Avoid:   Ask your doctor or pharmacist before using any other medicine, including over-the-counter medicines, vitamins, and herbal products. · Some medicines can affect how doxazosin works. Tell your doctor if you are using any of the following:  ¨ Atazanavir, clarithromycin, indinavir, itraconazole, ketoconazole, nefazodone, nelfinavir, ritonavir, saquinavir, telithromycin, or voriconazole  ¨ Blood pressure medicines  ¨ Medicine to treat erectile dysfunction  Warnings While Using This Medicine:   · Tell your doctor if you are pregnant or breastfeeding, or if you have liver disease, heart disease, prostate cancer, or stomach or bowel problems (such as a blockage). Tell your doctor if you plan to have cataract surgery. · This medicine may cause the following problems:  ¨ New or worsening angina (chest pain)  ¨ Priapism (painful, prolonged erection)  · This medicine could lower your blood pressure too much, especially when you first use it or if you are dehydrated. This can make you dizzy or lightheaded. Do not drive or do anything else that could be dangerous until you know how this medicine affects you. Stand or sit up slowly if you feel lightheaded or dizzy. · Your doctor will check your progress and the effects of this medicine at regular visits. Keep all appointments. · Keep all medicine out of the reach of children. Never share your medicine with anyone.   Possible Side Effects While Using This Medicine:   Call your doctor right away if you notice any of these side effects:  · Allergic reaction: Itching or hives, swelling in your face or hands, swelling or tingling in your mouth or throat, chest tightness, trouble breathing  · Fast, slow, pounding, or uneven heartbeat  · Lightheadedness, dizziness, or fainting  · New or worsening chest pain  · Painful erection or an erection that lasts longer than usual  If you notice these less serious side effects, talk with your doctor:   · Tiredness or weakness  If you notice other side effects that you think are caused by this medicine, tell your doctor. Call your doctor for medical advice about side effects. You may report side effects to FDA at 3-784-NAV-8792  © 2017 [facility] Information is for End User's use only and may not be sold, redistributed or otherwise used for commercial purposes. The above information is an  only. It is not intended as medical advice for individual conditions or treatments. Talk to your doctor, nurse or pharmacist before following any medical regimen to see if it is safe and effective for you. Patient Education   Ticagrelor (By mouth)   Ticagrelor (ddg-IU-jfge-or)  Helps prevent stroke, heart attack, and other heart problems. This medicine is a blood thinner. Brand Name(s): Dante   There may be other brand names for this medicine. When This Medicine Should Not Be Used: This medicine is not right for everyone. Do not use it if you had an allergic reaction to ticagrelor, or if you have bleeding problems (such as a bleeding stomach ulcer) or a history of bleeding in your brain. How to Use This Medicine:   Tablet  · Your doctor will tell you how much medicine to use. Do not use more than directed. Take this medicine at the same time every day. · Your doctor may tell you to take aspirin with this medicine. Do not use more than 100 milligrams of aspirin per day. Check the labels of other medicines to make sure they do not contain aspirin. · If you cannot swallow the tablet, you may do this:   ¨ Crush the tablet and mix it in a glass of water. Drink it right away.  Rinse the glass with more water and drink that too, so you get all the medicine. ¨ You may give the tablet and water mixture through a nasogastric tube. Flush the tube with more water so you receive all the medicine. · This medicine should come with a Medication Guide. Ask your pharmacist for a copy if you do not have one. · Missed dose: Skip the missed dose and take your next dose as usual. Do not take extra medicine to make up for a missed dose. · Store the medicine in a closed container at room temperature, away from heat, moisture, and direct light. Drugs and Foods to Avoid:   Ask your doctor or pharmacist before using any other medicine, including over-the-counter medicines, vitamins, and herbal products. · Some medicines can affect how ticagrelor works. Tell your doctor if you are using any of the following:  ¨ Atazanavir, carbamazepine, clarithromycin, digoxin, indinavir, itraconazole, ketoconazole, lovastatin, nefazodone, nelfinavir, phenobarbital, phenytoin, rifampin, ritonavir, saquinavir, simvastatin, telithromycin, or voriconazole  ¨ Blood thinner (including warfarin or heparin)  ¨ NSAID pain or arthritis medicine (including celecoxib, diclofenac, ibuprofen, naproxen)  Warnings While Using This Medicine:   · Tell your doctor if you are pregnant or breastfeeding, or if you have liver disease, heart rhythm problems (including slow heartbeat), lung or breathing problems (such as asthma or COPD), or a history of bleeding problems. · This medicine may cause you to bleed and bruise more easily, and it may take longer than usual for bleeding to stop. Be careful to avoid injuries. · Do not stop using this medicine unless your doctor tells you to. To stop it may increase your risk of a heart attack, blood clot, or other serious problem. · Tell any doctor or dentist who treats you that you are using this medicine. With your doctor's permission, you may need to stop using this medicine several days before you have surgery to reduce the risk of bleeding problems.  Follow your doctor's instructions carefully. · Your doctor will do lab tests at regular visits to check on the effects of this medicine. Keep all appointments. · Keep all medicine out of the reach of children. Never share your medicine with anyone. Possible Side Effects While Using This Medicine:   Call your doctor right away if you notice any of these side effects:  · Allergic reaction: Itching or hives, swelling in your face or hands, swelling or tingling in your mouth or throat, chest tightness, trouble breathing  · Bloody or black, tarry stools, red or dark brown urine  · Fast, slow, or pounding heartbeat  · Trouble breathing  · Unusual bleeding, bruising, or weakness  · Vomiting of blood or material that looks like coffee grounds  If you notice other side effects that you think are caused by this medicine, tell your doctor. Call your doctor for medical advice about side effects. You may report side effects to FDA at 0-447-FDA-7076  © 2017 [facility] Information is for End User's use only and may not be sold, redistributed or otherwise used for commercial purposes. The above information is an  only. It is not intended as medical advice for individual conditions or treatments. Talk to your doctor, nurse or pharmacist before following any medical regimen to see if it is safe and effective for you.

## 2020-04-24 NOTE — ROUTINE PROCESS
0730 - Bedside and Verbal shift change report given to Meagan Sprague (oncoming nurse) by Yang Gonzáles RN (offgoing nurse). Report included the following information SBAR, Kardex, Procedure Summary, Intake/Output, MAR, Recent Results, Med Rec Status and Cardiac Rhythm NSR, Sinus gama, 1st degree AV block, BBB. 0800 - Vitals taken, BS checked, pt resting quietly in recliner chair, NAD, no c/o pain or discomfort. 9710 - Administered scheduled meds. Pt resting quietly, sitting on recliner chair. 1200 - Vitals taken, BS checked. Pt resting quietly in recliner chair, NAD, no c/o pain or discomfort. 1240 - Scheduled meds administered. Pt had no complaints. Will continue to monitor. 1430 - Awaiting hospitalist for signature on prescription. Pt d/c instructions in progress. 7201 - Patient Discharged I have reviewed discharge instructions with the patient and spouse. The patient and spouse verbalized understanding. AVS e-signed, IV lines removed, all pt belongings packed and taken home. Pt transported to front of heart [place] via wheelchair. Pt spouse will transport pt back home or d/c.

## 2020-04-24 NOTE — INTERDISCIPLINARY ROUNDS
Discharge noted for today, no needs, Brillina sent to outpatient pharmacy. Interdisciplinary Round Note Patient Information: Patient Information: Boris Herbert. 
                                    3440/20 Reason for Admission: NSTEMI (non-ST elevated myocardial infarction) ([facility] [address] [I21.4] Attending Provider:   Daniel Rivera MD 
 Past Medical History:  
Past Medical History:  
Diagnosis Date  ACS (acute coronary syndrome) ([facility] [address]  CAD (coronary artery disease), native coronary artery August 2010  
 s/p non-ST-elevation myocardial infarction, s/p PCI with BMS (Vision 4x18mm) distal RCA with 45% distal LAD  and mild LCx disease. mild-moderate LV systolic dysfunction (82/06).  Diabetes mellitus type II   
 Dyslipidemia  ED (erectile dysfunction)  GERD (gastroesophageal reflux disease)  History of BPH   
 History of echocardiogram 5/18/2011 EF 65%. Mod-marked increased wall thickness. Gr 1 DDfx. No significant valvular heart disease.  Hyperlipidemia  Hypertension  Ischemic cardiomyopathy   
 recovery of LV syst fct  Echo May 2011 LV EF 65%  MI (myocardial infarction) ([facility] [address]  Obesity  RBBB (right bundle branch block with left anterior fascicular block)  S/P cardiac cath 8/26/2010 LM patent. dLAD 45%. CX mild. dRCA 100% thrombotic (S/P cath thrombectomy & Vision 4 x 18-mm BMS). EF 40%. Posterobasal, diaphrag, apical hypk.  Shingles 4/2012  Tobacco use disorder, continuous Hospital day: 4 Estimated discharge date: 4/25/2020 RRAT Score: Medium Risk 15 Total Score 2 . Living with Significant Other. Assisted Living. LTAC. SNF. or  
Rehab  
 5 Pt. Coverage (Medicare=5 , Medicaid, or Self-Pay=4) 8 Charlson Comorbidity Score (Age + Comorbid Conditions) Criteria that do not apply:  
 Has Seen PCP in Last 6 Months (Yes=3, No=0) Patient Length of Stay (>5 days = 3) IP Visits Last 12 Months (1-3=4, 4=9, >4=11) Goals for Today: cleared by cardiology and nephrology VITAL SIGNS Vitals:  
 04/23/20 2130 04/24/20 0100 04/24/20 0449 04/24/20 0800 BP: 177/82 161/75 157/63 170/71 Pulse: 76 62 63 67 Resp: 18 20 18 18 Temp: 98.3 °F (36.8 °C) 98.6 °F (37 °C) 98.5 °F (36.9 °C) 97.5 °F (36.4 °C) SpO2: 99% 98% 100% 99% Weight:   101.9 kg (224 lb 9.6 oz) Height:      
 
 
 
 Lines, Drains, & Airways [REMOVED] Peripheral IV 04/20/20 Right Antecubital-Site Assessment: Clean, dry, & intact [REMOVED] Peripheral IV 04/20/20 Left Antecubital-Site Assessment: Other (Comment)(site discontinued) Peripheral IV 04/21/20 Anterior;Right Wrist-Site Assessment: Clean, dry, & intact VTE Prophylaxis Intake and Output:  
04/22 1901 - 04/24 0700 In: 2564 [P.O.:1120; I.V.:1444] Out: 3300 [[id number]] 04/24 0701 - 04/24 1900 In: -  
Out: 400 [Urine:400] Current Diet: DIET CARDIAC Regular Abdominal  
Last Bowel Movement Date: 04/23/20 Stool Appearance: Formed Recent Glucose Results:  
Lab Results Component Value Date/Time GLU 81 04/24/2020 05:13 AM  
 GLUCPOC 179 (H) 04/24/2020 08:13 AM  
 GLUCPOC 255 (H) 04/23/2020 09:54 PM  
 GLUCPOC 276 (H) 04/23/2020 04:51 PM  
 
 
  
IV Antibiotics? no 
      
Activity Level: Activity Level: Up ad tim Needs assistance with ADLs: no 
PT Consult Status: NO Current Immunizations: There is no immunization history on file for this patient. Recommendations:  
Discharge Disposition: Home Medication Reconciliation Completed: YES Cardiac/Pulmonary Rehab Ordered:  NO 
 
Needs for Discharge: none Recommendations from IDR team: none TRISTAN Bingham Case Management [phone]

## 2020-04-24 NOTE — DIABETES MGMT
Glycemic Control Plan of Care    T2DM with current A1c level of 8.0% (4/20/2020). See separate notes, 04/21/2020, for assessment of home diabetes management and educational needs. Home diabetes medications: Patient reported on 04/21/2020:  Tradjenta (linagliptin) 5 mg yoly. Lantus (SoloStar) pen insulin 50 units daily. POC BG range on 04/23/2020: . POC BG report on 04/24/2020 at time of review: 179. Patient seen this morning. Status post cardiac cath and stent on 04/23/2020. He is looking forward to go home as soon as possible. Current hospital diabetes medications:  Basal lantus insulin 30 units daily. Correctional lispro insulin ACHS. Very resistant dose. Total daily dose insulin requirement previous day: 04/23/2020:  Lantus: 30 units  Lispro: 12 units  TDD insulin: 42 units    Recommendation(s):  1.) cont glycemic monitoring and intervention. Assessment:  Patient is 69year old with past medical history including type 2 diabetes mellitus, CAD, dyslipidemia, GERD, hypertension, ischemic cardiomyopathy, shingles, and tobacco use (continuous) - was admitted on 04/20/2020 with report of chest pain past 3 days. Noted:  NSTEMI. Status post cardiac cath and stent on 04/23/2020  Acute on chronic renal failure stage 3-4. T2DM. Most recent blood glucose values:        Current A1C:      Lab Results   Component Value Date/Time    Hemoglobin A1c 8.0 (H) 04/20/2020 12:30 AM     Patient's  A1c level is elevated. This lab test reflects an estimated average blood blood glucose of 183 mg/dL over the past 3 months. Diet: Cardiac regular; consistent carb 1800 kcal    Goals:  Blood glucose will be within target range of  mg/dL by 04/27/2020.     Education:  _X__  Refer to Diabetes Education Record: 04/21/2020.             ___  Education not indicated at this time    Francia Toribio RN [facility]  Pager: [phone]

## 2020-05-18 ENCOUNTER — VIRTUAL VISIT (OUTPATIENT)
Dept: CARDIOLOGY CLINIC | Age: 75
End: 2020-05-18

## 2020-05-18 VITALS
DIASTOLIC BLOOD PRESSURE: 69 MMHG | SYSTOLIC BLOOD PRESSURE: 173 MMHG | WEIGHT: 204 LBS | BODY MASS INDEX: 32.78 KG/M2 | HEART RATE: 60 BPM | HEIGHT: 66 IN

## 2020-05-18 DIAGNOSIS — E78.5 DYSLIPIDEMIA: ICD-10-CM

## 2020-05-18 DIAGNOSIS — I25.118 CORONARY ARTERY DISEASE INVOLVING NATIVE CORONARY ARTERY OF NATIVE HEART WITH OTHER FORM OF ANGINA PECTORIS (HCC): Primary | ICD-10-CM

## 2020-05-18 DIAGNOSIS — I10 ESSENTIAL HYPERTENSION: ICD-10-CM

## 2020-05-18 DIAGNOSIS — Z98.61 POSTSURGICAL PERCUTANEOUS TRANSLUMINAL CORONARY ANGIOPLASTY STATUS: ICD-10-CM

## 2020-05-18 DIAGNOSIS — F17.209 TOBACCO USE DISORDER, CONTINUOUS: ICD-10-CM

## 2020-05-18 RX ORDER — DOXAZOSIN 4 MG/1
2 TABLET ORAL EVERY EVENING
Qty: 90 TAB | Refills: 1 | Status: SHIPPED | OUTPATIENT
Start: 2020-05-18 | End: 2020-06-01 | Stop reason: DRUGHIGH

## 2020-05-18 NOTE — PROGRESS NOTES
HISTORY OF PRESENT ILLNESS  Lynsey Montana is a 69 y.o. male. Lynsey Montana is a 69 y.o. male who was seen by synchronous (real-time) audio-video technology on 5/18/2020. Consent:  He and/or his healthcare decision maker is aware that this patient-initiated Telehealth encounter is a billable service, with coverage as determined by his insurance carrier. He is aware that he may receive a bill and has provided verbal consent to proceed: Yes    I was in the office while conducting this encounter. 4/17 improving blood pressure since meds adjusted by Dr. Keo Frazier;    1/17 seen for establishing/changing cardiologist   history of coronary disease, status post PCI(2010) and hypertension, along with conduction system disease    Cardiomyopathy   The history is provided by the medical records (ischemic). This is a chronic problem. Associated symptoms include shortness of breath. Pertinent negatives include no chest pain and no headaches. Hypertension   The history is provided by the medical records and patient. This is a chronic problem. Associated symptoms include shortness of breath. Pertinent negatives include no chest pain and no headaches. Cholesterol Problem   The history is provided by the medical records. This is a chronic problem. Associated symptoms include shortness of breath. Pertinent negatives include no chest pain and no headaches. Shortness of Breath   The history is provided by the patient. This is a recurrent problem. The problem occurs intermittently. The current episode started more than 1 week ago. The problem has not changed since onset. Associated symptoms include leg swelling. Pertinent negatives include no fever, no headaches, no cough, no wheezing, no PND, no orthopnea, no chest pain, no vomiting, no rash and no claudication. The problem's precipitants include exercise (10-15 mt walk; 5/20 walking in hurry). Leg Swelling   The history is provided by the patient.  This is a recurrent problem. The current episode started more than 1 week ago (6/16). The problem occurs every several days. The problem has not changed since onset. Associated symptoms include shortness of breath. Pertinent negatives include no chest pain and no headaches. The symptoms are aggravated by standing. The symptoms are relieved by sleep. Hospital Follow Up   The history is provided by the patient and medical records (NSTEMI, PCI-LAD). Associated symptoms include shortness of breath. Pertinent negatives include no chest pain and no headaches. Review of Systems   Constitutional: Negative for chills, fever, malaise/fatigue and weight loss. HENT: Negative for nosebleeds. Eyes: Negative for discharge. Respiratory: Positive for shortness of breath. Negative for cough and wheezing. Cardiovascular: Positive for leg swelling. Negative for chest pain, palpitations, orthopnea, claudication and PND. Gastrointestinal: Negative for diarrhea, nausea and vomiting. Genitourinary: Negative for dysuria and hematuria. Musculoskeletal: Negative for joint pain. Skin: Negative for rash. Neurological: Negative for dizziness, seizures, loss of consciousness and headaches. Endo/Heme/Allergies: Negative for polydipsia. Does not bruise/bleed easily. Psychiatric/Behavioral: Negative for depression and substance abuse. The patient does not have insomnia. No Known Allergies    Past Medical History:   Diagnosis Date    ACS (acute coronary syndrome) ([address]     CAD (coronary artery disease), native coronary artery August 2010    s/p non-ST-elevation myocardial infarction, s/p PCI with BMS (Vision 4x18mm) distal RCA with 45% distal LAD  and mild LCx disease. mild-moderate LV systolic dysfunction (72/16).  Diabetes mellitus type II     Dyslipidemia     ED (erectile dysfunction)     GERD (gastroesophageal reflux disease)     History of BPH     History of echocardiogram 5/18/2011    EF 65%.   Mod-marked increased wall thickness. Gr 1 DDfx. No significant valvular heart disease.  Hyperlipidemia     Hypertension     Ischemic cardiomyopathy     recovery of LV syst fct  Echo May 2011 LV EF 65%    MI (myocardial infarction) ([address]     Obesity     RBBB (right bundle branch block with left anterior fascicular block)     S/P cardiac cath 8/26/2010    LM patent. dLAD 45%. CX mild. dRCA 100% thrombotic (S/P cath thrombectomy & Vision 4 x 18-mm BMS). EF 40%. Posterobasal, diaphrag, apical hypk.  Shingles 4/2012    Tobacco use disorder, continuous        Family History   Problem Relation Age of Onset    Diabetes Mother        Social History     Tobacco Use    Smoking status: Current Every Day Smoker     Packs/day: 0.50     Years: 44.00     Pack years: 22.00     Types: Cigarettes    Smokeless tobacco: Never Used    Tobacco comment: 2-3 cigs per day   Substance Use Topics    Alcohol use: No    Drug use: No        Current Outpatient Medications   Medication Sig    ticagrelor (BRILINTA) 90 mg tablet Take 1 Tab by mouth every twelve (12) hours every twelve (12) hours.  carvediloL (COREG) 25 mg tablet Take 1 Tab by mouth two (2) times daily (with meals).  amLODIPine (NORVASC) 10 mg tablet Take 1 Tab by mouth daily.  doxazosin (CARDURA) 2 mg tablet Take 1 Tab by mouth every evening.  gabapentin (NEURONTIN) 100 mg capsule Take 100 mg by mouth two (2) times a day.  nitroglycerin (NITROSTAT) 0.4 mg SL tablet 1 Tab by SubLINGual route every five (5) minutes as needed for Chest Pain for up to 3 doses. Indications: Angina    cholecalciferol, vitamin D3, (VITAMIN D3) 2,000 unit tab Take  by mouth.  dutasteride (AVODART) 0.5 mg capsule Take 0.5 mg by mouth daily.  linagliptin (TRADJENTA) 5 mg tablet Take 5 mg by mouth daily.  hydrALAZINE (APRESOLINE) 100 mg tablet Take 100 mg by mouth three (3) times daily.  simvastatin (ZOCOR) 40 mg tablet Take 1 Tab by mouth nightly.     pantoprazole (PROTONIX) 40 mg tablet Take 40 mg by mouth daily.  insulin glargine (LANTUS SOLOSTAR) 100 unit/mL (3 mL) pen 60 Units by SubCUTAneous route daily.  aspirin 81 mg tablet Take 81 mg by mouth daily. No current facility-administered medications for this visit. Past Surgical History:   Procedure Laterality Date    COLONOSCOPY N/A 4/23/2019    COLONOSCOPY performed by Collin Criag MD at [facility] ENDOSCOPY    HX CATARACT REMOVAL      HX CORONARY ARTERY BYPASS GRAFT      HX CORONARY STENT PLACEMENT  2010    HX HEART CATHETERIZATION  08/26/10    1. Normal systemic pressure. 2. Moderate elevation of left sided filling pressures. 3. Mild to moderate left ventricular systolic dysfunction distinct regional wall motion abnormalities. 4. Coronary disease with thrombotic occlusion of the distal right coronary artery and moderate left anterior descending disease. 5. Successful percutaneous coronary intervention with catheter thrombectomy.  HX HEMORRHOIDECTOMY      HX MOHS PROCEDURES Left 2002    HX TRABECULECTOMY         Diagnostic Studies: Old records reviewed and show as follows  EKG tracings reviewed by me today. No flowsheet data found. 3/17 Nuc Stress  Conclusion:   1. Nondiagnostic EKG changes of ischemia due to abnormal baseline EKG at 89% of predicted maximal heart rate. 2. Normal functional capacity. 3. Severely hypertensive blood pressure response and appropriate heart rate response. 4. No ischemic symptoms or arrhythmias seen. 5. Perfusion image report to follow. Conclusion:   1. Normal perfusion scan. 2. Normal wall motion and ejection fraction. 3. Low risk scan. 3/17 ECHO  SUMMARY:  Left ventricle: Ejection fraction was estimated to be 65 %. There were no  regional wall motion abnormalities. There was severe concentric  hypertrophy.  Features were consistent with a pseudonormal left ventricular  filling pattern, with concomitant abnormal relaxation and increased  filling pressure (grade 2 diastolic dysfunction). Visit Vitals  /69 (BP Patient Position: Sitting)   Pulse 60   Ht 5' 6\" (1.676 m)   Wt 92.5 kg (204 lb)   BMI 32.93 kg/m²       Mr. Daisy Lutz has a reminder for a \"due or due soon\" health maintenance. I have asked that he contact his primary care provider for follow-up on this health maintenance. 9/10 Card Cath  IMPRESSION  1. Normal systemic pressure. 2. Moderate elevation of left-sided filling pressures. 3. Mild to moderate left ventricular systolic dysfunction with distinct  regional wall motion abnormalities. 4. Coronary disease as described above with a thrombotic occlusion of the  distal right coronary artery and moderate distal left anterior descending  disease. 5. Successful percutaneous coronary intervention with catheter  thrombectomy, followed by bare metal stent deployment with a Vision 4 x 18  mm stent in the distal right coronary artery. 04/20/20   CARDIAC PROCEDURE 04/23/2020 4/23/2020    Narrative Double vessel coronary artery disease. S/p ptca/stent to mid LAD. Known BMS to distal RCA with moderate lesion. Recommend intense risk factor modification. Signed by: Nicki Boyce MD     Physical Exam   Constitutional: He is oriented to person, place, and time. He appears well-developed and well-nourished. No distress. Obese   HENT:   Head: Normocephalic and atraumatic. Mouth/Throat: Normal dentition. Eyes: Right eye exhibits no discharge. Left eye exhibits no discharge. No scleral icterus. Neck: Neck supple. No JVD present. Carotid bruit is not present. No thyromegaly present. Cardiovascular: Normal rate, regular rhythm, S1 normal, S2 normal, normal heart sounds and intact distal pulses. Exam reveals decreased pulses. Exam reveals no gallop and no friction rub. No murmur heard. Pulmonary/Chest: Effort normal and breath sounds normal. He has no wheezes. He has no rales. Abdominal: Soft. He exhibits no mass.  There is no abdominal tenderness. Musculoskeletal:         General: No edema. Lymphadenopathy:        Right cervical: No superficial cervical adenopathy present. Left cervical: No superficial cervical adenopathy present. Neurological: He is alert and oriented to person, place, and time. Skin: Skin is warm and dry. No rash noted. Psychiatric: He has a normal mood and affect. His behavior is normal.       ASSESSMENT and PLAN    Patient advised to stop smoking to reduce future cardiovascular events. HLD: 3/17 LDL31;       Follow-up after non-STEMI when patient had LAD stent. He also has CKD. CAD clinically stable but he has dyspnea on exertion which may be atypical angina. Blood pressure is elevated and he is missing doxazosin for 3 days but per patient and wife, blood pressure was elevated even while he was taking that. Increase the dose to 4 mg at bedtime and follow home chart. Labs as ordered. Had a detailed discussion with patient and wife regarding diet to avoid salty foods and importance of controlling blood pressure. Diagnoses and all orders for this visit:    1. Coronary artery disease involving native coronary artery of native heart with other form of angina pectoris ([facility] [address]    2. Essential hypertension  -     doxazosin (CARDURA) 4 mg tablet; Take 0.5 Tabs by mouth every evening.  -     LIPID PANEL; Future  -     HEPATIC FUNCTION PANEL; Future  -     METABOLIC PANEL, BASIC; Future    3. Dyslipidemia    4. Tobacco use disorder, continuous    5. Postsurgical percutaneous transluminal coronary angioplasty status        Pertinent laboratory and test data reviewed and discussed with patient.   See patient instructions also for other medical advice given    Medications Discontinued During This Encounter   Medication Reason    doxazosin (CARDURA) 2 mg tablet        Follow-up and Dispositions    · Return in about 3 months (around 8/18/2020), or if symptoms worsen or fail to improve, for post test.         Vital Signs: (As obtained by patient/caregiver at home)  Visit Vitals  /69 (BP Patient Position: Sitting)   Pulse 60   Ht 5' 6\" (1.676 m)   Wt 92.5 kg (204 lb)   BMI 32.93 kg/m²          Other pertinent observable physical exam findings:-      We discussed the expected course, resolution and complications of the diagnosis(es) in detail. Medication risks, benefits, costs, interactions, and alternatives were discussed as indicated. I advised him to contact the office if his condition worsens, changes or fails to improve as anticipated. He expressed understanding with the diagnosis(es) and plan. Pursuant to the emergency declaration under the [facility], [facility] waiver authority and the [employer] Act, this Virtual  Visit was conducted, with patient's consent, to reduce the patient's risk of exposure to COVID-19 and provide continuity of care for an established patient. Services were provided through a video synchronous discussion virtually to substitute for in-person clinic visit.     Gini Finley MD

## 2020-05-18 NOTE — PATIENT INSTRUCTIONS
Medications Discontinued During This Encounter Medication Reason  doxazosin (CARDURA) 2 mg tablet After the recommended changes have been made in blood pressure medicines, patient advised to keep BP/HR(pulse rate) chart twice daily and bring us results in next 1 week or so. Patient may send the results via \"My Chart\" if desired. Please rest for 5-10 minutes before checking blood pressure. Sit on a comfortable chair without crossing the legs and put your arm on a table. We recommend that you use an upper arm cuff. Check the blood pressure 3 times weekly and take the lowest reading. If you check the blood pressure in both arms, use the higher reading. High Blood Pressure: Care Instructions Overview It's normal for blood pressure to go up and down throughout the day. But if it stays up, you have high blood pressure. Another name for high blood pressure is hypertension. Despite what a lot of people think, high blood pressure usually doesn't cause headaches or make you feel dizzy or lightheaded. It usually has no symptoms. But it does increase your risk of stroke, heart attack, and other problems. You and your doctor will talk about your risks of these problems based on your blood pressure. Your doctor will give you a goal for your blood pressure. Your goal will be based on your health and your age. Lifestyle changes, such as eating healthy and being active, are always important to help lower blood pressure. You might also take medicine to reach your blood pressure goal. 
Follow-up care is a key part of your treatment and safety. Be sure to make and go to all appointments, and call your doctor if you are having problems. It's also a good idea to know your test results and keep a list of the medicines you take. How can you care for yourself at home? Medical treatment · If you stop taking your medicine, your blood pressure will go back up. You may take one or more types of medicine to lower your blood pressure. Be safe with medicines. Take your medicine exactly as prescribed. Call your doctor if you think you are having a problem with your medicine. · Talk to your doctor before you start taking aspirin every day. Aspirin can help certain people lower their risk of a heart attack or stroke. But taking aspirin isn't right for everyone, because it can cause serious bleeding. · See your doctor regularly. You may need to see the doctor more often at first or until your blood pressure comes down. · If you are taking blood pressure medicine, talk to your doctor before you take decongestants or anti-inflammatory medicine, such as ibuprofen. Some of these medicines can raise blood pressure. · Learn how to check your blood pressure at home. Lifestyle changes · Stay at a healthy weight. This is especially important if you put on weight around the waist. Losing even 10 pounds can help you lower your blood pressure. · If your doctor recommends it, get more exercise. Walking is a good choice. Bit by bit, increase the amount you walk every day. Try for at least 30 minutes on most days of the week. You also may want to swim, bike, or do other activities. · Avoid or limit alcohol. Talk to your doctor about whether you can drink any alcohol. · Try to limit how much sodium you eat to less than 2,300 milligrams (mg) a day. Your doctor may ask you to try to eat less than 1,500 mg a day. · Eat plenty of fruits (such as bananas and oranges), vegetables, legumes, whole grains, and low-fat dairy products. · Lower the amount of saturated fat in your diet. Saturated fat is found in animal products such as milk, cheese, and meat. Limiting these foods may help you lose weight and also lower your risk for heart disease. · Do not smoke. Smoking increases your risk for heart attack and stroke.  If you need help quitting, talk to your doctor about stop-smoking programs and medicines. These can increase your chances of quitting for good. When should you call for help? Call  911 anytime you think you may need emergency care. This may mean having symptoms that suggest that your blood pressure is causing a serious heart or blood vessel problem. Your blood pressure may be over 180/120. 
 For example, call  911 if: 
  · You have symptoms of a heart attack. These may include: 
? Chest pain or pressure, or a strange feeling in the chest. 
? Sweating. ? Shortness of breath. ? Nausea or vomiting. ? Pain, pressure, or a strange feeling in the back, neck, jaw, or upper belly or in one or both shoulders or arms. ? Lightheadedness or sudden weakness. ? A fast or irregular heartbeat.  
  · You have symptoms of a stroke. These may include: 
? Sudden numbness, tingling, weakness, or loss of movement in your face, arm, or leg, especially on only one side of your body. ? Sudden vision changes. ? Sudden trouble speaking. ? Sudden confusion or trouble understanding simple statements. ? Sudden problems with walking or balance. ? A sudden, severe headache that is different from past headaches.  
  · You have severe back or belly pain.  
 Do not wait until your blood pressure comes down on its own. Get help right away. 
 Call your doctor now or seek immediate care if: 
  · Your blood pressure is much higher than normal (such as 180/120 or higher), but you don't have symptoms.  
  · You think high blood pressure is causing symptoms, such as: 
? Severe headache. 
? Blurry vision.  
 Watch closely for changes in your health, and be sure to contact your doctor if: 
  · Your blood pressure measures higher than your doctor recommends at least 2 times. That means the top number is higher or the bottom number is higher, or both.  
  · You think you may be having side effects from your blood pressure medicine. Where can you learn more? Go to http://jerry-shereen.info/ Enter L604 in the search box to learn more about \"High Blood Pressure: Care Instructions. \" Current as of: December 15, 2019Content Version: 12.4 © 7441-3732 [employer]. Care instructions adapted under license by [employer] (which disclaims liability or warranty for this information). If you have questions about a medical condition or this instruction, always ask your healthcare professional. [place] [address] any warranty or liability for your use of this information.

## 2020-05-19 ENCOUNTER — TELEPHONE (OUTPATIENT)
Dept: CARDIAC REHAB | Age: 75
End: 2020-05-19

## 2020-05-19 NOTE — TELEPHONE ENCOUNTER
[facility] called patient and spoke to him about the program. He declined services and stated that he does not need the program. 
 
Thank you, Enzo Zambrano

## 2020-05-21 ENCOUNTER — DOCUMENTATION ONLY (OUTPATIENT)
Dept: NEPHROLOGY | Age: 75
End: 2020-05-21

## 2020-05-21 NOTE — PROGRESS NOTES
Tele Visit:    The patient is a 69year old male who is being evaluated via Telehealth services. Verbal consent obtained from patient for Telehealth visit, audio only used during evaluation, visit originated from provider's office location to patient home and encounter during COVID-19 outbreak. Note: Patient is a 58-year-old -American male who has been referred from  for evaluation for chronic kidney disease. Patient has longstanding history of diabetes which is not adequately controlled with hemoglobin A1c of 8.3, hypertension for the last 10 years appears to be uncontrolled, does have diastolic dysfunction, obesity, ischemic cardiomyopathy, history of smoking.  It was noted that patient's creatinine has been in the 2 range through 2019.  His last creatinine was 2.6 with a EGFR of 29.  Patient denies any urinary symptoms.  He did have BPH diagnosis and is on Proscar, but denies any obstructive symptoms.  He did have a kidney injury kidney ultrasound in June 2019 which did not show any obstructive uropathy.  He does have significant proteinuria    Recent hospital stay with CP with elevated troponin. Patient was  adm with  NSTEMI with plan for cardiac catheter which had to be delayed due to poor renal function resulting from CKD3. Patient was admitted to SDU with HTN emergency and placed on Nitroglycerin drip.    Echocardiogram was completed which showed 60-65% EF. BP is improving on home medications. d/t elevated creat [patient was hydrated with IVF.  Troponin continues trending upward. Creatinine was  still elevated so NUC stress test was ordered which was positive for ischemia.  Cardiac catheter was completed with ptca/stent to mid LAD. Verline Fontan was added on discharge. on discharge patients creat was at his baseline ~ 2    Patients BP has been elevated , 160-190 SBP.  He takes coreg 25 BID and hydralazine 100 BID  denies symptoms , no urinary symptoms, no SPOB, CP        Assessment & Plan Heydi Cristobal Emily HANEY; 5/21/2020 3:08 PM)  Chronic kidney disease (CKD), stage II (mild) (N18.2)  Impression: #1 chronic kidney disease stage IV, etiology appears to be diabetic nephropathy, but may also have hypertension nephrosclerosis as well. He does have significant proteinuria, needs to be quantified. Blood pressure does not appear to be at goal, was 150/72 in the office. Needs to be better controlled, goal blood pressure of 130/80. Also hemoglobin A1c needs to improve to 7 or below. Smoking cessation would also be very helpful. Recommend low-salt diet, maintain good hydration avoid NSAIDs.  needs proteinuria evaluated, also needs SPEP UPEP serum free light chains as well  #2 diabetic nephropathy  #3 proteinuria likely from #2, also evaluate for paraproteinemia  #4 hypertension uncontrolled, increase Coreg today and log blood pressures  #5 diastolic dysfunction  #6 ischemic cardiomyopathy    labs in 1 weeks at  ( future visit ordered) for BMP  f/u in 3-4 mths  daily BP, if SBP > 150s wife to call poice  hydralazine to increased to 100 mg TID      Please call with questions    Corina Lowe MD FASN  Cell [phone]  Pager: [phone]

## 2020-06-01 ENCOUNTER — HOSPITAL ENCOUNTER (INPATIENT)
Age: 75
LOS: 4 days | Discharge: HOME HEALTH | DRG: 291 | End: 2020-06-05
Attending: EMERGENCY MEDICINE | Admitting: HOSPITALIST
Payer: MEDICARE

## 2020-06-01 ENCOUNTER — APPOINTMENT (OUTPATIENT)
Dept: GENERAL RADIOLOGY | Age: 75
DRG: 291 | End: 2020-06-01
Attending: EMERGENCY MEDICINE
Payer: MEDICARE

## 2020-06-01 DIAGNOSIS — D50.9 IRON DEFICIENCY ANEMIA, UNSPECIFIED IRON DEFICIENCY ANEMIA TYPE: ICD-10-CM

## 2020-06-01 DIAGNOSIS — I50.9 ACUTE CONGESTIVE HEART FAILURE, UNSPECIFIED HEART FAILURE TYPE (HCC): Primary | ICD-10-CM

## 2020-06-01 DIAGNOSIS — N17.9 AKI (ACUTE KIDNEY INJURY) (HCC): ICD-10-CM

## 2020-06-01 DIAGNOSIS — R06.00 DYSPNEA, UNSPECIFIED TYPE: ICD-10-CM

## 2020-06-01 LAB
ALBUMIN SERPL-MCNC: 2.9 G/DL (ref 3.4–5)
ALBUMIN/GLOB SERPL: 0.6 {RATIO} (ref 0.8–1.7)
ALP SERPL-CCNC: 111 U/L (ref 45–117)
ALT SERPL-CCNC: 27 U/L (ref 16–61)
ANION GAP SERPL CALC-SCNC: 5 MMOL/L (ref 3–18)
AST SERPL-CCNC: 13 U/L (ref 10–38)
ATRIAL RATE: 69 BPM
BASOPHILS # BLD: 0 K/UL (ref 0–0.1)
BASOPHILS NFR BLD: 0 % (ref 0–2)
BILIRUB SERPL-MCNC: 0.2 MG/DL (ref 0.2–1)
BNP SERPL-MCNC: 1098 PG/ML (ref 0–900)
BUN SERPL-MCNC: 33 MG/DL (ref 7–18)
BUN/CREAT SERPL: 14 (ref 12–20)
CALCIUM SERPL-MCNC: 8.3 MG/DL (ref 8.5–10.1)
CALCULATED P AXIS, ECG09: -11 DEGREES
CALCULATED R AXIS, ECG10: -80 DEGREES
CALCULATED T AXIS, ECG11: 99 DEGREES
CHLORIDE SERPL-SCNC: 114 MMOL/L (ref 100–111)
CK MB CFR SERPL CALC: 1.5 % (ref 0–4)
CK MB CFR SERPL CALC: 1.6 % (ref 0–4)
CK MB SERPL-MCNC: 2.8 NG/ML (ref 5–25)
CK MB SERPL-MCNC: 3 NG/ML (ref 5–25)
CK SERPL-CCNC: 185 U/L (ref 39–308)
CK SERPL-CCNC: 188 U/L (ref 39–308)
CO2 SERPL-SCNC: 23 MMOL/L (ref 21–32)
CREAT SERPL-MCNC: 2.4 MG/DL (ref 0.6–1.3)
DIAGNOSIS, 93000: NORMAL
DIFFERENTIAL METHOD BLD: ABNORMAL
EOSINOPHIL # BLD: 0.7 K/UL (ref 0–0.4)
EOSINOPHIL NFR BLD: 8 % (ref 0–5)
ERYTHROCYTE [DISTWIDTH] IN BLOOD BY AUTOMATED COUNT: 15 % (ref 11.6–14.5)
GLOBULIN SER CALC-MCNC: 4.8 G/DL (ref 2–4)
GLUCOSE BLD STRIP.AUTO-MCNC: 279 MG/DL (ref 70–110)
GLUCOSE SERPL-MCNC: 224 MG/DL (ref 74–99)
HCT VFR BLD AUTO: 27.9 % (ref 36–48)
HGB BLD-MCNC: 9.4 G/DL (ref 13–16)
LYMPHOCYTES # BLD: 0.8 K/UL (ref 0.9–3.6)
LYMPHOCYTES NFR BLD: 9 % (ref 21–52)
MCH RBC QN AUTO: 25.3 PG (ref 24–34)
MCHC RBC AUTO-ENTMCNC: 33.7 G/DL (ref 31–37)
MCV RBC AUTO: 75.2 FL (ref 74–97)
MONOCYTES # BLD: 0.8 K/UL (ref 0.05–1.2)
MONOCYTES NFR BLD: 10 % (ref 3–10)
NEUTS SEG # BLD: 6.2 K/UL (ref 1.8–8)
NEUTS SEG NFR BLD: 73 % (ref 40–73)
P-R INTERVAL, ECG05: 298 MS
PLATELET # BLD AUTO: 212 K/UL (ref 135–420)
PMV BLD AUTO: 10.2 FL (ref 9.2–11.8)
POTASSIUM SERPL-SCNC: 4.4 MMOL/L (ref 3.5–5.5)
PROT SERPL-MCNC: 7.7 G/DL (ref 6.4–8.2)
Q-T INTERVAL, ECG07: 452 MS
QRS DURATION, ECG06: 160 MS
QTC CALCULATION (BEZET), ECG08: 484 MS
RBC # BLD AUTO: 3.71 M/UL (ref 4.7–5.5)
SODIUM SERPL-SCNC: 142 MMOL/L (ref 136–145)
TROPONIN I SERPL-MCNC: 0.03 NG/ML (ref 0–0.04)
TROPONIN I SERPL-MCNC: 0.04 NG/ML (ref 0–0.04)
TROPONIN I SERPL-MCNC: 0.04 NG/ML (ref 0–0.04)
VENTRICULAR RATE, ECG03: 69 BPM
WBC # BLD AUTO: 8.5 K/UL (ref 4.6–13.2)

## 2020-06-01 PROCEDURE — 74011636637 HC RX REV CODE- 636/637: Performed by: HOSPITALIST

## 2020-06-01 PROCEDURE — 80053 COMPREHEN METABOLIC PANEL: CPT

## 2020-06-01 PROCEDURE — 96374 THER/PROPH/DIAG INJ IV PUSH: CPT

## 2020-06-01 PROCEDURE — 82962 GLUCOSE BLOOD TEST: CPT

## 2020-06-01 PROCEDURE — 65660000000 HC RM CCU STEPDOWN

## 2020-06-01 PROCEDURE — 74011250637 HC RX REV CODE- 250/637: Performed by: STUDENT IN AN ORGANIZED HEALTH CARE EDUCATION/TRAINING PROGRAM

## 2020-06-01 PROCEDURE — 74011250637 HC RX REV CODE- 250/637: Performed by: INTERNAL MEDICINE

## 2020-06-01 PROCEDURE — 74011250636 HC RX REV CODE- 250/636: Performed by: HOSPITALIST

## 2020-06-01 PROCEDURE — 74011250636 HC RX REV CODE- 250/636: Performed by: STUDENT IN AN ORGANIZED HEALTH CARE EDUCATION/TRAINING PROGRAM

## 2020-06-01 PROCEDURE — 93005 ELECTROCARDIOGRAM TRACING: CPT

## 2020-06-01 PROCEDURE — 74011250637 HC RX REV CODE- 250/637: Performed by: HOSPITALIST

## 2020-06-01 PROCEDURE — 85025 COMPLETE CBC W/AUTO DIFF WBC: CPT

## 2020-06-01 PROCEDURE — 71045 X-RAY EXAM CHEST 1 VIEW: CPT

## 2020-06-01 PROCEDURE — 99285 EMERGENCY DEPT VISIT HI MDM: CPT

## 2020-06-01 PROCEDURE — 82550 ASSAY OF CK (CPK): CPT

## 2020-06-01 PROCEDURE — 83880 ASSAY OF NATRIURETIC PEPTIDE: CPT

## 2020-06-01 RX ORDER — FUROSEMIDE 10 MG/ML
20 INJECTION INTRAMUSCULAR; INTRAVENOUS
Status: COMPLETED | OUTPATIENT
Start: 2020-06-01 | End: 2020-06-01

## 2020-06-01 RX ORDER — MELATONIN
2000 DAILY
Status: DISCONTINUED | OUTPATIENT
Start: 2020-06-02 | End: 2020-06-05 | Stop reason: HOSPADM

## 2020-06-01 RX ORDER — CARVEDILOL 25 MG/1
25 TABLET ORAL 2 TIMES DAILY WITH MEALS
Status: DISCONTINUED | OUTPATIENT
Start: 2020-06-01 | End: 2020-06-05 | Stop reason: HOSPADM

## 2020-06-01 RX ORDER — FUROSEMIDE 10 MG/ML
20 INJECTION INTRAMUSCULAR; INTRAVENOUS 2 TIMES DAILY
Status: DISCONTINUED | OUTPATIENT
Start: 2020-06-02 | End: 2020-06-02

## 2020-06-01 RX ORDER — HEPARIN SODIUM 5000 [USP'U]/ML
5000 INJECTION, SOLUTION INTRAVENOUS; SUBCUTANEOUS EVERY 8 HOURS
Status: DISCONTINUED | OUTPATIENT
Start: 2020-06-01 | End: 2020-06-05 | Stop reason: HOSPADM

## 2020-06-01 RX ORDER — HYDRALAZINE HYDROCHLORIDE 50 MG/1
50 TABLET, FILM COATED ORAL ONCE
Status: COMPLETED | OUTPATIENT
Start: 2020-06-01 | End: 2020-06-01

## 2020-06-01 RX ORDER — NITROGLYCERIN 0.4 MG/1
0.4 TABLET SUBLINGUAL
Status: DISCONTINUED | OUTPATIENT
Start: 2020-06-01 | End: 2020-06-01

## 2020-06-01 RX ORDER — ONDANSETRON 2 MG/ML
4 INJECTION INTRAMUSCULAR; INTRAVENOUS
Status: DISCONTINUED | OUTPATIENT
Start: 2020-06-01 | End: 2020-06-05 | Stop reason: HOSPADM

## 2020-06-01 RX ORDER — ATORVASTATIN CALCIUM 20 MG/1
20 TABLET, FILM COATED ORAL
Status: DISCONTINUED | OUTPATIENT
Start: 2020-06-01 | End: 2020-06-05 | Stop reason: HOSPADM

## 2020-06-01 RX ORDER — FUROSEMIDE 10 MG/ML
40 INJECTION INTRAMUSCULAR; INTRAVENOUS
Status: COMPLETED | OUTPATIENT
Start: 2020-06-01 | End: 2020-06-01

## 2020-06-01 RX ORDER — INSULIN LISPRO 100 [IU]/ML
INJECTION, SOLUTION INTRAVENOUS; SUBCUTANEOUS
Status: DISCONTINUED | OUTPATIENT
Start: 2020-06-01 | End: 2020-06-05 | Stop reason: HOSPADM

## 2020-06-01 RX ORDER — HYDRALAZINE HYDROCHLORIDE 50 MG/1
100 TABLET, FILM COATED ORAL EVERY 8 HOURS
Status: DISCONTINUED | OUTPATIENT
Start: 2020-06-01 | End: 2020-06-05 | Stop reason: HOSPADM

## 2020-06-01 RX ORDER — HYDRALAZINE HYDROCHLORIDE 50 MG/1
100 TABLET, FILM COATED ORAL 3 TIMES DAILY
Status: DISCONTINUED | OUTPATIENT
Start: 2020-06-01 | End: 2020-06-01

## 2020-06-01 RX ORDER — DOXAZOSIN 2 MG/1
2 TABLET ORAL EVERY EVENING
COMMUNITY
End: 2020-07-02 | Stop reason: SDUPTHER

## 2020-06-01 RX ORDER — GUAIFENESIN 100 MG/5ML
81 LIQUID (ML) ORAL DAILY
Status: DISCONTINUED | OUTPATIENT
Start: 2020-06-02 | End: 2020-06-05 | Stop reason: HOSPADM

## 2020-06-01 RX ORDER — DUTASTERIDE 0.5 MG/1
0.5 CAPSULE, LIQUID FILLED ORAL DAILY
Status: DISCONTINUED | OUTPATIENT
Start: 2020-06-02 | End: 2020-06-05 | Stop reason: HOSPADM

## 2020-06-01 RX ORDER — PANTOPRAZOLE SODIUM 40 MG/1
40 TABLET, DELAYED RELEASE ORAL DAILY
Status: DISCONTINUED | OUTPATIENT
Start: 2020-06-02 | End: 2020-06-05 | Stop reason: HOSPADM

## 2020-06-01 RX ORDER — INSULIN GLARGINE 100 [IU]/ML
50 INJECTION, SOLUTION SUBCUTANEOUS DAILY
Status: DISCONTINUED | OUTPATIENT
Start: 2020-06-02 | End: 2020-06-01

## 2020-06-01 RX ORDER — INSULIN GLARGINE 100 [IU]/ML
20 INJECTION, SOLUTION SUBCUTANEOUS DAILY
Status: DISCONTINUED | OUTPATIENT
Start: 2020-06-02 | End: 2020-06-03

## 2020-06-01 RX ORDER — DOXAZOSIN 1 MG/1
2 TABLET ORAL EVERY EVENING
Status: DISCONTINUED | OUTPATIENT
Start: 2020-06-01 | End: 2020-06-02

## 2020-06-01 RX ORDER — ACETAMINOPHEN 500 MG
500 TABLET ORAL
Status: DISCONTINUED | OUTPATIENT
Start: 2020-06-01 | End: 2020-06-05 | Stop reason: HOSPADM

## 2020-06-01 RX ORDER — GABAPENTIN 100 MG/1
100 CAPSULE ORAL 2 TIMES DAILY
Status: DISCONTINUED | OUTPATIENT
Start: 2020-06-01 | End: 2020-06-05 | Stop reason: HOSPADM

## 2020-06-01 RX ORDER — AMLODIPINE BESYLATE 10 MG/1
10 TABLET ORAL DAILY
Status: DISCONTINUED | OUTPATIENT
Start: 2020-06-02 | End: 2020-06-05 | Stop reason: HOSPADM

## 2020-06-01 RX ORDER — DEXTROSE 50 % IN WATER (D50W) INTRAVENOUS SYRINGE
25-50 AS NEEDED
Status: DISCONTINUED | OUTPATIENT
Start: 2020-06-01 | End: 2020-06-05 | Stop reason: HOSPADM

## 2020-06-01 RX ORDER — MAGNESIUM SULFATE 100 %
16 CRYSTALS MISCELLANEOUS AS NEEDED
Status: DISCONTINUED | OUTPATIENT
Start: 2020-06-01 | End: 2020-06-05 | Stop reason: HOSPADM

## 2020-06-01 RX ADMIN — HYDRALAZINE HYDROCHLORIDE 100 MG: 50 TABLET, FILM COATED ORAL at 21:08

## 2020-06-01 RX ADMIN — FUROSEMIDE 20 MG: 10 INJECTION, SOLUTION INTRAMUSCULAR; INTRAVENOUS at 18:33

## 2020-06-01 RX ADMIN — HYDRALAZINE HYDROCHLORIDE 50 MG: 50 TABLET, FILM COATED ORAL at 23:39

## 2020-06-01 RX ADMIN — ATORVASTATIN CALCIUM 20 MG: 20 TABLET, FILM COATED ORAL at 21:08

## 2020-06-01 RX ADMIN — TICAGRELOR 90 MG: 90 TABLET ORAL at 18:31

## 2020-06-01 RX ADMIN — HEPARIN SODIUM 5000 UNITS: 5000 INJECTION INTRAVENOUS; SUBCUTANEOUS at 18:33

## 2020-06-01 RX ADMIN — GABAPENTIN 100 MG: 100 CAPSULE ORAL at 18:31

## 2020-06-01 RX ADMIN — INSULIN LISPRO 6 UNITS: 100 INJECTION, SOLUTION INTRAVENOUS; SUBCUTANEOUS at 23:38

## 2020-06-01 RX ADMIN — NITROGLYCERIN 0.5 INCH: 20 OINTMENT TOPICAL at 13:17

## 2020-06-01 RX ADMIN — FUROSEMIDE 40 MG: 10 INJECTION, SOLUTION INTRAMUSCULAR; INTRAVENOUS at 13:13

## 2020-06-01 NOTE — H&P
History & Physical    Patient: Bess Valdovinos MRN: [id number]  CSN: [id number]    YOB: 1945  Age: 69 y.o. Sex: male      DOA: 6/1/2020    Chief Complaint   Patient presents with    Shortness of Breath          HPI:     Bess Valdovinos is a 69 y.o. male with past medical history of coronary artery disease status post stents in April 2020 as well as a stent in 2010, hypertension, dyslipidemia, obesity who recently quit tobacco in March 2020. He was admitted in April with an end STEMI, now status post ptca/stent to mid LAD done 4/23/2020. Patient reports compliance with dual antiplatelet therapy. Late last week he noticed lower extremity edema with associated dyspnea on exertion. The dyspnea on exertion worsened so that he could not walk from his bedroom to his den without feeling shortness of breath. He progressed to having shortness of breath at rest, although he denies orthopnea, but admits to PND with poor sleep. He reports associated chest pressure, \"like someone is squeezing me. \"  This started this past Thursday, has been intermittent, he denies any inciting or aggravating factors, but reports improvement with sitting up on the edge of the bed. In the ED, EKG with sinus rhythm with PACs, bifascicular block, LVH. Chest x-ray showed minimal congestion. In the ED, he received he received Lasix 40 mg IV x1, and Nitropaste. Patient reports a good urine output but this has not been quantified. Creatinine is noted 2.4, with a baseline around 2. Troponin x2 are reassuring. He denies recent travel, denies any known COVID exposure. He states his wife smokes in the house, although he himself quit in March.         Past Medical History:   Diagnosis Date    ACS (acute coronary syndrome) ([facility] [address]     CAD (coronary artery disease), native coronary artery August 2010    s/p non-ST-elevation myocardial infarction, s/p PCI with BMS (Vision 4x18mm) distal RCA with 45% distal LAD  and mild LCx disease. mild-moderate LV systolic dysfunction (39/99).  Diabetes mellitus type II     Dyslipidemia     ED (erectile dysfunction)     GERD (gastroesophageal reflux disease)     History of BPH     History of echocardiogram 5/18/2011    EF 65%. Mod-marked increased wall thickness. Gr 1 DDfx. No significant valvular heart disease.  Hyperlipidemia     Hypertension     Ischemic cardiomyopathy     recovery of LV syst fct  Echo May 2011 LV EF 65%    MI (myocardial infarction) ([address]     Obesity     RBBB (right bundle branch block with left anterior fascicular block)     S/P cardiac cath 8/26/2010    LM patent. dLAD 45%. CX mild. dRCA 100% thrombotic (S/P cath thrombectomy & Vision 4 x 18-mm BMS). EF 40%. Posterobasal, diaphrag, apical hypk.  Shingles 4/2012    Tobacco use disorder, continuous        Past Surgical History:   Procedure Laterality Date    COLONOSCOPY N/A 4/23/2019    COLONOSCOPY performed by Clare Colón MD at [facility] ENDOSCOPY    HX CATARACT REMOVAL      HX CORONARY ARTERY BYPASS GRAFT      HX CORONARY STENT PLACEMENT  2010    HX HEART CATHETERIZATION  08/26/10    1. Normal systemic pressure. 2. Moderate elevation of left sided filling pressures. 3. Mild to moderate left ventricular systolic dysfunction distinct regional wall motion abnormalities. 4. Coronary disease with thrombotic occlusion of the distal right coronary artery and moderate left anterior descending disease. 5. Successful percutaneous coronary intervention with catheter thrombectomy.     HX HEMORRHOIDECTOMY      HX MOHS PROCEDURES Left 2002    HX TRABECULECTOMY         Family History   Problem Relation Age of Onset    Diabetes Mother        Social History     Socioeconomic History    Marital status:      Spouse name: Not on file    Number of children: Not on file    Years of education: Not on file    Highest education level: Not on file   Tobacco Use    Smoking status: Current Every Day Smoker     Packs/day: 0.50     Years: 44.00     Pack years: 22.00     Types: Cigarettes    Smokeless tobacco: Never Used    Tobacco comment: 2-3 cigs per day   Substance and Sexual Activity    Alcohol use: No    Drug use: No       Prior to Admission medications    Medication Sig Start Date End Date Taking? Authorizing Provider   doxazosin (CARDURA) 2 mg tablet Take 2 mg by mouth every evening. Yes Provider, Historical   ticagrelor (BRILINTA) 90 mg tablet Take 1 Tab by mouth every twelve (12) hours every twelve (12) hours. 4/24/20   Jessica Alvarez NP   carvediloL (COREG) 25 mg tablet Take 1 Tab by mouth two (2) times daily (with meals). 4/24/20   Chapincito Tate MD   amLODIPine (NORVASC) 10 mg tablet Take 1 Tab by mouth daily. 4/25/20   Chapincito Tate MD   gabapentin (NEURONTIN) 100 mg capsule Take 100 mg by mouth two (2) times a day. Provider, Historical   nitroglycerin (NITROSTAT) 0.4 mg SL tablet 1 Tab by SubLINGual route every five (5) minutes as needed for Chest Pain for up to 3 doses. Indications: Angina 11/6/17   Cristel Madsen MD   cholecalciferol, vitamin D3, (VITAMIN D3) 2,000 unit tab Take  by mouth. Provider, Historical   dutasteride (AVODART) 0.5 mg capsule Take 0.5 mg by mouth daily. Provider, Historical   hydrALAZINE (APRESOLINE) 100 mg tablet Take 100 mg by mouth three (3) times daily. Provider, Historical   simvastatin (ZOCOR) 40 mg tablet Take 1 Tab by mouth nightly. 5/27/15   Vasquez Wynn MD   pantoprazole (PROTONIX) 40 mg tablet Take 40 mg by mouth daily. Provider, Historical   insulin glargine (LANTUS SOLOSTAR) 100 unit/mL (3 mL) pen 60 Units by SubCUTAneous route daily. Provider, Historical   aspirin 81 mg tablet Take 81 mg by mouth daily. Provider, Historical       No Known Allergies    Review of Systems  GENERAL: No fevers or chills. No sweats. HEENT: No change in vision, sore throat or sinus congestion. NECK: No pain or stiffness.    CARDIOVASCULAR: + chest pressure. No palpitations. PULMONARY: +shortness of breath, +dyspnea on minimal exertion, +PND. No cough, no orthopnea. GASTROINTESTINAL: No abdominal pain, nausea, vomiting or diarrhea, melena or bright red blood per rectum. GENITOURINARY: No urinary frequency, urgency, hesitancy or dysuria. No  hematuria  DERMATOLOGIC: No rash, no itching, no lesions. NEUROLOGIC: No headache, seizures, no focal weakness. PSYCHIATRIC: No depression, anxiety. Physical Exam:     Physical Exam:  Visit Vitals  /75   Pulse (!) 58   Resp 20   Wt 108.2 kg (238 lb 9.6 oz)   SpO2 100%   BMI 38.51 kg/m²    O2 Flow Rate (L/min): 2 l/min O2 Device: Nasal cannula    No data recorded. No intake/output data recorded. No intake/output data recorded. Awake alert and oriented x4, obese, lying in bed no acute distress  Normocephalic atraumatic, pupils equally round and reactive to light, oropharynx clear  Regular rate and rhythm  Breath sounds diminished throughout, faint crackles at both bases. Abdomen obese soft nontender nondistended normoactive bowel sounds  Extremities ankle edema bilaterally, dorsalis pedis pulses 2+ bilaterally  Neuro no focal deficit  Skin no rash no lesion      Labs Reviewed:    Recent Results (from the past 24 hour(s))   METABOLIC PANEL, COMPREHENSIVE    Collection Time: 06/01/20 12:34 PM   Result Value Ref Range    Sodium 142 136 - 145 mmol/L    Potassium 4.4 3.5 - 5.5 mmol/L    Chloride 114 (H) 100 - 111 mmol/L    CO2 23 21 - 32 mmol/L    Anion gap 5 3.0 - 18 mmol/L    Glucose 224 (H) 74 - 99 mg/dL    BUN 33 (H) 7.0 - 18 MG/DL    Creatinine 2.40 (H) 0.6 - 1.3 MG/DL    BUN/Creatinine ratio 14 12 - 20      GFR est AA 32 (L) >60 ml/min/1.73m2    GFR est non-AA 27 (L) >60 ml/min/1.73m2    Calcium 8.3 (L) 8.5 - 10.1 MG/DL    Bilirubin, total 0.2 0.2 - 1.0 MG/DL    ALT (SGPT) 27 16 - 61 U/L    AST (SGOT) 13 10 - 38 U/L    Alk.  phosphatase 111 45 - 117 U/L    Protein, total 7.7 6.4 - 8.2 g/dL    Albumin 2.9 (L) 3.4 - 5.0 g/dL    Globulin 4.8 (H) 2.0 - 4.0 g/dL    A-G Ratio 0.6 (L) 0.8 - 1.7     CBC WITH AUTOMATED DIFF    Collection Time: 06/01/20 12:34 PM   Result Value Ref Range    WBC 8.5 4.6 - 13.2 K/uL    RBC 3.71 (L) 4.70 - 5.50 M/uL    HGB 9.4 (L) 13.0 - 16.0 g/dL    HCT 27.9 (L) 36.0 - 48.0 %    MCV 75.2 74.0 - 97.0 FL    MCH 25.3 24.0 - 34.0 PG    MCHC 33.7 31.0 - 37.0 g/dL    RDW 15.0 (H) 11.6 - 14.5 %    PLATELET 193 181 - 702 K/uL    MPV 10.2 9.2 - 11.8 FL    NEUTROPHILS 73 40 - 73 %    LYMPHOCYTES 9 (L) 21 - 52 %    MONOCYTES 10 3 - 10 %    EOSINOPHILS 8 (H) 0 - 5 %    BASOPHILS 0 0 - 2 %    ABS. NEUTROPHILS 6.2 1.8 - 8.0 K/UL    ABS. LYMPHOCYTES 0.8 (L) 0.9 - 3.6 K/UL    ABS. MONOCYTES 0.8 0.05 - 1.2 K/UL    ABS. EOSINOPHILS 0.7 (H) 0.0 - 0.4 K/UL    ABS.  BASOPHILS 0.0 0.0 - 0.1 K/UL    DF AUTOMATED     CARDIAC PANEL,(CK, CKMB & TROPONIN)    Collection Time: 06/01/20 12:34 PM   Result Value Ref Range    CK - MB 3.0 <3.6 ng/ml    CK-MB Index 1.6 0.0 - 4.0 %     39 - 308 U/L    Troponin-I, QT 0.04 0.0 - 0.045 NG/ML   NT-PRO BNP    Collection Time: 06/01/20 12:34 PM   Result Value Ref Range    NT pro-BNP 1,098 (H) 0 - 900 PG/ML   EKG, 12 LEAD, INITIAL    Collection Time: 06/01/20 12:37 PM   Result Value Ref Range    Ventricular Rate 69 BPM    Atrial Rate 69 BPM    P-R Interval 298 ms    QRS Duration 160 ms    Q-T Interval 452 ms    QTC Calculation (Bezet) 484 ms    Calculated P Axis -11 degrees    Calculated R Axis -80 degrees    Calculated T Axis 99 degrees    Diagnosis       Sinus rhythm with 1st degree AV block with premature atrial complexes with   aberrant conduction  Right bundle branch block  Left anterior fascicular block  Bifascicular block  Left ventricular hypertrophy with repolarization abnormality  Cannot rule out Septal infarct (cited on or before 01-JUN-2020)  Abnormal ECG  When compared with ECG of 24-APR-2020 07:56,  premature ventricular complexes are no longer present  aberrant conduction is now present  Confirmed by Jacobo Michael MD, ----- ([phone]) on 6/1/2020 4:48:49 PM     CARDIAC PANEL,(CK, CKMB & TROPONIN)    Collection Time: 06/01/20  3:49 PM   Result Value Ref Range    CK - MB 2.8 <3.6 ng/ml    CK-MB Index 1.5 0.0 - 4.0 %     39 - 308 U/L    Troponin-I, QT 0.04 0.0 - 0.045 NG/ML       IMAGING  XR Results (most recent):  Results from Hospital Encounter encounter on 06/01/20   XR CHEST PORT    Narrative HISTORY: Shortness of breath. EXAM: Chest.    TECHNIQUE: Single view AP portable semiupright chest.     COMPARISON: 4/20/2020. FINDINGS: Essentially unchanged aeration of bilateral hemithoraces is  demonstrated with minimal bilateral diffuse interstitial prominence without  edema thorax pneumonia or pleural effusions. Heart and mediastinal structures  are unchanged. Heart is enlarged. . Visualized bony thorax and soft tissues are  within normal limits. Impression  IMPRESSION:    1. Unchanged sequela of minimal congestion. Follow-up with plain imaging.        EKG Results     Procedure 720 Value Units Date/Time    EKG, 12 LEAD, INITIAL [[id number]] Collected:  06/01/20 1237    Order Status:  Completed Updated:  06/01/20 1649     Ventricular Rate 69 BPM      Atrial Rate 69 BPM      P-R Interval 298 ms      QRS Duration 160 ms      Q-T Interval 452 ms      QTC Calculation (Bezet) 484 ms      Calculated P Axis -11 degrees      Calculated R Axis -80 degrees      Calculated T Axis 99 degrees      Diagnosis --     Sinus rhythm with 1st degree AV block with premature atrial complexes with   aberrant conduction  Right bundle branch block  Left anterior fascicular block  Bifascicular block  Left ventricular hypertrophy with repolarization abnormality  Cannot rule out Septal infarct (cited on or before 01-JUN-2020)  Abnormal ECG  When compared with ECG of 24-APR-2020 07:56,  premature ventricular complexes are no longer present  aberrant conduction is now present  Confirmed by Mariah Duron MD, ----- [phone]) on 6/1/2020 4:48:49 PM            Procedures/imaging: see electronic medical records for all procedures/Xrays and details which were not copied into this note but were reviewed prior to creation of Plan        Assessment/Plan       1. CHF, new onsetpatient had echo done in April, will continue him on Lasix iv, nitro paste, beta-blocker. CHF education. Strict I's and O's. Daily weight. 2.  Chest pressure, and patient with known coronary artery disease recent stent placed April 2020 continue aspirin Brilinta beta-blocker statin. Trend cardiac markers. Check EKG in the morning. N.p.o. after midnight pending cardiology input. 3.  Hypertensioncontinue home medications with holding parameters. 4. Obesity Body mass index is 38.51 kg/m². 5.  Diabetes type 2 with hyperglycemiareduced dose of Lantus given n.p.o. status, sliding scale insulin, ADA diet when appropriate. Check hemoglobin A1c. Consult diabetes educator. 6.  Dyslipidemiacontinue statin. 7.  Quit tobacco March 2020  8. 2nd hand smoke exposure - states his wife smokes in the house - he goes to another room  9. DVT prophylaxis  10.   Full code admit to telemetry      Time spent 70 minutes      Annetta Jacobo MD  June 1, 2020

## 2020-06-01 NOTE — ED TRIAGE NOTES
BIBA from home. 22g left hand. ASA 324mg and nitro x1. Shortness of breath for the past 3 days and some chest \"tightness\".   Hx of cardiac stents

## 2020-06-01 NOTE — ED NOTES
Went into patient room,  To help him answer call for his wife. Pt adjusting bedlinens and wires.    Pt short of breath with exertion

## 2020-06-01 NOTE — ED NOTES
I personally saw and examined the patient. I have reviewed and agree with the residents findings, including all diagnostic interpretations, and plans as written. I was present during the key portions of separately billed procedures. Ramya Flannery MD      I assumed care of the patient and Dr. Tyesha Mason to follow the second troponin. After evaluation the patient remained short of breath and his creatinine steadily rising and he recently had a cath with stents placed after non-STEMI. At this point he has new onset congestive heart failure and recent cardiac procedure. Patient has diuresed but still remains short of breath. Will give another dose of Lasix and discussed case with Dr. Kristina Cross and will admit the patient to the telemetry bed. Critical Care Time:  The services I provided to this patient were to treat and/or prevent clinically significant deterioration that could result in the failure of one or more body systems and/or organ systems due to new onset CHF, CAD. Services included the following:  -reviewing nursing notes and old charts  -vital sign assessments  -direct patient care  -medication orders and management  -interpreting and reviewing diagnostic studies/labs  -re-evaluations  -documentation time    Aggregate critical care time was 36 minutes, which includes only time during which I was engaged in work directly related to the patient's care as described above, whether I was at bedside or elsewhere in the Emergency Department. It did not include time spent performing other reported procedures or the services of residents, students, nurses, or advance practice providers.        Inocente Victoria, DO 4:32 PM

## 2020-06-01 NOTE — ED PROVIDER NOTES
HPI   78yom brought by EMS for dyspnea x 2 days. Per pt, has had worsening swelling in his legs, dyspnea, chest tightness in context of two cardiac stents placed in April of this year. Pt denies exertional component to symptoms, no provoking or alleviating factors. Past Medical History:   Diagnosis Date    ACS (acute coronary syndrome) [facility] [address]     CAD (coronary artery disease), native coronary artery August 2010    s/p non-ST-elevation myocardial infarction, s/p PCI with BMS (Vision 4x18mm) distal RCA with 45% distal LAD  and mild LCx disease. mild-moderate LV systolic dysfunction (65/61).  Diabetes mellitus type II     Dyslipidemia     ED (erectile dysfunction)     GERD (gastroesophageal reflux disease)     History of BPH     History of echocardiogram 5/18/2011    EF 65%. Mod-marked increased wall thickness. Gr 1 DDfx. No significant valvular heart disease.  Hyperlipidemia     Hypertension     Ischemic cardiomyopathy     recovery of LV syst fct  Echo May 2011 LV EF 65%    MI (myocardial infarction) ([facility] [address]     Obesity     RBBB (right bundle branch block with left anterior fascicular block)     S/P cardiac cath 8/26/2010    LM patent. dLAD 45%. CX mild. dRCA 100% thrombotic (S/P cath thrombectomy & Vision 4 x 18-mm BMS). EF 40%. Posterobasal, diaphrag, apical hypk.  Shingles 4/2012    Tobacco use disorder, continuous        Past Surgical History:   Procedure Laterality Date    COLONOSCOPY N/A 4/23/2019    COLON[facility] performed by [facility] at [facility] ENDOSCOPY    HX CATARACT REMOVAL      HX CORONARY ARTERY BYPASS GRAFT      HX CORONARY STENT PLACEMENT  2010    HX HEART CATHETERIZATION  08/26/10    1. Normal systemic pressure. 2. Moderate elevation of left sided filling pressures. 3. Mild to moderate left ventricular systolic dysfunction distinct regional wall motion abnormalities.  4. Coronary disease with thrombotic occlusion of the distal right coronary artery and moderate left anterior descending disease. 5. Successful percutaneous coronary intervention with catheter thrombectomy.  HX HEMORRHOIDECTOMY      HX MOHS PROCEDURES Left 2002    HX TRABECULECTOMY           Family History:   Problem Relation Age of Onset    Diabetes Mother        Social History     Socioeconomic History    Marital status:      Spouse name: Not on file    Number of children: Not on file    Years of education: Not on file    Highest education level: Not on file   Occupational History    Not on file   Social Needs    Financial resource strain: Not on file    Food insecurity     Worry: Not on file     Inability: Not on file    Transportation needs     Medical: Not on file     Non-medical: Not on file   Tobacco Use    Smoking status: Current Every Day Smoker     Packs/day: 0.50     Years: 44.00     Pack years: 22.00     Types: Cigarettes    Smokeless tobacco: Never Used    Tobacco comment: 2-3 cigs per day   Substance and Sexual Activity    Alcohol use: No    Drug use: No    Sexual activity: Not on file   Lifestyle    Physical activity     Days per week: Not on file     Minutes per session: Not on file    Stress: Not on file   Relationships    Social connections     Talks on phone: Not on file     Gets together: Not on file     Attends [employer] service: Not on file     Active member of club or organization: Not on file     Attends meetings of clubs or organizations: Not on file     Relationship status: Not on file    Intimate partner violence     Fear of current or ex partner: Not on file     Emotionally abused: Not on file     Physically abused: Not on file     Forced sexual activity: Not on file   Other Topics Concern    Not on file   Social History Narrative    Not on file         ALLERGIES: Patient has no known allergies. Review of Systems   Constitutional: Positive for fatigue. Negative for activity change, appetite change and unexpected weight change.    HENT: Negative for congestion, dental problem and drooling. Eyes: Negative for discharge. Respiratory: Positive for chest tightness and shortness of breath. Negative for apnea, cough, choking, wheezing and stridor. Cardiovascular: Positive for leg swelling. Negative for chest pain and palpitations. Gastrointestinal: Negative for abdominal distention, abdominal pain and anal bleeding. Endocrine: Negative for cold intolerance and heat intolerance. Genitourinary: Negative for difficulty urinating, dysuria, enuresis, flank pain and frequency. Musculoskeletal: Negative for arthralgias, back pain and gait problem. Skin: Negative for color change and pallor. Neurological: Negative for dizziness, facial asymmetry and headaches. Psychiatric/Behavioral: Negative for agitation and behavioral problems. Vitals:    06/01/20 1231 06/01/20 1239   BP: 178/65    Pulse: 67    Resp: 30    SpO2: 99%    Weight:  108.2 kg (238 lb 9.6 oz)            Physical Exam  Constitutional:       Appearance: He is obese. HENT:      Head: Normocephalic and atraumatic. Mouth/Throat:      Mouth: Mucous membranes are moist.   Neck:      Musculoskeletal: Normal range of motion. Cardiovascular:      Rate and Rhythm: Normal rate and regular rhythm. Pulmonary:      Effort: Tachypnea present. Breath sounds: Examination of the right-middle field reveals rales. Examination of the left-middle field reveals rales. Examination of the right-lower field reveals rales. Examination of the left-lower field reveals rales. Rales present. Abdominal:      General: Bowel sounds are normal.      Palpations: Abdomen is soft. Musculoskeletal: Normal range of motion. Right lower leg: Edema present. Left lower leg: Edema present. Skin:     General: Skin is warm and dry. Neurological:      Mental Status: He is alert.           MDM   78yom with hx CAD, diastolic dysfunction seen for 2 days of dyspnea in context of 2 cardiac stents placed in April. Considered ACS vs CHF exacerbation, also considered COPD, PE, PNA, PTX, renal failure, costochondritis, MSK strain. Given b/l edema, elevated BNP, normal troponin, concern lower for ACS. Pt with symptomatic improvement following lasix and nitro paste, successfully diuresing. Repeat troponins did not trend upwards, pt stable for discharge. All questions were answered and pt expressed understanding.     ED Course as of Jun 01 1344   Mon Jun 01, 2020   1313 Compared new EKG with one from April, morphology of QRS complexes similar, no changes in block morphology    [JL]   1343 NT pro-BNP(!): 1,098 [JL]   1343 Troponin-I, Qt.: 0.04 [JL]   1343 Creatinine(!): 2.40 [JL]      ED Course User Index  [JL] Sayra Tucker MD       Procedures    Marilu Snider MD

## 2020-06-02 ENCOUNTER — APPOINTMENT (OUTPATIENT)
Dept: NON INVASIVE DIAGNOSTICS | Age: 75
DRG: 291 | End: 2020-06-02
Attending: NURSE PRACTITIONER
Payer: MEDICARE

## 2020-06-02 LAB
ALBUMIN SERPL-MCNC: 3 G/DL (ref 3.4–5)
ANION GAP SERPL CALC-SCNC: 5 MMOL/L (ref 3–18)
ANION GAP SERPL CALC-SCNC: 7 MMOL/L (ref 3–18)
ATRIAL RATE: 66 BPM
BUN SERPL-MCNC: 34 MG/DL (ref 7–18)
BUN SERPL-MCNC: 34 MG/DL (ref 7–18)
BUN/CREAT SERPL: 15 (ref 12–20)
BUN/CREAT SERPL: 15 (ref 12–20)
CALCIUM SERPL-MCNC: 8.8 MG/DL (ref 8.5–10.1)
CALCIUM SERPL-MCNC: 8.8 MG/DL (ref 8.5–10.1)
CALCULATED P AXIS, ECG09: 4 DEGREES
CALCULATED R AXIS, ECG10: -77 DEGREES
CALCULATED T AXIS, ECG11: 93 DEGREES
CHLORIDE SERPL-SCNC: 113 MMOL/L (ref 100–111)
CHLORIDE SERPL-SCNC: 113 MMOL/L (ref 100–111)
CO2 SERPL-SCNC: 23 MMOL/L (ref 21–32)
CO2 SERPL-SCNC: 24 MMOL/L (ref 21–32)
CREAT SERPL-MCNC: 2.23 MG/DL (ref 0.6–1.3)
CREAT SERPL-MCNC: 2.29 MG/DL (ref 0.6–1.3)
DIAGNOSIS, 93000: NORMAL
EST. AVERAGE GLUCOSE BLD GHB EST-MCNC: 180 MG/DL
GLUCOSE BLD STRIP.AUTO-MCNC: 200 MG/DL (ref 70–110)
GLUCOSE BLD STRIP.AUTO-MCNC: 296 MG/DL (ref 70–110)
GLUCOSE BLD STRIP.AUTO-MCNC: 76 MG/DL (ref 70–110)
GLUCOSE BLD STRIP.AUTO-MCNC: 83 MG/DL (ref 70–110)
GLUCOSE SERPL-MCNC: 86 MG/DL (ref 74–99)
GLUCOSE SERPL-MCNC: 93 MG/DL (ref 74–99)
HBA1C MFR BLD: 7.9 % (ref 4.2–5.6)
P-R INTERVAL, ECG05: 286 MS
PHOSPHATE SERPL-MCNC: 4.3 MG/DL (ref 2.5–4.9)
POTASSIUM SERPL-SCNC: 4 MMOL/L (ref 3.5–5.5)
POTASSIUM SERPL-SCNC: 4 MMOL/L (ref 3.5–5.5)
Q-T INTERVAL, ECG07: 472 MS
QRS DURATION, ECG06: 166 MS
QTC CALCULATION (BEZET), ECG08: 494 MS
SODIUM SERPL-SCNC: 142 MMOL/L (ref 136–145)
SODIUM SERPL-SCNC: 143 MMOL/L (ref 136–145)
TROPONIN I SERPL-MCNC: 0.03 NG/ML (ref 0–0.04)
VENTRICULAR RATE, ECG03: 66 BPM

## 2020-06-02 PROCEDURE — 93005 ELECTROCARDIOGRAM TRACING: CPT

## 2020-06-02 PROCEDURE — 80069 RENAL FUNCTION PANEL: CPT

## 2020-06-02 PROCEDURE — 74011636637 HC RX REV CODE- 636/637: Performed by: HOSPITALIST

## 2020-06-02 PROCEDURE — 36415 COLL VENOUS BLD VENIPUNCTURE: CPT

## 2020-06-02 PROCEDURE — 74011250636 HC RX REV CODE- 250/636: Performed by: HOSPITALIST

## 2020-06-02 PROCEDURE — 74011250637 HC RX REV CODE- 250/637: Performed by: NURSE PRACTITIONER

## 2020-06-02 PROCEDURE — 74011250636 HC RX REV CODE- 250/636: Performed by: INTERNAL MEDICINE

## 2020-06-02 PROCEDURE — 74011000250 HC RX REV CODE- 250: Performed by: NURSE PRACTITIONER

## 2020-06-02 PROCEDURE — 84484 ASSAY OF TROPONIN QUANT: CPT

## 2020-06-02 PROCEDURE — 74011250637 HC RX REV CODE- 250/637: Performed by: INTERNAL MEDICINE

## 2020-06-02 PROCEDURE — 80048 BASIC METABOLIC PNL TOTAL CA: CPT

## 2020-06-02 PROCEDURE — 82962 GLUCOSE BLOOD TEST: CPT

## 2020-06-02 PROCEDURE — 65660000000 HC RM CCU STEPDOWN

## 2020-06-02 PROCEDURE — 83036 HEMOGLOBIN GLYCOSYLATED A1C: CPT

## 2020-06-02 PROCEDURE — 74011250637 HC RX REV CODE- 250/637: Performed by: HOSPITALIST

## 2020-06-02 RX ORDER — BUMETANIDE 0.25 MG/ML
1 INJECTION INTRAMUSCULAR; INTRAVENOUS 2 TIMES DAILY
Status: DISCONTINUED | OUTPATIENT
Start: 2020-06-02 | End: 2020-06-05 | Stop reason: HOSPADM

## 2020-06-02 RX ORDER — BUMETANIDE 0.25 MG/ML
1 INJECTION INTRAMUSCULAR; INTRAVENOUS
Status: COMPLETED | OUTPATIENT
Start: 2020-06-02 | End: 2020-06-02

## 2020-06-02 RX ORDER — CLONIDINE HYDROCHLORIDE 0.1 MG/1
0.1 TABLET ORAL EVERY 12 HOURS
Status: DISCONTINUED | OUTPATIENT
Start: 2020-06-02 | End: 2020-06-03

## 2020-06-02 RX ORDER — HYDRALAZINE HYDROCHLORIDE 20 MG/ML
20 INJECTION INTRAMUSCULAR; INTRAVENOUS
Status: DISCONTINUED | OUTPATIENT
Start: 2020-06-02 | End: 2020-06-02

## 2020-06-02 RX ORDER — CLONIDINE HYDROCHLORIDE 0.1 MG/1
0.1 TABLET ORAL 2 TIMES DAILY
Status: DISCONTINUED | OUTPATIENT
Start: 2020-06-02 | End: 2020-06-02

## 2020-06-02 RX ORDER — BUMETANIDE 0.25 MG/ML
1 INJECTION INTRAMUSCULAR; INTRAVENOUS ONCE
Status: DISCONTINUED | OUTPATIENT
Start: 2020-06-02 | End: 2020-06-02

## 2020-06-02 RX ORDER — DOXAZOSIN 1 MG/1
2 TABLET ORAL EVERY EVENING
Status: DISCONTINUED | OUTPATIENT
Start: 2020-06-02 | End: 2020-06-02

## 2020-06-02 RX ORDER — DOXAZOSIN 1 MG/1
4 TABLET ORAL EVERY EVENING
Status: DISCONTINUED | OUTPATIENT
Start: 2020-06-02 | End: 2020-06-05 | Stop reason: HOSPADM

## 2020-06-02 RX ORDER — HYDRALAZINE HYDROCHLORIDE 20 MG/ML
10 INJECTION INTRAMUSCULAR; INTRAVENOUS
Status: DISCONTINUED | OUTPATIENT
Start: 2020-06-02 | End: 2020-06-05 | Stop reason: HOSPADM

## 2020-06-02 RX ADMIN — DUTASTERIDE 0.5 MG: 0.5 CAPSULE, LIQUID FILLED ORAL at 15:33

## 2020-06-02 RX ADMIN — BUMETANIDE 1 MG: 0.25 INJECTION INTRAMUSCULAR; INTRAVENOUS at 12:33

## 2020-06-02 RX ADMIN — GABAPENTIN 100 MG: 100 CAPSULE ORAL at 12:14

## 2020-06-02 RX ADMIN — HYDRALAZINE HYDROCHLORIDE 100 MG: 50 TABLET, FILM COATED ORAL at 15:28

## 2020-06-02 RX ADMIN — ASPIRIN 81 MG CHEWABLE TABLET 81 MG: 81 TABLET CHEWABLE at 12:14

## 2020-06-02 RX ADMIN — HEPARIN SODIUM 5000 UNITS: 5000 INJECTION INTRAVENOUS; SUBCUTANEOUS at 02:46

## 2020-06-02 RX ADMIN — FUROSEMIDE 20 MG: 10 INJECTION, SOLUTION INTRAMUSCULAR; INTRAVENOUS at 12:15

## 2020-06-02 RX ADMIN — CARVEDILOL 25 MG: 25 TABLET, FILM COATED ORAL at 12:12

## 2020-06-02 RX ADMIN — HEPARIN SODIUM 5000 UNITS: 5000 INJECTION INTRAVENOUS; SUBCUTANEOUS at 17:53

## 2020-06-02 RX ADMIN — CHOLECALCIFEROL TAB 25 MCG (1000 UNIT) 2 TABLET: 25 TAB at 12:14

## 2020-06-02 RX ADMIN — CLONIDINE HYDROCHLORIDE 0.1 MG: 0.1 TABLET ORAL at 21:40

## 2020-06-02 RX ADMIN — TICAGRELOR 90 MG: 90 TABLET ORAL at 06:58

## 2020-06-02 RX ADMIN — HYDRALAZINE HYDROCHLORIDE 100 MG: 50 TABLET, FILM COATED ORAL at 06:58

## 2020-06-02 RX ADMIN — HEPARIN SODIUM 5000 UNITS: 5000 INJECTION INTRAVENOUS; SUBCUTANEOUS at 12:22

## 2020-06-02 RX ADMIN — CARVEDILOL 25 MG: 25 TABLET, FILM COATED ORAL at 17:52

## 2020-06-02 RX ADMIN — PANTOPRAZOLE SODIUM 40 MG: 40 TABLET, DELAYED RELEASE ORAL at 12:12

## 2020-06-02 RX ADMIN — TICAGRELOR 90 MG: 90 TABLET ORAL at 17:52

## 2020-06-02 RX ADMIN — NITROGLYCERIN 1 INCH: 20 OINTMENT TOPICAL at 12:25

## 2020-06-02 RX ADMIN — AMLODIPINE BESYLATE 10 MG: 10 TABLET ORAL at 12:12

## 2020-06-02 RX ADMIN — ATORVASTATIN CALCIUM 20 MG: 20 TABLET, FILM COATED ORAL at 21:40

## 2020-06-02 RX ADMIN — BUMETANIDE 1 MG: 0.25 INJECTION INTRAMUSCULAR; INTRAVENOUS at 21:40

## 2020-06-02 RX ADMIN — GABAPENTIN 100 MG: 100 CAPSULE ORAL at 17:52

## 2020-06-02 RX ADMIN — INSULIN LISPRO 4 UNITS: 100 INJECTION, SOLUTION INTRAVENOUS; SUBCUTANEOUS at 21:40

## 2020-06-02 RX ADMIN — HYDRALAZINE HYDROCHLORIDE 100 MG: 50 TABLET, FILM COATED ORAL at 21:40

## 2020-06-02 RX ADMIN — NITROGLYCERIN 1 INCH: 20 OINTMENT TOPICAL at 17:55

## 2020-06-02 RX ADMIN — INSULIN LISPRO 6 UNITS: 100 INJECTION, SOLUTION INTRAVENOUS; SUBCUTANEOUS at 17:52

## 2020-06-02 RX ADMIN — DOXAZOSIN 4 MG: 1 TABLET ORAL at 19:04

## 2020-06-02 RX ADMIN — HYDRALAZINE HYDROCHLORIDE 20 MG: 20 INJECTION INTRAMUSCULAR; INTRAVENOUS at 02:46

## 2020-06-02 NOTE — PROGRESS NOTES
Reason for Admission:  CHF (congestive heart failure) ([facility] [address] [I50.9]                 RRAT Score:    21            Plan for utilizing home health:    Pt will need h2h home health for CHF                    Likelihood of Readmission:   Moderate                         Do you (patient/family) have any concerns for transition/discharge?  no    Transition of Care Plan:       Initial assessment completed with patient. Cognitive status of patient: oriented to time, place, person and situation. Face sheet information confirmed:  yes. The patient designates his wife Teresita Carmen [phone]  to participate in his discharge plan and to receive any needed information. This patient lives in a home with wife. Patient is able to navigate steps as needed. Prior to hospitalization, patient was considered to be independent with ADLs/IADLS : yes . Patient has a current ACP document on file: no  The wife will be available to transport patient home upon discharge. The patient already has no medical equipment available in the home. Patient is not currently active with home health. Patient has not stayed in a skilled nursing facility or rehab. Was  stay within last 60 days : no. This patient is on dialysis :no  Currently, the discharge plan is Home vs home health (h2h for CHF)    The patient states that he can obtain his medications from the pharmacy, and take his medications as directed. Patient's current insurance is [employer] Medicare, [facility] Management Interventions  PCP Verified by CM: Yes(per pt, he saw his pcp in March)  Palliative Care Criteria Met (RRAT>21 & CHF Dx)?: No  Mode of Transport at Discharge:  Other (see comment)(family)  Transition of Care Consult (CM Consult): Discharge Planning  Physical Therapy Consult: No  Occupational Therapy Consult: No  Speech Therapy Consult: No  Current Support Network: Lives with Spouse  Confirm Follow Up Transport: Self  Discharge Location  Discharge Placement: Home        Burnice LARRY Valero RN  Care Management  Pager: [phone]

## 2020-06-02 NOTE — CONSULTS
Consult Note    Consult requested by: Torie Sarkar    ADMIT DATE: 6/1/2020    CONSULT DATE: June 2, 2020           Admission diagnosis: respiratory distress, edema   Reason for Nephrology Consultation: CKD4 , CHF        Assessment and plan:    #1  Chronic kidney disease stage IV(baseline in the 2 range)  with fluid overload and cardiorenal syndrome in the setting of acute on chronic diastolic CHF. Volume overloaded needs diuresis with Bumex  #2 acute on chronic congestive heart failure secondary to diastolic CHF  #3 recent history of NSTEMI with cardiac cath and mid LAD stent  #4 history of ischemic cardiomyopathy with preserved EF  #5 dyslipidemia  #6 tobacco abuse  #7 diabetes mellitus with complications    Plan:    #1 strict ins and outs Daily weight  #2 fluid restrict 1200 mL  #3 Bumex 1 mg twice daily IV  #4 low-sodium diet  #5 follow cardiology recommendations  #6 noted cardiology dose increased per cardiology, may consider clonidine continues to be greater than 350 systolic  #7 renal duplex scan to rule out renal artery stenosis    Plan discussed with both cardiology as well as     Please call with questions    Garett Byrnes MD [facility]  Cell [phone]  Pager: [phone]    HPI: Dariel Kumar. is a 69 y.o. male [address] with history of diabetes with complications, uncontrolled hypertension for the last 10+ years, history of diastolic dysfunction with diastolic CHF preserved EF, obesity, ischemic cardiomyopathy, tobacco abuse, chronic kidney disease stage IV with a baseline in the 2 range who presented with worsening shortness of breath edema, orthopnea. Patient was recently discharged from the hospital where he was admitted for chest pain and elevated troponins he had NSTEMI, plan for cardiac cath but had poor renal function. Nuclear stress test was ordered which was positive for ischemia. Cardiac cath was completed with stent to mid LAD.   Patient's blood pressure was better controlled with cardiograph added. His creatinine was at his baseline in the 2 range. Patient was not on any diuretic on discharge. After discharge from the hospital patient says he was drinking a lot of fluids, increase salt intake. His blood pressure was elevated. He started putting on fluid edema and developed shortness of breath and orthopnea. Therefore he presented to the emergency room. On presentation patient was hypotensive, hypoxic needing 2 L of nasal cannula. Patient had a normal white count, hemoglobin of 9.4, sodium of 140 potassium of 4.0 CO2 of 23 BUN of 34 and creatinine of 2.2. Chest x-ray suggestive of congestion and fluid overload. BNP was elevated at 1098. Patient was diuresed with 20 mg IV twice daily of the of Lasix  Nephrology was consulted for volume management as well as his CKD. Past Medical History:   Diagnosis Date    ACS (acute coronary syndrome) ([facility] [address]     CAD (coronary artery disease), native coronary artery August 2010    s/p non-ST-elevation myocardial infarction, s/p PCI with BMS (Vision 4x18mm) distal RCA with 45% distal LAD  and mild LCx disease. mild-moderate LV systolic dysfunction (04/18).  Diabetes mellitus type II     Dyslipidemia     ED (erectile dysfunction)     GERD (gastroesophageal reflux disease)     History of BPH     History of echocardiogram 5/18/2011    EF 65%. Mod-marked increased wall thickness. Gr 1 DDfx. No significant valvular heart disease.  Hyperlipidemia     Hypertension     Ischemic cardiomyopathy     recovery of LV syst fct  Echo May 2011 LV EF 65%    MI (myocardial infarction) ([facility] [address]     Obesity     RBBB (right bundle branch block with left anterior fascicular block)     S/P cardiac cath 8/26/2010    LM patent. dLAD 45%. CX mild. dRCA 100% thrombotic (S/P cath thrombectomy & Vision 4 x 18-mm BMS). EF 40%. Posterobasal, diaphrag, apical hypk.       Shingles 4/2012    Tobacco use disorder, continuous       Past Surgical History:   Procedure Laterality Date    COLONOSCOPY N/A 4/23/2019    COLONOS[facility] performed by [facility]ynum MD at [facility] ENDOSCOPY    HX CATARACT REMOVAL      HX CORONARY ARTERY BYPASS GRAFT      HX CORONARY STENT PLACEMENT  2010    HX HEART CATHETERIZATION  08/26/10    1. Normal systemic pressure. 2. Moderate elevation of left sided filling pressures. 3. Mild to moderate left ventricular systolic dysfunction distinct regional wall motion abnormalities. 4. Coronary disease with thrombotic occlusion of the distal right coronary artery and moderate left anterior descending disease. 5. Successful percutaneous coronary intervention with catheter thrombectomy.     HX HEMORRHOIDECTOMY      HX MOHS PROCEDURES Left 2002    HX TRABECULECTOMY         Social History     Socioeconomic History    Marital status:      Spouse name: Not on file    Number of children: Not on file    Years of education: Not on file    Highest education level: Not on file   Occupational History    Not on file   Social Needs    Financial resource strain: Not on file    Food insecurity     Worry: Not on file     Inability: Not on file    Transportation needs     Medical: Not on file     Non-medical: Not on file   Tobacco Use    Smoking status: Current Every Day Smoker     Packs/day: 0.50     Years: 44.00     Pack years: 22.00     Types: Cigarettes    Smokeless tobacco: Never Used    Tobacco comment: 2-3 cigs per day   Substance and Sexual Activity    Alcohol use: No    Drug use: No    Sexual activity: Not on file   Lifestyle    Physical activity     Days per week: Not on file     Minutes per session: Not on file    Stress: Not on file   Relationships    Social connections     Talks on phone: Not on file     Gets together: Not on file     Attends Rastafarian service: Not on file     Active member of club or organization: Not on file     Attends meetings of clubs or organizations: Not on file Relationship status: Not on file    Intimate partner violence     Fear of current or ex partner: Not on file     Emotionally abused: Not on file     Physically abused: Not on file     Forced sexual activity: Not on file   Other Topics Concern    Not on file   Social History Narrative    Not on file       Family History   Problem Relation Age of Onset    Diabetes Mother      No Known Allergies     Home Medications:     Medications Prior to Admission   Medication Sig    doxazosin (CARDURA) 2 mg tablet Take 2 mg by mouth every evening.  ticagrelor (BRILINTA) 90 mg tablet Take 1 Tab by mouth every twelve (12) hours every twelve (12) hours.  carvediloL (COREG) 25 mg tablet Take 1 Tab by mouth two (2) times daily (with meals).  amLODIPine (NORVASC) 10 mg tablet Take 1 Tab by mouth daily.  gabapentin (NEURONTIN) 100 mg capsule Take 100 mg by mouth two (2) times a day.  nitroglycerin (NITROSTAT) 0.4 mg SL tablet 1 Tab by SubLINGual route every five (5) minutes as needed for Chest Pain for up to 3 doses. Indications: Angina    cholecalciferol, vitamin D3, (VITAMIN D3) 2,000 unit tab Take  by mouth.  dutasteride (AVODART) 0.5 mg capsule Take 0.5 mg by mouth daily.  hydrALAZINE (APRESOLINE) 100 mg tablet Take 100 mg by mouth three (3) times daily.  simvastatin (ZOCOR) 40 mg tablet Take 1 Tab by mouth nightly.  pantoprazole (PROTONIX) 40 mg tablet Take 40 mg by mouth daily.  insulin glargine (LANTUS SOLOSTAR) 100 unit/mL (3 mL) pen 60 Units by SubCUTAneous route daily.  aspirin 81 mg tablet Take 81 mg by mouth daily.        Current Inpatient Medications:     Current Facility-Administered Medications   Medication Dose Route Frequency    hydrALAZINE (APRESOLINE) 20 mg/mL injection 20 mg  20 mg IntraVENous Q6H PRN    doxazosin (CARDURA) tablet 4 mg  4 mg Oral QPM    bumetanide (BUMEX) injection 1 mg  1 mg IntraVENous BID    amLODIPine (NORVASC) tablet 10 mg  10 mg Oral DAILY    aspirin chewable tablet 81 mg  81 mg Oral DAILY    carvediloL (COREG) tablet 25 mg  25 mg Oral BID WITH MEALS    cholecalciferol (VITAMIN D3) (1000 Units /25 mcg) tablet 2 Tab  2,000 Units Oral DAILY    dutasteride (AVODART) capsule 0.5 mg  0.5 mg Oral DAILY    gabapentin (NEURONTIN) capsule 100 mg  100 mg Oral BID    pantoprazole (PROTONIX) tablet 40 mg  40 mg Oral DAILY    atorvastatin (LIPITOR) tablet 20 mg  20 mg Oral QHS    ticagrelor (BRILINTA) tablet 90 mg  90 mg Oral Q12H    acetaminophen (TYLENOL) tablet 500 mg  500 mg Oral Q6H PRN    ondansetron (ZOFRAN) injection 4 mg  4 mg IntraVENous Q8H PRN    heparin (porcine) injection 5,000 Units  5,000 Units SubCUTAneous Q8H    insulin lispro (HUMALOG) injection   SubCUTAneous AC&HS    glucose chewable tablet 16 g  16 g Oral PRN    glucagon (GLUCAGEN) injection 1 mg  1 mg IntraMUSCular PRN    dextrose (D50W) injection syrg 12.5-25 g  25-50 mL IntraVENous PRN    insulin glargine (LANTUS) injection 20 Units  20 Units SubCUTAneous DAILY    hydrALAZINE (APRESOLINE) tablet 100 mg  100 mg Oral Q8H    nitroglycerin (NITROBID) 2 % ointment 1 Inch  1 Inch Topical BID       Review of Systems:   No fever or chills. No sore throat. No cough or hemoptysis. Good appetite. No nausea, vomiting, abdominal pain, melena or hematochezia. No constipation or diarrhea. No dysuria, no gross hematuria of voiding difficulties. No ankle swelling, no joint paints. No muscle aches. No skin changes. No dizziness or lightheadedness. No headaches.        Physical Assessment:     Vitals:    06/02/20 0400 06/02/20 0415 06/02/20 0740 06/02/20 1138   BP:  173/76 187/86 182/88   Pulse: 60 60 61 66   Resp:  20 19 19   Temp:  97.9 °F (36.6 °C) 97.5 °F (36.4 °C) 97.5 °F (36.4 °C)   SpO2:  98% 98% 96%   Weight:         Last 3 Recorded Weights in this Encounter    06/01/20 1239   Weight: 108.2 kg (238 lb 9.6 oz)     Admission weight: Weight: 108.2 kg (238 lb 9.6 oz) (06/01/20 1239)      Intake/Output Summary (Last 24 hours) at 6/2/2020 1555  Last data filed at 6/2/2020 1146  Gross per 24 hour   Intake    Output 1000 ml   Net -1000 ml     Mild distress  HEENT: mmm  Lungs: heidi rales   Cardiovascular system: S1, S2, regular rate and rhythm. JVD+  Abdomen: soft, non tender, non distended. BS+  Extremities: 2+LE edema   Neurologic: Alert, oriented time three. Data Review:    Labs: Results:       Chemistry Recent Labs     06/02/20  0454 06/01/20  1234   GLU 86  93 224*     142 142   K 4.0  4.0 4.4   *  113* 114*   CO2 23  24 23   BUN 34*  34* 33*   CREA 2.23*  2.29* 2.40*   CA 8.8  8.8 8.3*   AGAP 7  5 5   BUCR 15  15 14   AP  --  111   TP  --  7.7   ALB 3.0* 2.9*   GLOB  --  4.8*   AGRAT  --  0.6*   PHOS 4.3  --          CBC w/Diff Recent Labs     06/01/20  1234   WBC 8.5   RBC 3.71*   HGB 9.4*   HCT 27.9*      GRANS 73   LYMPH 9*   EOS 8*         Iron/Ferritin No results for input(s): IRON in the last 72 hours. No lab exists for component: TIBCCALC   PTH/VIT D No results for input(s): PTH in the last 72 hours.     No lab exists for component: VITD           Angelo Goodpasture, MD  6/2/2020  3:55 PM      June 2, 2020

## 2020-06-02 NOTE — PROGRESS NOTES
Interdisciplinary Round Note   Patient Information: Patient Information: Ruthy Herrera.                                      [id number]   Reason for Admission: CHF (congestive heart failure) ([facility] [I50.9]   Attending Provider:   Damián Ely   Past Medical History:   Past Medical History:   Diagnosis Date    ACS (acute coronary syndrome) ([facility]     CAD (coronary artery disease), native coronary artery August 2010    s/p non-ST-elevation myocardial infarction, s/p PCI with BMS (Vision 4x18mm) distal RCA with 45% distal LAD  and mild LCx disease. mild-moderate LV systolic dysfunction (17/71).  Diabetes mellitus type II     Dyslipidemia     ED (erectile dysfunction)     GERD (gastroesophageal reflux disease)     History of BPH     History of echocardiogram 5/18/2011    EF 65%. Mod-marked increased wall thickness. Gr 1 DDfx. No significant valvular heart disease.  Hyperlipidemia     Hypertension     Ischemic cardiomyopathy     recovery of LV syst fct  Echo May 2011 LV EF 65%    MI (myocardial infarction) ([facility]     Obesity     RBBB (right bundle branch block with left anterior fascicular block)     S/P cardiac cath 8/26/2010    LM patent. dLAD 45%. CX mild. dRCA 100% thrombotic (S/P cath thrombectomy & Vision 4 x 18-mm BMS). EF 40%. Posterobasal, diaphrag, apical hypk.  Shingles 4/2012    Tobacco use disorder, continuous       Hospital day: 1  Estimated discharge date: 6/6/20  RRAT Score: High Risk            21       Total Score        4 IP Visits Last 12 Months (1-3=4, 4=9, >4=11)    5 Pt. Coverage (Medicare=5 , Medicaid, or Self-Pay=4)    12 Charlson Comorbidity Score (Age + Comorbid Conditions)        Criteria that do not apply:    Has Seen PCP in Last 6 Months (Yes=3, No=0)    . Living with Significant Other. Assisted Living. LTAC. SNF.  or   Rehab    Patient Length of Stay (>5 days = 3)       Goals for Today: Echo,            VITAL SIGNS  Vitals:    06/02/20 0400 06/02/20 0415 06/02/20 0740 06/02/20 1138   BP:  173/76 187/86 182/88   Pulse: 60 60 61 66   Resp:  20 19 19   Temp:  97.9 °F (36.6 °C) 97.5 °F (36.4 °C) 97.5 °F (36.4 °C)   SpO2:  98% 98% 96%   Weight:              Lines, Drains, & Airways    Peripheral IV 06/01/20 Right Antecubital-Site Assessment: Clean, dry, & intact  Peripheral IV 06/01/20 Left Hand-Site Assessment: Clean, dry, & intact       VTE Prophylaxis                                   Intake and Output:   05/31 1901 - 06/02 0700  In: -   Out: 700 [Urine:700]  06/02 0701 - 06/02 1900  In: -   Out: 300 [Urine:300] Current Diet: DIET CARDIAC Regular; Consistent Carb 1800kcal       Abdominal   Last Bowel Movement Date: 06/01/20     Recent Glucose Results:   Lab Results   Component Value Date/Time    GLU 93 06/02/2020 04:54 AM    GLU 86 06/02/2020 04:54 AM    GLUCPOC 83 06/02/2020 11:41 AM    GLUCPOC 76 06/02/2020 07:06 AM    GLUCPOC 279 (H) 06/01/2020 11:34 PM          IV Antibiotics? no         Activity Level: Activity Level: Up with Assistance  Needs assistance with ADLs: yes  PT Consult Status: NO Current Immunizations: There is no immunization history on file for this patient.    Recommendations:   Discharge Disposition: Home with [address] status: no     Will the patient require COVID testing prior to discharge for placement?: NO    Medication Reconciliation Completed: YES    Cardiac/Pulmonary Rehab Ordered:  NO    Needs for Discharge:  Recommendations from IDR team:        LARRY Fregoso RN  Care Management  Pager: [phone]

## 2020-06-02 NOTE — CONSULTS
[facility] - Consult Note    Date of  Admission: 6/1/2020 12:24 PM     Primary Care Physician:  Gladis Lindo MD     Plan:       1. Acute on Chronic Congestive Hear failure- increasing in SOB. NT pro BNP>1000. in the setting uncontrolled hypertension, non compliance with fluid/ salt restriction and CKD- discussed with Renal  will give Bumex 1 mg now. 2. Hx of recent NSTEMI- stable no chest pain no chest pressure. Negative troponin x3. S/p cardiac cath 4/23/20 Double vessel coronary artery disease. S/p ptca/stent to mid LAD. Known BMS to distal RCA with moderate lesion- continue asa, Brilinta. 3. Uncontrolled Hypertension- with intermittent  SBP > 200's- increased Cardura to 4 mg at night. Continue home medications. 4. Hx of Ischemic cardiomyopathy- echo 2017 revealed EF% 65 %. Recent echo 4/20 shows EF%  60-65%. 5. Dyslipidemia- continue statin   6. Tobacco use disorder-per patient he quit recently   7. 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 Diabetes- poorly controlled with A1c 7.9   9. CKD- baseline 1.8 -2.4 since 2018 - creatinine levels stable. Will monitor with diuresis. Renal is following      Patient admitted with acute shortness of breath secondary to acute on chronic diastolic CHF and uncontrolled hypertension. Possibly also has a component of COPD though it is not diagnosed yet. He is a longtime smoker who recently quit in March 2020 and still gets passive smoke from his wife. Increase Cardura and add low-dose clonidine for better blood pressure control. Patient is also noncompliant with diet fluids at home. Not sure whether he takes all his medications regularly. Will check a limited echo because of recent PCI for any new wall motion abnormalities. We will follow with you and thank you for the referral.    04/20/20   ECHO ADULT COMPLETE 04/21/2020 4/21/2020    Addendum · Normal cavity size and systolic function (ejection fraction normal). Moderately increased wall thickness. Estimated left ventricular ejection  fraction is 60 - 65%. Visually measured ejection fraction. No regional  wall motion abnormality noted. Normal left ventricular strain. Moderate  (grade 2) left ventricular diastolic dysfunction. · Mitral valve non-specific thickening. Trace mitral valve regurgitation  is present. Matilde Broussard MD 4/21/2020 11:22 AM          Narrative · Normal cavity size and systolic function (ejection fraction normal). Moderately increased wall thickness. Estimated left ventricular ejection   fraction is 60 - 65%. Visually measured ejection fraction. No regional   wall motion abnormality noted. Normal left ventricular strain. Moderate   (grade 2) left ventricular diastolic dysfunction. · Mitral valve non-specific thickening and thickening. Trace mitral valve   regurgitation is present. Signed by: Matilde Broussard MD       04/20/20   NUCLEAR CARDIAC STRESS TEST 04/22/2020 4/22/2020    Narrative · Baseline ECG: Sinus bradycardia, right bundle branch blockLeft anterior   bifascicular block Left ventricular hypertrophy with wide QRS widening   Cannot rule out septal infarct, age undetermined T wave abnormality,   consider inferolateral ischemia . With 1st degree AV block  · Gated SPECT: Left ventricular function post-stress was abnormal.   Calculated ejection fraction is 42%. There is no evidence of transient   ischemic dilation (TID). The TID ratio is 1.07.  · Inconclusive stress test.  · Left ventricular perfusion is abnormal.  · Myocardial perfusion imaging defect 1: There is a defect that is   moderate to large in size with a moderate reduction in uptake present in   the mid-apical anterior and anteroseptal location(s) that is predominantly   reversible. There is abnormal wall motion in the defect area. Viability in   the area is good. The defect appears to be infarction with maria e-infarct   ischemia.  Perfusion defect was visually present without quantitative   evidence. · Myocardial perfusion imaging defect 2: There is a defect that is small   in size with a mild reduction in uptake affecting the apical to mid   inferior location(s) that is predominantly reversible. There is normal   wall motion in the defect area. Viability in the area is good. The defect   appears to be ischemia. Perfusion defect was visually present without   quantitative evidence. · Myocardial perfusion imaging defect 1: caused by soft tissue. · Positive myocardial perfusion imaging. Myocardial perfusion imaging   supports a high risk stress test.        Signed by: Bienvenido Weston MD     04/20/20   CARDIAC PROCEDURE 04/23/2020 4/23/2020    Narrative Double vessel coronary artery disease. S/p ptca/stent to mid LAD. Known BMS to distal RCA with moderate lesion. Recommend intense risk factor modification. Signed by: Vera Calzada MD       XR Results (most recent):  Results from Hospital Encounter encounter on 06/01/20   XR CHEST PORT    Narrative HISTORY: Shortness of breath. EXAM: Chest.    TECHNIQUE: Single view AP portable semiupright chest.     COMPARISON: 4/20/2020. FINDINGS: Essentially unchanged aeration of bilateral hemithoraces is  demonstrated with minimal bilateral diffuse interstitial prominence without  edema thorax pneumonia or pleural effusions. Heart and mediastinal structures  are unchanged. Heart is enlarged. . Visualized bony thorax and soft tissues are  within normal limits. Impression  IMPRESSION:    1. Unchanged sequela of minimal congestion. Follow-up with plain imaging. Assessment:     Hospital Problems  Date Reviewed: 5/18/2020          Codes Class Noted POA    CHF [facility]congestive heart failure) [facility] ICD-10-CM: I50.9  ICD-9-CM: 428.0  6/1/2020 Unknown                   History of Present Illness: This is a 69 y.o. male admitted for CHF (congestive heart failure) ([facility] [I50.9].   Patient with PMHx of CAD, CHD, tobacco abuse, obesity and diabetes. The patient presented Sunday night with c/o increasing SOB and BLE. He reports late last week he noticed lower extremity edema with associated dyspnea on exertion. The dyspnea on exertion worsened so that he could not walk from his bedroom to his den without feeling shortness of breath. He progressed to having shortness of breath at rest, although he denies orthopnea, but admits to PND with poor sleep. In the ED, EKG with sinus rhythm with PACs, bifascicular block, LVH. Chest x-ray showed minimal congestion. In the ED, he received he received Lasix 40 mg IV x1, and Nitropaste. Patient reports a good urine output but this has not been quantified accurately. Patient's Creatinine is noted 2.4, with a baseline around 2. Troponin x2 negative. He states his wife smokes in the house, although he himself quit in March. Past Medical History:     Past Medical History:   Diagnosis Date    ACS (acute coronary syndrome) ([facility]     CAD (coronary artery disease), native coronary artery August 2010    s/p non-ST-elevation myocardial infarction, s/p PCI with BMS (Vision 4x18mm) distal RCA with 45% distal LAD  and mild LCx disease. mild-moderate LV systolic dysfunction (32/35).  Diabetes mellitus type II     Dyslipidemia     ED (erectile dysfunction)     GERD (gastroesophageal reflux disease)     History of BPH     History of echocardiogram 5/18/2011    EF 65%. Mod-marked increased wall thickness. Gr 1 DDfx. No significant valvular heart disease.  Hyperlipidemia     Hypertension     Ischemic cardiomyopathy     recovery of LV syst fct  Echo May 2011 LV EF 65%    MI (myocardial infarction) ([facility]     Obesity     RBBB (right bundle branch block with left anterior fascicular block)     S/P cardiac cath 8/26/2010    LM patent. dLAD 45%. CX mild. dRCA 100% thrombotic (S/P cath thrombectomy & Vision 4 x 18-mm BMS). EF 40%. Posterobasal, diaphrag, apical hypk.       Shingles 4/2012    Tobacco use disorder, continuous          Social History:     Social History     Socioeconomic History    Marital status:      Spouse name: Not on file    Number of children: Not on file    Years of education: Not on file    Highest education level: Not on file   Tobacco Use    Smoking status: Current Every Day Smoker     Packs/day: 0.50     Years: 44.00     Pack years: 22.00     Types: Cigarettes    Smokeless tobacco: Never Used    Tobacco comment: 2-3 cigs per day   Substance and Sexual Activity    Alcohol use: No    Drug use: No        Family History:     Family History   Problem Relation Age of Onset    Diabetes Mother         Medications:   No Known Allergies     Current Facility-Administered Medications   Medication Dose Route Frequency    hydrALAZINE (APRESOLINE) 20 mg/mL injection 20 mg  20 mg IntraVENous Q6H PRN    amLODIPine (NORVASC) tablet 10 mg  10 mg Oral DAILY    aspirin chewable tablet 81 mg  81 mg Oral DAILY    carvediloL (COREG) tablet 25 mg  25 mg Oral BID WITH MEALS    cholecalciferol (VITAMIN D3) (1000 Units /25 mcg) tablet 2 Tab  2,000 Units Oral DAILY    doxazosin (CARDURA) tablet 2 mg  2 mg Oral QPM    dutasteride (AVODART) capsule 0.5 mg  0.5 mg Oral DAILY    gabapentin (NEURONTIN) capsule 100 mg  100 mg Oral BID    pantoprazole (PROTONIX) tablet 40 mg  40 mg Oral DAILY    atorvastatin (LIPITOR) tablet 20 mg  20 mg Oral QHS    ticagrelor (BRILINTA) tablet 90 mg  90 mg Oral Q12H    acetaminophen (TYLENOL) tablet 500 mg  500 mg Oral Q6H PRN    ondansetron (ZOFRAN) injection 4 mg  4 mg IntraVENous Q8H PRN    heparin (porcine) injection 5,000 Units  5,000 Units SubCUTAneous Q8H    insulin lispro (HUMALOG) injection   SubCUTAneous AC&HS    glucose chewable tablet 16 g  16 g Oral PRN    glucagon (GLUCAGEN) injection 1 mg  1 mg IntraMUSCular PRN    dextrose (D50W) injection syrg 12.5-25 g  25-50 mL IntraVENous PRN    insulin glargine (LANTUS) injection 20 Units  20 Units SubCUTAneous DAILY    hydrALAZINE (APRESOLINE) tablet 100 mg  100 mg Oral Q8H    nitroglycerin (NITROBID) 2 % ointment 1 Inch  1 Inch Topical BID    furosemide (LASIX) injection 20 mg  20 mg IntraVENous BID        Review Of Systems:           Constitutional: No fever, no chills, no weight loss, no night sweats   HEENT: No epistaxis, no nasal drainage, no difficulty in swallowing, no redness in eyes  Respiratory: dyspnea on exertion, cough, sputum,  Cardiovascular: dyspnea, no pnd, orthopnea, trace leg edema,   Gastrointestinal: no abd pain, no vomiting, no diarrhea, no bleeding symptoms  Genitourinary: No urinary symptoms or hematuria  Integument/breast: No ulcers or rashes  Musculoskeletal: no muscle pain, no weakness  Neurological: No focal weakness, no seizures, no headaches  Behvioral/Psych: No anxiety, no depression       Physical Exam:     Visit Vitals  /86 (BP 1 Location: Left arm, BP Patient Position: Supine)   Pulse 61   Temp 97.5 °F (36.4 °C)   Resp 19   Wt 108.2 kg (238 lb 9.6 oz)   SpO2 98%   BMI 38.51 kg/m²     BP Readings from Last 3 Encounters:   06/02/20 187/86   05/18/20 173/69   04/24/20 159/69     Pulse Readings from Last 3 Encounters:   06/02/20 61   05/18/20 60   04/24/20 60     Wt Readings from Last 3 Encounters:   06/01/20 108.2 kg (238 lb 9.6 oz)   05/18/20 92.5 kg (204 lb)   04/24/20 101.9 kg (224 lb 9.6 oz)       General:  alert, cooperative, no distress, appears stated age, moderately obese  Skin: Warm and dry, acyanotic, normal color. Head: Normocephalic, atraumatic. Eyes: Sclerae anicteric, conjunctivae without injection.   Neck:  nontender, no nuchal rigidity, no masses, no stridor, no carotid bruit, no JVD  Lungs: Mild diffuse expiratory wheezing, heard crackles at the bases of  both the left and right lungs  Heart:  regular rate and rhythm, S1, S2 normal, no S3 or S4, no click, no rub  Abdomen:  abdomen is soft without significant tenderness, masses, organomegaly or guarding  Extremities:  extremities normal, atraumatic, no cyanosis. +1 edema BLE  Peripheral pulses present   Neurological: grossly intact. No focal abnormalities, moves all extremities well. Psychiatric Affect: The patient is awake, alert and oriented x3. Priyanka Miller is interactive and appropriate. Data Review:     Recent Results (from the past 48 hour(s))   METABOLIC PANEL, COMPREHENSIVE    Collection Time: 06/01/20 12:34 PM   Result Value Ref Range    Sodium 142 136 - 145 mmol/L    Potassium 4.4 3.5 - 5.5 mmol/L    Chloride 114 (H) 100 - 111 mmol/L    CO2 23 21 - 32 mmol/L    Anion gap 5 3.0 - 18 mmol/L    Glucose 224 (H) 74 - 99 mg/dL    BUN 33 (H) 7.0 - 18 MG/DL    Creatinine 2.40 (H) 0.6 - 1.3 MG/DL    BUN/Creatinine ratio 14 12 - 20      GFR est AA 32 (L) >60 ml/min/1.73m2    GFR est non-AA 27 (L) >60 ml/min/1.73m2    Calcium 8.3 (L) 8.5 - 10.1 MG/DL    Bilirubin, total 0.2 0.2 - 1.0 MG/DL    ALT (SGPT) 27 16 - 61 U/L    AST (SGOT) 13 10 - 38 U/L    Alk. phosphatase 111 45 - 117 U/L    Protein, total 7.7 6.4 - 8.2 g/dL    Albumin 2.9 (L) 3.4 - 5.0 g/dL    Globulin 4.8 (H) 2.0 - 4.0 g/dL    A-G Ratio 0.6 (L) 0.8 - 1.7     CBC WITH AUTOMATED DIFF    Collection Time: 06/01/20 12:34 PM   Result Value Ref Range    WBC 8.5 4.6 - 13.2 K/uL    RBC 3.71 (L) 4.70 - 5.50 M/uL    HGB 9.4 (L) 13.0 - 16.0 g/dL    HCT 27.9 (L) 36.0 - 48.0 %    MCV 75.2 74.0 - 97.0 FL    MCH 25.3 24.0 - 34.0 PG    MCHC 33.7 31.0 - 37.0 g/dL    RDW 15.0 (H) 11.6 - 14.5 %    PLATELET 405 978 - 717 K/uL    MPV 10.2 9.2 - 11.8 FL    NEUTROPHILS 73 40 - 73 %    LYMPHOCYTES 9 (L) 21 - 52 %    MONOCYTES 10 3 - 10 %    EOSINOPHILS 8 (H) 0 - 5 %    BASOPHILS 0 0 - 2 %    ABS. NEUTROPHILS 6.2 1.8 - 8.0 K/UL    ABS. LYMPHOCYTES 0.8 (L) 0.9 - 3.6 K/UL    ABS. MONOCYTES 0.8 0.05 - 1.2 K/UL    ABS. EOSINOPHILS 0.7 (H) 0.0 - 0.4 K/UL    ABS.  BASOPHILS 0.0 0.0 - 0.1 K/UL    DF AUTOMATED     CARDIAC PANEL,(CK, CKMB & TROPONIN) Collection Time: 06/01/20 12:34 PM   Result Value Ref Range    CK - MB 3.0 <3.6 ng/ml    CK-MB Index 1.6 0.0 - 4.0 %     39 - 308 U/L    Troponin-I, QT 0.04 0.0 - 0.045 NG/ML   NT-PRO BNP    Collection Time: 06/01/20 12:34 PM   Result Value Ref Range    NT pro-BNP 1,098 (H) 0 - 900 PG/ML   EKG, 12 LEAD, INITIAL    Collection Time: 06/01/20 12:37 PM   Result Value Ref Range    Ventricular Rate 69 BPM    Atrial Rate 69 BPM    P-R Interval 298 ms    QRS Duration 160 ms    Q-T Interval 452 ms    QTC Calculation (Bezet) 484 ms    Calculated P Axis -11 degrees    Calculated R Axis -80 degrees    Calculated T Axis 99 degrees    Diagnosis       Sinus rhythm with 1st degree AV block with premature atrial complexes with   aberrant conduction  Right bundle branch block  Left anterior fascicular block  Bifascicular block  Left ventricular hypertrophy with repolarization abnormality  Cannot rule out Septal infarct (cited on or before 01-JUN-2020)  Abnormal ECG  When compared with ECG of 24-APR-2020 07:56,  premature ventricular complexes are no longer present  aberrant conduction is now present  Confirmed by Jacobo Michael MD, ----- ([id number] on 6/1/2020 4:48:49 PM     CARDIAC PANEL,(CK, CKMB & TROPONIN)    Collection Time: 06/01/20  3:49 PM   Result Value Ref Range    CK - MB 2.8 <3.6 ng/ml    CK-MB Index 1.5 0.0 - 4.0 %     39 - 308 U/L    Troponin-I, QT 0.04 0.0 - 0.045 NG/ML   TROPONIN I    Collection Time: 06/01/20  9:00 PM   Result Value Ref Range    Troponin-I, QT 0.03 0.0 - 0.045 NG/ML   GLUCOSE, POC    Collection Time: 06/01/20 11:34 PM   Result Value Ref Range    Glucose (POC) 279 (H) 70 - 110 mg/dL   HEMOGLOBIN A1C WITH EAG    Collection Time: 06/02/20  4:54 AM   Result Value Ref Range    Hemoglobin A1c 7.9 (H) 4.2 - 5.6 %    Est. average glucose 180 mg/dL   TROPONIN I    Collection Time: 06/02/20  4:54 AM   Result Value Ref Range    Troponin-I, QT 0.03 0.0 - 0.045 NG/ML   GLUCOSE, POC    Collection Time: 06/02/20  7:06 AM   Result Value Ref Range    Glucose (POC) 76 70 - 110 mg/dL   EKG, 12 LEAD, SUBSEQUENT    Collection Time: 06/02/20  7:16 AM   Result Value Ref Range    Ventricular Rate 66 BPM    Atrial Rate 66 BPM    P-R Interval 286 ms    QRS Duration 166 ms    Q-T Interval 472 ms    QTC Calculation (Bezet) 494 ms    Calculated P Axis 4 degrees    Calculated R Axis -77 degrees    Calculated T Axis 93 degrees    Diagnosis       Sinus rhythm with 1st degree AV block with occasional premature ventricular   complexes  Left axis deviation  Right bundle branch block  Left ventricular hypertrophy with repolarization abnormality  Abnormal ECG  When compared with ECG of 01-JUN-2020 12:37,  premature ventricular complexes are now present  aberrant conduction is no longer present  Confirmed by Briana Deluna [id number]) on 6/2/2020 7:34:41 AM           Intake/Output Summary (Last 24 hours) at 6/2/2020 1109  Last data filed at 6/2/2020 0005  Gross per 24 hour   Intake    Output 700 ml   Net -700 ml       Cardiographics:       EKG Results     Procedure 720 Value Units Date/Time    EKG, 12 LEAD, SUBSEQUENT [[id number]] Collected:  06/02/20 0716    Order Status:  Completed Updated:  06/02/20 0734     Ventricular Rate 66 BPM      Atrial Rate 66 BPM      P-R Interval 286 ms      QRS Duration 166 ms      Q-T Interval 472 ms      QTC Calculation (Bezet) 494 ms      Calculated P Axis 4 degrees      Calculated R Axis -77 degrees      Calculated T Axis 93 degrees      Diagnosis --     Sinus rhythm with 1st degree AV block with occasional premature ventricular   complexes  Left axis deviation  Right bundle branch block  Left ventricular hypertrophy with repolarization abnormality  Abnormal ECG  When compared with ECG of 01-JUN-2020 12:37,  premature ventricular complexes are now present  aberrant conduction is no longer present  Confirmed by Briana Deluna [id number]) on 6/2/2020 7:34:41 AM      EKG, 12 LEAD, INITIAL [[id number]] Collected: 06/01/20 1237    Order Status:  Completed Updated:  06/01/20 1649     Ventricular Rate 69 BPM      Atrial Rate 69 BPM      P-R Interval 298 ms      QRS Duration 160 ms      Q-T Interval 452 ms      QTC Calculation (Bezet) 484 ms      Calculated P Axis -11 degrees      Calculated R Axis -80 degrees      Calculated T Axis 99 degrees      Diagnosis --     Sinus rhythm with 1st degree AV block with premature atrial complexes with   aberrant conduction  Right bundle branch block  Left anterior fascicular block  Bifascicular block  Left ventricular hypertrophy with repolarization abnormality  Cannot rule out Septal infarct (cited on or before 01-JUN-2020)  Abnormal ECG  When compared with ECG of 24-APR-2020 07:56,  premature ventricular complexes are no longer present  aberrant conduction is now present  Confirmed by Deion Nesbitt MD, ----- ([id number]) on 6/1/2020 4:48:49 PM          04/20/20   ECHO ADULT COMPLETE 04/21/2020 4/21/2020    Addendum · Normal cavity size and systolic function (ejection fraction normal). Moderately increased wall thickness. Estimated left ventricular ejection  fraction is 60 - 65%. Visually measured ejection fraction. No regional  wall motion abnormality noted. Normal left ventricular strain. Moderate  (grade 2) left ventricular diastolic dysfunction. · Mitral valve non-specific thickening. Trace mitral valve regurgitation  is present. Naveen Pierre MD 4/21/2020 11:22 AM          Narrative · Normal cavity size and systolic function (ejection fraction normal). Moderately increased wall thickness. Estimated left ventricular ejection   fraction is 60 - 65%. Visually measured ejection fraction. No regional   wall motion abnormality noted. Normal left ventricular strain. Moderate   (grade 2) left ventricular diastolic dysfunction. · Mitral valve non-specific thickening and thickening. Trace mitral valve   regurgitation is present.         Signed by: Naveen Pierre MD         Signed By: Daksha LEACH Phone [phone]    June 2, 2020      I have independently evaluated and examined the patient. All relevant labs and testing data are reviewed. Care plan discussed and updated after review.   Leopold Roup, MD

## 2020-06-02 NOTE — ED NOTES
TRANSFER - OUT REPORT:    Verbal report given to Mamta Avila RN(name) on Esvin Cox.  being transferred to (unit) for routine progression of care       Report consisted of patients Situation, Background, Assessment and   Recommendations(SBAR). Information from the following report(s) SBAR, Kardex, ED Summary, Intake/Output, MAR, Accordion and Recent Results was reviewed with the receiving nurse. Lines:   Peripheral IV 06/01/20 Right Antecubital (Active)   Site Assessment Clean, dry, & intact 6/1/2020 12:34 PM   Phlebitis Assessment 0 6/1/2020 12:34 PM   Infiltration Assessment 0 6/1/2020 12:34 PM   Dressing Status Clean, dry, & intact 6/1/2020 12:34 PM   Dressing Type Transparent 6/1/2020 12:34 PM   Hub Color/Line Status Pink;Patent; Flushed 6/1/2020 12:34 PM       Peripheral IV 06/01/20 Left Hand (Active)   Site Assessment Clean, dry, & intact 6/1/2020 12:35 PM   Phlebitis Assessment 0 6/1/2020 12:35 PM   Infiltration Assessment 0 6/1/2020 12:35 PM   Dressing Status Clean, dry, & intact 6/1/2020 12:35 PM   Dressing Type Transparent 6/1/2020 12:35 PM   Hub Color/Line Status Blue;Patent; Flushed 6/1/2020 12:35 PM        Opportunity for questions and clarification was provided.       Patient transported with:   Registered Nurse

## 2020-06-02 NOTE — PROGRESS NOTES
06/02/20 0215   Vitals   Temp 98 °F (36.7 °C)   Temp Source Oral   Pulse (Heart Rate) 60   Heart Rate Source Monitor   Resp Rate 22   O2 Sat (%) 98 %   Level of Consciousness Alert   BP (!) 212/86   MAP (Monitor) (!) 62   MAP (Calculated) 128   BP 1 Location Left arm   BP 1 Method Automatic     Patient arrived from ED with vital signs included above. Patient without any complaints at this time. Pt Blood pressure also elevated prior to arrival on the floor, but was not reassessed after a one time dose of hydralazine. Paged on call MD Meggan Euceda in regards to patient blood pressure     PRN dose of Hydralazine 20mg IV administered as ordered. Will reassess patient vital signs and continue to monitor.

## 2020-06-02 NOTE — PHYSICIAN ADVISORY
Letter of Status Determination:  
Recommend hospitalization status upgraded from INPATIENT  to INPATIENT  Status Pt Name:  Ingris Mon MR#  
PARVIZ # [id number] / 
[id number] Payor: Yeny Ruiz / Plan: Zahraa Burris / Product Type: Medicare /   
CSN#  [id number] Room and Hospital  410/01  @ [facility] Hospitalization date  6/1/2020 12:24 PM  
Current Attending Physician  Cory Engel Principal diagnosis  CHF (congestive heart failure) ([facility] [address] [I50.9] Clinicals  69 y.o. y.o  male hospitalized with above diagnosis  69 y.o. male with past medical history of coronary artery disease status post stents in April 2020 as well as a stent in 2010, hypertension, dyslipidemia, obesity who recently quit tobacco in March 2020. He was admitted in April with an end STEMI, now status post ptca/stent to mid LAD done 4/23/2020 1. Uncontrolled Hypertension- with intermittent  SBP > 200's-  
2.  
3. Acute on Chronic Congestive Hear failure- increasing in SOB. NT pro BNP>1000. in the setting uncontrolled hypertension, non compliance with fluid/ salt restriction and CKD- discussed with Renal  will give Bumex 1 mg now. [place] (MCG) criteria Does  NOT apply STATUS DETERMINATION This patient is at high risk of adverse events and deterioration based on documented clinical data, comorbid conditions and current acute care course. Mr. Ingris Mon is expected to meet Inpatient Admission status criteria in accordance with CMS regulation Section 43 .3. Specifically, due to medical necessity the patient's stay is expected to exceed Two Midnights. It is our recommendation that this patient's hospitalization status should be upgraded from  INPATIENT to INPATIENT status. The final decision of the patient's hospitalization status depends on the attending physician's judgment. Additional comments Payor: VA MEDICARE / Plan: VA MEDICARE PART A & B / Product Type: Medicare /   
  
 
 
[address] [id number] [facility] T: [phone]    [email or web address]

## 2020-06-02 NOTE — PROGRESS NOTES
conducted an initial consultation and Spiritual Assessment for Ruthy Mcfarlane, who is a 69 y. o.,male. Patients Primary Language is: [place]. According to the patients EMR Hoahaoism Affiliation is: No Taoist. The reason the Patient came to the hospital is:   Patient Active Problem List    Diagnosis Date Noted    CHF (congestive heart failure) ([facility] 06/01/2020    Postsurgical percutaneous transluminal coronary angioplasty status 05/18/2020    NSTEMI (non-ST elevated myocardial infarction) ([facility] 04/20/2020    ACS (acute coronary syndrome) ([facility] 04/20/2020    Hypertensive urgency 04/20/2020    Obesity (BMI 30.0-34.9) 11/19/2018    Ischemic cardiomyopathy     Hypertension     Dyslipidemia     Diabetes mellitus, type 2 (HCC)     Obesity     RBBB (right bundle branch block with left anterior fascicular block)     Tobacco use disorder, continuous     CAD S/P percutaneous coronary angioplasty 08/01/2010        The  provided the following Interventions:  Initiated a relationship of care and support. Explored issues of yesenia, belief, spirituality and Hoahaoism/ritual needs while hospitalized. Listened empathically. Provided chaplaincy education. Provided information about Spiritual Care Services. Offered prayer and assurance of continued prayers on patient's behalf. Chart reviewed. The following outcomes where achieved:  Patient shared limited information about both their medical narrative and spiritual journey/beliefs. Patient processed feeling about current hospitalization. Patient expressed gratitude for 's visit. Assessment:  Patient does not have any Hoahaoism/cultural needs that will affect patients preferences in health care. There are no spiritual or Hoahaoism issues which require intervention at this time. Plan:  Chaplains will continue to follow and will provide pastoral care on an as needed/requested basis.    recommends bedside caregivers page  on duty if patient shows signs of acute spiritual or emotional distress.     [id number] [facility]   [phone]

## 2020-06-02 NOTE — DIABETES MGMT
Glycemic Control Plan of Care    T2DM with current A1c of 7.9%(06/02/2020). See separate notes, 06/02/2020, for assessment of home diabetes management and education. Home diabetes medications: Patient reported on 06/02/2020:  Lantus (SoloStar) pen insulin 60 units daily. POC BG report on 06/01/2020: 279. POC BG report on 06/02/2020: 76, 83. Nurse reported scheduled lantus held this morning. See recommendation below. MD placed patient on diabetic diet. Current hospital diabetes medications:  Basal lantus insulin 20 units daily, first dose ordered 06/02/2020  Correction lispro insulin ACHS. Normal sensitivity dose. Total daily dose insulin requirement previous day: 06/01/2020:  Lispro: 6 units    Recommendation(s):  1.) discussed with nursing lantus insulin ordered daily. Assessment:  Patient is 69year old with PMH including type 2 diabetes mellitus, dyslipidemia, CAD, GERD, hypertension, ischemic cardiomyopathy, shingles, and tobacco use disorder - was admitted on 06/021/2020 with report of shortness of breath. Noted:  CHF, new onset. Obesity. Tobacco use, quit March 2020. Wife smokes in the house.  T2DM. Most recent blood glucose values:        Current A1C:     Lab Results   Component Value Date/Time    Hemoglobin A1c 7.9 (H) 06/02/2020 04:54 AM     This lab test reflects the patient's estimated average  blood glucose of 180  over the past 3 months.      Diet: Cardiac regular; consistent carb 1800kcal    Goals:  Blood glucose will be within target range of  mg/dL by 06/05/2020    Education:  __X_  Refer to Diabetes Education Record:06/02/2020             ___  Education not indicated at this time    Kg Frazier RN [facility]  Pager: [phone]

## 2020-06-02 NOTE — PROGRESS NOTES
[facility]  Progress Note    Patient: Kiko Gonzalez. Age: 69 y.o. : 1945 MR#: [id number] SSN: [id number]  Date/Time: 2020     Subjective: Patient feels slightly better, shortness of breath improving, orthopnea+ PND +      Assessment/Plan:   1. Acute diastolic heart failure exacerbation: Continue Lasix iv, nitro paste, beta-blocker. Strict I's and O's. Daily weight. 2. Chest pressure, CE and EKG negative. Plan per cardiology  3. Hypertension: BP uncontrolled, will adjust medications, continue Coreg, amlodipine, hydralazine, and as needed hydralazine. 4. Diabetes type 2: BS lower side, will give Lantus once blood sugar improves, continue SSI  5. CKD stage 3: Crt stable, nephrology on case. 6. Dyslipidemiacontinue statin. 7. Obesity Body mass index is 38.51 kg/m². 8. DVT prophylaxis  Full code         Case discussed with:  [x]Patient  []Family  [x]Nursing  []Case Management  DVT Prophylaxis:  []Lovenox  [x]Hep SQ  []SCDs  []Coumadin   []Eliquis/Xarelto     Objective:   VS:   Visit Vitals  /88 (BP 1 Location: Right arm, BP Patient Position: Sitting)   Pulse 66   Temp 97.5 °F (36.4 °C)   Resp 19   Wt 108.2 kg (238 lb 9.6 oz)   SpO2 96%   BMI 38.51 kg/m²      Tmax/24hrs: Temp (24hrs), Av.8 °F (36.6 °C), Min:97.5 °F (36.4 °C), Max:98.1 °F (36.7 °C)  IOBRIEF    Intake/Output Summary (Last 24 hours) at 2020 1604  Last data filed at 2020 1146  Gross per 24 hour   Intake    Output 1000 ml   Net -1000 ml       General:  Alert, cooperative, no acute distress    Pulmonary: Bilateral basal Rales, no wheezing. Cardiovascular: Regular rate and Rhythm. GI:  Soft, Non distended, Non tender. + Bowel sounds. Extremities:  + edema. Psych: Good insight. Not anxious or agitated. Neurologic: Alert and oriented X 3. Moves all ext.   Additional:    Medications:   Current Facility-Administered Medications   Medication Dose Route Frequency    hydrALAZINE (APRESOLINE) 20 mg/mL injection 20 mg  20 mg IntraVENous Q6H PRN    doxazosin (CARDURA) tablet 4 mg  4 mg Oral QPM    bumetanide (BUMEX) injection 1 mg  1 mg IntraVENous BID    amLODIPine (NORVASC) tablet 10 mg  10 mg Oral DAILY    aspirin chewable tablet 81 mg  81 mg Oral DAILY    carvediloL (COREG) tablet 25 mg  25 mg Oral BID WITH MEALS    cholecalciferol (VITAMIN D3) (1000 Units /25 mcg) tablet 2 Tab  2,000 Units Oral DAILY    dutasteride (AVODART) capsule 0.5 mg  0.5 mg Oral DAILY    gabapentin (NEURONTIN) capsule 100 mg  100 mg Oral BID    pantoprazole (PROTONIX) tablet 40 mg  40 mg Oral DAILY    atorvastatin (LIPITOR) tablet 20 mg  20 mg Oral QHS    ticagrelor (BRILINTA) tablet 90 mg  90 mg Oral Q12H    acetaminophen (TYLENOL) tablet 500 mg  500 mg Oral Q6H PRN    ondansetron (ZOFRAN) injection 4 mg  4 mg IntraVENous Q8H PRN    heparin (porcine) injection 5,000 Units  5,000 Units SubCUTAneous Q8H    insulin lispro (HUMALOG) injection   SubCUTAneous AC&HS    glucose chewable tablet 16 g  16 g Oral PRN    glucagon (GLUCAGEN) injection 1 mg  1 mg IntraMUSCular PRN    dextrose (D50W) injection syrg 12.5-25 g  25-50 mL IntraVENous PRN    insulin glargine (LANTUS) injection 20 Units  20 Units SubCUTAneous DAILY    hydrALAZINE (APRESOLINE) tablet 100 mg  100 mg Oral Q8H    nitroglycerin (NITROBID) 2 % ointment 1 Inch  1 Inch Topical BID       Labs:    Recent Results (from the past 24 hour(s))   TROPONIN I    Collection Time: 06/01/20  9:00 PM   Result Value Ref Range    Troponin-I, QT 0.03 0.0 - 0.045 NG/ML   GLUCOSE, POC    Collection Time: 06/01/20 11:34 PM   Result Value Ref Range    Glucose (POC) 279 (H) 70 - 110 mg/dL   HEMOGLOBIN A1C WITH EAG    Collection Time: 06/02/20  4:54 AM   Result Value Ref Range    Hemoglobin A1c 7.9 (H) 4.2 - 5.6 %    Est. average glucose 180 mg/dL   TROPONIN I    Collection Time: 06/02/20  4:54 AM   Result Value Ref Range    Troponin-I, QT 0.03 0.0 - 0.045 NG/ML RENAL FUNCTION PANEL    Collection Time: 06/02/20  4:54 AM   Result Value Ref Range    Sodium 142 136 - 145 mmol/L    Potassium 4.0 3.5 - 5.5 mmol/L    Chloride 113 (H) 100 - 111 mmol/L    CO2 24 21 - 32 mmol/L    Anion gap 5 3.0 - 18 mmol/L    Glucose 93 74 - 99 mg/dL    BUN 34 (H) 7.0 - 18 MG/DL    Creatinine 2.29 (H) 0.6 - 1.3 MG/DL    BUN/Creatinine ratio 15 12 - 20      GFR est AA 34 (L) >60 ml/min/1.73m2    GFR est non-AA 28 (L) >60 ml/min/1.73m2    Calcium 8.8 8.5 - 10.1 MG/DL    Phosphorus 4.3 2.5 - 4.9 MG/DL    Albumin 3.0 (L) 3.4 - 5.0 g/dL   METABOLIC PANEL, BASIC    Collection Time: 06/02/20  4:54 AM   Result Value Ref Range    Sodium 143 136 - 145 mmol/L    Potassium 4.0 3.5 - 5.5 mmol/L    Chloride 113 (H) 100 - 111 mmol/L    CO2 23 21 - 32 mmol/L    Anion gap 7 3.0 - 18 mmol/L    Glucose 86 74 - 99 mg/dL    BUN 34 (H) 7.0 - 18 MG/DL    Creatinine 2.23 (H) 0.6 - 1.3 MG/DL    BUN/Creatinine ratio 15 12 - 20      GFR est AA 35 (L) >60 ml/min/1.73m2    GFR est non-AA 29 (L) >60 ml/min/1.73m2    Calcium 8.8 8.5 - 10.1 MG/DL   GLUCOSE, POC    Collection Time: 06/02/20  7:06 AM   Result Value Ref Range    Glucose (POC) 76 70 - 110 mg/dL   EKG, 12 LEAD, SUBSEQUENT    Collection Time: 06/02/20  7:16 AM   Result Value Ref Range    Ventricular Rate 66 BPM    Atrial Rate 66 BPM    P-R Interval 286 ms    QRS Duration 166 ms    Q-T Interval 472 ms    QTC Calculation (Bezet) 494 ms    Calculated P Axis 4 degrees    Calculated R Axis -77 degrees    Calculated T Axis 93 degrees    Diagnosis       Sinus rhythm with 1st degree AV block with occasional premature ventricular   complexes  Left axis deviation  Right bundle branch block  Left ventricular hypertrophy with repolarization abnormality  Abnormal ECG  When compared with ECG of 01-JUN-2020 12:37,  premature ventricular complexes are now present  aberrant conduction is no longer present  Confirmed by Kev Cervantes ([id number]) on 6/2/2020 7:34:41 AM     GLUCOSE, POC Collection Time: 06/02/20 11:41 AM   Result Value Ref Range    Glucose (POC) 83 70 - 110 mg/dL       Signed By: Wilson Radford MD     June 2, 2020      Disclaimer: Sections of this note are dictated using utilizing voice recognition software. Minor typographical errors may be present. If questions arise, please do not hesitate to contact me or call our department.

## 2020-06-02 NOTE — DIABETES MGMT
Diabetes Patient/Family Education Record  Factors That  May Influence Patients Ability  to Learn or  Comply with Recommendations   []   Language barrier    []   Cultural needs   []   Motivation    []   Cognitive limitation    []   Physical   [x]   Education    []   Physiological factors   []   Hearing/vision/speaking impairment   []   Caodaism beliefs    []   Financial factors   []  Other:   []  No factors identified at this time. Person Instructed:   [x]   Patient   []   Family   []  Other     Preference for Learning:   [x]   Verbal   [x]   Written: diab educ packet   []  Demonstration     Level of Comprehension & Competence:    [x]  Good                                      [] Fair                                     []  Poor                             []  Needs Reinforcement   [x]  Teachback completed    Education Component:   [x]  Medication management, including how to administer insulin (if appropriate) and potential medication interactions: Yes. Patient reported home diabetes medication:  Lantus (SoloStar) pen insulin 60 units daily. Patient reported that his wife gives his insulin at home and asked him why and he stated, \"she need to make sure that I get the right dose. I guess she doesn't trust me but I'm capable of giving my own insulin because it is easy using the pen. \"      [x]  Nutritional management -obtain usual meal pattern: Yes. Patient reported that he often miss or skip meals due to poor appetite and he reported episodes of low blood sugar. Discussed eating small frequent feedings throughout the day and patient stated this would be easier for him because he just can't keep looking at his plate and the plate of food looking at him. Patient verbalized understanding about the importance of eating enough food to help prevent low blood sugar.     []  Exercise   [x]  Signs, symptoms, and treatment of hyperglycemia and hypoglycemia: Yes.  Educated patient about action of insulin and appropriate nutrition to help control diabetes and prevent complications. Patient plan to take the recommendation to eat small frequent feedings at home to help ensure adequate food and prevent low blood sugar. [x] Prevention, recognition and treatment of hyperglycemia and hypoglycemia: Yes. Educated patient about prevention of hypoglycemia, treatment, and prevention. [x]  Importance of blood glucose monitoring and how to obtain a blood glucose meter: Yes. Patient reported that his wife checks his blood sugar at home although he is very capable of doing it himself. Patient stated that they have been  for a long time and he wants to keep her happy. []  Instruction on use of the blood glucose meter   [x]  Discuss the importance of HbA1C monitoring: Yes. Your A1C  was   Lab Results   Component Value Date/Time    Hemoglobin A1c 7.9 (H) 06/02/2020 04:54 AM     This lab test reflects that his estimated average blood glucose was 180 mg/dL over the past 3 months. It is important to follow up with his provider on a routine basis to continue to evaluate your blood sugar discuss any necessary changes in treatment. []  Sick day guidelines   [x]  Proper use and disposal of lancets, needles, syringes or insulin pens (if appropriate): Yes. [x]  Potential long-term complications (retinopathy, kidney disease, neuropathy, foot care): Yes. [x] Information about whom to contact in case of emergency or for more information: Yes. [x]  Goal:  Patient/family will demonstrate understanding of Diabetes Self Management Skills by: 06/06/2020  Plan for post-discharge education or self-management support:    [x] Outpatient class schedule provided            [] Patient Declined    [] Scheduled for outpatient classes (date) _______  Verify:  Does patient understand how diabetes medications work? Yes. Does patient know what their most recent A1c is? Yes. Does patient monitor glucose at home? Yes.  Does patient have difficulty obtaining diabetes medications and testing supplies?  No.       Reji Portillo RN [facility]  Pager: [phone]

## 2020-06-02 NOTE — ROUTINE PROCESS
Spoke with RN on [facility] and asked that patient be kept NPO after midnight tonight for his renal artery duplex tomorrow morning.

## 2020-06-03 ENCOUNTER — APPOINTMENT (OUTPATIENT)
Dept: VASCULAR SURGERY | Age: 75
DRG: 291 | End: 2020-06-03
Attending: INTERNAL MEDICINE
Payer: MEDICARE

## 2020-06-03 ENCOUNTER — APPOINTMENT (OUTPATIENT)
Dept: NON INVASIVE DIAGNOSTICS | Age: 75
DRG: 291 | End: 2020-06-03
Attending: NURSE PRACTITIONER
Payer: MEDICARE

## 2020-06-03 LAB
ANION GAP SERPL CALC-SCNC: 5 MMOL/L (ref 3–18)
BASOPHILS # BLD: 0 K/UL (ref 0–0.1)
BASOPHILS NFR BLD: 0 % (ref 0–2)
BUN SERPL-MCNC: 33 MG/DL (ref 7–18)
BUN/CREAT SERPL: 14 (ref 12–20)
CALCIUM SERPL-MCNC: 8.5 MG/DL (ref 8.5–10.1)
CHLORIDE SERPL-SCNC: 113 MMOL/L (ref 100–111)
CO2 SERPL-SCNC: 25 MMOL/L (ref 21–32)
CREAT SERPL-MCNC: 2.36 MG/DL (ref 0.6–1.3)
DIFFERENTIAL METHOD BLD: ABNORMAL
ECHO LV EDV TEICHHOLZ: 0.45 ML
ECHO LV ESV TEICHHOLZ: 0.09 ML
ECHO LV GLOBAL LONGITUDINAL STRAIN (GLS): -20.9 %
ECHO LV INTERNAL DIMENSION DIASTOLIC: 4.14 CM (ref 4.2–5.9)
ECHO LV INTERNAL DIMENSION SYSTOLIC: 2.13 CM
ECHO LV IVSD: 1.57 CM (ref 0.6–1)
ECHO LV MASS 2D: 407.3 G (ref 88–224)
ECHO LV MASS INDEX 2D: 197.1 G/M2 (ref 49–115)
ECHO LV POSTERIOR WALL DIASTOLIC: 2.1 CM (ref 0.6–1)
EOSINOPHIL # BLD: 0.6 K/UL (ref 0–0.4)
EOSINOPHIL NFR BLD: 7 % (ref 0–5)
ERYTHROCYTE [DISTWIDTH] IN BLOOD BY AUTOMATED COUNT: 15.3 % (ref 11.6–14.5)
GLUCOSE BLD STRIP.AUTO-MCNC: 134 MG/DL (ref 70–110)
GLUCOSE BLD STRIP.AUTO-MCNC: 134 MG/DL (ref 70–110)
GLUCOSE BLD STRIP.AUTO-MCNC: 168 MG/DL (ref 70–110)
GLUCOSE BLD STRIP.AUTO-MCNC: 238 MG/DL (ref 70–110)
GLUCOSE BLD STRIP.AUTO-MCNC: 68 MG/DL (ref 70–110)
GLUCOSE SERPL-MCNC: 59 MG/DL (ref 74–99)
HCT VFR BLD AUTO: 28.3 % (ref 36–48)
HGB BLD-MCNC: 9.4 G/DL (ref 13–16)
LVFS 2D: 48.55 %
LVSV (TEICH): 27.21 ML
LYMPHOCYTES # BLD: 0.8 K/UL (ref 0.9–3.6)
LYMPHOCYTES NFR BLD: 9 % (ref 21–52)
MAGNESIUM SERPL-MCNC: 2.2 MG/DL (ref 1.6–2.6)
MCH RBC QN AUTO: 25.2 PG (ref 24–34)
MCHC RBC AUTO-ENTMCNC: 33.2 G/DL (ref 31–37)
MCV RBC AUTO: 75.9 FL (ref 74–97)
MONOCYTES # BLD: 0.9 K/UL (ref 0.05–1.2)
MONOCYTES NFR BLD: 9 % (ref 3–10)
NEUTS SEG # BLD: 7.2 K/UL (ref 1.8–8)
NEUTS SEG NFR BLD: 75 % (ref 40–73)
PLATELET # BLD AUTO: 214 K/UL (ref 135–420)
PMV BLD AUTO: 10.2 FL (ref 9.2–11.8)
POTASSIUM SERPL-SCNC: 3.8 MMOL/L (ref 3.5–5.5)
RBC # BLD AUTO: 3.73 M/UL (ref 4.7–5.5)
SODIUM SERPL-SCNC: 143 MMOL/L (ref 136–145)
WBC # BLD AUTO: 9.6 K/UL (ref 4.6–13.2)

## 2020-06-03 PROCEDURE — 80048 BASIC METABOLIC PNL TOTAL CA: CPT

## 2020-06-03 PROCEDURE — 74011250637 HC RX REV CODE- 250/637: Performed by: INTERNAL MEDICINE

## 2020-06-03 PROCEDURE — 74011636637 HC RX REV CODE- 636/637: Performed by: HOSPITALIST

## 2020-06-03 PROCEDURE — 93975 VASCULAR STUDY: CPT

## 2020-06-03 PROCEDURE — 36415 COLL VENOUS BLD VENIPUNCTURE: CPT

## 2020-06-03 PROCEDURE — 93308 TTE F-UP OR LMTD: CPT

## 2020-06-03 PROCEDURE — 74011250636 HC RX REV CODE- 250/636: Performed by: HOSPITALIST

## 2020-06-03 PROCEDURE — 97162 PT EVAL MOD COMPLEX 30 MIN: CPT

## 2020-06-03 PROCEDURE — 83735 ASSAY OF MAGNESIUM: CPT

## 2020-06-03 PROCEDURE — 82962 GLUCOSE BLOOD TEST: CPT

## 2020-06-03 PROCEDURE — 74011250637 HC RX REV CODE- 250/637: Performed by: NURSE PRACTITIONER

## 2020-06-03 PROCEDURE — 74011000250 HC RX REV CODE- 250: Performed by: HOSPITALIST

## 2020-06-03 PROCEDURE — 74011250637 HC RX REV CODE- 250/637: Performed by: HOSPITALIST

## 2020-06-03 PROCEDURE — 74011000250 HC RX REV CODE- 250: Performed by: NURSE PRACTITIONER

## 2020-06-03 PROCEDURE — 65660000000 HC RM CCU STEPDOWN

## 2020-06-03 PROCEDURE — 85025 COMPLETE CBC W/AUTO DIFF WBC: CPT

## 2020-06-03 PROCEDURE — 97165 OT EVAL LOW COMPLEX 30 MIN: CPT

## 2020-06-03 PROCEDURE — 77010033678 HC OXYGEN DAILY

## 2020-06-03 RX ORDER — ISOSORBIDE MONONITRATE 30 MG/1
30 TABLET, EXTENDED RELEASE ORAL DAILY
Status: DISCONTINUED | OUTPATIENT
Start: 2020-06-03 | End: 2020-06-05 | Stop reason: HOSPADM

## 2020-06-03 RX ORDER — CLONIDINE HYDROCHLORIDE 0.1 MG/1
0.1 TABLET ORAL 3 TIMES DAILY
Status: DISCONTINUED | OUTPATIENT
Start: 2020-06-03 | End: 2020-06-05 | Stop reason: HOSPADM

## 2020-06-03 RX ORDER — DEXTROSE 50 % IN WATER (D50W) INTRAVENOUS SYRINGE
25 AS NEEDED
Status: DISCONTINUED | OUTPATIENT
Start: 2020-06-03 | End: 2020-06-05 | Stop reason: HOSPADM

## 2020-06-03 RX ORDER — INSULIN GLARGINE 100 [IU]/ML
15 INJECTION, SOLUTION SUBCUTANEOUS DAILY
Status: DISCONTINUED | OUTPATIENT
Start: 2020-06-04 | End: 2020-06-05 | Stop reason: HOSPADM

## 2020-06-03 RX ADMIN — HEPARIN SODIUM 5000 UNITS: 5000 INJECTION INTRAVENOUS; SUBCUTANEOUS at 17:40

## 2020-06-03 RX ADMIN — INSULIN LISPRO 2 UNITS: 100 INJECTION, SOLUTION INTRAVENOUS; SUBCUTANEOUS at 17:43

## 2020-06-03 RX ADMIN — DEXTROSE MONOHYDRATE 25 ML: 25 INJECTION, SOLUTION INTRAVENOUS at 07:03

## 2020-06-03 RX ADMIN — CARVEDILOL 25 MG: 25 TABLET, FILM COATED ORAL at 09:34

## 2020-06-03 RX ADMIN — ATORVASTATIN CALCIUM 20 MG: 20 TABLET, FILM COATED ORAL at 23:49

## 2020-06-03 RX ADMIN — TICAGRELOR 90 MG: 90 TABLET ORAL at 17:39

## 2020-06-03 RX ADMIN — CHOLECALCIFEROL TAB 25 MCG (1000 UNIT) 2 TABLET: 25 TAB at 09:34

## 2020-06-03 RX ADMIN — GABAPENTIN 100 MG: 100 CAPSULE ORAL at 17:37

## 2020-06-03 RX ADMIN — AMLODIPINE BESYLATE 10 MG: 10 TABLET ORAL at 09:36

## 2020-06-03 RX ADMIN — DUTASTERIDE 0.5 MG: 0.5 CAPSULE, LIQUID FILLED ORAL at 09:43

## 2020-06-03 RX ADMIN — BUMETANIDE 1 MG: 0.25 INJECTION INTRAMUSCULAR; INTRAVENOUS at 09:44

## 2020-06-03 RX ADMIN — HYDRALAZINE HYDROCHLORIDE 100 MG: 50 TABLET, FILM COATED ORAL at 23:49

## 2020-06-03 RX ADMIN — CARVEDILOL 25 MG: 25 TABLET, FILM COATED ORAL at 17:39

## 2020-06-03 RX ADMIN — HEPARIN SODIUM 5000 UNITS: 5000 INJECTION INTRAVENOUS; SUBCUTANEOUS at 09:36

## 2020-06-03 RX ADMIN — ASPIRIN 81 MG CHEWABLE TABLET 81 MG: 81 TABLET CHEWABLE at 09:35

## 2020-06-03 RX ADMIN — CLONIDINE HYDROCHLORIDE 0.1 MG: 0.1 TABLET ORAL at 17:38

## 2020-06-03 RX ADMIN — CLONIDINE HYDROCHLORIDE 0.1 MG: 0.1 TABLET ORAL at 23:49

## 2020-06-03 RX ADMIN — TICAGRELOR 90 MG: 90 TABLET ORAL at 06:09

## 2020-06-03 RX ADMIN — BUMETANIDE 1 MG: 0.25 INJECTION INTRAMUSCULAR; INTRAVENOUS at 17:46

## 2020-06-03 RX ADMIN — ISOSORBIDE MONONITRATE 30 MG: 30 TABLET, EXTENDED RELEASE ORAL at 12:12

## 2020-06-03 RX ADMIN — HYDRALAZINE HYDROCHLORIDE 100 MG: 50 TABLET, FILM COATED ORAL at 14:39

## 2020-06-03 RX ADMIN — GABAPENTIN 100 MG: 100 CAPSULE ORAL at 09:35

## 2020-06-03 RX ADMIN — HEPARIN SODIUM 5000 UNITS: 5000 INJECTION INTRAVENOUS; SUBCUTANEOUS at 01:31

## 2020-06-03 RX ADMIN — DOXAZOSIN 4 MG: 1 TABLET ORAL at 17:37

## 2020-06-03 RX ADMIN — HYDRALAZINE HYDROCHLORIDE 100 MG: 50 TABLET, FILM COATED ORAL at 06:09

## 2020-06-03 RX ADMIN — INSULIN LISPRO 4 UNITS: 100 INJECTION, SOLUTION INTRAVENOUS; SUBCUTANEOUS at 23:50

## 2020-06-03 RX ADMIN — PANTOPRAZOLE SODIUM 40 MG: 40 TABLET, DELAYED RELEASE ORAL at 09:34

## 2020-06-03 NOTE — ROUTINE PROCESS
Bedside shift change report given to Tejal RN (oncoming nurse) by Alli Alvarez RN (offgoing nurse). Report included the following information SBAR, Kardex, Intake/Output, MAR and Recent Results.

## 2020-06-03 NOTE — PROGRESS NOTES
In Patient Progress note      Admit Date: 6/1/2020          Impression:       #1  Chronic kidney disease stage IV(baseline in the 2 range)  with fluid overload and cardiorenal syndrome   in the setting of acute on chronic diastolic CHF.   Volume overloaded needs diuresis with Bumex  #2 acute on chronic congestive heart failure secondary to diastolic CHF  #3 recent history of NSTEMI with cardiac cath and mid LAD stent  #4 history of ischemic cardiomyopathy with preserved EF  #5 dyslipidemia  #6 tobacco abuse  #7 diabetes mellitus with complications     Improved breathing  Diuresing ok  Edema +  BP uncontrolled   Creat stable      Plan:     #1 strict ins and outs Daily weight  #2 fluid restrict 1200 mL  #3 continue gentle diuresis with Bumex 1 mg twice daily IV   #4 low-sodium diet  #5 follow cardiology recommendations  #6 noted cardiology added clonidine , agree   #7 renal duplex scan to rule out renal artery stenosis pending     Please call with questions     Chad Grant MD [facility]  Cell [phone]  Pager: [phone]  Subjective:     Feels better  Diuresing well        Current Facility-Administered Medications:     dextrose (D50W) injection syrg 25 g, 25 g, IntraVENous, PRN, Gold Castañeda MD    isosorbide mononitrate ER (IMDUR) tablet 30 mg, 30 mg, Oral, DAILY, Pascual Farrar MD, 30 mg at 06/03/20 1212    cloNIDine HCL (CATAPRES) tablet 0.1 mg, 0.1 mg, Oral, TID, Pascual Farrar MD    doxazosin (CARDURA) tablet 4 mg, 4 mg, Oral, QPM, Jessica Alvarez, NP, 4 mg at 06/02/20 1904    bumetanide (BUMEX) injection 1 mg, 1 mg, IntraVENous, BID, Jessica Alvarez NP, 1 mg at 06/03/20 0944    hydrALAZINE (APRESOLINE) 20 mg/mL injection 10 mg, 10 mg, IntraVENous, Q6H PRN, Giselle Brown MD    amLODIPine (NORVASC) tablet 10 mg, 10 mg, Oral, DAILY, Cristel Arellano MD, 10 mg at 06/03/20 0936    aspirin chewable tablet 81 mg, 81 mg, Oral, DAILY, Cristel Arellano MD, 81 mg at 06/03/20 0935    carvediloL (COREG) tablet 25 mg, 25 mg, Oral, BID WITH MEALS, Brayan Arellano MD, 25 mg at 06/03/20 0934    cholecalciferol (VITAMIN D3) (1000 Units /25 mcg) tablet 2 Tab, 2,000 Units, Oral, DAILY, Brayan Arellano MD, 2 Tab at 06/03/20 0934    dutasteride (AVODART) capsule 0.5 mg, 0.5 mg, Oral, DAILY, Brayan Arellano MD, 0.5 mg at 06/03/20 0943    gabapentin (NEURONTIN) capsule 100 mg, 100 mg, Oral, BID, Brayan Arellano MD, 100 mg at 06/03/20 0935    pantoprazole (PROTONIX) tablet 40 mg, 40 mg, Oral, DAILY, Brayan Arellano MD, 40 mg at 06/03/20 0934    atorvastatin (LIPITOR) tablet 20 mg, 20 mg, Oral, QHS, Brayan Arellano MD, 20 mg at 06/02/20 2140    ticagrelor (BRILINTA) tablet 90 mg, 90 mg, Oral, Q12H, Brayan Arellano MD, 90 mg at 06/03/20 0609    acetaminophen (TYLENOL) tablet 500 mg, 500 mg, Oral, Q6H PRN, Brayan Arellano MD    ondansetron (ZOFRAN) injection 4 mg, 4 mg, IntraVENous, Q8H PRN, Brayan Arellano MD    heparin (porcine) injection 5,000 Units, 5,000 Units, SubCUTAneous, Q8H, Brayan Arellano MD, 5,000 Units at 06/03/20 0936    insulin lispro (HUMALOG) injection, , SubCUTAneous, AC&HS, Brayan Arellano MD, Stopped at 06/03/20 0730    glucose chewable tablet 16 g, 16 g, Oral, PRN, Brayan Arellano MD    glucagon (GLUCAGEN) injection 1 mg, 1 mg, IntraMUSCular, PRN, Brayan Fuentes MD    dextrose (D50W) injection syrg 12.5-25 g, 25-50 mL, IntraVENous, PRN, Jacques Bitter, Brayan Mccullough MD, 25 mL at 06/03/20 0703    insulin glargine (LANTUS) injection 20 Units, 20 Units, SubCUTAneous, DAILY, Brayan Arellano MD, Stopped at 06/02/20 0900    hydrALAZINE (APRESOLINE) tablet 100 mg, 100 mg, Oral, Q8H, Kelsie Arellano MD, 100 mg at 06/03/20 1439        Objective:     Visit Vitals  /60   Pulse (!) 58   Temp 97.4 °F (36.3 °C)   Resp 18   Ht 5' 6\" (1.676 m)   Wt 98 kg (216 lb)   SpO2 97%   BMI 34.86 kg/m²         Intake/Output Summary (Last 24 hours) at 6/3/2020 1602  Last data filed at 6/3/2020 0609  Gross per 24 hour   Intake    Output 1100 ml   Net -1100 ml       Physical Exam:           NAD   HEENT: mmm  Lungs: heidi rales   Cardiovascular system: S1, S2, regular rate and rhythm. JVD+  Abdomen: soft, non tender, non distended. BS+  Extremities: 1+LE edema   Neurologic: Alert, oriented time three.          Data Review:    Recent Labs     06/03/20  0545   WBC 9.6   RBC 3.73*   HCT 28.3*   MCV 75.9   MCH 25.2   MCHC 33.2   RDW 15.3*     Recent Labs     06/03/20  0545 06/02/20  0454 06/01/20  1234   BUN 33* 34*  34* 33*   CREA 2.36* 2.23*  2.29* 2.40*   CA 8.5 8.8  8.8 8.3*   ALB  --  3.0* 2.9*   K 3.8 4.0  4.0 4.4    143  142 142   * 113*  113* 114*   CO2 25 23  24 23   PHOS  --  4.3  --    GLU 59* 86  93 224*       Caity Monahan MD

## 2020-06-03 NOTE — DIABETES MGMT
Glycemic Control Plan of Care    T2DM with current A1c of 7.9%(06/02/2020). See separate notes, 06/02/2020, for assessment of home diabetes management and education. Home diabetes medications: Patient reported on 06/02/2020:  Lantus (SoloStar) pen insulin 60 units daily. Seen patient this afternoon and noted that he just finished eating 100% of his lunch. Patient reported that his blood sugar dropped too low early this morning because he was not allowed to eat after midnight last for testing. POC BG range on 06/02/2020: . Two BG values above 180. Lantus insulin not given. He received a total of 10 units sliding scale lispro. POC BG report on 06/03/2020: 68, 134, 134. He was NPO after midnight treated hypoglycemia with 12.5 gm D50    Current hospital diabetes medications:  Basal lantus insulin 20 units daily. Correction lispro insulin ACHS. Normal sensitivity dose. Total daily dose insulin requirement previous day: 06/02/2020:  Lispro: 10 units    Recommendation(s):  1.) discussed with nursing lantus insulin ordered daily. Assessment:  Patient is 69year old with PMH including type 2 diabetes mellitus, dyslipidemia, CAD, GERD, hypertension, ischemic cardiomyopathy, shingles, and tobacco use disorder - was admitted on 06/021/2020 with report of shortness of breath. Noted:  CHF, new onset. Obesity. Tobacco use, quit March 2020. Wife smokes in the house.  T2DM. Most recent blood glucose values:        Current A1C:     Lab Results   Component Value Date/Time    Hemoglobin A1c 7.9 (H) 06/02/2020 04:54 AM     This lab test reflects the patient's estimated average  blood glucose of 180  over the past 3 months.      Diet: Cardiac regular; consistent carb 1800kcal    Goals:  Blood glucose will be within target range of  mg/dL by 06/06/2020    Education:  __X_  Refer to Diabetes Education Record:06/02/2020             ___  Education not indicated at this time    Sabine Schmidt RN [facility]  Pager: [phone]

## 2020-06-03 NOTE — PROGRESS NOTES
[facility]  Progress Note    Patient: ouralberto Peñaloza. Age: 69 y.o. : 1945 MR#: [id number] SSN: [id number]  Date/Time: 6/3/2020     Subjective: Patient feels better, shortness of breath and orthopnea improving,       Assessment/Plan:   1. Acute diastolic heart failure exacerbation: Continue Bumex iv, nitro paste, beta-blocker. Strict I's and O's. Daily weight. More than 2 L negative balance  2. Chest pressure, CE and EKG negative. Plan per cardiology  3. Hypertension: BP uncontrolled, will adjust medications, continue Coreg, amlodipine, hydralazine, and as needed hydralazine. Agree with adding clonidine. 4. Diabetes type 2: BS lower side, will decrease Lantus and SSI  5. CKD stage 3: Crt stable, nephrology on case. Renal artery duplex noted  6. Dyslipidemiacontinue statin. 7. Obesity Body mass index is 38.51 kg/m². 8. DVT prophylaxis  Full code         Case discussed with:  [x]Patient  []Family  [x]Nursing  []Case Management  DVT Prophylaxis:  []Lovenox  [x]Hep SQ  []SCDs  []Coumadin   []Eliquis/Xarelto     Objective:   VS:   Visit Vitals  /88   Pulse 67   Temp 97.5 °F (36.4 °C)   Resp 18   Ht 5' 6\" (1.676 m)   Wt 98 kg (216 lb)   SpO2 98%   BMI 34.86 kg/m²      Tmax/24hrs: Temp (24hrs), Av.7 °F (36.5 °C), Min:97.5 °F (36.4 °C), Max:98 °F (36.7 °C)  IOBRIEF    Intake/Output Summary (Last 24 hours) at 6/3/2020 1158  Last data filed at 6/3/2020 0609  Gross per 24 hour   Intake    Output 1100 ml   Net -1100 ml       General:  Alert, cooperative, no acute distress    Pulmonary: Bilateral basal Rales, no wheezing. Cardiovascular: Regular rate and Rhythm. GI:  Soft, Non distended, Non tender. + Bowel sounds. Extremities:  + edema. Psych: Good insight. Not anxious or agitated. Neurologic: Alert and oriented X 3. Moves all ext.   Additional:    Medications:   Current Facility-Administered Medications   Medication Dose Route Frequency    dextrose (D50W) injection syrg 25 g  25 g IntraVENous PRN    isosorbide mononitrate ER (IMDUR) tablet 30 mg  30 mg Oral DAILY    cloNIDine HCL (CATAPRES) tablet 0.1 mg  0.1 mg Oral TID    doxazosin (CARDURA) tablet 4 mg  4 mg Oral QPM    bumetanide (BUMEX) injection 1 mg  1 mg IntraVENous BID    hydrALAZINE (APRESOLINE) 20 mg/mL injection 10 mg  10 mg IntraVENous Q6H PRN    amLODIPine (NORVASC) tablet 10 mg  10 mg Oral DAILY    aspirin chewable tablet 81 mg  81 mg Oral DAILY    carvediloL (COREG) tablet 25 mg  25 mg Oral BID WITH MEALS    cholecalciferol (VITAMIN D3) (1000 Units /25 mcg) tablet 2 Tab  2,000 Units Oral DAILY    dutasteride (AVODART) capsule 0.5 mg  0.5 mg Oral DAILY    gabapentin (NEURONTIN) capsule 100 mg  100 mg Oral BID    pantoprazole (PROTONIX) tablet 40 mg  40 mg Oral DAILY    atorvastatin (LIPITOR) tablet 20 mg  20 mg Oral QHS    ticagrelor (BRILINTA) tablet 90 mg  90 mg Oral Q12H    acetaminophen (TYLENOL) tablet 500 mg  500 mg Oral Q6H PRN    ondansetron (ZOFRAN) injection 4 mg  4 mg IntraVENous Q8H PRN    heparin (porcine) injection 5,000 Units  5,000 Units SubCUTAneous Q8H    insulin lispro (HUMALOG) injection   SubCUTAneous AC&HS    glucose chewable tablet 16 g  16 g Oral PRN    glucagon (GLUCAGEN) injection 1 mg  1 mg IntraMUSCular PRN    dextrose (D50W) injection syrg 12.5-25 g  25-50 mL IntraVENous PRN    insulin glargine (LANTUS) injection 20 Units  20 Units SubCUTAneous DAILY    hydrALAZINE (APRESOLINE) tablet 100 mg  100 mg Oral Q8H       Labs:    Recent Results (from the past 24 hour(s))   GLUCOSE, POC    Collection Time: 06/02/20  5:03 PM   Result Value Ref Range    Glucose (POC) 296 (H) 70 - 110 mg/dL   GLUCOSE, POC    Collection Time: 06/02/20  9:11 PM   Result Value Ref Range    Glucose (POC) 200 (H) 70 - 110 mg/dL   CBC WITH AUTOMATED DIFF    Collection Time: 06/03/20  5:45 AM   Result Value Ref Range    WBC 9.6 4.6 - 13.2 K/uL    RBC 3.73 (L) 4.70 - 5.50 M/uL HGB 9.4 (L) 13.0 - 16.0 g/dL    HCT 28.3 (L) 36.0 - 48.0 %    MCV 75.9 74.0 - 97.0 FL    MCH 25.2 24.0 - 34.0 PG    MCHC 33.2 31.0 - 37.0 g/dL    RDW 15.3 (H) 11.6 - 14.5 %    PLATELET 614 543 - 229 K/uL    MPV 10.2 9.2 - 11.8 FL    NEUTROPHILS 75 (H) 40 - 73 %    LYMPHOCYTES 9 (L) 21 - 52 %    MONOCYTES 9 3 - 10 %    EOSINOPHILS 7 (H) 0 - 5 %    BASOPHILS 0 0 - 2 %    ABS. NEUTROPHILS 7.2 1.8 - 8.0 K/UL    ABS. LYMPHOCYTES 0.8 (L) 0.9 - 3.6 K/UL    ABS. MONOCYTES 0.9 0.05 - 1.2 K/UL    ABS. EOSINOPHILS 0.6 (H) 0.0 - 0.4 K/UL    ABS.  BASOPHILS 0.0 0.0 - 0.1 K/UL    DF AUTOMATED     MAGNESIUM    Collection Time: 06/03/20  5:45 AM   Result Value Ref Range    Magnesium 2.2 1.6 - 2.6 mg/dL   METABOLIC PANEL, BASIC    Collection Time: 06/03/20  5:45 AM   Result Value Ref Range    Sodium 143 136 - 145 mmol/L    Potassium 3.8 3.5 - 5.5 mmol/L    Chloride 113 (H) 100 - 111 mmol/L    CO2 25 21 - 32 mmol/L    Anion gap 5 3.0 - 18 mmol/L    Glucose 59 (L) 74 - 99 mg/dL    BUN 33 (H) 7.0 - 18 MG/DL    Creatinine 2.36 (H) 0.6 - 1.3 MG/DL    BUN/Creatinine ratio 14 12 - 20      GFR est AA 33 (L) >60 ml/min/1.73m2    GFR est non-AA 27 (L) >60 ml/min/1.73m2    Calcium 8.5 8.5 - 10.1 MG/DL   GLUCOSE, POC    Collection Time: 06/03/20  6:42 AM   Result Value Ref Range    Glucose (POC) 68 (L) 70 - 110 mg/dL   GLUCOSE, POC    Collection Time: 06/03/20  7:32 AM   Result Value Ref Range    Glucose (POC) 134 (H) 70 - 110 mg/dL   ECHO ADULT FOLLOW-UP OR LIMITED    Collection Time: 06/03/20 10:43 AM   Result Value Ref Range    LVIDd 4.14 (A) 4.2 - 5.9 cm    LVPWd 2.10 (A) 0.6 - 1.0 cm    LVIDs 2.13 cm    IVSd 1.57 (A) 0.6 - 1.0 cm    Global Longitudinal Strain -20.90 %    LV Mass .3 (A) 88 - 224 g    LV Mass AL Index 197.1 (A) 49 - 115 g/m2    Left Ventricular Fractional Shortening by 2D 85.200172407 %    Left Ventricular End Diastolic Volume by Teichholz Method 8.61482760821 mL    Left Ventricular End Systolic Volume by Margueritte Liter Method 9.060139254937 mL    Left Ventricular Stroke Volume by Teichholz Method 89.240840070 mL   DUPLEX RENAL ART/GABBY BILATERAL    Collection Time: 06/03/20 11:23 AM   Result Value Ref Range    Right kidney length 9.81 cm    Right kidney width 4.62 cm    Right renal lower parenchyma max 38.4 cm/s    Right renal lower parenchyma min 0.0 cm/s    Right renal dist sys 58.7 cm/s    Right renal dist geller 12.7 cm/s    Right renal prox sys 132.9 cm/s    Right renal prox geller 21.7 cm/s    Left renal upper parenchyma max 49.4 cm/s    Left renal upper parenchyma min 0.0 cm/s    Left renal dist sys 69.5 cm/s    Left renal dist geller 14.3 cm/s    Left renal prox sys 104.5 cm/s    Left renal prox geller 23.3 cm/s    Abdominal prox aorta karina 119.8 cm/s    Right Hilar PSV 55.5 cm/s    Right Hilar EDV 11.2 cm/s    Left Hilar PSV 47.5 cm/s    Left Hilar EDV 10.2 cm/s    Prox aortic AP 2.24 cm    Prox aortic trans 1.85 cm    Left kidney length 10.68 cm    Left kidney width 5.65 cm    Right renal prox rar 1.11     Right renal dist rar 0.49     Right renal lower parenchyma RI 1.00     Left renal mid sys 77.0 cm/s    Left renal mid geller 11.8 cm/s    Left renal prox rar 0.87     Left renal mid rar 0.64     Left renal dist rar 0.58     Left renal upper parenchyma RI 1.00     Left Kidney Arcuate Artery Superior PSV 14.2 cm/s    Left Kidney Arcuate Artery Superior EDV 0.0 cm/s    Right Kidney Arcuate Artery Superior PSV 17.5 cm/s    Right Kidney Arcuate Artery Superior EDV 0.0 cm/s    Right Interlobar PSV 24.1 cm/s    Right Interlobar EDV 3.8 cm/s    Left Interlobar PSV 27.0 cm/s    Left Interlobar EDV 0.0 cm/s    Right Renal Prox RI 0.84     Right Renal Dist RI 0.78     Left Renal Prox RI 0.78     Left Renal Mid RI 0.85     Left Renal Dist RI 0.79     Right Superior Arcuate RI 1.00     Left Superior Arcuate RI 1.00        Signed By: Katia Zambrano MD     Jenni 3, 2020      Disclaimer: Sections of this note are dictated using utilizing voice recognition software. Minor typographical errors may be present. If questions arise, please do not hesitate to contact me or call our department.

## 2020-06-03 NOTE — PROGRESS NOTES
Problem: Falls - Risk of  Goal: *Absence of Falls  Description: Document Steven Wan Fall Risk and appropriate interventions in the flowsheet. 6/3/2020 1058 by Lili Shane RN  Outcome: Progressing Towards Goal  Note: Fall Risk Interventions:  Mobility Interventions: Assess mobility with egress test, Bed/chair exit alarm, OT consult for ADLs, Patient to call before getting OOB, PT Consult for mobility concerns, PT Consult for assist device competence, Strengthening exercises (ROM-active/passive)         Medication Interventions: Teach patient to arise slowly    Elimination Interventions: Bed/chair exit alarm, Call light in reach           6/2/2020 2107 by Lili Shane RN  Outcome: Progressing Towards Goal  Note: Fall Risk Interventions:  Mobility Interventions: Bed/chair exit alarm, OT consult for ADLs, Patient to call before getting OOB, PT Consult for mobility concerns, PT Consult for assist device competence         Medication Interventions: Patient to call before getting OOB    Elimination Interventions: Bed/chair exit alarm, Call light in reach, Patient to call for help with toileting needs              Problem: Diabetes Self-Management  Goal: *Disease process and treatment process  Description: Define diabetes and identify own type of diabetes; list 3 options for treating diabetes.   6/3/2020 1058 by Lili Shane RN  Outcome: Progressing Towards Goal  6/2/2020 2107 by Lili Shane RN  Outcome: Progressing Towards Goal     Problem: Patient Education: Go to Patient Education Activity  Goal: Patient/Family Education  Outcome: Progressing Towards Goal

## 2020-06-03 NOTE — PROGRESS NOTES
Problem: Falls - Risk of  Goal: *Absence of Falls  Description: Document Anamaria Blood Fall Risk and appropriate interventions in the flowsheet. Outcome: Progressing Towards Goal  Note: Fall Risk Interventions:  Mobility Interventions: Bed/chair exit alarm, OT consult for ADLs, Patient to call before getting OOB, PT Consult for mobility concerns, PT Consult for assist device competence         Medication Interventions: Patient to call before getting OOB    Elimination Interventions: Bed/chair exit alarm, Call light in reach, Patient to call for help with toileting needs              Problem: Diabetes Self-Management  Goal: *Disease process and treatment process  Description: Define diabetes and identify own type of diabetes; list 3 options for treating diabetes.   Outcome: Progressing Towards Goal

## 2020-06-03 NOTE — PROGRESS NOTES
Interdisciplinary Round Note   Patient Information: Patient Information: Lynsey Lindo.                                      381/47   Reason for Admission: CHF (congestive heart failure) ([facility] [I50.9]   Attending Provider:   Ana Delaney   Past Medical History:   Past Medical History:   Diagnosis Date    ACS (acute coronary syndrome) ([facility]     CAD (coronary artery disease), native coronary artery August 2010    s/p non-ST-elevation myocardial infarction, s/p PCI with BMS (Vision 4x18mm) distal RCA with 45% distal LAD  and mild LCx disease. mild-moderate LV systolic dysfunction (10/85).  Diabetes mellitus type II     Dyslipidemia     ED (erectile dysfunction)     GERD (gastroesophageal reflux disease)     History of BPH     History of echocardiogram 5/18/2011    EF 65%. Mod-marked increased wall thickness. Gr 1 DDfx. No significant valvular heart disease.  Hyperlipidemia     Hypertension     Ischemic cardiomyopathy     recovery of LV syst fct  Echo May 2011 LV EF 65%    MI (myocardial infarction) ([facility]     Obesity     RBBB (right bundle branch block with left anterior fascicular block)     S/P cardiac cath 8/26/2010    LM patent. dLAD 45%. CX mild. dRCA 100% thrombotic (S/P cath thrombectomy & Vision 4 x 18-mm BMS). EF 40%. Posterobasal, diaphrag, apical hypk.  Shingles 4/2012    Tobacco use disorder, continuous       Hospital day: 2  Estimated discharge date: 6/5/20  RRAT Score: High Risk            21       Total Score        4 IP Visits Last 12 Months (1-3=4, 4=9, >4=11)    5 Pt. Coverage (Medicare=5 , Medicaid, or Self-Pay=4)    12 Charlson Comorbidity Score (Age + Comorbid Conditions)        Criteria that do not apply:    Has Seen PCP in Last 6 Months (Yes=3, No=0)    . Living with Significant Other. Assisted Living. LTAC. SNF.  or   Rehab    Patient Length of Stay (>5 days = 3)       Goals for Today: vascular study, cardiology, pt/ot           VITAL SIGNS  Vitals:    06/03/20 0410 06/03/20 0745 06/03/20 1027 06/03/20 1207   BP: 151/65 166/65 182/88 175/67   Pulse: (!) 58 67  60   Resp: 19 18  18   Temp: 97.9 °F (36.6 °C) 97.5 °F (36.4 °C)  97.4 °F (36.3 °C)   SpO2: 95% 98%  97%   Weight:   98 kg (216 lb)    Height:   5' 6\" (1.676 m)           Lines, Drains, & Airways    Peripheral IV 06/01/20 Right Antecubital-Site Assessment: Clean, dry, & intact  [REMOVED] Peripheral IV 06/01/20 Left Hand-Site Assessment: Clean, dry, & intact       VTE Prophylaxis                                   Intake and Output:   06/01 1901 - 06/03 0700  In: -   Out: 2100 [Urine:2100]  No intake/output data recorded. Current Diet: DIET CARDIAC Regular; Consistent Carb 1800kcal       Abdominal   Last Bowel Movement Date: 06/03/20  Stool Appearance: Soft     Recent Glucose Results:   Lab Results   Component Value Date/Time    GLU 59 (L) 06/03/2020 05:45 AM    GLUCPOC 134 (H) 06/03/2020 12:08 PM    GLUCPOC 134 (H) 06/03/2020 07:32 AM    GLUCPOC 68 (L) 06/03/2020 06:42 AM          IV Antibiotics? Activity Level: Activity Level: Up ad tim  Needs assistance with ADLs: yes  PT Consult Status: YES Current Immunizations: There is no immunization history on file for this patient.    Recommendations:   Discharge Disposition: Home    COVID status: no     Will the patient require COVID testing prior to discharge for placement?: NO    Medication Reconciliation Completed: YES    Cardiac/Pulmonary Rehab Ordered:  NO    Needs for Discharge:  Recommendations from IDR team:        LARRY Rhodes RN  Care Management  Pager: [phone]

## 2020-06-03 NOTE — PROGRESS NOTES
[facility]      CARDIOLOGY PROGRESS NOTE  RECS:    1. Acute on Chronic Congestive Hear failure- increasing in SOB. NT pro BNP>1000. in the setting uncontrolled hypertension, non compliance with fluid/ salt restriction and CKD-continue gentle diuresis. Possibly has COPD component also. 2. Hx of recent NSTEMI- stable no chest pain no chest pressure. Negative troponin x3. S/p cardiac cath 4/23/20 Double vessel coronary artery disease. S/p ptca/stent to mid LAD. Known BMS to distal RCA with moderate lesion- continue asa, Brilinta. 3. Uncontrolled Hypertension- still elevated. Increase clonidine to 3 times a day. Continue gentle diuresis. 4. Hx of Ischemic cardiomyopathy- echo 2017 revealed EF% 65 %. Recent echo 4/20 shows EF%  60-65%. 5. Dyslipidemia- continue statin   6. Tobacco use disorder-per patient he quit recently   7. 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 Diabetes- poorly controlled with A1c 7.9   9. CKD- baseline 1.8 -2.4 since 2018 - creatinine levels stable. Will monitor with diuresis. Renal is following     Reinforced on the patient regarding salt and fluid restriction. He understands and promises to follow and comply.     EKG Results     Procedure 720 Value Units Date/Time    EKG, 12 LEAD, SUBSEQUENT [[id number]] Collected:  06/02/20 0716    Order Status:  Completed Updated:  06/02/20 0734     Ventricular Rate 66 BPM      Atrial Rate 66 BPM      P-R Interval 286 ms      QRS Duration 166 ms      Q-T Interval 472 ms      QTC Calculation (Bezet) 494 ms      Calculated P Axis 4 degrees      Calculated R Axis -77 degrees      Calculated T Axis 93 degrees      Diagnosis --     Sinus rhythm with 1st degree AV block with occasional premature ventricular   complexes  Left axis deviation  Right bundle branch block  Left ventricular hypertrophy with repolarization abnormality  Abnormal ECG  When compared with ECG of 01-JUN-2020 12:37,  premature ventricular complexes are now present  aberrant conduction is no longer present  Confirmed by Yina Waggoner ([id number] ) on 6/2/2020 7:34:41 AM      EKG, 12 LEAD, INITIAL [[id number]] Collected:  06/01/20 1237    Order Status:  Completed Updated:  06/01/20 1649     Ventricular Rate 69 BPM      Atrial Rate 69 BPM      P-R Interval 298 ms      QRS Duration 160 ms      Q-T Interval 452 ms      QTC Calculation (Bezet) 484 ms      Calculated P Axis -11 degrees      Calculated R Axis -80 degrees      Calculated T Axis 99 degrees      Diagnosis --     Sinus rhythm with 1st degree AV block with premature atrial complexes with   aberrant conduction  Right bundle branch block  Left anterior fascicular block  Bifascicular block  Left ventricular hypertrophy with repolarization abnormality  Cannot rule out Septal infarct (cited on or before 01-JUN-2020)  Abnormal ECG  When compared with ECG of 24-APR-2020 07:56,  premature ventricular complexes are no longer present  aberrant conduction is now present  Confirmed by Gama Spence MD, ----- ([id number]) on 6/1/2020 4:48:49 PM          XR Results (most recent):  Results from Hospital Encounter encounter on 06/01/20   XR CHEST PORT    Narrative HISTORY: Shortness of breath. EXAM: Chest.    TECHNIQUE: Single view AP portable semiupright chest.     COMPARISON: 4/20/2020. FINDINGS: Essentially unchanged aeration of bilateral hemithoraces is  demonstrated with minimal bilateral diffuse interstitial prominence without  edema thorax pneumonia or pleural effusions. Heart and mediastinal structures  are unchanged. Heart is enlarged. . Visualized bony thorax and soft tissues are  within normal limits. Impression  IMPRESSION:    1. Unchanged sequela of minimal congestion. Follow-up with plain imaging. 04/20/20   ECHO ADULT COMPLETE 04/21/2020 4/21/2020    Addendum · Normal cavity size and systolic function (ejection fraction normal).   Moderately increased wall thickness. Estimated left ventricular ejection  fraction is 60 - 65%. Visually measured ejection fraction. No regional  wall motion abnormality noted. Normal left ventricular strain. Moderate  (grade 2) left ventricular diastolic dysfunction. · Mitral valve non-specific thickening. Trace mitral valve regurgitation  is present. Harley Kinney MD 4/21/2020 11:22 AM          Narrative · Normal cavity size and systolic function (ejection fraction normal). Moderately increased wall thickness. Estimated left ventricular ejection   fraction is 60 - 65%. Visually measured ejection fraction. No regional   wall motion abnormality noted. Normal left ventricular strain. Moderate   (grade 2) left ventricular diastolic dysfunction. · Mitral valve non-specific thickening and thickening. Trace mitral valve   regurgitation is present. Signed by: Harley Kinney MD       04/20/20   NUCLEAR CARDIAC STRESS TEST 04/22/2020 4/22/2020    Narrative · Baseline ECG: Sinus bradycardia, right bundle branch blockLeft anterior   bifascicular block Left ventricular hypertrophy with wide QRS widening   Cannot rule out septal infarct, age undetermined T wave abnormality,   consider inferolateral ischemia . With 1st degree AV block  · Gated SPECT: Left ventricular function post-stress was abnormal.   Calculated ejection fraction is 42%. There is no evidence of transient   ischemic dilation (TID). The TID ratio is 1.07.  · Inconclusive stress test.  · Left ventricular perfusion is abnormal.  · Myocardial perfusion imaging defect 1: There is a defect that is   moderate to large in size with a moderate reduction in uptake present in   the mid-apical anterior and anteroseptal location(s) that is predominantly   reversible. There is abnormal wall motion in the defect area. Viability in   the area is good. The defect appears to be infarction with maria e-infarct   ischemia.  Perfusion defect was visually present without quantitative evidence. · Myocardial perfusion imaging defect 2: There is a defect that is small   in size with a mild reduction in uptake affecting the apical to mid   inferior location(s) that is predominantly reversible. There is normal   wall motion in the defect area. Viability in the area is good. The defect   appears to be ischemia. Perfusion defect was visually present without   quantitative evidence. · Myocardial perfusion imaging defect 1: caused by soft tissue. · Positive myocardial perfusion imaging. Myocardial perfusion imaging   supports a high risk stress test.        Signed by: Max Busby MD     04/20/20   CARDIAC PROCEDURE 04/23/2020 4/23/2020    Narrative Double vessel coronary artery disease. S/p ptca/stent to mid LAD. Known BMS to distal RCA with moderate lesion. Recommend intense risk factor modification. Signed by: Maria G Coto MD         ASSESSMENT:  Hospital Problems  Date Reviewed: 5/18/2020          Codes Class Noted POA    CHF (congestive heart failure) ([facility] ICD-10-CM: I50.9  ICD-9-CM: 428.0  6/1/2020 Unknown                SUBJECTIVE:  No chest pain  Improving shortness of breath    OBJECTIVE:    VS:   Visit Vitals  /65 (BP 1 Location: Left arm, BP Patient Position: Supine)   Pulse 67   Temp 97.5 °F (36.4 °C)   Resp 18   Wt 98.1 kg (216 lb 3.2 oz)   SpO2 98%   BMI 34.90 kg/m²         Intake/Output Summary (Last 24 hours) at 6/3/2020 0920  Last data filed at 6/3/2020 0609  Gross per 24 hour   Intake    Output 1400 ml   Net -1400 ml     TELE: normal sinus rhythm    General: alert and in no apparent distress  HENT: Normocephalic, atraumatic. Normal external eye. Neck :  no bruit, no JVD  Cardiac:  regular rate and rhythm, S1, S2 normal, no murmur, click, rub or gallop  Chest/Lungs:harsh breath sounds noted lung bases, bilateral mild wheezing diffusely and expiration.   Abdomen: Soft, nontender, no masses  Extremities:  No c/c/trace to 1+ edema, peripheral pulses present      Labs: Results:       Chemistry Recent Labs     06/03/20  0545 06/02/20  0454 06/01/20  1234   GLU 59* 86  93 224*    143  142 142   K 3.8 4.0  4.0 4.4   * 113*  113* 114*   CO2 25 23  24 23   BUN 33* 34*  34* 33*   CREA 2.36* 2.23*  2.29* 2.40*   CA 8.5 8.8  8.8 8.3*   MG 2.2  --   --    PHOS  --  4.3  --    AGAP 5 7  5 5   BUCR 14 15  15 14   AP  --   --  111   TP  --   --  7.7   ALB  --  3.0* 2.9*   GLOB  --   --  4.8*   AGRAT  --   --  0.6*      CBC w/Diff Recent Labs     06/03/20  0545 06/01/20  1234   WBC 9.6 8.5   RBC 3.73* 3.71*   HGB 9.4* 9.4*   HCT 28.3* 27.9*    212   GRANS 75* 73   LYMPH 9* 9*   EOS 7* 8*      Cardiac Enzymes Recent Labs     06/01/20  1549 06/01/20  1234    188   CKND1 1.5 1.6      Coagulation No results for input(s): PTP, INR, APTT, INREXT in the last 72 hours. Lipid Panel Lab Results   Component Value Date/Time    Cholesterol, total 122 04/20/2020 04:54 AM    HDL Cholesterol 23 (L) 04/20/2020 04:54 AM    LDL, calculated 62.2 04/20/2020 04:54 AM    VLDL, calculated 36.8 04/20/2020 04:54 AM    Triglyceride 184 (H) 04/20/2020 04:54 AM    CHOL/HDL Ratio 5.3 (H) 04/20/2020 04:54 AM      BNP No results for input(s): BNPP in the last 72 hours. Liver Enzymes Recent Labs     06/02/20  0454 06/01/20  1234   TP  --  7.7   ALB 3.0* 2.9*   AP  --  111      Digoxin    Thyroid Studies No results found for: T4, T3U, TSH, TSHEXT           Debrah Najjar, MD     Contact numbers:   5 PM to 9 AM: Answering service at [phone]   9 AM to 5 PM: Pager [facility]--[phone]; if no response, please call through answering service or office.   Office number is [phone] or [phone]

## 2020-06-03 NOTE — PROGRESS NOTES
Problem: Self Care Deficits Care Plan (Adult)  Goal: *Acute Goals and Plan of Care (Insert Text)  Outcome: Resolved/Met   OCCUPATIONAL THERAPY EVALUATION/DISCHARGE    Patient: Dong Mcqueen (79 y.o. male)  Date: 6/3/2020  Primary Diagnosis: CHF (congestive heart failure) ([facility] [I50.9]        Precautions:   Fall  PLOF: Pt reports he (I) with basic self-care/ADLs, IADLs, and functional mobility without AD PTA. ASSESSMENT AND RECOMMENDATIONS:  Pt seen in conjunction with PT to maximize safety of patient and staff members. Based on the objective data described below, the patient presents he is (I) with basic self-care/ADLs. No LOB noted during functional transfers. Will defer to PT for further functional balance and mobility. SpO2 remained at >96% throughout session. Pt reports he has a supportive wife to assist PRN. As pt presents he is at or near his baseline with basic self-care, skilled occupational therapy is not indicated at this time, therefore OT to d/c pt from caseload. Discharge Recommendations: None  Further Equipment Recommendations for Discharge: N/A     SUBJECTIVE:   Patient stated my wife takes good care of me    OBJECTIVE DATA SUMMARY:     Past Medical History:   Diagnosis Date    ACS (acute coronary syndrome) ([facility]     CAD (coronary artery disease), native coronary artery August 2010    s/p non-ST-elevation myocardial infarction, s/p PCI with BMS (Vision 4x18mm) distal RCA with 45% distal LAD  and mild LCx disease. mild-moderate LV systolic dysfunction (14/54). Diabetes mellitus type II     Dyslipidemia     ED (erectile dysfunction)     GERD (gastroesophageal reflux disease)     History of BPH     History of echocardiogram 5/18/2011    EF 65%. Mod-marked increased wall thickness. Gr 1 DDfx. No significant valvular heart disease.     Hyperlipidemia     Hypertension     Ischemic cardiomyopathy     recovery of LV syst fct  Echo May 2011 LV EF 65%    MI (myocardial infarction) ([facility] Obesity     RBBB (right bundle branch block with left anterior fascicular block)     S/P cardiac cath 8/26/2010    LM patent. dLAD 45%. CX mild. dRCA 100% thrombotic (S/P cath thrombectomy & Vision 4 x 18-mm BMS). EF 40%. Posterobasal, diaphrag, apical hypk. Shingles 4/2012    Tobacco use disorder, continuous      Past Surgical History:   Procedure Laterality Date    COLONOSCOPY N/A 4/23/2019    COLONOSCOPY performed by Zayra Galo MD at [facility] ENDOSCOPY    HX CATARACT REMOVAL      HX CORONARY ARTERY BYPASS GRAFT      HX CORONARY STENT PLACEMENT  2010    HX HEART CATHETERIZATION  08/26/10    1. Normal systemic pressure. 2. Moderate elevation of left sided filling pressures. 3. Mild to moderate left ventricular systolic dysfunction distinct regional wall motion abnormalities. 4. Coronary disease with thrombotic occlusion of the distal right coronary artery and moderate left anterior descending disease. 5. Successful percutaneous coronary intervention with catheter thrombectomy. HX HEMORRHOIDECTOMY      HX MOHS PROCEDURES Left 2002    HX TRABECULECTOMY       Barriers to Learning/Limitations: None  Compensate with: visual, verbal, tactile, kinesthetic cues/model    Home Situation:   Home Situation  Home Environment: Private residence  # Steps to Enter: 3  Rails to Enter: Yes  Hand Rails : Right  One/Two Story Residence: One story  Living Alone: No  Support Systems: Spouse/Significant Other/Partner  Patient Expects to be Discharged to[de-identified] Private residence  Current DME Used/Available at Home: Other (comment)(none)  []     Right hand dominant   []     Left hand dominant    Cognitive/Behavioral Status:  Neurologic State: Alert  Orientation Level: Oriented X4  Cognition: Appropriate decision making; Follows commands  Safety/Judgement: Fall prevention    Skin: Visible skin appeared intact  Edema: None noted        Coordination: BUE  Coordination: Within functional limits  Fine Motor Skills-Upper: Left Intact; Right Intact      Balance:  Sitting: Intact  Standing: Intact; Without support  Standing - Static: Good  Standing - Dynamic : Good    Strength: BUE  Strength: Within functional limits    Tone & Sensation: BUE  Tone: Normal  Sensation: Impaired(Pt reports tingling sensation in B hands)    Range of Motion: BUE  AROM: Within functional limits         Functional Mobility and Transfers for ADLs:  Bed Mobility:  Transfers:  Sit to Stand: Independent  Stand to Sit: Independent  Bed to Chair: Independent      ADL Assessment:  Feeding: Independent    Oral Facial Hygiene/Grooming: Independent    Bathing: Independent    Upper Body Dressing: Independent    Lower Body Dressing: Independent    Toileting: Independent      ADL Intervention:  Lower Body Dressing Assistance  Dressing Assistance: Independent  Socks: Independent    Cognitive Retraining  Safety/Judgement: Fall prevention    Pain:  Pain level pre-treatment: 0/10   Pain level post-treatment: 0/10   Pain Intervention(s): Medication (see MAR); Rest, Ice, Repositioning  Response to intervention: Nurse notified, See doc flow    Activity Tolerance:   Good    Please refer to the flowsheet for vital signs taken during this treatment. After treatment:   [x]  Patient left in no apparent distress sitting up in chair  []  Patient left in no apparent distress in bed  [x]  Call bell left within reach  []  Nursing notified  []  Caregiver present  []  Bed alarm activated    COMMUNICATION/EDUCATION:   [x]      Role of Occupational Therapy in the acute care setting  []      Home safety education was provided and the patient/caregiver indicated understanding. [x]      Patient/family have participated as able and agree with findings and recommendations. []      Patient is unable to participate in plan of care at this time.     Thank you for this referral.  Anabel Desouza MS, OTR/L  Time Calculation: 15 mins      Eval Complexity: History: MEDIUM Complexity : Expanded review of history including physical, cognitive and psychosocial  history ; Examination: LOW Complexity : 1-3 performance deficits relating to physical, cognitive , or psychosocial skils that result in activity limitations and / or participation restrictions ;    Decision Making:LOW Complexity : No comorbidities that affect functional and no verbal or physical assistance needed to complete eval tasks

## 2020-06-03 NOTE — PROGRESS NOTES
Problem: Mobility Impaired (Adult and Pediatric)  Goal: *Acute Goals and Plan of Care (Insert Text)  Description: Physical Therapy Goals  Initiated 6/3/2020 and to be accomplished within 7 day(s)  1. Patient will ambulate with modified independence for 150 feet with the least restrictive device without LOB. 2.  Patient will ascend/descend 3 stairs with right handrail(s) with modified independence. Prior Level of Function:   Patient was independent for all mobility including gait without AD. Patient lives with wife in a one-story home, 3 steps to enter with hand rail on right. Patient is retired and likes to work on old cars. 6/3/2020 1432 by Mehdi Benton  Outcome: Progressing Towards Goal   PHYSICAL THERAPY EVALUATION    Patient: Phoenix Shetty (79 y.o. male)  Date: 6/3/2020  Primary Diagnosis: CHF (congestive heart failure) ([facility] [I50.9]        Precautions:  Fall      ASSESSMENT :  Based on the objective data described below, the patient presents with functional BLE strength, impaired activity tolerance, unsteady gait with fatigue. Patient seen with OT to maximize safety. Patient received sitting in bedside chair, asleep but easily aroused by voice. Patient agreeable to PT evaluation and treatment. SpO2 96% on RA at rest and HR 60 bpm at rest. Patient with grossly 5/5 BLE strength. Independent for sit<>stand transfers from chair and bed without AD. Ambulated with standby assist in hallway x 75 feet no AD to stairs. Gait steady, continuous steps, wide base of support, no sway, no path deviation, no LOB. SpO2 96% on RA with ambulation. Completed 3 steps with hand rail on right with CGA with alternating step pattern. SpO2 96% on RA. PT noticed patient became fatigued with progressively worsening gait stability, LOB x 2 on the way back to his room x 75 feet. CGA with patient using hand rail briefly to recover balance. Patient increased trunk sway, crossing midline during stance at times.  Upon return to room, SpO2 97% on RA, HR 70 bpm. /64 mmHg. SpO2 recovered to 99% on RA. Mild wheezing noted after activity but resolved with 5 minutes rest. Patient returned to seated in bedside chair, all needs in reach, in NAD, vitals stable. PT educated patient on benefit of additional session due to LOB and fatigue. Also educated patient on benefits of rollator for community mobility. Patient will benefit from skilled intervention to address the above impairments. Patient's rehabilitation potential is considered to be Good  Factors which may influence rehabilitation potential include:   [x]         None noted  []         Mental ability/status  []         Medical condition  []         Home/family situation and support systems  []         Safety awareness  []         Pain tolerance/management  []         Other:      PLAN :  Recommendations and Planned Interventions:   [x]           Bed Mobility Training             [x]    Neuromuscular Re-Education  [x]           Transfer Training                   []    Orthotic/Prosthetic Training  [x]           Gait Training                          []    Modalities  [x]           Therapeutic Exercises           []    Edema Management/Control  [x]           Therapeutic Activities            [x]    Family Training/Education  [x]           Patient Education  []           Other (comment):    Frequency/Duration: Patient will be followed by physical therapy 1-2 times per day/4-7 days per week to address goals. Discharge Recommendations: None  Further Equipment Recommendations for Discharge: rollator     SUBJECTIVE:   Patient stated I'm just taking a nap. It's cold in here.     OBJECTIVE DATA SUMMARY:     Past Medical History:   Diagnosis Date    ACS (acute coronary syndrome) ([facility]     CAD (coronary artery disease), native coronary artery August 2010    s/p non-ST-elevation myocardial infarction, s/p PCI with BMS (Vision 4x18mm) distal RCA with 45% distal LAD  and mild LCx disease. mild-moderate LV systolic dysfunction (41/11). Diabetes mellitus type II     Dyslipidemia     ED (erectile dysfunction)     GERD (gastroesophageal reflux disease)     History of BPH     History of echocardiogram 5/18/2011    EF 65%. Mod-marked increased wall thickness. Gr 1 DDfx. No significant valvular heart disease. Hyperlipidemia     Hypertension     Ischemic cardiomyopathy     recovery of LV syst fct  Echo May 2011 LV EF 65%    MI (myocardial infarction) ([address]     Obesity     RBBB (right bundle branch block with left anterior fascicular block)     S/P cardiac cath 8/26/2010    LM patent. dLAD 45%. CX mild. dRCA 100% thrombotic (S/P cath thrombectomy & Vision 4 x 18-mm BMS). EF 40%. Posterobasal, diaphrag, apical hypk. Shingles 4/2012    Tobacco use disorder, continuous      Past Surgical History:   Procedure Laterality Date    COLONOSCOPY N/A 4/23/2019    COLONOSCOPY performed by Matheus Brower MD at [facility] ENDOSCOPY    HX CATARACT REMOVAL      HX CORONARY ARTERY BYPASS GRAFT      HX CORONARY STENT PLACEMENT  2010    HX HEART CATHETERIZATION  08/26/10    1. Normal systemic pressure. 2. Moderate elevation of left sided filling pressures. 3. Mild to moderate left ventricular systolic dysfunction distinct regional wall motion abnormalities. 4. Coronary disease with thrombotic occlusion of the distal right coronary artery and moderate left anterior descending disease. 5. Successful percutaneous coronary intervention with catheter thrombectomy.     HX HEMORRHOIDECTOMY      HX MOHS PROCEDURES Left 2002    HX TRABECULECTOMY       Barriers to Learning/Limitations: None  Compensate with: N/A  Home Situation:  Home Situation  Home Environment: Private residence  # Steps to Enter: 3  Rails to Enter: Yes  Hand Rails : Right  One/Two Story Residence: One story  Living Alone: No  Support Systems: Spouse/Significant Other/Partner  Patient Expects to be Discharged to[de-identified] Private residence  Current DME Used/Available at Home: Other (comment)(none)  Critical Behavior:  Neurologic State: Alert  Orientation Level: Oriented X4  Cognition: Appropriate decision making; Follows commands  Safety/Judgement: Fall prevention  Psychosocial  Patient Behaviors: Calm; Cooperative  Purposeful Interaction: Yes  Pt Identified Daily Priority: Clinical issues (comment)  Caritas Process: Establish trust;Teaching/learning; Attend basic human needs;Create healing environment  Caring Interventions: Reassure; Therapeutic modalities  Reassure: Therapeutic listening; Informing;Caring rounds  Therapeutic Modalities: Humor                 Strength:    Strength: Within functional limits      Tone & Sensation:   Tone: Normal   Sensation: Impaired(tingling in bilateral hands)      Range Of Motion:  AROM: Within functional limits      Functional Mobility:  Bed Mobility:   Not addressed     Transfers:  Sit to Stand: Independent  Stand to Sit: Independent      Bed to Chair: Independent     Balance:   Sitting: Intact; Without support  Standing: Impaired; Without support  Standing - Static: Good  Standing - Dynamic : Fair;Occasional(LoB x 2 with ambulation)     Ambulation/Gait Training:  Distance (ft): 150 Feet (ft)  Assistive Device: Other (comment)(none)  Ambulation - Level of Assistance: Stand-by assistance   Gait Description (WDL): Exceptions to WDL  Gait Abnormalities: (LOB x 2 with CGA for recovery)   Base of Support: Widened        Stairs:  Number of Stairs Trained: 3  Stairs - Level of Assistance: Contact guard assistance  Rail Use: Right        Pain:  Pain level pre-treatment: 0/10   Pain level post-treatment: 0/10   Pain Intervention(s) : Medication (see MAR); Rest, Repositioning  Response to intervention: Nurse notified, See doc flow    Activity Tolerance:   VSS throughout session. 96-99% on RA. But wheezing noted  Please refer to the flowsheet for vital signs taken during this treatment.   After treatment:   [x]         Patient left in no apparent distress sitting up in chair  []         Patient left in no apparent distress in bed  [x]         Call bell left within reach  []         Nursing notified  []         Caregiver present  []         Bed alarm activated  []         SCDs applied    COMMUNICATION/EDUCATION:   [x]         Role of Physical Therapy in the acute care setting. [x]         Fall prevention education was provided and the patient/caregiver indicated understanding. [x]         Patient/family have participated as able in goal setting and plan of care. [x]         Patient/family agree to work toward stated goals and plan of care. []         Patient understands intent and goals of therapy, but is neutral about his/her participation. []         Patient is unable to participate in goal setting/plan of care: ongoing with therapy staff.  []         Other:     Thank you for this referral.  Peter Beverly, DPT   Time Calculation: 15 mins      Eval Complexity: History: MEDIUM  Complexity : 1-2 comorbidities / personal factors will impact the outcome/ POC Exam:MEDIUM Complexity : 3 Standardized tests and measures addressing body structure, function, activity limitation and / or participation in recreation  Presentation: MEDIUM Complexity : Evolving with changing characteristics  Clinical Decision Making:Medium Complexity strength, activity tolerance, monitoring of vitals, gait stability, stairs  Overall Complexity:MEDIUM

## 2020-06-03 NOTE — PROGRESS NOTES
Bedside and Verbal shift change report given to Wilfredo Romano (oncoming nurse) by Carly Leon RN   (offgoing nurse). Report given with SBAR, Leanna, MAR and Recent Results.

## 2020-06-03 NOTE — PROGRESS NOTES
Bedside and Verbal shift change report given to Rani (oncoming nurse) by Daniella Cheung RN   (offgoing nurse). Report given with SBAR, Kardex, MAR and Recent Results.

## 2020-06-04 LAB
ABDOMINAL PROX AORTA VEL: 119.8 CM/S
ANION GAP SERPL CALC-SCNC: 7 MMOL/L (ref 3–18)
BUN SERPL-MCNC: 32 MG/DL (ref 7–18)
BUN/CREAT SERPL: 13 (ref 12–20)
CALCIUM SERPL-MCNC: 8.3 MG/DL (ref 8.5–10.1)
CHLORIDE SERPL-SCNC: 112 MMOL/L (ref 100–111)
CO2 SERPL-SCNC: 25 MMOL/L (ref 21–32)
CREAT SERPL-MCNC: 2.42 MG/DL (ref 0.6–1.3)
GLUCOSE BLD STRIP.AUTO-MCNC: 104 MG/DL (ref 70–110)
GLUCOSE BLD STRIP.AUTO-MCNC: 185 MG/DL (ref 70–110)
GLUCOSE BLD STRIP.AUTO-MCNC: 245 MG/DL (ref 70–110)
GLUCOSE BLD STRIP.AUTO-MCNC: 280 MG/DL (ref 70–110)
GLUCOSE SERPL-MCNC: 95 MG/DL (ref 74–99)
LEFT HILAR EDV: 10.2 CM/S
LEFT HILAR PSV: 47.5 CM/S
LEFT INTERLOBAR EDV: 0 CM/S
LEFT INTERLOBAR PSV: 27 CM/S
LEFT KIDNEY ARCUATE ARTERY SUPERIOR EDV: 0 CM/S
LEFT KIDNEY ARCUATE ARTERY SUPERIOR PSV: 14.2 CM/S
LEFT KIDNEY LENGTH: 10.68 CM
LEFT KIDNEY SUP ARCUATE RI: 1
LEFT KIDNEY WIDTH: 5.65 CM
LEFT RENAL DIST DIAS: 14.3 CM/S
LEFT RENAL DIST RAR: 0.58
LEFT RENAL DIST RI: 0.79
LEFT RENAL DIST SYS: 69.5 CM/S
LEFT RENAL MID DIAS: 11.8 CM/S
LEFT RENAL MID RAR: 0.64
LEFT RENAL MID RI: 0.85
LEFT RENAL MID SYS: 77 CM/S
LEFT RENAL PROX DIAS: 23.3 CM/S
LEFT RENAL PROX RAR: 0.87
LEFT RENAL PROX RI: 0.78
LEFT RENAL PROX SYS: 104.5 CM/S
LEFT RENAL UPPER PARENCHYMA MAX: 49.4 CM/S
LEFT RENAL UPPER PARENCHYMA MIN: 0 CM/S
LEFT RENAL UPPER PARENCHYMA RI: 1
MAGNESIUM SERPL-MCNC: 2.2 MG/DL (ref 1.6–2.6)
PHOSPHATE SERPL-MCNC: 3.5 MG/DL (ref 2.5–4.9)
POTASSIUM SERPL-SCNC: 3.9 MMOL/L (ref 3.5–5.5)
PROX AORTIC AP: 2.24 CM
PROX AORTIC TRANS: 1.85 CM
RIGHT HILAR EDV: 11.2 CM/S
RIGHT HILAR PSV: 55.5 CM/S
RIGHT INTERLOBAR EDV: 3.8 CM/S
RIGHT INTERLOBAR PSV: 24.1 CM/S
RIGHT KIDNEY ARCUATE ARTERY SUPERIOR EDV: 0 CM/S
RIGHT KIDNEY ARCUATE ARTERY SUPERIOR PSV: 17.5 CM/S
RIGHT KIDNEY LENGTH: 9.81 CM
RIGHT KIDNEY SUP ARCUATE RI: 1
RIGHT KIDNEY WIDTH: 4.62 CM
RIGHT RENAL DIST DIAS: 12.7 CM/S
RIGHT RENAL DIST RAR: 0.49
RIGHT RENAL DIST RI: 0.78
RIGHT RENAL DIST SYS: 58.7 CM/S
RIGHT RENAL LOWER PARENCHYMA MAX: 38.4 CM/S
RIGHT RENAL LOWER PARENCHYMA MIN: 0 CM/S
RIGHT RENAL LOWER PARENCHYMA RI: 1
RIGHT RENAL PROX DIAS: 21.7 CM/S
RIGHT RENAL PROX RAR: 1.11
RIGHT RENAL PROX RI: 0.84
RIGHT RENAL PROX SYS: 132.9 CM/S
SODIUM SERPL-SCNC: 144 MMOL/L (ref 136–145)

## 2020-06-04 PROCEDURE — 97530 THERAPEUTIC ACTIVITIES: CPT

## 2020-06-04 PROCEDURE — 84100 ASSAY OF PHOSPHORUS: CPT

## 2020-06-04 PROCEDURE — 83735 ASSAY OF MAGNESIUM: CPT

## 2020-06-04 PROCEDURE — 74011250637 HC RX REV CODE- 250/637: Performed by: HOSPITALIST

## 2020-06-04 PROCEDURE — 74011636637 HC RX REV CODE- 636/637: Performed by: HOSPITALIST

## 2020-06-04 PROCEDURE — 74011250637 HC RX REV CODE- 250/637: Performed by: NURSE PRACTITIONER

## 2020-06-04 PROCEDURE — 65660000000 HC RM CCU STEPDOWN

## 2020-06-04 PROCEDURE — 82962 GLUCOSE BLOOD TEST: CPT

## 2020-06-04 PROCEDURE — 74011000250 HC RX REV CODE- 250: Performed by: NURSE PRACTITIONER

## 2020-06-04 PROCEDURE — 80048 BASIC METABOLIC PNL TOTAL CA: CPT

## 2020-06-04 PROCEDURE — 74011250637 HC RX REV CODE- 250/637: Performed by: INTERNAL MEDICINE

## 2020-06-04 PROCEDURE — 74011250636 HC RX REV CODE- 250/636: Performed by: HOSPITALIST

## 2020-06-04 PROCEDURE — 36415 COLL VENOUS BLD VENIPUNCTURE: CPT

## 2020-06-04 RX ADMIN — ASPIRIN 81 MG CHEWABLE TABLET 81 MG: 81 TABLET CHEWABLE at 09:46

## 2020-06-04 RX ADMIN — HEPARIN SODIUM 5000 UNITS: 5000 INJECTION INTRAVENOUS; SUBCUTANEOUS at 18:40

## 2020-06-04 RX ADMIN — CARVEDILOL 25 MG: 25 TABLET, FILM COATED ORAL at 09:47

## 2020-06-04 RX ADMIN — INSULIN LISPRO 2 UNITS: 100 INJECTION, SOLUTION INTRAVENOUS; SUBCUTANEOUS at 21:51

## 2020-06-04 RX ADMIN — CHOLECALCIFEROL TAB 25 MCG (1000 UNIT) 2 TABLET: 25 TAB at 09:46

## 2020-06-04 RX ADMIN — BUMETANIDE 1 MG: 0.25 INJECTION INTRAMUSCULAR; INTRAVENOUS at 09:46

## 2020-06-04 RX ADMIN — TICAGRELOR 90 MG: 90 TABLET ORAL at 18:39

## 2020-06-04 RX ADMIN — HYDRALAZINE HYDROCHLORIDE 100 MG: 50 TABLET, FILM COATED ORAL at 21:50

## 2020-06-04 RX ADMIN — INSULIN LISPRO 6 UNITS: 100 INJECTION, SOLUTION INTRAVENOUS; SUBCUTANEOUS at 14:16

## 2020-06-04 RX ADMIN — INSULIN GLARGINE 15 UNITS: 100 INJECTION, SOLUTION SUBCUTANEOUS at 09:48

## 2020-06-04 RX ADMIN — AMLODIPINE BESYLATE 10 MG: 10 TABLET ORAL at 09:47

## 2020-06-04 RX ADMIN — CLONIDINE HYDROCHLORIDE 0.1 MG: 0.1 TABLET ORAL at 18:39

## 2020-06-04 RX ADMIN — CLONIDINE HYDROCHLORIDE 0.1 MG: 0.1 TABLET ORAL at 09:46

## 2020-06-04 RX ADMIN — ISOSORBIDE MONONITRATE 30 MG: 30 TABLET, EXTENDED RELEASE ORAL at 09:46

## 2020-06-04 RX ADMIN — CLONIDINE HYDROCHLORIDE 0.1 MG: 0.1 TABLET ORAL at 21:48

## 2020-06-04 RX ADMIN — DOXAZOSIN 4 MG: 1 TABLET ORAL at 18:39

## 2020-06-04 RX ADMIN — HYDRALAZINE HYDROCHLORIDE 100 MG: 50 TABLET, FILM COATED ORAL at 14:16

## 2020-06-04 RX ADMIN — TICAGRELOR 90 MG: 90 TABLET ORAL at 07:47

## 2020-06-04 RX ADMIN — BUMETANIDE 1 MG: 0.25 INJECTION INTRAMUSCULAR; INTRAVENOUS at 18:40

## 2020-06-04 RX ADMIN — HYDRALAZINE HYDROCHLORIDE 100 MG: 50 TABLET, FILM COATED ORAL at 07:47

## 2020-06-04 RX ADMIN — GABAPENTIN 100 MG: 100 CAPSULE ORAL at 09:46

## 2020-06-04 RX ADMIN — DUTASTERIDE 0.5 MG: 0.5 CAPSULE, LIQUID FILLED ORAL at 09:47

## 2020-06-04 RX ADMIN — ATORVASTATIN CALCIUM 20 MG: 20 TABLET, FILM COATED ORAL at 21:50

## 2020-06-04 RX ADMIN — GABAPENTIN 100 MG: 100 CAPSULE ORAL at 18:39

## 2020-06-04 RX ADMIN — CARVEDILOL 25 MG: 25 TABLET, FILM COATED ORAL at 18:39

## 2020-06-04 RX ADMIN — HEPARIN SODIUM 5000 UNITS: 5000 INJECTION INTRAVENOUS; SUBCUTANEOUS at 02:15

## 2020-06-04 RX ADMIN — PANTOPRAZOLE SODIUM 40 MG: 40 TABLET, DELAYED RELEASE ORAL at 09:47

## 2020-06-04 RX ADMIN — INSULIN LISPRO 4 UNITS: 100 INJECTION, SOLUTION INTRAVENOUS; SUBCUTANEOUS at 18:40

## 2020-06-04 RX ADMIN — HEPARIN SODIUM 5000 UNITS: 5000 INJECTION INTRAVENOUS; SUBCUTANEOUS at 09:49

## 2020-06-04 NOTE — INTERDISCIPLINARY ROUNDS
Interdisciplinary Round Note Patient Information: Patient Information: Nathalie Mane. 
                                    852/17 Reason for Admission: CHF (congestive heart failure) ([facility] [I50.9] Attending Provider:   Delia Velazco Past Medical History:  
Past Medical History:  
Diagnosis Date  ACS (acute coronary syndrome) ([facility]  CAD (coronary artery disease), native coronary artery August 2010  
 s/p non-ST-elevation myocardial infarction, s/p PCI with BMS (Vision 4x18mm) distal RCA with 45% distal LAD  and mild LCx disease. mild-moderate LV systolic dysfunction (83/64).  Diabetes mellitus type II   
 Dyslipidemia  ED (erectile dysfunction)  GERD (gastroesophageal reflux disease)  History of BPH   
 History of echocardiogram 5/18/2011 EF 65%. Mod-marked increased wall thickness. Gr 1 DDfx. No significant valvular heart disease.  Hyperlipidemia  Hypertension  Ischemic cardiomyopathy   
 recovery of LV syst fct  Echo May 2011 LV EF 65%  MI (myocardial infarction) ([facility]  Obesity  RBBB (right bundle branch block with left anterior fascicular block)  S/P cardiac cath 8/26/2010 LM patent. dLAD 45%. CX mild. dRCA 100% thrombotic (S/P cath thrombectomy & Vision 4 x 18-mm BMS). EF 40%. Posterobasal, diaphrag, apical hypk.  Shingles 4/2012  Tobacco use disorder, continuous Hospital day: 3 Estimated discharge date: 6/5/20 RRAT Score: High Risk   
      
 23 Total Score 2 . Living with Significant Other. Assisted Living. LTAC. SNF. or  
Rehab  
 4 IP Visits Last 12 Months (1-3=4, 4=9, >4=11) 5 Pt. Coverage (Medicare=5 , Medicaid, or Self-Pay=4) 12 Charlson Comorbidity Score (Age + Comorbid Conditions) Criteria that do not apply:  
 Has Seen PCP in Last 6 Months (Yes=3, No=0) Patient Length of Stay (>5 days = 3) Goals for Today: cardiology to see VITAL SIGNS Vitals: 06/03/20 2000 06/04/20 0000 06/04/20 0400 06/04/20 0741 BP: 159/67 146/53 153/73 145/81 Pulse: (!) 58 (!) 56 63 62 Resp: 18 18 18 18 Temp: 97.5 °F (36.4 °C) 98.1 °F (36.7 °C) 98.1 °F (36.7 °C) 97.9 °F (36.6 °C) SpO2: 96% 96% 97% 98% Weight:      
Height:      
 
 
 
 Lines, Drains, & Airways Peripheral IV 06/01/20 Right Antecubital-Site Assessment: Clean, dry, & intact [REMOVED] Peripheral IV 06/01/20 Left Hand-Site Assessment: Clean, dry, & intact VTE Prophylaxis Intake and Output:  
06/02 1901 - 06/04 0700 In: 240 [P.O.:240] Out: 1450 [PKASH:0458] 06/04 0701 - 06/04 1900 In: 240 [P.O.:240] Out: -  Current Diet: DIET CARDIAC Regular; Consistent Carb 1800kcal      
Abdominal  
Last Bowel Movement Date: 06/03/20 Stool Appearance: Soft Recent Glucose Results:  
Lab Results Component Value Date/Time GLU 95 06/04/2020 05:14 AM  
 GLUCPOC 104 06/04/2020 06:34 AM  
 GLUCPOC 238 (H) 06/03/2020 09:31 PM  
 GLUCPOC 168 (H) 06/03/2020 03:52 PM  
 
 
  
IV Antibiotics? no 
      
Activity Level: Activity Level: Up ad tim Needs assistance with ADLs: no 
PT Consult Status: YES Current Immunizations: There is no immunization history on file for this patient. Recommendations:  
Discharge Disposition: Home COVID status: n/a Will the patient require COVID testing prior to discharge for placement?: NO Medication Reconciliation Completed: NO 
 
Cardiac/Pulmonary Rehab Ordered:  NO 
 
Needs for Discharge: no identified needs Recommendations from IDR team: pending cardiology

## 2020-06-04 NOTE — PROGRESS NOTES
[facility]      CARDIOLOGY PROGRESS NOTE  RECS:    1. Acute on Chronic Congestive Hear failure- increasing in SOB. NT pro BNP>1000. in the setting uncontrolled hypertension, non compliance with fluid/ salt restriction and CKD-continue gentle diuresis. Possibly has COPD component also- better I&O not accurate. On Bumex iv bid. May consider to change to po.   2. Hx of recent NSTEMI- stable no chest pain no chest pressure. Negative troponin x3. S/p cardiac cath 4/23/20 Double vessel coronary artery disease. S/p ptca/stent to mid LAD. Known BMS to distal RCA with moderate lesion- continue asa, Brilinta. 3. Uncontrolled Hypertension- difficult to control on multiple BP medications. Better after clonidine was increased  to 3 times a day  4. Hx of Ischemic cardiomyopathy- echo 2017 revealed EF% 65 %. Recent echo 4/20 shows EF%  60-65%. 5. Dyslipidemia- continue statin   6. Tobacco use disorder-per patient he quit recently   7. 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 Diabetes- poorly controlled with A1c 7.9   9. CKD- baseline 1.8 -2.4 since 2018 - creatinine levels stable. Will monitor with diuresis. Renal is following     Diuresing well- continue diuresis for additional 24 hrs.    Continue supportive care        EKG Results     Procedure 720 Value Units Date/Time    EKG, 12 LEAD, SUBSEQUENT [[id number]] Collected:  06/02/20 0716    Order Status:  Completed Updated:  06/02/20 0734     Ventricular Rate 66 BPM      Atrial Rate 66 BPM      P-R Interval 286 ms      QRS Duration 166 ms      Q-T Interval 472 ms      QTC Calculation (Bezet) 494 ms      Calculated P Axis 4 degrees      Calculated R Axis -77 degrees      Calculated T Axis 93 degrees      Diagnosis --     Sinus rhythm with 1st degree AV block with occasional premature ventricular   complexes  Left axis deviation  Right bundle branch block  Left ventricular hypertrophy with repolarization abnormality  Abnormal ECG  When compared with ECG of 01-JUN-2020 12:37,  premature ventricular complexes are now present  aberrant conduction is no longer present  Confirmed by Valerie Hodge ([phone]) on 6/2/2020 7:34:41 AM      EKG, 12 LEAD, INITIAL [[id number]] Collected:  06/01/20 1237    Order Status:  Completed Updated:  06/01/20 1649     Ventricular Rate 69 BPM      Atrial Rate 69 BPM      P-R Interval 298 ms      QRS Duration 160 ms      Q-T Interval 452 ms      QTC Calculation (Bezet) 484 ms      Calculated P Axis -11 degrees      Calculated R Axis -80 degrees      Calculated T Axis 99 degrees      Diagnosis --     Sinus rhythm with 1st degree AV block with premature atrial complexes with   aberrant conduction  Right bundle branch block  Left anterior fascicular block  Bifascicular block  Left ventricular hypertrophy with repolarization abnormality  Cannot rule out Septal infarct (cited on or before 01-JUN-2020)  Abnormal ECG  When compared with ECG of 24-APR-2020 07:56,  premature ventricular complexes are no longer present  aberrant conduction is now present  Confirmed by Kenyon Toribio MD, ----- ([id number]) on 6/1/2020 4:48:49 PM          XR Results (most recent):  Results from Hospital Encounter encounter on 06/01/20   XR CHEST PORT    Narrative HISTORY: Shortness of breath. EXAM: Chest.    TECHNIQUE: Single view AP portable semiupright chest.     COMPARISON: 4/20/2020. FINDINGS: Essentially unchanged aeration of bilateral hemithoraces is  demonstrated with minimal bilateral diffuse interstitial prominence without  edema thorax pneumonia or pleural effusions. Heart and mediastinal structures  are unchanged. Heart is enlarged. . Visualized bony thorax and soft tissues are  within normal limits. Impression  IMPRESSION:    1. Unchanged sequela of minimal congestion. Follow-up with plain imaging.        04/20/20   ECHO ADULT COMPLETE 04/21/2020 4/21/2020    Addendum · Normal cavity size and systolic function (ejection fraction normal). Moderately increased wall thickness. Estimated left ventricular ejection  fraction is 60 - 65%. Visually measured ejection fraction. No regional  wall motion abnormality noted. Normal left ventricular strain. Moderate  (grade 2) left ventricular diastolic dysfunction. · Mitral valve non-specific thickening. Trace mitral valve regurgitation  is present. Guru Mills MD 4/21/2020 11:22 AM          Narrative · Normal cavity size and systolic function (ejection fraction normal). Moderately increased wall thickness. Estimated left ventricular ejection   fraction is 60 - 65%. Visually measured ejection fraction. No regional   wall motion abnormality noted. Normal left ventricular strain. Moderate   (grade 2) left ventricular diastolic dysfunction. · Mitral valve non-specific thickening and thickening. Trace mitral valve   regurgitation is present. Signed by: Guru Mills MD       04/20/20   NUCLEAR CARDIAC STRESS TEST 04/22/2020 4/22/2020    Narrative · Baseline ECG: Sinus bradycardia, right bundle branch blockLeft anterior   bifascicular block Left ventricular hypertrophy with wide QRS widening   Cannot rule out septal infarct, age undetermined T wave abnormality,   consider inferolateral ischemia . With 1st degree AV block  · Gated SPECT: Left ventricular function post-stress was abnormal.   Calculated ejection fraction is 42%. There is no evidence of transient   ischemic dilation (TID). The TID ratio is 1.07.  · Inconclusive stress test.  · Left ventricular perfusion is abnormal.  · Myocardial perfusion imaging defect 1: There is a defect that is   moderate to large in size with a moderate reduction in uptake present in   the mid-apical anterior and anteroseptal location(s) that is predominantly   reversible. There is abnormal wall motion in the defect area. Viability in   the area is good. The defect appears to be infarction with maria e-infarct   ischemia. Perfusion defect was visually present without quantitative   evidence. · Myocardial perfusion imaging defect 2: There is a defect that is small   in size with a mild reduction in uptake affecting the apical to mid   inferior location(s) that is predominantly reversible. There is normal   wall motion in the defect area. Viability in the area is good. The defect   appears to be ischemia. Perfusion defect was visually present without   quantitative evidence. · Myocardial perfusion imaging defect 1: caused by soft tissue. · Positive myocardial perfusion imaging. Myocardial perfusion imaging   supports a high risk stress test.        Signed by: Vibha Dalton MD     04/20/20   CARDIAC PROCEDURE 04/23/2020 4/23/2020    Narrative Double vessel coronary artery disease. S/p ptca/stent to mid LAD. Known BMS to distal RCA with moderate lesion. Recommend intense risk factor modification. Signed by: Ronna Estrella MD         ASSESSMENT:  Hospital Problems  Date Reviewed: 5/18/2020          Codes Class Noted POA    CHF (congestive heart failure) ([facility] ICD-10-CM: I50.9  ICD-9-CM: 428.0  6/1/2020 Unknown                SUBJECTIVE:  No chest pain  Feels better SOB improved     OBJECTIVE:    VS:   Visit Vitals  /81   Pulse 62   Temp 97.9 °F (36.6 °C)   Resp 18   Ht 5' 6\" (1.676 m)   Wt 98 kg (216 lb 0.8 oz)   SpO2 98%   BMI 34.87 kg/m²         Intake/Output Summary (Last 24 hours) at 6/4/2020 1215  Last data filed at 6/4/2020 1019  Gross per 24 hour   Intake 480 ml   Output 350 ml   Net 130 ml     TELE: normal sinus rhythm    General: alert and in no apparent distress  HENT: Normocephalic, atraumatic. Normal external eye. Neck :  no bruit, no JVD  Cardiac:  regular rate and rhythm, S1, S2 normal, no murmur, click, rub or gallop  Chest/Lungs: diminished breath sounds B/L.   Abdomen: Soft, nontender, no masses  Extremities:  No c/c/trace to 1+ edema, peripheral pulses present      Labs: Results: Chemistry Recent Labs     06/04/20  0514 06/03/20  0545 06/02/20  0454 06/01/20  1234   GLU 95 59* 86  93 224*    143 143  142 142   K 3.9 3.8 4.0  4.0 4.4   * 113* 113*  113* 114*   CO2 25 25 23  24 23   BUN 32* 33* 34*  34* 33*   CREA 2.42* 2.36* 2.23*  2.29* 2.40*   CA 8.3* 8.5 8.8  8.8 8.3*   MG 2.2 2.2  --   --    PHOS 3.5  --  4.3  --    AGAP 7 5 7  5 5   BUCR 13 14 15  15 14   AP  --   --   --  111   TP  --   --   --  7.7   ALB  --   --  3.0* 2.9*   GLOB  --   --   --  4.8*   AGRAT  --   --   --  0.6*      CBC w/Diff Recent Labs     06/03/20  0545 06/01/20  1234   WBC 9.6 8.5   RBC 3.73* 3.71*   HGB 9.4* 9.4*   HCT 28.3* 27.9*    212   GRANS 75* 73   LYMPH 9* 9*   EOS 7* 8*      Cardiac Enzymes Recent Labs     06/01/20  1549 06/01/20  1234    188   CKND1 1.5 1.6      Coagulation No results for input(s): PTP, INR, APTT, INREXT, INREXT in the last 72 hours. Lipid Panel Lab Results   Component Value Date/Time    Cholesterol, total 122 04/20/2020 04:54 AM    HDL Cholesterol 23 (L) 04/20/2020 04:54 AM    LDL, calculated 62.2 04/20/2020 04:54 AM    VLDL, calculated 36.8 04/20/2020 04:54 AM    Triglyceride 184 (H) 04/20/2020 04:54 AM    CHOL/HDL Ratio 5.3 (H) 04/20/2020 04:54 AM      BNP No results for input(s): BNPP in the last 72 hours. Liver Enzymes Recent Labs     06/02/20  0454 06/01/20  1234   TP  --  7.7   ALB 3.0* 2.9*   AP  --  111      Digoxin    Thyroid Studies No results found for: T4, T3U, TSH, TSHEXT, TSHEXT           JESUS De La TorreC supervised    I have independently evaluated and examined the patient. All relevant labs and testing data's are reviewed. Care plan discussed and updated after review.     Joby Brizuela MD

## 2020-06-04 NOTE — PROGRESS NOTES
1920: Assumed patient care from [address] Patient is alert and oriented to person, place, time and situation. Respiratory status is stable on room air. Vital signs are stable. MEWS score is a one. Patient denies any pain, discomfort, nausea vomiting dizziness or anxiety. White board and fall card is updated. Bed is locked and in lowest position. Call bell, water and personal belongings are within reach. Patient has no questions, comments or concerns after bedside shift report. 0700: Patient had an uneventful shift. Respiratory status, vital signs and MEWS score remained stable. Patient was resting quietly with no signs of distress noted. Bed locked and in lowest position. Call bell water and personal belongings were within reach. Patient had no questions, comments or concerns after bedside shift report.  Bedside report given to Traci Perales R.N.

## 2020-06-04 NOTE — PROGRESS NOTES
[facility]  [facility]    Patient: Holland Andujar. Age: 69 y.o. : 1945 MR#: [id number] SSN: [id number]  Date/Time: 2020     Subjective: Patient feels better, shortness of breath and orthopnea improvED       Assessment/Plan:   1. Acute diastolic heart failure exacerbation: Continue Bumex iv, nitro paste, beta-blocker. Strict I's and O's. Daily weight. More than 2 L negative balance  2. Chest pressure, CE and EKG negative. Plan per cardiology  3. Hypertension: BP BETTER, continue Coreg, amlodipine, hydralazine, clonidine and as needed hydralazine. 4. Diabetes type 2: BS lower side, will cont Lantus and SSI  5. CKD stage 3: Crt stable, nephrology on case. Renal artery duplex noted  6. Dyslipidemiacontinue statin. 7. Obesity Body mass index is 38.51 kg/m². 8. DVT prophylaxis  Full code   If remains stable, discharge home tomorrow. Case discussed with:  [x]Patient  []Family  [x]Nursing  []Case Management  DVT Prophylaxis:  []Lovenox  [x]Hep SQ  []SCDs  []Coumadin   []Eliquis/Xarelto     Objective:   VS:   Visit Vitals  /81   Pulse 62   Temp 97.9 °F (36.6 °C)   Resp 18   Ht 5' 6\" (1.676 m)   Wt 98 kg (216 lb 0.8 oz)   SpO2 98%   BMI 34.87 kg/m²      Tmax/24hrs: Temp (24hrs), Av.9 °F (36.6 °C), Min:97.5 °F (36.4 °C), Max:98.1 °F (36.7 °C)  IOBRIEF    Intake/Output Summary (Last 24 hours) at 2020 1257  Last data filed at 2020 1019  Gross per 24 hour   Intake 480 ml   Output 350 ml   Net 130 ml       General:  Alert, cooperative, no acute distress    Pulmonary: Bilateral minimal basal Rales, no wheezing. Cardiovascular: Regular rate and Rhythm. GI:  Soft, Non distended, Non tender. + Bowel sounds. Extremities:  + edema. Psych: Good insight. Not anxious or agitated. Neurologic: Alert and oriented X 3. Moves all ext.   Additional:    Medications:   Current Facility-Administered Medications   Medication Dose Route Frequency    dextrose (D50W) injection syrg 25 g  25 g IntraVENous PRN    isosorbide mononitrate ER (IMDUR) tablet 30 mg  30 mg Oral DAILY    cloNIDine HCL (CATAPRES) tablet 0.1 mg  0.1 mg Oral TID    insulin glargine (LANTUS) injection 15 Units  15 Units SubCUTAneous DAILY    doxazosin (CARDURA) tablet 4 mg  4 mg Oral QPM    bumetanide (BUMEX) injection 1 mg  1 mg IntraVENous BID    hydrALAZINE (APRESOLINE) 20 mg/mL injection 10 mg  10 mg IntraVENous Q6H PRN    amLODIPine (NORVASC) tablet 10 mg  10 mg Oral DAILY    aspirin chewable tablet 81 mg  81 mg Oral DAILY    carvediloL (COREG) tablet 25 mg  25 mg Oral BID WITH MEALS    cholecalciferol (VITAMIN D3) (1000 Units /25 mcg) tablet 2 Tab  2,000 Units Oral DAILY    dutasteride (AVODART) capsule 0.5 mg  0.5 mg Oral DAILY    gabapentin (NEURONTIN) capsule 100 mg  100 mg Oral BID    pantoprazole (PROTONIX) tablet 40 mg  40 mg Oral DAILY    atorvastatin (LIPITOR) tablet 20 mg  20 mg Oral QHS    ticagrelor (BRILINTA) tablet 90 mg  90 mg Oral Q12H    acetaminophen (TYLENOL) tablet 500 mg  500 mg Oral Q6H PRN    ondansetron (ZOFRAN) injection 4 mg  4 mg IntraVENous Q8H PRN    heparin (porcine) injection 5,000 Units  5,000 Units SubCUTAneous Q8H    insulin lispro (HUMALOG) injection   SubCUTAneous AC&HS    glucose chewable tablet 16 g  16 g Oral PRN    glucagon (GLUCAGEN) injection 1 mg  1 mg IntraMUSCular PRN    dextrose (D50W) injection syrg 12.5-25 g  25-50 mL IntraVENous PRN    hydrALAZINE (APRESOLINE) tablet 100 mg  100 mg Oral Q8H       Labs:    Recent Results (from the past 24 hour(s))   GLUCOSE, POC    Collection Time: 06/03/20  3:52 PM   Result Value Ref Range    Glucose (POC) 168 (H) 70 - 110 mg/dL   GLUCOSE, POC    Collection Time: 06/03/20  9:31 PM   Result Value Ref Range    Glucose (POC) 238 (H) 70 - 591 mg/dL   METABOLIC PANEL, BASIC    Collection Time: 06/04/20  5:14 AM   Result Value Ref Range    Sodium 144 136 - 145 mmol/L    Potassium 3.9 3.5 - 5.5 mmol/L    Chloride 112 (H) 100 - 111 mmol/L    CO2 25 21 - 32 mmol/L    Anion gap 7 3.0 - 18 mmol/L    Glucose 95 74 - 99 mg/dL    BUN 32 (H) 7.0 - 18 MG/DL    Creatinine 2.42 (H) 0.6 - 1.3 MG/DL    BUN/Creatinine ratio 13 12 - 20      GFR est AA 32 (L) >60 ml/min/1.73m2    GFR est non-AA 26 (L) >60 ml/min/1.73m2    Calcium 8.3 (L) 8.5 - 10.1 MG/DL   PHOSPHORUS    Collection Time: 06/04/20  5:14 AM   Result Value Ref Range    Phosphorus 3.5 2.5 - 4.9 MG/DL   MAGNESIUM    Collection Time: 06/04/20  5:14 AM   Result Value Ref Range    Magnesium 2.2 1.6 - 2.6 mg/dL   GLUCOSE, POC    Collection Time: 06/04/20  6:34 AM   Result Value Ref Range    Glucose (POC) 104 70 - 110 mg/dL       Signed By: Rashard Olsen MD     June 4, 2020      Disclaimer: Sections of this note are dictated using utilizing voice recognition software. Minor typographical errors may be present. If questions arise, please do not hesitate to contact me or call our department.

## 2020-06-04 NOTE — PROGRESS NOTES
In Patient Progress note      Admit Date: 6/1/2020    Impression:     #1  Chronic kidney disease stage IV(baseline in the 2 range)  with fluid overload and cardiorenal syndrome in the setting of acute on chronic diastolic CHF.     #2 acute on chronic congestive heart failure secondary to diastolic CHF  #3 recent history of NSTEMI with cardiac cath and mid LAD stent  #4 history of ischemic cardiomyopathy with preserved EF  #5 dyslipidemia  #6 tobacco abuse  #7 diabetes mellitus with complications     Improved breathing  Diuresing well   Edema better  BP uncontrolled   Creat stable   Renal duplex with no SUE     Plan:     #1 strict ins and outs Daily weight  #2 fluid restrict 1200 mL  #3diuresis with Bumex 1 mg twice daily IV for another 24 hrs   Anticipate switching to PO  #4 low-sodium diet  #5 follow cardiology recommendations  #6 noted cardiology added clonidine , agree      Please call with questions     Deepa Lewis MD FASN  Cell [phone]  Pager: [phone]  Subjective:     Feels better  Diuresing well        Current Facility-Administered Medications:     dextrose (D50W) injection syrg 25 g, 25 g, IntraVENous, PRN, Gold Castañeda MD    isosorbide mononitrate ER (IMDUR) tablet 30 mg, 30 mg, Oral, DAILY, Savita Schulz MD, 30 mg at 06/04/20 0946    cloNIDine HCL (CATAPRES) tablet 0.1 mg, 0.1 mg, Oral, TID, Savita Schulz MD, 0.1 mg at 06/04/20 0946    insulin glargine (LANTUS) injection 15 Units, 15 Units, SubCUTAneous, DAILY, Garry Curtis MD, 15 Units at 06/04/20 0948    doxazosin (CARDURA) tablet 4 mg, 4 mg, Oral, QPM, Jessica Alvarez NP, 4 mg at 06/03/20 1737    bumetanide (BUMEX) injection 1 mg, 1 mg, IntraVENous, BID, Jessica Alvarez NP, 1 mg at 06/04/20 0946    hydrALAZINE (APRESOLINE) 20 mg/mL injection 10 mg, 10 mg, IntraVENous, Q6H PRN, Yuan Brown MD    amLODIPine (NORVASC) tablet 10 mg, 10 mg, Oral, DAILY, Art Arellano MD, 10 mg at 06/04/20 0947    aspirin chewable tablet 81 mg, 81 mg, Oral, DAILY, Dashawn Arellano MD, 81 mg at 06/04/20 0946    carvediloL (COREG) tablet 25 mg, 25 mg, Oral, BID WITH MEALS, Dashawn Arellano MD, 25 mg at 06/04/20 0947    cholecalciferol (VITAMIN D3) (1000 Units /25 mcg) tablet 2 Tab, 2,000 Units, Oral, DAILY, Dashawn Arellano MD, 2 Tab at 06/04/20 0946    dutasteride (AVODART) capsule 0.5 mg, 0.5 mg, Oral, DAILY, Dashawn Arellano MD, 0.5 mg at 06/04/20 0947    gabapentin (NEURONTIN) capsule 100 mg, 100 mg, Oral, BID, Dashawn Arellano MD, 100 mg at 06/04/20 0946    pantoprazole (PROTONIX) tablet 40 mg, 40 mg, Oral, DAILY, Dashawn Arellano MD, 40 mg at 06/04/20 0947    atorvastatin (LIPITOR) tablet 20 mg, 20 mg, Oral, QHS, Dashawn Arellano MD, 20 mg at 06/03/20 2349    ticagrelor (BRILINTA) tablet 90 mg, 90 mg, Oral, Q12H, Dashawn Arellano MD, 90 mg at 06/04/20 0747    acetaminophen (TYLENOL) tablet 500 mg, 500 mg, Oral, Q6H PRN, Dashawn Arellano MD    ondansetron (ZOFRAN) injection 4 mg, 4 mg, IntraVENous, Q8H PRN, Dashawn Arellano MD    heparin (porcine) injection 5,000 Units, 5,000 Units, SubCUTAneous, Q8H, Dashawn Arellano MD, 5,000 Units at 06/04/20 0949    insulin lispro (HUMALOG) injection, , SubCUTAneous, AC&HS, Emily Powell MD, 6 Units at 06/04/20 1416    glucose chewable tablet 16 g, 16 g, Oral, PRN, Dashawn Arellano MD    glucagon (GLUCAGEN) injection 1 mg, 1 mg, IntraMUSCular, PRN, Dashawn Pacheco MD    dextrose (D50W) injection syrg 12.5-25 g, 25-50 mL, IntraVENous, PRN, Myna Leak, Dashawn Grajeda MD, 25 mL at 06/03/20 0703    hydrALAZINE (APRESOLINE) tablet 100 mg, 100 mg, Oral, Q8H, Kelsie Arlelano MD, 100 mg at 06/04/20 1416        Objective:     Visit Vitals  /81   Pulse 62   Temp 97.9 °F (36.6 °C)   Resp 18   Ht 5' 6\" (1.676 m)   Wt 98 kg (216 lb 0.8 oz)   SpO2 98%   BMI 34.87 kg/m²         Intake/Output Summary (Last 24 hours) at 6/4/2020 1519  Last data filed at 6/4/2020 1019  Gross per 24 hour   Intake 480 ml   Output 350 ml   Net 130 ml       Physical Exam:           NAD   HEENT: mmm  Lungs: heidi rales   Cardiovascular system: S1, S2, regular rate and rhythm. JVD+  Abdomen: soft, non tender, non distended. BS+  Extremities: 1+LE edema   Neurologic: Alert, oriented time three.      Data Review:    Recent Labs     06/03/20  0545   WBC 9.6   RBC 3.73*   HCT 28.3*   MCV 75.9   MCH 25.2   MCHC 33.2   RDW 15.3*     Recent Labs     06/04/20  0514 06/03/20  0545 06/02/20  0454   BUN 32* 33* 34*  34*   CREA 2.42* 2.36* 2.23*  2.29*   CA 8.3* 8.5 8.8  8.8   ALB  --   --  3.0*   K 3.9 3.8 4.0  4.0    143 143  142   * 113* 113*  113*   CO2 25 25 23  24   PHOS 3.5  --  4.3   GLU 95 59* 86  93       Nyasia Hi MD

## 2020-06-04 NOTE — DIABETES MGMT
Glycemic Control Plan of Care    T2DM with current A1c of 7.9%(06/02/2020). See separate notes, 06/02/2020, for assessment of home diabetes management and education. Home diabetes medications: Patient reported on 06/02/2020:  Lantus (SoloStar) pen insulin 60 units daily. Seen patient this afternoon and reported that his blood sugar 280 was checked after he ate lunch and it's not correct. Instructed patient to ring his call bell next time for nursing to check his blood sugar before meals for accuracy. Patient agreed. POC BG range on 06/03/2020: . Patient was NPO and was treated with D50 for hypoglycemia. One BG reading above 180. Lantus insulin not given. POC BG report on 06/04/2020: 104, 280 (1336 hr). Current hospital diabetes medications:  Basal lantus insulin 15 units daily. Correction lispro insulin ACHS. Normal sensitivity dose. Total daily dose insulin requirement previous day: 06/02/2020:  Lispro: 10 units    Recommendation(s):  1.) cont glycemic monitoring and intervention. Assessment:  Patient is 69year old with PMH including type 2 diabetes mellitus, dyslipidemia, CAD, GERD, hypertension, ischemic cardiomyopathy, shingles, and tobacco use disorder - was admitted on 06/021/2020 with report of shortness of breath. Noted:  CHF, new onset. Obesity. Tobacco use, quit March 2020. Wife smokes in the house.  T2DM. Most recent blood glucose values:        Current A1C:     Lab Results   Component Value Date/Time    Hemoglobin A1c 7.9 (H) 06/02/2020 04:54 AM     This lab test reflects the patient's estimated average  blood glucose of 180  over the past 3 months.      Diet: Cardiac regular; consistent carb 1800kcal    Goals:  Blood glucose will be within target range of  mg/dL by 06/07/2020    Education:  __X_  Refer to Diabetes Education Record:06/02/2020             ___  Education not indicated at this time    Barb Ponce RN [facility]  Pager: [phone]

## 2020-06-04 NOTE — PROGRESS NOTES
Problem: Mobility Impaired (Adult and Pediatric)  Goal: *Acute Goals and Plan of Care (Insert Text)  Description: Physical Therapy Goals  Initiated 6/3/2020 and to be accomplished within 7 day(s)  1. Patient will ambulate with modified independence for 150 feet with the least restrictive device without LOB. 2.  Patient will ascend/descend 3 stairs with right handrail(s) with modified independence. Prior Level of Function:   Patient was independent for all mobility including gait without AD. Patient lives with wife in a one-story home, 3 steps to enter with hand rail on right. Patient is retired and likes to work on old cars. Outcome: Resolved/Met   PHYSICAL THERAPY TREATMENT AND DISCHARGE    Patient: Alma Rosa Kang (79 y.o. male)  Date: 6/4/2020  Diagnosis: CHF (congestive heart failure) ([facility]) [I50.9]   <principal problem not specified>       Precautions: Fall  PLOF: see above    ASSESSMENT:  Nurse cleared patient to participate in therapy. Patient received up in chair, awake and alert, agreeable to therapy assessment and treatment this AM. Patient endorsed feeling much better today and stated that he may go home today. Demonstrated independence with sit<>stand transfers with good immediate standing balance without AD. Patient endorsed feeling much better with breathing and therefore PT had patient ambulate x 150 feet without AD on RA. Patient ambulated independently. Patient with steady mauricio, wider base of support, but continuous steps, symmetrical swing, no path deviation, no sway, no LOB. Patient demonstrated good standing dynamic balance in hallway without AD with reaching outside base of support, turning in 180 deg [place]. NO SOB during session. SpO2 97% on RA upon return to room, HR 64 bpm. Patient denied functional concerns at this time and denied concern for ascending/descending steps at home. \"We did the steps yesterday. \" PT educated patient on signs to watch out for such as leg swelling, SOB, inability to lie flat due to SOB. Patient demonstrated understanding. Patient left sitting up in bedside chair, in NAD, VSS, call bell in reach. Physical therapy will sign off at this time. PLAN:  Maximum therapeutic gains met at current level of care and patient will be discharged from physical therapy at this time. Rationale for discharge:  [x]     Goals Achieved  []     [address]  []     Patient not participating in therapy  []     Other:  Discharge Recommendations:  None  Further Equipment Recommendations for Discharge:  N/A     SUBJECTIVE:   Patient stated I feel so much better.     OBJECTIVE DATA SUMMARY:   Critical Behavior:  Neurologic State: Alert  Orientation Level: Oriented X4  Cognition: Appropriate decision making, Follows commands  Safety/Judgement: Fall prevention  Functional Mobility Training:  Transfers:  Sit to Stand: Independent        Balance:  Sitting: Intact; Without support  Standing: Intact; Without support  Standing - Static: Good  Standing - Dynamic : Good   Range Of Motion:   AROM: Within functional limits           Ambulation/Gait Training:  Distance (ft): 150 Feet (ft)  Assistive Device: Other (comment)(none)  Ambulation - Level of Assistance: Independent  Base of Support: Widened     Therapeutic Exercises:   Educated on ankle pump exercises and leg elevation at home     Pain:  Pain level pre-treatment: 0/10   Pain level post-treatment: 0/10   Pain Intervention(s): Medication (see MAR); Rest,  Repositioning   Response to intervention: Nurse notified, See doc flow    Activity Tolerance: Tolerated session without SOB, no wheezing, SpO2 97% on RA  Please refer to the flowsheet for vital signs taken during this treatment.   After treatment:   [x] Patient left in no apparent distress sitting up in chair  [] Patient left in no apparent distress in bed  [x] Call bell left within reach  [x] Nursing notified  [] Caregiver present  [] Bed alarm activated  [] SCDs applied      COMMUNICATION/EDUCATION:   [x]         Role of Physical Therapy in the acute care setting. [x]         Fall prevention education was provided and the patient/caregiver indicated understanding. []         Patient/family have participated as able and agree with findings and recommendations. []         Patient is unable to participate in plan of care at this time. [x]         Other: Patient demonstrated understanding of discharge from skilled PT services.          Adithya Bustos DPT   Time Calculation: 10 mins

## 2020-06-05 VITALS
OXYGEN SATURATION: 96 % | WEIGHT: 208.9 LBS | SYSTOLIC BLOOD PRESSURE: 138 MMHG | HEART RATE: 72 BPM | RESPIRATION RATE: 20 BRPM | BODY MASS INDEX: 33.57 KG/M2 | HEIGHT: 66 IN | TEMPERATURE: 98.1 F | DIASTOLIC BLOOD PRESSURE: 61 MMHG

## 2020-06-05 LAB
ANION GAP SERPL CALC-SCNC: 7 MMOL/L (ref 3–18)
BUN SERPL-MCNC: 42 MG/DL (ref 7–18)
BUN/CREAT SERPL: 15 (ref 12–20)
CALCIUM SERPL-MCNC: 8.6 MG/DL (ref 8.5–10.1)
CHLORIDE SERPL-SCNC: 108 MMOL/L (ref 100–111)
CO2 SERPL-SCNC: 24 MMOL/L (ref 21–32)
CREAT SERPL-MCNC: 2.83 MG/DL (ref 0.6–1.3)
GLUCOSE BLD STRIP.AUTO-MCNC: 145 MG/DL (ref 70–110)
GLUCOSE BLD STRIP.AUTO-MCNC: 262 MG/DL (ref 70–110)
GLUCOSE SERPL-MCNC: 139 MG/DL (ref 74–99)
POTASSIUM SERPL-SCNC: 4.2 MMOL/L (ref 3.5–5.5)
SODIUM SERPL-SCNC: 139 MMOL/L (ref 136–145)

## 2020-06-05 PROCEDURE — 82962 GLUCOSE BLOOD TEST: CPT

## 2020-06-05 PROCEDURE — 74011636637 HC RX REV CODE- 636/637: Performed by: HOSPITALIST

## 2020-06-05 PROCEDURE — 80048 BASIC METABOLIC PNL TOTAL CA: CPT

## 2020-06-05 PROCEDURE — 74011250636 HC RX REV CODE- 250/636: Performed by: HOSPITALIST

## 2020-06-05 PROCEDURE — 36415 COLL VENOUS BLD VENIPUNCTURE: CPT

## 2020-06-05 PROCEDURE — 74011250637 HC RX REV CODE- 250/637: Performed by: INTERNAL MEDICINE

## 2020-06-05 PROCEDURE — 74011250637 HC RX REV CODE- 250/637: Performed by: HOSPITALIST

## 2020-06-05 RX ORDER — INSULIN GLARGINE 100 [IU]/ML
30 INJECTION, SOLUTION SUBCUTANEOUS DAILY
Qty: 1 PEN | Refills: 0 | Status: ON HOLD
Start: 2020-06-05 | End: 2022-08-08 | Stop reason: SDUPTHER

## 2020-06-05 RX ORDER — ISOSORBIDE MONONITRATE 30 MG/1
30 TABLET, EXTENDED RELEASE ORAL DAILY
Qty: 30 TAB | Refills: 0 | Status: ON HOLD | OUTPATIENT
Start: 2020-06-06 | End: 2021-09-10

## 2020-06-05 RX ORDER — CLONIDINE HYDROCHLORIDE 0.1 MG/1
0.1 TABLET ORAL 3 TIMES DAILY
Qty: 90 TAB | Refills: 0 | Status: SHIPPED | OUTPATIENT
Start: 2020-06-05 | End: 2020-06-12

## 2020-06-05 RX ORDER — BUMETANIDE 0.5 MG/1
0.5 TABLET ORAL 2 TIMES DAILY
Qty: 60 TAB | Refills: 0 | Status: SHIPPED | OUTPATIENT
Start: 2020-06-05 | End: 2020-06-12

## 2020-06-05 RX ADMIN — CARVEDILOL 25 MG: 25 TABLET, FILM COATED ORAL at 10:07

## 2020-06-05 RX ADMIN — DUTASTERIDE 0.5 MG: 0.5 CAPSULE, LIQUID FILLED ORAL at 10:08

## 2020-06-05 RX ADMIN — INSULIN LISPRO 9 UNITS: 100 INJECTION, SOLUTION INTRAVENOUS; SUBCUTANEOUS at 12:50

## 2020-06-05 RX ADMIN — CHOLECALCIFEROL TAB 25 MCG (1000 UNIT) 2 TABLET: 25 TAB at 10:06

## 2020-06-05 RX ADMIN — HYDRALAZINE HYDROCHLORIDE 100 MG: 50 TABLET, FILM COATED ORAL at 06:30

## 2020-06-05 RX ADMIN — HEPARIN SODIUM 5000 UNITS: 5000 INJECTION INTRAVENOUS; SUBCUTANEOUS at 02:04

## 2020-06-05 RX ADMIN — TICAGRELOR 90 MG: 90 TABLET ORAL at 06:30

## 2020-06-05 RX ADMIN — HEPARIN SODIUM 5000 UNITS: 5000 INJECTION INTRAVENOUS; SUBCUTANEOUS at 10:14

## 2020-06-05 RX ADMIN — PANTOPRAZOLE SODIUM 40 MG: 40 TABLET, DELAYED RELEASE ORAL at 10:09

## 2020-06-05 RX ADMIN — INSULIN GLARGINE 15 UNITS: 100 INJECTION, SOLUTION SUBCUTANEOUS at 10:10

## 2020-06-05 RX ADMIN — GABAPENTIN 100 MG: 100 CAPSULE ORAL at 10:07

## 2020-06-05 RX ADMIN — CLONIDINE HYDROCHLORIDE 0.1 MG: 0.1 TABLET ORAL at 10:06

## 2020-06-05 RX ADMIN — ASPIRIN 81 MG CHEWABLE TABLET 81 MG: 81 TABLET CHEWABLE at 10:06

## 2020-06-05 RX ADMIN — AMLODIPINE BESYLATE 10 MG: 10 TABLET ORAL at 10:07

## 2020-06-05 RX ADMIN — ISOSORBIDE MONONITRATE 30 MG: 30 TABLET, EXTENDED RELEASE ORAL at 10:06

## 2020-06-05 NOTE — HOME CARE
Referral for [facility] cancelled. Patient set up with [facility] due to [facility] not being able to see patient until 6/8/2020.      Crispin Levy LPN   [facility] Liaison  [phone]

## 2020-06-05 NOTE — DISCHARGE INSTRUCTIONS
Your A1C  was   Lab Results   Component Value Date/Time    Hemoglobin A1c 7.9 (H) 06/02/2020 04:54 AM     This lab test reflects that your blood sugar averaged  180 over the past 3 months. It is important to follow up with your provider on a routine basis to continue to evaluate your blood sugar discuss any necessary changes in treatment. Discharge Instructions    Patient: Emma Cantu MRN: [id number]  CSN: [id number]    YOB: 1945  Age: 69 y.o. Sex: male    DOA: 6/1/2020 LOS:  LOS: 4 days   Discharge Date:      DIET:  Cardiac Diet and Diabetic Diet    ACTIVITY: Activity as tolerated  Home health care for Skilled care for CHF, DM, Hypertension and medication management      ADDITIONAL INFORMATION: If you experience any of the following symptoms but not limited to Fever, chills, nausea, vomiting, diarrhea, change in mentation, falling, bleeding, shortness of breath, chest pain, please call your primary care physician or return to the emergency room if you cannot get hold of your doctor:     FOLLOW UP CARE:  Dr. Terrence Roldan MD in 5-7 days. Please call and set up an appointment. Dr. Gasper Seip, cardio in 2 week  Dr. Alon Riley, nephrology in 2 week      Chasidy Palmer MD  6/5/2020 11:27 AM      Patient armband removed and shredded  MyChart Activation    Thank you for requesting access to [address] Please follow the instructions below to securely access and download your online medical record. [employer] allows you to send messages to your doctor, view your test results, renew your prescriptions, schedule appointments, and more. How Do I Sign Up? 1. In your internet browser, go to [email or web address]  2. Click on the First Time User? Click Here link in the Sign In box. You will be redirect to the New Member Sign Up page. 3. Enter your [employer] Access Code exactly as it appears below. You will not need to use this code after youve completed the sign-up process.  If you do not sign up before the expiration date, you must request a new code. [employer] Access Code: Activation code not generated  Current [employer] Status: Active (This is the date your [employer] access code will )    4. Enter the last four digits of your Social Security Number (xxxx) and Date of Birth (mm/dd/yyyy) as indicated and click Submit. You will be taken to the next sign-up page. 5. Create a [employer] ID. This will be your [employer] login ID and cannot be changed, so think of one that is secure and easy to remember. 6. Create a [employer] password. You can change your password at any time. 7. Enter your Password Reset Question and Answer. This can be used at a later time if you forget your password. 8. Enter your e-mail address. You will receive e-mail notification when new information is available in [address] 9. Click Sign Up. You can now view and download portions of your medical record. 10. Click the Download Summary menu link to download a portable copy of your medical information. Additional Information    If you have questions, please visit the Frequently Asked Questions section of the [employer] website at [email or web address] Remember, [employer] is NOT to be used for urgent needs. For medical emergencies, dial 911. As part of the discharge instructions, medications already given today were discussed with the patient. The next dose due of all ordered meds was highlighted as part of the medication discharge instructions. Discussed with the patient the importance of taking medications as directed, as well as the side effects and adverse reactions to medications ordered.      DISCHARGE SUMMARY from Nurse    PATIENT INSTRUCTIONS:    After general anesthesia or intravenous sedation, for 24 hours or while taking prescription Narcotics:  · Limit your activities  · Do not drive and operate hazardous machinery  · Do not make important personal or business decisions  · Do  not drink alcoholic beverages  · If you have not urinated within 8 hours after discharge, please contact your surgeon on call. Report the following to your surgeon:  · Excessive pain, swelling, redness or odor of or around the surgical area  · Temperature over 100.5  · Nausea and vomiting lasting longer than 4 hours or if unable to take medications  · Any signs of decreased circulation or nerve impairment to extremity: change in color, persistent  numbness, tingling, coldness or increase pain  · Any questions    What to do at Home:  Recommended activity: Activity as tolerated,     If you experience any of the following symptoms shortness of breath, chest pain, increased swelling in feet, legs, or abdomen. Weight increase of 1 or 2 lbs in 2 or 3 days. , please follow up with Emergency Department or Primary Md. *  Please give a list of your current medications to your Primary Care Provider. *  Please update this list whenever your medications are discontinued, doses are      changed, or new medications (including over-the-counter products) are added. *  Please carry medication information at all times in case of emergency situations. These are general instructions for a healthy lifestyle:    No smoking/ No tobacco products/ Avoid exposure to second hand smoke  Surgeon General's Warning:  Quitting smoking now greatly reduces serious risk to your health. Obesity, smoking, and sedentary lifestyle greatly increases your risk for illness    A healthy diet, regular physical exercise & weight monitoring are important for maintaining a healthy lifestyle    You may be retaining fluid if you have a history of heart failure or if you experience any of the following symptoms:  Weight gain of 3 pounds or more overnight or 5 pounds in a week, increased swelling in our hands or feet or shortness of breath while lying flat in bed.   Please call your doctor as soon as you notice any of these symptoms; do not wait until your next office visit.        The discharge information has been reviewed with the patient. The patient verbalized understanding. Discharge medications reviewed with the patient and appropriate educational materials and side effects teaching were provided.   ___________________________________________________________________________________________________________________________________

## 2020-06-05 NOTE — PROGRESS NOTES
[facility]      CARDIOLOGY PROGRESS NOTE  RECS:    1. Acute on Chronic Congestive Hear failure- increasing in SOB. NT pro BNP>1000. in the setting uncontrolled hypertension, non compliance with fluid/ salt restriction and CKD-continue gentle diuresis. Possibly has COPD component also- better I&O not accurate. On Bumex iv bid. May consider to change to po.   2. Hx of recent NSTEMI- stable no chest pain no chest pressure. Negative troponin x3. S/p cardiac cath 4/23/20 Double vessel coronary artery disease. S/p ptca/stent to mid LAD. Known BMS to distal RCA with moderate lesion- continue asa, Brilinta. 3. Uncontrolled Hypertension- difficult to control on multiple BP medications. Better after clonidine was increased  to 3 times a day  4. Hx of Ischemic cardiomyopathy- echo 2017 revealed EF% 65 %. Recent echo 4/20 shows EF%  60-65%. 5. Dyslipidemia- continue statin   6. Tobacco use disorder-per patient he quit recently   7. 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 Diabetes- poorly controlled with A1c 7.9   9. CKD- baseline 1.8 -2.4 since 2018 - creatinine levels stable. Will monitor with diuresis. Renal is following     CHF improving. Switch to po bumex 0.5 mg bid.   Will f/u in clinic        EKG Results     Procedure 720 Value Units Date/Time    EKG, 12 LEAD, SUBSEQUENT [[id number]] Collected:  06/02/20 0716    Order Status:  Completed Updated:  06/02/20 0734     Ventricular Rate 66 BPM      Atrial Rate 66 BPM      P-R Interval 286 ms      QRS Duration 166 ms      Q-T Interval 472 ms      QTC Calculation (Bezet) 494 ms      Calculated P Axis 4 degrees      Calculated R Axis -77 degrees      Calculated T Axis 93 degrees      Diagnosis --     Sinus rhythm with 1st degree AV block with occasional premature ventricular   complexes  Left axis deviation  Right bundle branch block  Left ventricular hypertrophy with repolarization abnormality  Abnormal ECG  When compared with ECG of 01-JUN-2020 12:37,  premature ventricular complexes are now present  aberrant conduction is no longer present  Confirmed by Katherin Ríos ([phone]) on 6/2/2020 7:34:41 AM      EKG, 12 LEAD, INITIAL [[id number]] Collected:  06/01/20 1237    Order Status:  Completed Updated:  06/01/20 1649     Ventricular Rate 69 BPM      Atrial Rate 69 BPM      P-R Interval 298 ms      QRS Duration 160 ms      Q-T Interval 452 ms      QTC Calculation (Bezet) 484 ms      Calculated P Axis -11 degrees      Calculated R Axis -80 degrees      Calculated T Axis 99 degrees      Diagnosis --     Sinus rhythm with 1st degree AV block with premature atrial complexes with   aberrant conduction  Right bundle branch block  Left anterior fascicular block  Bifascicular block  Left ventricular hypertrophy with repolarization abnormality  Cannot rule out Septal infarct (cited on or before 01-JUN-2020)  Abnormal ECG  When compared with ECG of 24-APR-2020 07:56,  premature ventricular complexes are no longer present  aberrant conduction is now present  Confirmed by Conor Mcdaniel MD, ----- ([id number]) on 6/1/2020 4:48:49 PM          XR Results (most recent):  Results from Hospital Encounter encounter on 06/01/20   XR CHEST PORT    Narrative HISTORY: Shortness of breath. EXAM: Chest.    TECHNIQUE: Single view AP portable semiupright chest.     COMPARISON: 4/20/2020. FINDINGS: Essentially unchanged aeration of bilateral hemithoraces is  demonstrated with minimal bilateral diffuse interstitial prominence without  edema thorax pneumonia or pleural effusions. Heart and mediastinal structures  are unchanged. Heart is enlarged. . Visualized bony thorax and soft tissues are  within normal limits. Impression  IMPRESSION:    1. Unchanged sequela of minimal congestion. Follow-up with plain imaging.        04/20/20   ECHO ADULT COMPLETE 04/21/2020 4/21/2020    Addendum · Normal cavity size and systolic function (ejection fraction normal). Moderately increased wall thickness. Estimated left ventricular ejection  fraction is 60 - 65%. Visually measured ejection fraction. No regional  wall motion abnormality noted. Normal left ventricular strain. Moderate  (grade 2) left ventricular diastolic dysfunction. · Mitral valve non-specific thickening. Trace mitral valve regurgitation  is present. Josh Quijano MD 4/21/2020 11:22 AM          Narrative · Normal cavity size and systolic function (ejection fraction normal). Moderately increased wall thickness. Estimated left ventricular ejection   fraction is 60 - 65%. Visually measured ejection fraction. No regional   wall motion abnormality noted. Normal left ventricular strain. Moderate   (grade 2) left ventricular diastolic dysfunction. · Mitral valve non-specific thickening and thickening. Trace mitral valve   regurgitation is present. Signed by: Josh Quijano MD       04/20/20   NUCLEAR CARDIAC STRESS TEST 04/22/2020 4/22/2020    Narrative · Baseline ECG: Sinus bradycardia, right bundle branch blockLeft anterior   bifascicular block Left ventricular hypertrophy with wide QRS widening   Cannot rule out septal infarct, age undetermined T wave abnormality,   consider inferolateral ischemia . With 1st degree AV block  · Gated SPECT: Left ventricular function post-stress was abnormal.   Calculated ejection fraction is 42%. There is no evidence of transient   ischemic dilation (TID). The TID ratio is 1.07.  · Inconclusive stress test.  · Left ventricular perfusion is abnormal.  · Myocardial perfusion imaging defect 1: There is a defect that is   moderate to large in size with a moderate reduction in uptake present in   the mid-apical anterior and anteroseptal location(s) that is predominantly   reversible. There is abnormal wall motion in the defect area. Viability in   the area is good. The defect appears to be infarction with maria e-infarct   ischemia.  Perfusion defect was visually present without quantitative   evidence. · Myocardial perfusion imaging defect 2: There is a defect that is small   in size with a mild reduction in uptake affecting the apical to mid   inferior location(s) that is predominantly reversible. There is normal   wall motion in the defect area. Viability in the area is good. The defect   appears to be ischemia. Perfusion defect was visually present without   quantitative evidence. · Myocardial perfusion imaging defect 1: caused by soft tissue. · Positive myocardial perfusion imaging. Myocardial perfusion imaging   supports a high risk stress test.        Signed by: Denman Najjar, MD     04/20/20   CARDIAC PROCEDURE 04/23/2020 4/23/2020    Narrative Double vessel coronary artery disease. S/p ptca/stent to mid LAD. Known BMS to distal RCA with moderate lesion. Recommend intense risk factor modification. Signed by: Katlyn Neil MD         ASSESSMENT:  Hospital Problems  Date Reviewed: 5/18/2020          Codes Class Noted POA    CHF (congestive heart failure) ([facility] [address] ICD-10-CM: I50.9  ICD-9-CM: 428.0  6/1/2020 Unknown                SUBJECTIVE:  No chest pain  Feels better SOB improved     OBJECTIVE:    VS:   Visit Vitals  /61   Pulse 72   Temp 98.1 °F (36.7 °C)   Resp 20   Ht 5' 6\" (1.676 m)   Wt 94.8 kg (208 lb 14.4 oz)   SpO2 96%   BMI 33.72 kg/m²         Intake/Output Summary (Last 24 hours) at 6/5/2020 1425  Last data filed at 6/4/2020 1548  Gross per 24 hour   Intake 120 ml   Output    Net 120 ml     TELE: normal sinus rhythm    General: alert and in no apparent distress  HENT: Normocephalic, atraumatic. Normal external eye. Neck :  no bruit, no JVD  Cardiac:  regular rate and rhythm, S1, S2 normal, no murmur, click, rub or gallop  Chest/Lungs: diminished breath sounds B/L.   Abdomen: Soft, nontender, no masses  Extremities:  mild edema, peripheral pulses present      Labs: Results:       Chemistry Recent Labs     06/05/20  0143 06/04/20  0514 06/03/20  0545   * 95 59*    144 143   K 4.2 3.9 3.8    112* 113*   CO2 24 25 25   BUN 42* 32* 33*   CREA 2.83* 2.42* 2.36*   CA 8.6 8.3* 8.5   MG  --  2.2 2.2   PHOS  --  3.5  --    AGAP 7 7 5   BUCR 15 13 14      CBC w/Diff Recent Labs     06/03/20  0545   WBC 9.6   RBC 3.73*   HGB 9.4*   HCT 28.3*      GRANS 75*   LYMPH 9*   EOS 7*      Cardiac Enzymes No results for input(s): CPK, CKND1, MARIBEL in the last 72 hours. No lab exists for component: CKRMB, TROIP   Coagulation No results for input(s): PTP, INR, APTT, INREXT, INREXT in the last 72 hours. Lipid Panel Lab Results   Component Value Date/Time    Cholesterol, total 122 04/20/2020 04:54 AM    HDL Cholesterol 23 (L) 04/20/2020 04:54 AM    LDL, calculated 62.2 04/20/2020 04:54 AM    VLDL, calculated 36.8 04/20/2020 04:54 AM    Triglyceride 184 (H) 04/20/2020 04:54 AM    CHOL/HDL Ratio 5.3 (H) 04/20/2020 04:54 AM      BNP No results for input(s): BNPP in the last 72 hours. Liver Enzymes No results for input(s): TP, ALB, TBIL, AP in the last 72 hours.     No lab exists for component: SGOT, GPT, DBIL   Digoxin    Thyroid Studies No results found for: T4, T3U, TSH, TSHEXT, TSHEXT           Brandie Cuevas MD

## 2020-06-05 NOTE — PROGRESS NOTES
Pt verbally agreed to use [address]. Home health order sent and Jorge Lamar was notified. Per Jorge Lamar of [facility], they cannot see pt until Monday. Pt agreeable to [facility]. Order sent in [place]. Notified Pedro Lennon of [facility]. Dr. Anthony Beaver. Discharge order noted for today. Pt has been accepted to [facility] agency. Met with patient  and are agreeable to the transition plan today. Transport has been arranged through pt's daughter. Patient's discharge summary and home health  orders have been forwarded to [facility] via [place]. Updated bedside RN, Wili Hadley,  to the transition plan.   Discharge information has been documented on the AVS.       REGAN MayerN RN  Care Management  Pager: [phone]

## 2020-06-05 NOTE — PROGRESS NOTES
In Patient Progress note      Admit Date: 6/1/2020    Impression:     #1  Chronic kidney disease stage IV(baseline in the 2 range)  with fluid overload and cardiorenal syndrome in the setting of acute on chronic diastolic CHF.     #2 acute on chronic congestive heart failure secondary to diastolic CHF  #3 recent history of NSTEMI with cardiac cath and mid LAD stent  #4 history of ischemic cardiomyopathy with preserved EF  #5 dyslipidemia  #6 tobacco abuse  #7 diabetes mellitus with complications     Improved breathing  Diuresing well   Edema better  BP uncontrolled   Creat stable   Renal duplex with no SUE     Plan:     #1 strict ins and outs Daily weight  #2 fluid restrict 1200 mL  #3 Bumex 1 mg PO BID starting this evening   #4 low-sodium diet  #5 follow cardiology recommendations    OK to be discharged f/u with nephro in 3-4 weeks      Please call with questions     Yahir Nichols MD FASN  Cell [phone]  Pager: [phone]  Subjective:     Feels better  Diuresing well        Current Facility-Administered Medications:     dextrose (D50W) injection syrg 25 g, 25 g, IntraVENous, PRN, Gold Castañeda MD    isosorbide mononitrate ER (IMDUR) tablet 30 mg, 30 mg, Oral, DAILY, Nancy Mayo MD, 30 mg at 06/05/20 1006    cloNIDine HCL (CATAPRES) tablet 0.1 mg, 0.1 mg, Oral, TID, Nancy Mayo MD, 0.1 mg at 06/05/20 1006    insulin glargine (LANTUS) injection 15 Units, 15 Units, SubCUTAneous, DAILY, Boubacar Brown MD, 15 Units at 06/05/20 1010    doxazosin (CARDURA) tablet 4 mg, 4 mg, Oral, QPM, Jessica Alvarez NP, 4 mg at 06/04/20 1839    [Held by provider] bumetanide (BUMEX) injection 1 mg, 1 mg, IntraVENous, BID, Jessica Alvarez NP, Stopped at 06/05/20 0900    hydrALAZINE (APRESOLINE) 20 mg/mL injection 10 mg, 10 mg, IntraVENous, Q6H PRN, Abril Brown MD    amLODIPine (NORVASC) tablet 10 mg, 10 mg, Oral, DAILY, Randa Arellano MD, 10 mg at 06/05/20 1007   aspirin chewable tablet 81 mg, 81 mg, Oral, DAILY, Jeff Arellano MD, 81 mg at 06/05/20 1006    carvediloL (COREG) tablet 25 mg, 25 mg, Oral, BID WITH MEALS, Jeff Arellano MD, 25 mg at 06/05/20 1007    cholecalciferol (VITAMIN D3) (1000 Units /25 mcg) tablet 2 Tab, 2,000 Units, Oral, DAILY, Jeff Arellano MD, 2 Tab at 06/05/20 1006    dutasteride (AVODART) capsule 0.5 mg, 0.5 mg, Oral, DAILY, Jeff Arellano MD, 0.5 mg at 06/05/20 1008    gabapentin (NEURONTIN) capsule 100 mg, 100 mg, Oral, BID, Jeff Arellano MD, 100 mg at 06/05/20 1007    pantoprazole (PROTONIX) tablet 40 mg, 40 mg, Oral, DAILY, Jeff Arellano MD, 40 mg at 06/05/20 1009    atorvastatin (LIPITOR) tablet 20 mg, 20 mg, Oral, QHS, Jeff Arellano MD, 20 mg at 06/04/20 2150    ticagrelor (BRILINTA) tablet 90 mg, 90 mg, Oral, Q12H, Jeff Arellano MD, 90 mg at 06/05/20 0630    acetaminophen (TYLENOL) tablet 500 mg, 500 mg, Oral, Q6H PRN, Jeff Arellano MD    ondansetron (ZOFRAN) injection 4 mg, 4 mg, IntraVENous, Q8H PRN, Jeff Arellano MD    heparin (porcine) injection 5,000 Units, 5,000 Units, SubCUTAneous, Q8H, Jeff Arellano MD, 5,000 Units at 06/05/20 1014    insulin lispro (HUMALOG) injection, , SubCUTAneous, AC&HS, Augustus Willis MD, 9 Units at 06/05/20 1250    glucose chewable tablet 16 g, 16 g, Oral, PRN, Jeff Arellano MD    glucagon (GLUCAGEN) injection 1 mg, 1 mg, IntraMUSCular, PRN, Carlyn Hemming, Jeff Hollering, MD    dextrose (D50W) injection syrg 12.5-25 g, 25-50 mL, IntraVENous, PRN, Jeff Pratt MD, 25 mL at 06/03/20 0703    hydrALAZINE (APRESOLINE) tablet 100 mg, 100 mg, Oral, Q8H, Kelsie Arellano MD, 100 mg at 06/05/20 0630        Objective:     Visit Vitals  /61   Pulse 72   Temp 98.1 °F (36.7 °C)   Resp 20   Ht 5' 6\" (1.676 m)   Wt 94.8 kg (208 lb 14.4 oz)   SpO2 96%   BMI 33.72 kg/m²         Intake/Output Summary (Last 24 hours) at 6/5/2020 1313  Last data filed at 6/4/2020 1548  Gross per 24 hour   Intake 120 ml   Output    Net 120 ml       Physical Exam:           NAD   HEENT: mmm  Lungs: heidi rales   Cardiovascular system: S1, S2, regular rate and rhythm. JVD+  Abdomen: soft, non tender, non distended. BS+  Extremities: 1+LE edema   Neurologic: Alert, oriented time three.      Data Review:    Recent Labs     06/03/20  0545   WBC 9.6   RBC 3.73*   HCT 28.3*   MCV 75.9   MCH 25.2   MCHC 33.2   RDW 15.3*     Recent Labs     06/05/20  0143 06/04/20  0514 06/03/20  0545   BUN 42* 32* 33*   CREA 2.83* 2.42* 2.36*   CA 8.6 8.3* 8.5   K 4.2 3.9 3.8    144 143    112* 113*   CO2 24 25 25   PHOS  --  3.5  --    * 95 59*       Kwaku Tabares MD

## 2020-06-05 NOTE — DIABETES MGMT
Glycemic Control Plan of Care    RECOMMENDATION: Increase daily lantus insulin dose from 15 to 20 units. Give additional lantus 5 units x1 for today, 06/05/2020    T2DM with current A1c of 7.9%(06/02/2020). See separate notes, 06/02/2020, for assessment of home diabetes management and education. Home diabetes medications: Patient reported on 06/02/2020:  Lantus (SoloStar) pen insulin 60 units daily. POC BG report on 06/04/2020: 104, 280, 245, 185. POC BG report on 06/04/2020: 145, 262. Current hospital diabetes medications:  Basal lantus insulin 15 units daily. Correction lispro insulin ACHS. Modified to very resistant dose. Total daily dose insulin requirement previous day: 06/04/2020:  Lantus: 15 units  Lispro: 12 units  TDD insulin: 27 units    Recommendation(s):  1.) cont glycemic monitoring and intervention. Assessment:  Patient is 69year old with PMH including type 2 diabetes mellitus, dyslipidemia, CAD, GERD, hypertension, ischemic cardiomyopathy, shingles, and tobacco use disorder - was admitted on 06/021/2020 with report of shortness of breath. Noted:  CHF, new onset. Obesity. Tobacco use, quit March 2020. Wife smokes in the house.  T2DM. Most recent blood glucose values:        Current A1C:     Lab Results   Component Value Date/Time    Hemoglobin A1c 7.9 (H) 06/02/2020 04:54 AM     This lab test reflects the patient's estimated average  blood glucose of 180  over the past 3 months.      Diet: Cardiac regular; consistent carb 1800kcal    Goals:  Blood glucose will be within target range of  mg/dL by 06/08/2020    Education:  __X_  Refer to Diabetes Education Record:06/02/2020             ___  Education not indicated at this time    Syed Araujo RN [facility]  Pager: [phone]

## 2020-06-05 NOTE — DISCHARGE SUMMARY
Discharge Summary    Patient: Lynsey oMntana MRN: [id number]  CSN: [id number]    YOB: 1945  Age: 69 y.o. Sex: male    DOA: 6/1/2020 LOS:  LOS: 4 days        Disposition: Home with [facility] AT [facility] [address]    Discharge Date:     Admission Diagnoses: CHF (congestive heart failure) ([facility] [address] [I50.9]    Discharge Diagnoses:    1. Acute diastolic heart failure exacerbation. 2. Chest pressure, ACS R/O  3. Hypertension. 4. Diabetes type 2.  5. CKD stage 3.  6. Dyslipidemia. 7. Obesity. Discharge Condition: Stable    PHYSICAL EXAM  Visit Vitals  /58   Pulse (!) 59   Temp 98.3 °F (36.8 °C)   Resp 21   Ht 5' 6\" (1.676 m)   Wt 94.8 kg (208 lb 14.4 oz)   SpO2 95%   BMI 33.72 kg/m²       General: Alert, cooperative, no acute distress    Lungs:  CTA Bilaterally. No Wheezing/Rales. Heart:             Regular rate and Rhythm. Abdomen: Soft, Non distended, Non tender. + Bowel sounds. Extremities: No edema. Psych:   Good insight. Not anxious or agitated. Neurologic:  AA, oriented X 3. Moves all ext                                 Hospital Course:   78-year-old -American male with known history of CAD status post stent, hypertension, diabetes, dyslipidemia comes to the emergency room complaining of shortness of breath. In the emergency room patient was noted to have acute CHF exacerbation, patient was started on IV diuretics and was admitted to hospital.  Cardiology was consulted. Cardiology saw the patient and recommended to continue diuresis but switched from Lasix to Bumex. Patient diuresed well, had more than 2 L negative balance. Per patient he has diuresed more than that and it has not been documented well. Patient symptoms improved, he has been weaned off oxygen. Due to his CKD, nephrology was consulted. Creatinine was monitored, renal artery duplex was done which was negative. With the diuresis his creatinine slightly trended up, nephrology recommended no further intervention.   Discussed with the cardiology Dr. Arlyn Rebolledo, recommended to switch to IV Bumex to p.o. Bumex 0.5 mg twice daily and okay for discharge. In view of diabetes, patient uses Lantus 60 units at home but here he was only getting 15 units. His blood sugars are slightly elevated so I advised him to start 30 units at home and adjust the dose according to his blood sugars. I suspect the patient is on high dose at home probably due to increased carbohydrate intake at home. Discussed with nephrology, hilaria with the discharge plan. Patient is stable for discharge today. Patient was made to ambulate in the hallway off oxygen, sats remained more than 97% on exertion. Discussed with the patient about discharge plan, follow-up appointments, medication changes and side effects of new medications. Patient understood and verbalized understanding. Also answered all his questions or concerns appropriate. Procedures: None     Consults:   Dr. Jay Lucero, cardio  Dr. Bautista Veras, nephrology    Imaging studies:   06/01/20   ECHO ADULT FOLLOW-UP OR LIMITED 06/03/2020 6/3/2020    Narrative · Normal cavity size and systolic function (ejection fraction normal). Severe concentric hypertrophy. Estimated left ventricular ejection   fraction is 60 - 65%. Visually measured ejection fraction. No regional   wall motion abnormality noted. Normal left ventricular strain. · Global longitudinal strain is -20.90%  · Tricuspid regurgitation is inadequate for estimation of right   ventricular systolic pressure. Signed by: Michael Pires MD       Current Discharge Medication List      START taking these medications    Details   cloNIDine HCL (CATAPRES) 0.1 mg tablet Take 1 Tab by mouth three (3) times daily. Qty: 90 Tab, Refills: 0      isosorbide mononitrate ER (IMDUR) 30 mg tablet Take 1 Tab by mouth daily.   Qty: 30 Tab, Refills: 0      OTHER BMP in 1 week  Dx: CHF  Fax results to   Qty: 1 Each, Refills: 0      bumetanide (BUMEX) 0.5 mg tablet Take 1 Tab by mouth two (2) times a day. Qty: 60 Tab, Refills: 0         CONTINUE these medications which have CHANGED    Details   insulin glargine (Lantus Solostar U-100 Insulin) 100 unit/mL (3 mL) inpn 30 Units by SubCUTAneous route daily. Qty: 1 Pen, Refills: 0         CONTINUE these medications which have NOT CHANGED    Details   doxazosin (CARDURA) 2 mg tablet Take 2 mg by mouth every evening. ticagrelor (BRILINTA) 90 mg tablet Take 1 Tab by mouth every twelve (12) hours every twelve (12) hours. Qty: 60 Tab, Refills: 3      carvediloL (COREG) 25 mg tablet Take 1 Tab by mouth two (2) times daily (with meals). Qty: 60 Tab, Refills: 0      amLODIPine (NORVASC) 10 mg tablet Take 1 Tab by mouth daily. Qty: 30 Tab, Refills: 0      gabapentin (NEURONTIN) 100 mg capsule Take 100 mg by mouth two (2) times a day. nitroglycerin (NITROSTAT) 0.4 mg SL tablet 1 Tab by SubLINGual route every five (5) minutes as needed for Chest Pain for up to 3 doses. Indications: Angina  Qty: 25 Tab, Refills: 0    Associated Diagnoses: Coronary artery disease involving native coronary artery of native heart without angina pectoris      cholecalciferol, vitamin D3, (VITAMIN D3) 2,000 unit tab Take  by mouth. dutasteride (AVODART) 0.5 mg capsule Take 0.5 mg by mouth daily. hydrALAZINE (APRESOLINE) 100 mg tablet Take 100 mg by mouth three (3) times daily. simvastatin (ZOCOR) 40 mg tablet Take 1 Tab by mouth nightly. Qty: 30 Tab, Refills: 11      pantoprazole (PROTONIX) 40 mg tablet Take 40 mg by mouth daily. aspirin 81 mg tablet Take 81 mg by mouth daily. · It is important that you take the medication exactly as they are prescribed. · Keep your medication in the bottles provided by the pharmacist and keep a list of the medication names, dosages, and times to be taken in your wallet. · Do not take other medications without consulting your doctor.      DIET:  Cardiac Diet and Diabetic Diet    ACTIVITY: Activity as tolerated  Home health care for Skilled care for CHF, DM, Hypertension and medication management      ADDITIONAL INFORMATION: If you experience any of the following symptoms but not limited to Fever, chills, nausea, vomiting, diarrhea, change in mentation, falling, bleeding, shortness of breath, chest pain, please call your primary care physician or return to the emergency room if you cannot get hold of your doctor:     FOLLOW UP CARE:  Dr. Angy Benitez MD in 5-7 days. Please call and set up an appointment. Dr. Jay Lucero, cardio in 2 week  Dr. Bautista Veras, nephrology in 2 week    Minutes spent on discharge: 40 minutes spent coordinating this discharge (review instructions/follow-up, prescriptions, preparing report for sign off)    Phil Buchanan MD  6/5/2020 11:28 AM    Disclaimer: Sections of this note are dictated using utilizing voice recognition software. Minor typographical errors may be present. If questions arise, please do not hesitate to contact me or call our department.

## 2020-06-05 NOTE — ROUTINE PROCESS
Bedside and Verbal shift change report given to [facility] RN (oncoming nurse) by Drake Webb RN (offgoing nurse). Report given with SBAR, Kardex, Intake/Output, MAR, Accordion and Recent Results.

## 2020-06-05 NOTE — PROGRESS NOTES
Important Message from Medicare\" reviewed and explained with the patient at bedside and signature was obtained. A signed copy provided to patient/representative. Original signed document placed in patient's chart.

## 2020-06-12 ENCOUNTER — VIRTUAL VISIT (OUTPATIENT)
Dept: CARDIOLOGY CLINIC | Age: 75
End: 2020-06-12

## 2020-06-12 DIAGNOSIS — I50.33 ACUTE ON CHRONIC DIASTOLIC CONGESTIVE HEART FAILURE (HCC): Primary | ICD-10-CM

## 2020-06-12 DIAGNOSIS — Z98.61 CAD S/P PERCUTANEOUS CORONARY ANGIOPLASTY: ICD-10-CM

## 2020-06-12 DIAGNOSIS — F17.209 TOBACCO USE DISORDER, CONTINUOUS: ICD-10-CM

## 2020-06-12 DIAGNOSIS — I25.10 CAD S/P PERCUTANEOUS CORONARY ANGIOPLASTY: ICD-10-CM

## 2020-06-12 DIAGNOSIS — I10 ESSENTIAL HYPERTENSION: ICD-10-CM

## 2020-06-12 RX ORDER — BUMETANIDE 1 MG/1
0.5 TABLET ORAL 2 TIMES DAILY
Qty: 60 TAB | Refills: 1 | Status: SHIPPED | OUTPATIENT
Start: 2020-06-12 | End: 2020-12-14

## 2020-06-12 RX ORDER — CLONIDINE HYDROCHLORIDE 0.1 MG/1
0.1 TABLET ORAL 2 TIMES DAILY
Qty: 60 TAB | Refills: 1 | Status: SHIPPED | OUTPATIENT
Start: 2020-06-12 | End: 2021-02-11

## 2020-06-12 NOTE — PROGRESS NOTES
1. Have you been to the ER, urgent care clinic since your last visit? Hospitalized since your last visit? Yes When:6/2020  Where: [facility] Reason for visit: leg swelling     2. Have you seen or consulted any other health care providers outside of the [address] [place] [address] since your last visit? Include any pap smears or colon screening. No     3. Since your last visit, have you had any of the following symptoms? chest pains, shortness of breath and swelling in legs/arms. 4.  Have you had any blood work, X-rays or cardiac testing? Yes Where: [facility]         5. Where do you normally have your labs drawn? [facility]    6. Do you need any refills today?    no

## 2020-06-12 NOTE — PATIENT INSTRUCTIONS
Medications Discontinued During This Encounter Medication Reason  bumetanide (BUMEX) 0.5 mg tablet  cloNIDine HCL (CATAPRES) 0.1 mg tablet Avoiding Triggers With Heart Failure: Care Instructions Your Care Instructions Triggers are anything that make your heart failure flare up. A flare-up is also called \"sudden heart failure\" or \"acute heart failure. \" When you have a flare-up, fluid builds up in your lungs, and you have problems breathing. You might need to go to the hospital. By watching for changes in your condition and avoiding triggers, you can prevent heart failure flare-ups. Follow-up care is a key part of your treatment and safety. Be sure to make and go to all appointments, and call your doctor if you are having problems. It's also a good idea to know your test results and keep a list of the medicines you take. How can you care for yourself at home? Watch for changes in your weight and condition · Weigh yourself without clothing at the same time each day. Record your weight. Call your doctor if you have sudden weight gain, such as more than 2 to 3 pounds in a day or 5 pounds in a week. (Your doctor may suggest a different range of weight gain.) A sudden weight gain may mean that your heart failure is getting worse. · Keep a daily record of your symptoms. Write down any changes in how you feel, such as new shortness of breath, cough, or problems eating. Also record if your ankles are more swollen than usual and if you feel more tired than usual. Note anything that you ate or did that could have triggered these changes. Limit sodium Sodium causes your body to hold on to extra water. This may cause your heart failure symptoms to get worse. People get most of their sodium from processed foods. Fast food and restaurant meals also tend to be very high in sodium. · Your doctor may suggest that you limit sodium.  Your doctor can tell you how much sodium is right for you. This includes limiting sodium in cooked and packaged foods. · Read food labels on cans and food packages. They tell you how much sodium you get in one serving. Check the serving size. If you eat more than one serving, you are getting more sodium. · Be aware that sodium can come in forms other than salt, including monosodium glutamate (MSG), sodium citrate, and sodium bicarbonate (baking soda). MSG is often added to Asian food. You can sometimes ask for food without MSG or salt. · Slowly reducing salt will help you adjust to the taste. Take the salt shaker off the table. · Flavor your food with garlic, lemon juice, onion, vinegar, herbs, and spices instead of salt. Do not use soy sauce, steak sauce, onion salt, garlic salt, mustard, or ketchup on your food, unless it is labeled \"low-sodium\" or \"low-salt. \" 
· Make your own salad dressings, sauces, and ketchup without adding salt. · Use fresh or frozen ingredients, instead of canned ones, whenever you can. Choose low-sodium canned goods. · Eat less processed food and food from restaurants, including fast food. Exercise as directed Moderate, regular exercise is very good for your heart. It improves your blood flow and helps control your weight. But too much exercise can stress your heart and cause a heart failure flare-up. · Check with your doctor before you start an exercise program. 
· Walking is an easy way to get exercise. Start out slowly. Gradually increase the length and pace of your walk. Swimming, riding a bike, and using a treadmill are also good forms of exercise. · When you exercise, watch for signs that your heart is working too hard. You are pushing yourself too hard if you cannot talk while you are exercising. If you become short of breath or dizzy or have chest pain, stop, sit down, and rest. 
· Do not exercise when you do not feel well. Take medicines correctly · Take your medicines exactly as prescribed. Call your doctor if you think you are having a problem with your medicine. · Make a list of all the medicines you take. Include those prescribed to you by other doctors and any over-the-counter medicines, vitamins, or supplements you take. Take this list with you when you go to any doctor. · Take your medicines at the same time every day. It may help you to post a list of all the medicines you take every day and what time of day you take them. · Make taking your medicine as simple as you can. Plan times to take your medicines when you are doing other things, such as eating a meal or getting ready for bed. This will make it easier to remember to take your medicines. · Get organized. Use helpful tools, such as daily or weekly pill containers. When should you call for help? Call 911 if you have symptoms of sudden heart failure such as: 
· You have severe trouble breathing. · You cough up pink, foamy mucus. · You have a new irregular or rapid heartbeat. Call your doctor now or seek immediate medical care if: 
· You have new or increased shortness of breath. · You are dizzy or lightheaded, or you feel like you may faint. · You have sudden weight gain, such as more than 2 to 3 pounds in a day or 5 pounds in a week. (Your doctor may suggest a different range of weight gain.) · You have increased swelling in your legs, ankles, or feet. · You are suddenly so tired or weak that you cannot do your usual activities. Watch closely for changes in your health, and be sure to contact your doctor if you develop new symptoms. Where can you learn more? Go to http://jerry-shereen.info/ Enter V289 in the search box to learn more about \"Avoiding Triggers With Heart Failure: Care Instructions. \" Current as of: December 16, 2019               Content Version: 12.5 © 0544-4947 [employer]. Care instructions adapted under license by [employer] (which disclaims liability or warranty for this information). If you have questions about a medical condition or this instruction, always ask your healthcare professional. [address] any warranty or liability for your use of this information.

## 2020-06-12 NOTE — PROGRESS NOTES
HISTORY OF PRESENT ILLNESS  Valery Bailey is a 69 y.o. male. Valery Bailey is a 69 y.o. male who was seen by synchronous (real-time) audio-video technology on 6/12/2020. Consent:  He and/or his healthcare decision maker is aware that this patient-initiated Telehealth encounter is a billable service, with coverage as determined by his insurance carrier. He is aware that he may receive a bill and has provided verbal consent to proceed: Yes    I was in the office while conducting this encounter. 4/17 improving blood pressure since meds adjusted by Dr. Penelope Ellington;    1/17 seen for establishing/changing cardiologist   history of coronary disease, status post PCI(2010) and hypertension, along with conduction system disease    Hospital Follow Up   The history is provided by the patient and medical records (CHF). Associated symptoms include shortness of breath. Pertinent negatives include no chest pain and no headaches. Hypertension   The history is provided by the medical records and patient. This is a chronic problem. Associated symptoms include shortness of breath. Pertinent negatives include no chest pain and no headaches. Cholesterol Problem   The history is provided by the medical records. This is a chronic problem. Associated symptoms include shortness of breath. Pertinent negatives include no chest pain and no headaches. Cardiomyopathy   The history is provided by the medical records (ischemic). This is a chronic problem. Associated symptoms include shortness of breath. Pertinent negatives include no chest pain and no headaches. Shortness of Breath   The history is provided by the patient. This is a recurrent problem. The problem occurs intermittently. The current episode started more than 1 week ago. The problem has not changed since onset. Associated symptoms include leg swelling.  Pertinent negatives include no fever, no headaches, no cough, no wheezing, no PND, no orthopnea, no chest pain, no vomiting, no rash and no claudication. The problem's precipitants include exercise (10-15 mt walk; 5/20 walking in hurry). Leg Swelling   The history is provided by the patient. This is a recurrent problem. The current episode started more than 1 week ago (6/16). The problem occurs every several days. The problem has been gradually worsening. Associated symptoms include shortness of breath. Pertinent negatives include no chest pain and no headaches. The symptoms are aggravated by standing. The symptoms are relieved by sleep. Review of Systems   Constitutional: Negative for chills, fever, malaise/fatigue and weight loss. HENT: Negative for nosebleeds. Eyes: Negative for discharge. Respiratory: Positive for shortness of breath. Negative for cough and wheezing. Cardiovascular: Positive for leg swelling. Negative for chest pain, palpitations, orthopnea, claudication and PND. Gastrointestinal: Negative for diarrhea, nausea and vomiting. Genitourinary: Negative for dysuria and hematuria. Musculoskeletal: Negative for joint pain. Skin: Negative for rash. Neurological: Negative for dizziness, seizures, loss of consciousness and headaches. Endo/Heme/Allergies: Negative for polydipsia. Does not bruise/bleed easily. Psychiatric/Behavioral: Negative for depression and substance abuse. The patient does not have insomnia. No Known Allergies    Past Medical History:   Diagnosis Date    ACS (acute coronary syndrome) ([facility] [address]     CAD (coronary artery disease), native coronary artery August 2010    s/p non-ST-elevation myocardial infarction, s/p PCI with BMS (Vision 4x18mm) distal RCA with 45% distal LAD  and mild LCx disease. mild-moderate LV systolic dysfunction (16/98).  Diabetes mellitus type II     Dyslipidemia     ED (erectile dysfunction)     GERD (gastroesophageal reflux disease)     History of BPH     History of echocardiogram 5/18/2011    EF 65%.   Mod-marked increased wall thickness. Gr 1 DDfx. No significant valvular heart disease.  Hyperlipidemia     Hypertension     Ischemic cardiomyopathy     recovery of LV syst fct  Echo May 2011 LV EF 65%    MI (myocardial infarction) ([address]     Obesity     RBBB (right bundle branch block with left anterior fascicular block)     S/P cardiac cath 8/26/2010    LM patent. dLAD 45%. CX mild. dRCA 100% thrombotic (S/P cath thrombectomy & Vision 4 x 18-mm BMS). EF 40%. Posterobasal, diaphrag, apical hypk.  Shingles 4/2012    Tobacco use disorder, continuous        Family History   Problem Relation Age of Onset    Diabetes Mother        Social History     Tobacco Use    Smoking status: Current Every Day Smoker     Packs/day: 0.50     Years: 44.00     Pack years: 22.00     Types: Cigarettes    Smokeless tobacco: Never Used    Tobacco comment: 2-3 cigs per day   Substance Use Topics    Alcohol use: No    Drug use: No        Current Outpatient Medications   Medication Sig    cloNIDine HCL (CATAPRES) 0.1 mg tablet Take 1 Tab by mouth three (3) times daily.  isosorbide mononitrate ER (IMDUR) 30 mg tablet Take 1 Tab by mouth daily.  insulin glargine (Lantus Solostar U-100 Insulin) 100 unit/mL (3 mL) inpn 30 Units by SubCUTAneous route daily.  OTHER BMP in 1 week  Dx: CHF  Fax results to     doxazosin (CARDURA) 2 mg tablet Take 2 mg by mouth every evening.  ticagrelor (BRILINTA) 90 mg tablet Take 1 Tab by mouth every twelve (12) hours every twelve (12) hours.  carvediloL (COREG) 25 mg tablet Take 1 Tab by mouth two (2) times daily (with meals).  amLODIPine (NORVASC) 10 mg tablet Take 1 Tab by mouth daily.  gabapentin (NEURONTIN) 100 mg capsule Take 100 mg by mouth two (2) times a day.  nitroglycerin (NITROSTAT) 0.4 mg SL tablet 1 Tab by SubLINGual route every five (5) minutes as needed for Chest Pain for up to 3 doses.  Indications: Angina    cholecalciferol (Vitamin D3) (1000 Units /25 mcg) tablet Take  by mouth.  dutasteride (AVODART) 0.5 mg capsule Take 0.5 mg by mouth daily.  hydrALAZINE (APRESOLINE) 100 mg tablet Take 100 mg by mouth three (3) times daily.  simvastatin (ZOCOR) 40 mg tablet Take 1 Tab by mouth nightly.  pantoprazole (PROTONIX) 40 mg tablet Take 40 mg by mouth daily.  aspirin 81 mg tablet Take 81 mg by mouth daily.  bumetanide (BUMEX) 0.5 mg tablet Take 1 Tab by mouth two (2) times a day. No current facility-administered medications for this visit. Past Surgical History:   Procedure Laterality Date    COLONOSCOPY N/A 4/23/2019    COLONOSCOPY performed by Turner Marin MD at [facility] ENDOSCOPY    HX CATARACT REMOVAL      HX CORONARY ARTERY BYPASS GRAFT      HX CORONARY STENT PLACEMENT  2010    HX HEART CATHETERIZATION  08/26/10    1. Normal systemic pressure. 2. Moderate elevation of left sided filling pressures. 3. Mild to moderate left ventricular systolic dysfunction distinct regional wall motion abnormalities. 4. Coronary disease with thrombotic occlusion of the distal right coronary artery and moderate left anterior descending disease. 5. Successful percutaneous coronary intervention with catheter thrombectomy.  HX HEMORRHOIDECTOMY      HX MOHS PROCEDURES Left 2002    HX TRABECULECTOMY         Diagnostic Studies: Old records reviewed and show as follows  EKG tracings reviewed by me today. No flowsheet data found. 3/17 Nuc Stress  Conclusion:   1. Nondiagnostic EKG changes of ischemia due to abnormal baseline EKG at 89% of predicted maximal heart rate. 2. Normal functional capacity. 3. Severely hypertensive blood pressure response and appropriate heart rate response. 4. No ischemic symptoms or arrhythmias seen. 5. Perfusion image report to follow. Conclusion:   1. Normal perfusion scan. 2. Normal wall motion and ejection fraction. 3. Low risk scan.   3/17 ECHO  SUMMARY:  Left ventricle: Ejection fraction was estimated to be 72 %. There were no  regional wall motion abnormalities. There was severe concentric  hypertrophy. Features were consistent with a pseudonormal left ventricular  filling pattern, with concomitant abnormal relaxation and increased  filling pressure (grade 2 diastolic dysfunction). There were no vitals taken for this visit. Mr. Jeancarlos Slaughter has a reminder for a \"due or due soon\" health maintenance. I have asked that he contact his primary care provider for follow-up on this health maintenance. 9/10 Card Cath  IMPRESSION  1. Normal systemic pressure. 2. Moderate elevation of left-sided filling pressures. 3. Mild to moderate left ventricular systolic dysfunction with distinct  regional wall motion abnormalities. 4. Coronary disease as described above with a thrombotic occlusion of the  distal right coronary artery and moderate distal left anterior descending  disease. 5. Successful percutaneous coronary intervention with catheter  thrombectomy, followed by bare metal stent deployment with a Vision 4 x 18  mm stent in the distal right coronary artery. 04/20/20   CARDIAC PROCEDURE 04/23/2020 4/23/2020    Narrative Double vessel coronary artery disease. S/p ptca/stent to mid LAD. Known BMS to distal RCA with moderate lesion. Recommend intense risk factor modification. Signed by: Alec Cardenas MD     Physical Exam   Constitutional: He is oriented to person, place, and time. He appears well-developed and well-nourished. No distress. HENT:   Head: Normocephalic and atraumatic. Nose: Nose normal.   Eyes: Conjunctivae and EOM are normal. Right eye exhibits no discharge. Left eye exhibits no discharge. No scleral icterus. Neck: Normal range of motion. No JVD present. No thyromegaly present. Pulmonary/Chest: Effort normal. No accessory muscle usage or stridor. Abdominal: Soft. He exhibits no distension. Musculoskeletal: Normal range of motion. General: Edema present.    Neurological: He is alert and oriented to person, place, and time. Gait normal.   No obvious facial asymmetry   Skin: No rash noted. No erythema. Psychiatric: He has a normal mood and affect. His behavior is normal.       ASSESSMENT and PLAN    Patient advised to stop smoking to reduce future cardiovascular events. HLD: 3/17 LDL31;       Follow-up after CHF admission which was after a few weeks of non-STEMI when patient had LAD stent. He also has CKD. Creatinine had increased. Apparently he was not discharged on diuretics as wife is not giving any. Edema and shortness of breath are worsening again. Start Bumex and follow-up electrolytes. CAD clinically stable. Blood pressure is controlled. Reduce clonidine to twice daily as I am adding Bumex. Labs as ordered. Had a detailed discussion with patient and wife regarding diet to avoid salty foods and importance of controlling blood pressure. Diagnoses and all orders for this visit:    1. Acute on chronic diastolic congestive heart failure (HCC)  -     bumetanide (BUMEX) 1 mg tablet; Take 0.5 Tabs by mouth two (2) times a day. -     cloNIDine HCL (CATAPRES) 0.1 mg tablet; Take 1 Tab by mouth two (2) times a day. -     METABOLIC PANEL, BASIC; Future    2. CAD S/P percutaneous coronary angioplasty    3. Essential hypertension    4. Tobacco use disorder, continuous        Pertinent laboratory and test data reviewed and discussed with patient. See patient instructions also for other medical advice given    Medications Discontinued During This Encounter   Medication Reason    bumetanide (BUMEX) 0.5 mg tablet     cloNIDine HCL (CATAPRES) 0.1 mg tablet        Follow-up and Dispositions    · Return in about 2 weeks (around 6/26/2020), or if symptoms worsen or fail to improve, for post test.       Vital Signs: (As obtained by patient/caregiver at home)  There were no vitals taken for this visit.        Other pertinent observable physical exam findings:-      We discussed the expected course, resolution and complications of the diagnosis(es) in detail. Medication risks, benefits, costs, interactions, and alternatives were discussed as indicated. I advised him to contact the office if his condition worsens, changes or fails to improve as anticipated. He expressed understanding with the diagnosis(es) and plan. Pursuant to the emergency declaration under the [address] [place] [address] waiver authority and the [employer] Act, this Virtual  Visit was conducted, with patient's consent, to reduce the patient's risk of exposure to COVID-19 and provide continuity of care for an established patient. Services were provided through a video synchronous discussion virtually to substitute for in-person clinic visit.     Bety Zuniga MD

## 2020-06-29 ENCOUNTER — HOSPITAL ENCOUNTER (OUTPATIENT)
Dept: CT IMAGING | Age: 75
Discharge: HOME | End: 2020-06-29
Attending: INTERNAL MEDICINE
Payer: MEDICARE

## 2020-06-29 ENCOUNTER — HOSPITAL ENCOUNTER (OUTPATIENT)
Dept: LAB | Age: 75
Discharge: HOME | End: 2020-06-29
Attending: INTERNAL MEDICINE
Payer: MEDICARE

## 2020-06-29 DIAGNOSIS — I10 ESSENTIAL HYPERTENSION: ICD-10-CM

## 2020-06-29 DIAGNOSIS — K70.31 ALCOHOLIC CIRRHOSIS OF LIVER WITH ASCITES (HCC): ICD-10-CM

## 2020-06-29 LAB
ALBUMIN SERPL-MCNC: 3.3 G/DL (ref 3.4–5)
ALBUMIN SERPL-MCNC: 3.3 G/DL (ref 3.4–5)
ALBUMIN/GLOB SERPL: 0.7 {RATIO} (ref 0.8–1.7)
ALP SERPL-CCNC: 116 U/L (ref 45–117)
ALT SERPL-CCNC: 16 U/L (ref 16–61)
ANION GAP SERPL CALC-SCNC: 4 MMOL/L (ref 3–18)
ANION GAP SERPL CALC-SCNC: 4 MMOL/L (ref 3–18)
AST SERPL-CCNC: 11 U/L (ref 10–38)
BILIRUB DIRECT SERPL-MCNC: 0.1 MG/DL (ref 0–0.2)
BILIRUB SERPL-MCNC: 0.3 MG/DL (ref 0.2–1)
BUN SERPL-MCNC: 27 MG/DL (ref 7–18)
BUN SERPL-MCNC: 27 MG/DL (ref 7–18)
BUN/CREAT SERPL: 11 (ref 12–20)
BUN/CREAT SERPL: 11 (ref 12–20)
CALCIUM SERPL-MCNC: 9.1 MG/DL (ref 8.5–10.1)
CALCIUM SERPL-MCNC: 9.1 MG/DL (ref 8.5–10.1)
CALCIUM SERPL-MCNC: 9.2 MG/DL (ref 8.5–10.1)
CHLORIDE SERPL-SCNC: 113 MMOL/L (ref 100–111)
CHLORIDE SERPL-SCNC: 113 MMOL/L (ref 100–111)
CHOLEST SERPL-MCNC: 122 MG/DL
CO2 SERPL-SCNC: 27 MMOL/L (ref 21–32)
CO2 SERPL-SCNC: 27 MMOL/L (ref 21–32)
CREAT SERPL-MCNC: 2.39 MG/DL (ref 0.6–1.3)
CREAT SERPL-MCNC: 2.42 MG/DL (ref 0.6–1.3)
CREAT UR-MCNC: 108 MG/DL (ref 30–125)
ERYTHROCYTE [DISTWIDTH] IN BLOOD BY AUTOMATED COUNT: 15 % (ref 11.6–14.5)
GLOBULIN SER CALC-MCNC: 4.5 G/DL (ref 2–4)
GLUCOSE SERPL-MCNC: 88 MG/DL (ref 74–99)
GLUCOSE SERPL-MCNC: 88 MG/DL (ref 74–99)
HCT VFR BLD AUTO: 31.8 % (ref 36–48)
HDLC SERPL-MCNC: 41 MG/DL (ref 40–60)
HDLC SERPL: 3 {RATIO} (ref 0–5)
HGB BLD-MCNC: 10.9 G/DL (ref 13–16)
LDLC SERPL CALC-MCNC: 64.2 MG/DL (ref 0–100)
LIPID PROFILE,FLP: NORMAL
MCH RBC QN AUTO: 25.8 PG (ref 24–34)
MCHC RBC AUTO-ENTMCNC: 34.3 G/DL (ref 31–37)
MCV RBC AUTO: 75.2 FL (ref 74–97)
PHOSPHATE SERPL-MCNC: 3.6 MG/DL (ref 2.5–4.9)
PLATELET # BLD AUTO: 241 K/UL (ref 135–420)
PMV BLD AUTO: 10.2 FL (ref 9.2–11.8)
POTASSIUM SERPL-SCNC: 4.2 MMOL/L (ref 3.5–5.5)
POTASSIUM SERPL-SCNC: 4.3 MMOL/L (ref 3.5–5.5)
PROT SERPL-MCNC: 7.8 G/DL (ref 6.4–8.2)
PROT UR-MCNC: 458 MG/DL
PTH-INTACT SERPL-MCNC: 83.1 PG/ML (ref 18.4–88)
RBC # BLD AUTO: 4.23 M/UL (ref 4.7–5.5)
SODIUM SERPL-SCNC: 144 MMOL/L (ref 136–145)
SODIUM SERPL-SCNC: 144 MMOL/L (ref 136–145)
TRIGL SERPL-MCNC: 84 MG/DL (ref ?–150)
VLDLC SERPL CALC-MCNC: 16.8 MG/DL
WBC # BLD AUTO: 8.6 K/UL (ref 4.6–13.2)

## 2020-06-29 PROCEDURE — 80061 LIPID PANEL: CPT

## 2020-06-29 PROCEDURE — 84156 ASSAY OF PROTEIN URINE: CPT

## 2020-06-29 PROCEDURE — 36415 COLL VENOUS BLD VENIPUNCTURE: CPT

## 2020-06-29 PROCEDURE — 85027 COMPLETE CBC AUTOMATED: CPT

## 2020-06-29 PROCEDURE — 74176 CT ABD & PELVIS W/O CONTRAST: CPT

## 2020-06-29 PROCEDURE — 80069 RENAL FUNCTION PANEL: CPT

## 2020-06-29 PROCEDURE — 82570 ASSAY OF URINE CREATININE: CPT

## 2020-06-29 PROCEDURE — 80048 BASIC METABOLIC PNL TOTAL CA: CPT

## 2020-06-29 PROCEDURE — 83970 ASSAY OF PARATHORMONE: CPT

## 2020-06-29 PROCEDURE — 80076 HEPATIC FUNCTION PANEL: CPT

## 2020-07-02 ENCOUNTER — OFFICE VISIT (OUTPATIENT)
Dept: CARDIOLOGY CLINIC | Age: 75
End: 2020-07-02

## 2020-07-02 VITALS
DIASTOLIC BLOOD PRESSURE: 67 MMHG | WEIGHT: 205 LBS | TEMPERATURE: 98.7 F | BODY MASS INDEX: 32.95 KG/M2 | SYSTOLIC BLOOD PRESSURE: 148 MMHG | HEIGHT: 66 IN | HEART RATE: 61 BPM

## 2020-07-02 DIAGNOSIS — I25.10 CAD S/P PERCUTANEOUS CORONARY ANGIOPLASTY: Primary | ICD-10-CM

## 2020-07-02 DIAGNOSIS — I10 ESSENTIAL HYPERTENSION: ICD-10-CM

## 2020-07-02 DIAGNOSIS — Z98.61 POSTSURGICAL PERCUTANEOUS TRANSLUMINAL CORONARY ANGIOPLASTY STATUS: ICD-10-CM

## 2020-07-02 DIAGNOSIS — I50.32 CHRONIC DIASTOLIC CONGESTIVE HEART FAILURE (HCC): ICD-10-CM

## 2020-07-02 DIAGNOSIS — E78.5 DYSLIPIDEMIA: ICD-10-CM

## 2020-07-02 DIAGNOSIS — E66.9 OBESITY (BMI 30.0-34.9): ICD-10-CM

## 2020-07-02 DIAGNOSIS — Z98.61 CAD S/P PERCUTANEOUS CORONARY ANGIOPLASTY: Primary | ICD-10-CM

## 2020-07-02 RX ORDER — DOXAZOSIN 4 MG/1
4 TABLET ORAL EVERY EVENING
Qty: 90 TAB | Refills: 1 | Status: SHIPPED | OUTPATIENT
Start: 2020-07-02 | End: 2021-05-17

## 2020-07-02 RX ORDER — CLOPIDOGREL BISULFATE 75 MG/1
75 TABLET ORAL DAILY
Qty: 90 TAB | Refills: 3 | Status: SHIPPED | OUTPATIENT
Start: 2020-07-02 | End: 2021-09-27

## 2020-07-02 NOTE — PATIENT INSTRUCTIONS
Medications Discontinued During This Encounter Medication Reason  amLODIPine (NORVASC) 10 mg tablet  doxazosin (CARDURA) 2 mg tablet Reorder  ticagrelor (BRILINTA) 90 mg tablet Cost of Medication After the recommended changes have been made in blood pressure medicines, patient advised to keep BP/HR(pulse rate) chart twice daily and bring us results in next 1 week or so. Patient may send the results via \"My Chart\" if desired. Please rest for 5-10 minutes before checking blood pressure. Sit on a comfortable chair without crossing the legs and put your arm on a table. We recommend that you use an upper arm cuff. Check the blood pressure 3 times weekly and take the lowest reading. If you check the blood pressure in both arms, use the higher reading. A Healthy Heart: Care Instructions Your Care Instructions Coronary artery disease, also called heart disease, occurs when a substance called plaque builds up in the vessels that supply oxygen-rich blood to your heart muscle. This can narrow the blood vessels and reduce blood flow. A heart attack happens when blood flow is completely blocked. A high-fat diet, smoking, and other factors increase the risk of heart disease. Your doctor has found that you have a chance of having heart disease. You can do lots of things to keep your heart healthy. It may not be easy, but you can change your diet, exercise more, and quit smoking. These steps really work to lower your chance of heart disease. Follow-up care is a key part of your treatment and safety. Be sure to make and go to all appointments, and call your doctor if you are having problems. It's also a good idea to know your test results and keep a list of the medicines you take. How can you care for yourself at home? Diet · Use less salt when you cook and eat. This helps lower your blood pressure. Taste food before salting.  Add only a little salt when you think you need it. With time, your taste buds will adjust to less salt. · Eat fewer snack items, fast foods, canned soups, and other high-salt, high-fat, processed foods. · Read food labels and try to avoid saturated and trans fats. They increase your risk of heart disease by raising cholesterol levels. · Limit the amount of solid fat-butter, margarine, and shortening-you eat. Use olive, peanut, or canola oil when you cook. Bake, broil, and steam foods instead of frying them. · Eat a variety of fruit and vegetables every day. Dark green, deep orange, red, or yellow fruits and vegetables are especially good for you. Examples include spinach, carrots, peaches, and berries. · Foods high in fiber can reduce your cholesterol and provide important vitamins and minerals. High-fiber foods include whole-grain cereals and breads, oatmeal, beans, brown rice, citrus fruits, and apples. · Eat lean proteins. Heart-healthy proteins include seafood, lean meats and poultry, eggs, beans, peas, nuts, seeds, and soy products. · Limit drinks and foods with added sugar. These include candy, desserts, and soda pop. Lifestyle changes · If your doctor recommends it, get more exercise. Walking is a good choice. Bit by bit, increase the amount you walk every day. Try for at least 30 minutes on most days of the week. You also may want to swim, bike, or do other activities. · Do not smoke. If you need help quitting, talk to your doctor about stop-smoking programs and medicines. These can increase your chances of quitting for good. Quitting smoking may be the most important step you can take to protect your heart. It is never too late to quit. · Limit alcohol to 2 drinks a day for men and 1 drink a day for women. Too much alcohol can cause health problems. · Manage other health problems such as diabetes, high blood pressure, and high cholesterol. If you think you may have a problem with alcohol or drug use, talk to your doctor. Medicines · Take your medicines exactly as prescribed. Call your doctor if you think you are having a problem with your medicine. · If your doctor recommends aspirin, take the amount directed each day. Make sure you take aspirin and not another kind of pain reliever, such as acetaminophen (Tylenol). When should you call for help? [id number] if you have symptoms of a heart attack. These may include: · Chest pain or pressure, or a strange feeling in the chest. 
· Sweating. · Shortness of breath. · Pain, pressure, or a strange feeling in the back, neck, jaw, or upper belly or in one or both shoulders or arms. · Lightheadedness or sudden weakness. · A fast or irregular heartbeat. After you call 911, the  may tell you to chew 1 adult-strength or 2 to 4 low-dose aspirin. Wait for an ambulance. Do not try to drive yourself. Watch closely for changes in your health, and be sure to contact your doctor if you have any problems. Where can you learn more? Go to http://jerry-shereen.info/ Enter M006 in the search box to learn more about \"A Healthy Heart: Care Instructions. \" Current as of: December 16, 2019               Content Version: 12.5 © 3344-2064 [employer]. Care instructions adapted under license by [employer] (which disclaims liability or warranty for this information). If you have questions about a medical condition or this instruction, always ask your healthcare professional. [place] [address] any warranty or liability for your use of this information.

## 2020-07-02 NOTE — PROGRESS NOTES
1. Have you been to the ER, urgent care clinic since your last visit? Hospitalized since your last visit?     no  2. Have you seen or consulted any other health care providers outside of the [address] [place] [address] since your last visit? Include any pap smears or colon screening. Yes When: x 1 wk ago at PCP office     3. Since your last visit, have you had any of the following symptoms?      swelling in legs/arms. Explain:bilateral feet swelling x 2 months ago     4. Have you had any blood work, X-rays or cardiac testing? Yes When: x 1 wk ago CT scan at      Requested: NO     In [facility]: YES    5. Where do you normally have your labs drawn? [address]    6. Do you need any refills today?    no

## 2020-07-02 NOTE — PROGRESS NOTES
HISTORY OF PRESENT ILLNESS  Darien Dhillon is a 69 y.o. male. Darien Dhillon is a 69 y.o. male who was seen by synchronous (real-time) audio-video technology on 7/2/2020. Consent:  He and/or his healthcare decision maker is aware that this patient-initiated Telehealth encounter is a billable service, with coverage as determined by his insurance carrier. He is aware that he may receive a bill and has provided verbal consent to proceed: Yes    I was in the office while conducting this encounter. 4/17 improving blood pressure since meds adjusted by Dr. Romina Bailey;    1/17 seen for establishing/changing cardiologist   history of coronary disease, status post PCI(2010) and hypertension, along with conduction system disease    Leg Swelling    The history is provided by the patient. This is a recurrent problem. The current episode started more than 1 week ago (6/16). The problem occurs every several days. The problem has not changed since onset. Pertinent negatives include no chest pain, no headaches and no shortness of breath. The symptoms are aggravated by standing. The symptoms are relieved by sleep. Hypertension    The history is provided by the medical records and patient. This is a chronic problem. Pertinent negatives include no chest pain, no headaches and no shortness of breath. Cholesterol Problem    The history is provided by the medical records. This is a chronic problem. Pertinent negatives include no chest pain, no headaches and no shortness of breath. Cardiomyopathy    The history is provided by the medical records (ischemic). This is a chronic problem. Pertinent negatives include no chest pain, no headaches and no shortness of breath. Shortness of Breath    The history is provided by the patient. This is a recurrent problem. The problem occurs intermittently. The current episode started more than 1 week ago. The problem has been resolved. Associated symptoms include leg swelling.  Pertinent negatives include no fever, no headaches, no cough, no wheezing, no PND, no orthopnea, no chest pain, no vomiting, no rash and no claudication. The problem's precipitants include exercise (10-15 mt walk; 5/20 walking in hurry). Review of Systems   Constitutional: Negative for chills, fever, malaise/fatigue and weight loss. HENT: Negative for nosebleeds. Eyes: Negative for discharge. Respiratory: Negative for cough, shortness of breath and wheezing. Cardiovascular: Positive for leg swelling. Negative for chest pain, palpitations, orthopnea, claudication and PND. Gastrointestinal: Negative for diarrhea, nausea and vomiting. Genitourinary: Negative for dysuria and hematuria. Musculoskeletal: Negative for joint pain. Skin: Negative for rash. Neurological: Negative for dizziness, seizures, loss of consciousness and headaches. Endo/Heme/Allergies: Negative for polydipsia. Does not bruise/bleed easily. Psychiatric/Behavioral: Negative for depression and substance abuse. The patient does not have insomnia. No Known Allergies    Past Medical History:   Diagnosis Date    ACS [facility]acute coronary syndrome) ([facility] [address]     CAD (coronary artery disease), native coronary artery August 2010    s/p non-ST-elevation myocardial infarction, s/p PCI with BMS (Vision 4x18mm) distal RCA with 45% distal LAD  and mild LCx disease. mild-moderate LV systolic dysfunction (66/72).  Diabetes mellitus type II     Dyslipidemia     ED (erectile dysfunction)     GERD (gastroesophageal reflux disease)     History of BPH     History of echocardiogram 5/18/2011    EF 65%. Mod-marked increased wall thickness. Gr 1 DDfx. No significant valvular heart disease.     Hyperlipidemia     Hypertension     Ischemic cardiomyopathy     recovery of LV syst fct  Echo May 2011 LV EF 65%    MI (myocardial infarction) ([facility] [address]     Obesity     RBBB (right bundle branch block with left anterior fascicular block)     S/P cardiac cath 2010    LM patent. dLAD 45%. CX mild. dRCA 100% thrombotic (S/P cath thrombectomy & Vision 4 x 18-mm BMS). EF 40%. Posterobasal, diaphrag, apical hypk.  Shingles 2012    Tobacco use disorder, continuous        Family History   Problem Relation Age of Onset    Diabetes Mother        Social History     Tobacco Use    Smoking status: Former Smoker     Packs/day: 0.50     Years: 44.00     Pack years: 22.00     Types: Cigarettes     Last attempt to quit: 2020     Years since quittin.0    Smokeless tobacco: Never Used   Substance Use Topics    Alcohol use: No    Drug use: No        Current Outpatient Medications   Medication Sig    bumetanide (BUMEX) 1 mg tablet Take 0.5 Tabs by mouth two (2) times a day.  cloNIDine HCL (CATAPRES) 0.1 mg tablet Take 1 Tab by mouth two (2) times a day.  isosorbide mononitrate ER (IMDUR) 30 mg tablet Take 1 Tab by mouth daily.  insulin glargine (Lantus Solostar U-100 Insulin) 100 unit/mL (3 mL) inpn 30 Units by SubCUTAneous route daily.  OTHER BMP in 1 week  Dx: CHF  Fax results to     doxazosin (CARDURA) 2 mg tablet Take 2 mg by mouth every evening.  ticagrelor (BRILINTA) 90 mg tablet Take 1 Tab by mouth every twelve (12) hours every twelve (12) hours.  carvediloL (COREG) 25 mg tablet Take 1 Tab by mouth two (2) times daily (with meals).  gabapentin (NEURONTIN) 100 mg capsule Take 100 mg by mouth two (2) times a day.  nitroglycerin (NITROSTAT) 0.4 mg SL tablet 1 Tab by SubLINGual route every five (5) minutes as needed for Chest Pain for up to 3 doses. Indications: Angina    cholecalciferol (Vitamin D3) (1000 Units /25 mcg) tablet Take  by mouth.  dutasteride (AVODART) 0.5 mg capsule Take 0.5 mg by mouth daily.  hydrALAZINE (APRESOLINE) 100 mg tablet Take 100 mg by mouth three (3) times daily.  simvastatin (ZOCOR) 40 mg tablet Take 1 Tab by mouth nightly.     pantoprazole (PROTONIX) 40 mg tablet Take 40 mg by mouth daily.  aspirin 81 mg tablet Take 81 mg by mouth daily. No current facility-administered medications for this visit. Past Surgical History:   Procedure Laterality Date    COLONOSCOPY N/A 4/23/2019    COLONOSCOPY performed by Magi Subramanian MD at [facility]COPY    HX CATARACT REMOVAL      HX CORONARY ARTERY BYPASS GRAFT      HX CORONARY STENT PLACEMENT  2010    HX HEART CATHETERIZATION  08/26/10    1. Normal systemic pressure. 2. Moderate elevation of left sided filling pressures. 3. Mild to moderate left ventricular systolic dysfunction distinct regional wall motion abnormalities. 4. Coronary disease with thrombotic occlusion of the distal right coronary artery and moderate left anterior descending disease. 5. Successful percutaneous coronary intervention with catheter thrombectomy.  HX HEMORRHOIDECTOMY      HX MOHS PROCEDURES Left 2002    HX TRABECULECTOMY         Diagnostic Studies: Old records reviewed and show as follows  EKG tracings reviewed by me today. No flowsheet data found. 3/17 Nuc Stress  Conclusion:   1. Nondiagnostic EKG changes of ischemia due to abnormal baseline EKG at 89% of predicted maximal heart rate. 2. Normal functional capacity. 3. Severely hypertensive blood pressure response and appropriate heart rate response. 4. No ischemic symptoms or arrhythmias seen. 5. Perfusion image report to follow. Conclusion:   1. Normal perfusion scan. 2. Normal wall motion and ejection fraction. 3. Low risk scan. 3/17 ECHO  SUMMARY:  Left ventricle: Ejection fraction was estimated to be 65 %. There were no  regional wall motion abnormalities. There was severe concentric  hypertrophy. Features were consistent with a pseudonormal left ventricular  filling pattern, with concomitant abnormal relaxation and increased  filling pressure (grade 2 diastolic dysfunction).     Visit Vitals  /67 (BP 1 Location: Left arm, BP Patient Position: Sitting)   Pulse 61 Temp 98.7 °F (37.1 °C) (Temporal)   Ht 5' 6\" (1.676 m)   Wt 93 kg (205 lb)   BMI 33.09 kg/m²       Mr. Shiva Leo has a reminder for a \"due or due soon\" health maintenance. I have asked that he contact his primary care provider for follow-up on this health maintenance. 9/10 Card Cath  IMPRESSION  1. Normal systemic pressure. 2. Moderate elevation of left-sided filling pressures. 3. Mild to moderate left ventricular systolic dysfunction with distinct  regional wall motion abnormalities. 4. Coronary disease as described above with a thrombotic occlusion of the  distal right coronary artery and moderate distal left anterior descending  disease. 5. Successful percutaneous coronary intervention with catheter  thrombectomy, followed by bare metal stent deployment with a Vision 4 x 18  mm stent in the distal right coronary artery. 04/20/20   CARDIAC PROCEDURE 04/23/2020 4/23/2020    Narrative Double vessel coronary artery disease. S/p ptca/stent to mid LAD. Known BMS to distal RCA with moderate lesion. Recommend intense risk factor modification. Signed by: Kingston Avery MD     Physical Exam   Constitutional: He is oriented to person, place, and time. He appears well-developed and well-nourished. No distress. HENT:   Head: Normocephalic and atraumatic. Nose: Nose normal.   Eyes: Conjunctivae and EOM are normal. Right eye exhibits no discharge. Left eye exhibits no discharge. No scleral icterus. Neck: Normal range of motion. No JVD present. No thyromegaly present. Pulmonary/Chest: Effort normal. No accessory muscle usage or stridor. Abdominal: Soft. He exhibits no distension. Musculoskeletal: Normal range of motion. General: Edema (1+R>L) present. Neurological: He is alert and oriented to person, place, and time. Gait normal.   No obvious facial asymmetry   Skin: No rash noted. No erythema. Psychiatric: He has a normal mood and affect.  His behavior is normal.       ASSESSMENT and PLAN    Patient advised to stop smoking to reduce future cardiovascular events. HLD: 3/17 LDL31;       CHF is improving. Edema still present but improving  Shortness of breath is resolved. Change Brilinta to Plavix due to cost.  Norvasc was discontinued by PCP due to edema. Will increase doxazosin for hypertension. Follow home chart. Creatinine is stable post Bumex was started. Diagnoses and all orders for this visit:    1. CAD S/P percutaneous coronary angioplasty  -     clopidogreL (Plavix) 75 mg tab; Take 1 Tab by mouth daily. 2. Chronic diastolic congestive heart failure (HCC)  -     METABOLIC PANEL, BASIC; Future    3. Essential hypertension  -     doxazosin (CARDURA) 4 mg tablet; Take 1 Tab by mouth every evening. 4. Dyslipidemia    5. Postsurgical percutaneous transluminal coronary angioplasty status  -     clopidogreL (Plavix) 75 mg tab; Take 1 Tab by mouth daily. 6. Obesity (BMI 30.0-34. 9)        Pertinent laboratory and test data reviewed and discussed with patient.   See patient instructions also for other medical advice given    Medications Discontinued During This Encounter   Medication Reason    amLODIPine (NORVASC) 10 mg tablet     doxazosin (CARDURA) 2 mg tablet Reorder    ticagrelor (BRILINTA) 90 mg tablet Cost of Medication       Follow-up and Dispositions    · Return in about 6 months (around 1/2/2021), or if symptoms worsen or fail to improve, for post test.

## 2020-11-06 ENCOUNTER — HOSPITAL ENCOUNTER (OUTPATIENT)
Dept: LAB | Age: 75
Discharge: HOME | End: 2020-11-06
Payer: MEDICARE

## 2020-11-06 ENCOUNTER — TRANSCRIBE ORDER (OUTPATIENT)
Dept: REGISTRATION | Age: 75
End: 2020-11-06

## 2020-11-06 DIAGNOSIS — N18.4 CHRONIC KIDNEY DISEASE, STAGE IV (SEVERE) (HCC): Primary | ICD-10-CM

## 2020-11-06 DIAGNOSIS — N18.4 CHRONIC KIDNEY DISEASE, STAGE IV (SEVERE) (HCC): ICD-10-CM

## 2020-11-06 LAB
ALBUMIN SERPL-MCNC: 2.8 G/DL (ref 3.4–5)
ANION GAP SERPL CALC-SCNC: 6 MMOL/L (ref 3–18)
BUN SERPL-MCNC: 23 MG/DL (ref 7–18)
BUN/CREAT SERPL: 9 (ref 12–20)
CALCIUM SERPL-MCNC: 8.8 MG/DL (ref 8.5–10.1)
CHLORIDE SERPL-SCNC: 109 MMOL/L (ref 100–111)
CO2 SERPL-SCNC: 26 MMOL/L (ref 21–32)
CREAT SERPL-MCNC: 2.56 MG/DL (ref 0.6–1.3)
GLUCOSE SERPL-MCNC: 195 MG/DL (ref 74–99)
PHOSPHATE SERPL-MCNC: 3.4 MG/DL (ref 2.5–4.9)
POTASSIUM SERPL-SCNC: 3.7 MMOL/L (ref 3.5–5.5)
SODIUM SERPL-SCNC: 141 MMOL/L (ref 136–145)

## 2020-11-06 PROCEDURE — 80069 RENAL FUNCTION PANEL: CPT

## 2020-11-06 PROCEDURE — 36415 COLL VENOUS BLD VENIPUNCTURE: CPT

## 2020-12-14 DIAGNOSIS — I50.33 ACUTE ON CHRONIC DIASTOLIC CONGESTIVE HEART FAILURE (HCC): ICD-10-CM

## 2020-12-14 RX ORDER — BUMETANIDE 1 MG/1
TABLET ORAL
Qty: 60 TAB | Refills: 1 | Status: SHIPPED | OUTPATIENT
Start: 2020-12-14 | End: 2021-07-27

## 2021-02-11 ENCOUNTER — VIRTUAL VISIT (OUTPATIENT)
Dept: CARDIOLOGY CLINIC | Age: 76
End: 2021-02-11
Payer: MEDICARE

## 2021-02-11 DIAGNOSIS — E78.5 DYSLIPIDEMIA: ICD-10-CM

## 2021-02-11 DIAGNOSIS — I10 ESSENTIAL HYPERTENSION: ICD-10-CM

## 2021-02-11 DIAGNOSIS — E66.9 OBESITY (BMI 30.0-34.9): ICD-10-CM

## 2021-02-11 DIAGNOSIS — Z98.61 CAD S/P PERCUTANEOUS CORONARY ANGIOPLASTY: ICD-10-CM

## 2021-02-11 DIAGNOSIS — I50.32 CHRONIC DIASTOLIC CONGESTIVE HEART FAILURE (HCC): Primary | ICD-10-CM

## 2021-02-11 DIAGNOSIS — I25.10 CAD S/P PERCUTANEOUS CORONARY ANGIOPLASTY: ICD-10-CM

## 2021-02-11 PROCEDURE — 1101F PT FALLS ASSESS-DOCD LE1/YR: CPT | Performed by: INTERNAL MEDICINE

## 2021-02-11 PROCEDURE — G8417 CALC BMI ABV UP PARAM F/U: HCPCS | Performed by: INTERNAL MEDICINE

## 2021-02-11 PROCEDURE — G8536 NO DOC ELDER MAL SCRN: HCPCS | Performed by: INTERNAL MEDICINE

## 2021-02-11 PROCEDURE — G8427 DOCREV CUR MEDS BY ELIG CLIN: HCPCS | Performed by: INTERNAL MEDICINE

## 2021-02-11 PROCEDURE — 99214 OFFICE O/P EST MOD 30 MIN: CPT | Performed by: INTERNAL MEDICINE

## 2021-02-11 PROCEDURE — 3017F COLORECTAL CA SCREEN DOC REV: CPT | Performed by: INTERNAL MEDICINE

## 2021-02-11 PROCEDURE — G8432 DEP SCR NOT DOC, RNG: HCPCS | Performed by: INTERNAL MEDICINE

## 2021-02-11 PROCEDURE — G8756 NO BP MEASURE DOC: HCPCS | Performed by: INTERNAL MEDICINE

## 2021-02-11 RX ORDER — CLONIDINE HYDROCHLORIDE 0.2 MG/1
0.2 TABLET ORAL 2 TIMES DAILY
Qty: 180 TAB | Refills: 1 | Status: SHIPPED | OUTPATIENT
Start: 2021-02-11 | End: 2021-08-09 | Stop reason: DRUGHIGH

## 2021-02-11 NOTE — PROGRESS NOTES
HISTORY OF PRESENT ILLNESS  Twana Cheadle. is a 77 y.o. male. Twana Cheadle. is a 77 y.o. male who was seen by synchronous (real-time) audio-video technology on 2/11/2021. Consent:  He and/or his healthcare decision maker is aware that this patient-initiated Telehealth encounter is a billable service, with coverage as determined by his insurance carrier. He is aware that he may receive a bill and has provided verbal consent to proceed: Yes    I was in the office while conducting this encounter. 4/17 improving blood pressure since meds adjusted by Dr. Willie Cason;    1/17 seen for establishing/changing cardiologist   history of coronary disease, status post PCI(2010) and hypertension, along with conduction system disease    Leg Swelling  The history is provided by the patient. This is a recurrent problem. The current episode started more than 1 week ago (6/16). The problem occurs every several days. The problem has been gradually improving. Associated symptoms include shortness of breath. Pertinent negatives include no chest pain and no headaches. The symptoms are aggravated by standing. The symptoms are relieved by sleep. Hypertension  The history is provided by the medical records and patient. This is a chronic problem. Associated symptoms include shortness of breath. Pertinent negatives include no chest pain and no headaches. Cholesterol Problem  The history is provided by the medical records. This is a chronic problem. Associated symptoms include shortness of breath. Pertinent negatives include no chest pain and no headaches. Cardiomyopathy  The history is provided by the medical records (ischemic). This is a chronic problem. Associated symptoms include shortness of breath. Pertinent negatives include no chest pain and no headaches. Shortness of Breath  The history is provided by the patient. This is a recurrent problem. The problem occurs intermittently. The current episode started more than 1 week ago. The problem has not changed since onset. Associated symptoms include leg swelling. Pertinent negatives include no fever, no headaches, no cough, no wheezing, no PND, no orthopnea, no chest pain, no vomiting, no rash and no claudication. The problem's precipitants include exercise (10-15 mt walk; 5/20 walking in hurry). CHF  The history is provided by the medical records. This is a chronic problem. Associated symptoms include shortness of breath. Pertinent negatives include no chest pain and no headaches. Review of Systems   Constitutional: Negative for chills, fever, malaise/fatigue and weight loss. HENT: Negative for nosebleeds. Eyes: Negative for discharge. Respiratory: Positive for shortness of breath. Negative for cough and wheezing. Cardiovascular: Positive for leg swelling. Negative for chest pain, palpitations, orthopnea, claudication and PND. Gastrointestinal: Negative for diarrhea, nausea and vomiting. Genitourinary: Negative for dysuria and hematuria. Musculoskeletal: Negative for joint pain. Skin: Negative for rash. Neurological: Negative for dizziness, seizures, loss of consciousness and headaches. Endo/Heme/Allergies: Negative for polydipsia. Does not bruise/bleed easily. Psychiatric/Behavioral: Negative for depression and substance abuse. The patient does not have insomnia. No Known Allergies    Past Medical History:   Diagnosis Date    ACS (acute coronary syndrome) ([facility] [address]     CAD (coronary artery disease), native coronary artery August 2010    s/p non-ST-elevation myocardial infarction, s/p PCI with BMS (Vision 4x18mm) distal RCA with 45% distal LAD  and mild LCx disease. mild-moderate LV systolic dysfunction (15/02).  Diabetes mellitus type II     Dyslipidemia     ED (erectile dysfunction)     GERD (gastroesophageal reflux disease)     History of BPH     History of echocardiogram 5/18/2011    EF 65%. Mod-marked increased wall thickness. Gr 1 DDfx. No significant valvular heart disease.  Hyperlipidemia     Hypertension     Ischemic cardiomyopathy     recovery of LV syst fct  Echo May 2011 LV EF 65%    MI (myocardial infarction) ([address]     Obesity     RBBB (right bundle branch block with left anterior fascicular block)     S/P cardiac cath 2010    LM patent. dLAD 45%. CX mild. dRCA 100% thrombotic (S/P cath thrombectomy & Vision 4 x 18-mm BMS). EF 40%. Posterobasal, diaphrag, apical hypk.  Shingles 2012    Tobacco use disorder, continuous        Family History   Problem Relation Age of Onset    Diabetes Mother        Social History     Tobacco Use    Smoking status: Former Smoker     Packs/day: 0.50     Years: 44.00     Pack years: 22.00     Types: Cigarettes     Quit date: 2020     Years since quittin.6    Smokeless tobacco: Never Used   Substance Use Topics    Alcohol use: No    Drug use: No        Current Outpatient Medications   Medication Sig    bumetanide (BUMEX) 1 mg tablet take 1/2 tablet by mouth twice a day    doxazosin (CARDURA) 4 mg tablet Take 1 Tab by mouth every evening.  clopidogreL (Plavix) 75 mg tab Take 1 Tab by mouth daily.  cloNIDine HCL (CATAPRES) 0.1 mg tablet Take 1 Tab by mouth two (2) times a day.  isosorbide mononitrate ER (IMDUR) 30 mg tablet Take 1 Tab by mouth daily.  insulin glargine (Lantus Solostar U-100 Insulin) 100 unit/mL (3 mL) inpn 30 Units by SubCUTAneous route daily.  OTHER BMP in 1 week  Dx: CHF  Fax results to     carvediloL (COREG) 25 mg tablet Take 1 Tab by mouth two (2) times daily (with meals).  gabapentin (NEURONTIN) 100 mg capsule Take 100 mg by mouth two (2) times a day.  nitroglycerin (NITROSTAT) 0.4 mg SL tablet 1 Tab by SubLINGual route every five (5) minutes as needed for Chest Pain for up to 3 doses. Indications: Angina    cholecalciferol (Vitamin D3) (1000 Units /25 mcg) tablet Take  by mouth.     dutasteride (AVODART) 0.5 mg capsule Take 0.5 mg by mouth daily.  hydrALAZINE (APRESOLINE) 100 mg tablet Take 100 mg by mouth three (3) times daily.  simvastatin (ZOCOR) 40 mg tablet Take 1 Tab by mouth nightly.  pantoprazole (PROTONIX) 40 mg tablet Take 40 mg by mouth daily.  aspirin 81 mg tablet Take 81 mg by mouth daily. No current facility-administered medications for this visit. Past Surgical History:   Procedure Laterality Date    COLONOSCOPY N/A 4/23/2019    COLONOSCOPY performed by Deep Spence [facility] at [facility] ENDOSCOPY    HX CATARACT REMOVAL      HX CORONARY ARTERY BYPASS GRAFT      HX CORONARY STENT PLACEMENT  2010    HX HEART CATHETERIZATION  08/26/10    1. Normal systemic pressure. 2. Moderate elevation of left sided filling pressures. 3. Mild to moderate left ventricular systolic dysfunction distinct regional wall motion abnormalities. 4. Coronary disease with thrombotic occlusion of the distal right coronary artery and moderate left anterior descending disease. 5. Successful percutaneous coronary intervention with catheter thrombectomy.  HX HEMORRHOIDECTOMY      HX MOHS PROCEDURES Left 2002    HX TRABECULECTOMY         Diagnostic Studies: Old records reviewed and show as follows  EKG tracings reviewed by me today. No flowsheet data found. 3/17 Nuc Stress  Conclusion:   1. Nondiagnostic EKG changes of ischemia due to abnormal baseline EKG at 89% of predicted maximal heart rate. 2. Normal functional capacity. 3. Severely hypertensive blood pressure response and appropriate heart rate response. 4. No ischemic symptoms or arrhythmias seen. 5. Perfusion image report to follow. Conclusion:   1. Normal perfusion scan. 2. Normal wall motion and ejection fraction. 3. Low risk scan. 3/17 ECHO  SUMMARY:  Left ventricle: Ejection fraction was estimated to be 65 %. There were no  regional wall motion abnormalities. There was severe concentric  hypertrophy.  Features were consistent with a pseudonormal left ventricular  filling pattern, with concomitant abnormal relaxation and increased  filling pressure (grade 2 diastolic dysfunction). There were no vitals taken for this visit. Mr. Jeancarlos Slaughter has a reminder for a \"due or due soon\" health maintenance. I have asked that he contact his primary care provider for follow-up on this health maintenance. 9/10 Card Cath  IMPRESSION  1. Normal systemic pressure. 2. Moderate elevation of left-sided filling pressures. 3. Mild to moderate left ventricular systolic dysfunction with distinct  regional wall motion abnormalities. 4. Coronary disease as described above with a thrombotic occlusion of the  distal right coronary artery and moderate distal left anterior descending  disease. 5. Successful percutaneous coronary intervention with catheter  thrombectomy, followed by bare metal stent deployment with a Vision 4 x 18  mm stent in the distal right coronary artery. 04/20/20   CARDIAC PROCEDURE 04/23/2020 4/23/2020    Narrative Double vessel coronary artery disease. S/p ptca/stent to mid LAD. Known BMS to distal RCA with moderate lesion. Recommend intense risk factor modification. Signed by: Alec Cardenas MD     Physical Exam   Constitutional: He is oriented to person, place, and time. He appears well-developed and well-nourished. No distress. HENT:   Head: Normocephalic and atraumatic. Nose: Nose normal.   Eyes: Conjunctivae and EOM are normal. Right eye exhibits no discharge. Left eye exhibits no discharge. No scleral icterus. Neck: Normal range of motion. No JVD present. No thyromegaly present. Pulmonary/Chest: Effort normal. No accessory muscle usage or stridor. Abdominal: Soft. He exhibits no distension. Musculoskeletal: Normal range of motion. General: Edema (mild) present. Neurological: He is alert and oriented to person, place, and time. Gait normal.   No obvious facial asymmetry   Skin: No rash noted. No erythema. Psychiatric: He has a normal mood and affect. His behavior is normal.       ASSESSMENT and PLAN    Patient advised to stop smoking to reduce future cardiovascular events. HLD: 3/17 LDL31;       Follow-up after CHF admission which was after a few weeks of non-STEMI when patient had LAD stent. He also has CKD. Creatinine had increased. Apparently he was not discharged on diuretics as wife is not giving any. Edema and shortness of breath are worsening again. Start Bumex and follow-up electrolytes. CAD clinically stable. Blood pressure is controlled. Reduce clonidine to twice daily as I am adding Bumex. Labs as ordered. Had a detailed discussion with patient and wife regarding diet to avoid salty foods and importance of controlling blood pressure. Diagnoses and all orders for this visit:    1. Chronic diastolic congestive heart failure ([address]    2. CAD S/P percutaneous coronary angioplasty    3. Essential hypertension    4. Dyslipidemia    5. Obesity (BMI 30.0-34.9)    6. Acute on chronic diastolic congestive heart failure (HCC)  -     cloNIDine HCL (CATAPRES) 0.2 mg tablet; Take 1 Tab by mouth two (2) times a day. Pertinent laboratory and test data reviewed and discussed with patient. See patient instructions also for other medical advice given    Medications Discontinued During This Encounter   Medication Reason    cloNIDine HCL (CATAPRES) 0.1 mg tablet        Follow-up and Dispositions    · Return in about 3 months (around 5/11/2021), or if symptoms worsen or fail to improve, for BP log x 4-5 days post med changes. 2/21 appears that the weight is reducing as also the edema. Continue present dose of diuretics as he still has mild edema. Blood pressure is elevated at home at 186/90 and heart rate was 65 as she told me. Increase clonidine 2.2 twice daily and follow home chart. CAD stable clinically.   Continue same medications  CHF is improving NYHA class III  Would recommend Mediterranean diet and strongly recommended not to use any processed meats as his blood pressure is significantly elevated. Ruthy Mcfarlane is a 77 y.o. male who was seen by synchronous (real-time) audio-video technology on 2/11/2021. Consent:  He and/or his healthcare decision maker is aware that this patient-initiated Telehealth encounter is a billable service, with coverage as determined by his insurance carrier. He is aware that he may receive a bill and has provided verbal consent to proceed: Yes    I was in the office while conducting this encounter. Vital Signs: (As obtained by patient/caregiver at home)  There were no vitals taken for this visit. Other pertinent observable physical exam findings:-      We discussed the expected course, resolution and complications of the diagnosis(es) in detail. Medication risks, benefits, costs, interactions, and alternatives were discussed as indicated. I advised him to contact the office if his condition worsens, changes or fails to improve as anticipated. He expressed understanding with the diagnosis(es) and plan. Pursuant to the emergency declaration under the [facility], [facility] waiver authority and the [employer] General Act, this Virtual  Visit was conducted, with patient's consent, to reduce the patient's risk of exposure to COVID-19 and provide continuity of care for an established patient. Services were provided through a video synchronous discussion virtually to substitute for in-person clinic visit.     Ivy Camara MD

## 2021-02-11 NOTE — PATIENT INSTRUCTIONS
Medications Discontinued During This Encounter Medication Reason  cloNIDine HCL (CATAPRES) 0.1 mg tablet After the recommended changes have been made in blood pressure medicines, patient advised to keep BP/HR(pulse rate) chart twice daily and bring us results in next 4 to 5 days. Patient may send the results via \"My Chart\" if desired. Please rest for 5-10 minutes before checking blood pressure. Sit on a comfortable chair without crossing the legs and put your arm on a table. We recommend that you use an upper arm cuff. Check the blood pressure 3 times each time you check the blood pressure and record the lowest reading. If you check the blood pressure in both arms, use the higher reading. Learning About the [facility] Diet What is the Mediterranean diet? The Mediterranean diet is a style of eating rather than a diet plan. It features foods eaten in [place], [place], [place] and [place], and other countries along the [facility]. It emphasizes eating foods like fish, fruits, vegetables, beans, high-fiber breads and whole grains, nuts, and olive oil. This style of eating includes limited red meat, cheese, and sweets. Why choose the Mediterranean diet? A Mediterranean-style diet may improve heart health. It contains more fat than other heart-healthy diets. But the fats are mainly from nuts, unsaturated oils (such as fish oils and olive oil), and certain nut or seed oils (such as canola, soybean, or flaxseed oil). These fats may help protect the heart and blood vessels. How can you get started on the Mediterranean diet? Here are some things you can do to switch to a more Mediterranean way of eating. What to eat · Eat a variety of fruits and vegetables each day, such as grapes, blueberries, tomatoes, broccoli, peppers, figs, olives, spinach, eggplant, beans, lentils, and chickpeas. · Eat a variety of whole-grain foods each day, such as oats, brown rice, and whole wheat bread, pasta, and couscous. · Eat fish at least 2 times a week. Try tuna, salmon, mackerel, lake trout, herring, or sardines. · Eat moderate amounts of low-fat dairy products, such as milk, cheese, or yogurt. · Eat moderate amounts of poultry and eggs. · Choose healthy (unsaturated) fats, such as nuts, olive oil, and certain nut or seed oils like canola, soybean, and flaxseed. · Limit unhealthy (saturated) fats, such as butter, palm oil, and coconut oil. And limit fats found in animal products, such as meat and dairy products made with whole milk. Try to eat red meat only a few times a month in very small amounts. · Limit sweets and desserts to only a few times a week. This includes sugar-sweetened drinks like soda. The Mediterranean diet may also include red wine with your meal1 glass each day for women and up to 2 glasses a day for men. Tips for eating at home · Use herbs, spices, garlic, lemon zest, and citrus juice instead of salt to add flavor to foods. · Add avocado slices to your sandwich instead of espitia. · Have fish for lunch or dinner instead of red meat. Brush the fish with olive oil, and broil or grill it. · Sprinkle your salad with seeds or nuts instead of cheese. · Cook with olive or canola oil instead of butter or oils that are high in saturated fat. · Switch from 2% milk or whole milk to 1% or fat-free milk. · Dip raw vegetables in a vinaigrette dressing or hummus instead of dips made from mayonnaise or sour cream. 
· Have a piece of fruit for dessert instead of a piece of cake. Try baked apples, or have some dried fruit. Tips for eating out · Try broiled, grilled, baked, or poached fish instead of having it fried or breaded. · Ask your  to have your meals prepared with olive oil instead of butter. · Order dishes made with marinara sauce or sauces made from olive oil. Avoid sauces made from cream or mayonnaise. · Choose whole-grain breads, whole wheat pasta and pizza crust, brown rice, beans, and lentils. · Cut back on butter or margarine on bread. Instead, you can dip your bread in a small amount of olive oil. · Ask for a side salad or grilled vegetables instead of french fries or chips. Where can you learn more? Go to http://www.[employer]/ Enter 979-791-1995 in the search box to learn more about \"Learning About the Mediterranean Diet. \" Current as of: August 22, 2019               Content Version: 12.6 © 6690-8864 [employer]. Care instructions adapted under license by [employer] (which disclaims liability or warranty for this information). If you have questions about a medical condition or this instruction, always ask your healthcare professional. [place] [address] any warranty or liability for your use of this information.

## 2021-02-11 NOTE — PROGRESS NOTES
1. Have you been to the ER, urgent care clinic since your last visit? Hospitalized since your last visit?     no    2. Have you seen or consulted any other health care providers outside of the [address] [place] [address] since your last visit? Include any pap smears or colon screening. Yes with PCP      3. Since your last visit, have you had any of the following symptoms?      no    4. Have you had any blood work, X-rays or cardiac testing? Yes BMP     Requested: YES     In [facility]: YES    5. Where do you normally have your labs drawn? [facility]    6. Do you need any refills today?    no

## 2021-03-30 ENCOUNTER — TRANSCRIBE ORDER (OUTPATIENT)
Dept: SCHEDULING | Age: 76
End: 2021-03-30

## 2021-03-30 DIAGNOSIS — I73.9 PERIPHERAL VASCULAR DISEASE (HCC): Primary | ICD-10-CM

## 2021-03-31 ENCOUNTER — TRANSCRIBE ORDER (OUTPATIENT)
Dept: SCHEDULING | Age: 76
End: 2021-03-31

## 2021-03-31 DIAGNOSIS — I73.9 PERIPHERAL VASCULAR DISEASE, UNSPECIFIED (HCC): Primary | ICD-10-CM

## 2021-04-09 ENCOUNTER — HOSPITAL ENCOUNTER (OUTPATIENT)
Dept: VASCULAR SURGERY | Age: 76
Discharge: HOME | End: 2021-04-09
Attending: INTERNAL MEDICINE
Payer: MEDICARE

## 2021-04-09 DIAGNOSIS — I73.9 PERIPHERAL VASCULAR DISEASE, UNSPECIFIED (HCC): ICD-10-CM

## 2021-04-09 DIAGNOSIS — I73.9 PERIPHERAL VASCULAR DISEASE (HCC): ICD-10-CM

## 2021-04-09 LAB
LEFT ABI: 0.86
LEFT ANTERIOR TIBIAL: 102 MMHG
LEFT ARM BP: 148 MMHG
LEFT CALF PRESSURE: 164 MMHG
LEFT CCA DIST DIAS: 10.6 CM/S
LEFT CCA DIST SYS: 62.6 CM/S
LEFT CCA MID DIAS: 10.66 CM/S
LEFT CCA MID SYS: 93.78 CM/S
LEFT CCA PROX DIAS: 14.3 CM/S
LEFT CCA PROX SYS: 101 CM/S
LEFT ECA DIAS: 0 CM/S
LEFT ECA SYS: 76.4 CM/S
LEFT ICA DIST DIAS: 19.3 CM/S
LEFT ICA DIST SYS: 62 CM/S
LEFT ICA MID DIAS: 15.4 CM/S
LEFT ICA MID SYS: 64.6 CM/S
LEFT ICA PROX DIAS: 10.6 CM/S
LEFT ICA PROX SYS: 53.4 CM/S
LEFT ICA/CCA SYS: 1.03
LEFT POSTERIOR TIBIAL: 128 MMHG
LEFT SUBCLAVIAN DIAS: 0 CM/S
LEFT SUBCLAVIAN SYS: 159 CM/S
LEFT TBI: 0.74
LEFT TOE PRESSURE: 109 MMHG
LEFT VERTEBRAL DIAS: 13 CM/S
LEFT VERTEBRAL SYS: 54 CM/S
RIGHT ABI: 0.61
RIGHT ANTERIOR TIBIAL: 90 MMHG
RIGHT ARM BP: 142 MMHG
RIGHT CALF PRESSURE: 124 MMHG
RIGHT CCA DIST DIAS: 14.4 CM/S
RIGHT CCA DIST SYS: 77.4 CM/S
RIGHT CCA MID DIAS: 15.99 CM/S
RIGHT CCA MID SYS: 87.06 CM/S
RIGHT CCA PROX DIAS: 0 CM/S
RIGHT CCA PROX SYS: 98.4 CM/S
RIGHT ECA DIAS: 0 CM/S
RIGHT ECA SYS: 91.9 CM/S
RIGHT ICA DIST DIAS: 12.5 CM/S
RIGHT ICA DIST SYS: 52.3 CM/S
RIGHT ICA MID DIAS: 22.6 CM/S
RIGHT ICA MID SYS: 93.5 CM/S
RIGHT ICA PROX DIAS: 21.1 CM/S
RIGHT ICA PROX SYS: 83.6 CM/S
RIGHT ICA/CCA SYS: 1.2
RIGHT POSTERIOR TIBIAL: 86 MMHG
RIGHT SUBCLAVIAN DIAS: 0 CM/S
RIGHT SUBCLAVIAN SYS: 78.4 CM/S
RIGHT TBI: 0.5
RIGHT TOE PRESSURE: 74 MMHG
RIGHT VERTEBRAL DIAS: 16.16 CM/S
RIGHT VERTEBRAL SYS: 51.8 CM/S

## 2021-04-09 PROCEDURE — 93923 UPR/LXTR ART STDY 3+ LVLS: CPT

## 2021-04-09 PROCEDURE — 93880 EXTRACRANIAL BILAT STUDY: CPT

## 2021-05-05 ENCOUNTER — OFFICE VISIT (OUTPATIENT)
Dept: VASCULAR SURGERY | Age: 76
End: 2021-05-05
Payer: MEDICARE

## 2021-05-05 VITALS
DIASTOLIC BLOOD PRESSURE: 80 MMHG | SYSTOLIC BLOOD PRESSURE: 138 MMHG | WEIGHT: 205 LBS | HEART RATE: 64 BPM | BODY MASS INDEX: 32.95 KG/M2 | HEIGHT: 66 IN | RESPIRATION RATE: 21 BRPM | OXYGEN SATURATION: 98 %

## 2021-05-05 DIAGNOSIS — M79.89 LEG SWELLING: Primary | ICD-10-CM

## 2021-05-05 PROCEDURE — 99203 OFFICE O/P NEW LOW 30 MIN: CPT | Performed by: SURGERY

## 2021-05-05 RX ORDER — AMLODIPINE BESYLATE 10 MG
10 TABLET ORAL DAILY
COMMUNITY
End: 2022-05-02 | Stop reason: ALTCHOICE

## 2021-05-05 RX ORDER — GLIMEPIRIDE 4 MG/1
TABLET ORAL
COMMUNITY
End: 2022-05-02 | Stop reason: ALTCHOICE

## 2021-05-05 NOTE — PROGRESS NOTES
1. Have you been to an emergency room or urgent care clinic since your last visit? NO    Hospitalized since your last visit? If yes, where, when, and reason for visit?  no  2. Have you seen or consulted any other health care providers outside of the [facility] since your last visit including any procedures, health maintenance items. If yes, where, when and reason for visit?   NO

## 2021-05-05 NOTE — PROGRESS NOTES
Alayna Olivo.    5/5/2021      Chief Complaint   Patient presents with    New Patient     PAD    Swelling       History and Physical    HPI   46-year-old male with a recent several month history of bilateral lower extremity swelling. He has tried some compression socks without much relief of his symptoms. History of diabetes mellitus, coronary artery disease, dyslipidemia, obesity and myocardial infarction. He states that the swelling is worse at the end of the day. He attempts to elevate but has not been doing this consistently. No other complaints today. Past Medical History:   Diagnosis Date    ACS (acute coronary syndrome) ([address]     CAD (coronary artery disease), native coronary artery August 2010    s/p non-ST-elevation myocardial infarction, s/p PCI with BMS (Vision 4x18mm) distal RCA with 45% distal LAD  and mild LCx disease. mild-moderate LV systolic dysfunction (03/21).  Diabetes mellitus type II     Dyslipidemia     ED (erectile dysfunction)     GERD (gastroesophageal reflux disease)     History of BPH     History of echocardiogram 5/18/2011    EF 65%. Mod-marked increased wall thickness. Gr 1 DDfx. No significant valvular heart disease.  Hyperlipidemia     Hypertension     Ischemic cardiomyopathy     recovery of LV syst fct  Echo May 2011 LV EF 65%    MI (myocardial infarction) ([address]     Obesity     RBBB (right bundle branch block with left anterior fascicular block)     S/P cardiac cath 8/26/2010    LM patent. dLAD 45%. CX mild. dRCA 100% thrombotic (S/P cath thrombectomy & Vision 4 x 18-mm BMS). EF 40%. Posterobasal, diaphrag, apical hypk.       Shingles 4/2012    Tobacco use disorder, continuous      Patient Active Problem List   Diagnosis Code    CAD S/P percutaneous coronary angioplasty I25.10, Z98.61    Ischemic cardiomyopathy I25.5    Essential hypertension I10    Dyslipidemia E78.5    Diabetes mellitus, type 2 ([address] E11.9    Obesity E66.9    RBBB (right bundle branch block with left anterior fascicular block) I45.2    Tobacco use disorder, continuous F17.209    Obesity (BMI 30.0-34. 9) E66.9    NSTEMI (non-ST elevated myocardial infarction) ([facility]) I21.4    ACS (acute coronary syndrome) ([facility]) I24.9    Hypertensive urgency I16.0    Postsurgical percutaneous transluminal coronary angioplasty status Z98.61    CHF (congestive heart failure) ([facility]) I50.9     Past Surgical History:   Procedure Laterality Date    COLONOSCOPY N/A 4/23/2019    COLON[facility] HX CATARACT REMOVAL      HX CORONARY ARTERY BYPASS GRAFT      HX CORONARY STENT PLACEMENT  2010    HX HEART CATHETERIZATION  08/26/10    1. Normal systemic pressure. 2. Moderate elevation of left sided filling pressures. 3. Mild to moderate left ventricular systolic dysfunction distinct regional wall motion abnormalities. 4. Coronary disease with thrombotic occlusion of the distal right coronary artery and moderate left anterior descending disease. 5. Successful percutaneous coronary intervention with catheter thrombectomy.  HX HEMORRHOIDECTOMY      HX MOHS PROCEDURES Left 2002    HX TRABECULECTOMY       Current Outpatient Medications   Medication Sig Dispense Refill    amLODIPine (Norvasc) 10 mg tablet Take  by mouth daily.  glimepiride (AMARYL) 4 mg tablet Take  by mouth every morning.  cloNIDine HCL (CATAPRES) 0.2 mg tablet Take 1 Tab by mouth two (2) times a day. 180 Tab 1    bumetanide (BUMEX) 1 mg tablet take 1/2 tablet by mouth twice a day 60 Tab 1    doxazosin (CARDURA) 4 mg tablet Take 1 Tab by mouth every evening. 90 Tab 1    clopidogreL (Plavix) 75 mg tab Take 1 Tab by mouth daily. 90 Tab 3    insulin glargine (Lantus Solostar U-100 Insulin) 100 unit/mL (3 mL) inpn 30 Units by SubCUTAneous route daily.  1 Pen 0    OTHER BMP in 1 week  Dx: CHF  Fax results to  1 Each 0    carvediloL (COREG) 25 mg tablet Take 1 Tab by mouth two (2) times daily (with meals). 60 Tab 0    gabapentin (NEURONTIN) 100 mg capsule Take 100 mg by mouth two (2) times a day.  nitroglycerin (NITROSTAT) 0.4 mg SL tablet 1 Tab by SubLINGual route every five (5) minutes as needed for Chest Pain for up to 3 doses. Indications: Angina 25 Tab 0    cholecalciferol (Vitamin D3) (1000 Units /25 mcg) tablet Take  by mouth.  dutasteride (AVODART) 0.5 mg capsule Take 0.5 mg by mouth daily.  hydrALAZINE (APRESOLINE) 100 mg tablet Take 100 mg by mouth three (3) times daily.  simvastatin (ZOCOR) 40 mg tablet Take 1 Tab by mouth nightly. 30 Tab 11    pantoprazole (PROTONIX) 40 mg tablet Take 40 mg by mouth daily.  aspirin 81 mg tablet Take 81 mg by mouth daily.  isosorbide mononitrate ER (IMDUR) 30 mg tablet Take 1 Tab by mouth daily.  30 Tab 0     No Known Allergies  Social History     Socioeconomic History    Marital status:      Spouse name: Not on file    Number of children: Not on file    Years of education: Not on file    Highest education level: Not on file   Occupational History    Not on file   Social Needs    Financial resource strain: Not on file    Food insecurity     Worry: Not on file     Inability: Not on file    Transportation needs     Medical: Not on file     Non-medical: Not on file   Tobacco Use    Smoking status: Former Smoker     Packs/day: 0.50     Years: 44.00     Pack years: 22.00     Types: Cigarettes     Quit date: 2020     Years since quittin.9    Smokeless tobacco: Never Used   Substance and Sexual Activity    Alcohol use: No    Drug use: No    Sexual activity: Not on file   Lifestyle    Physical activity     Days per week: Not on file     Minutes per session: Not on file    Stress: Not on file   Relationships    Social connections     Talks on phone: Not on file     Gets together: Not on file     Attends Hindu service: Not on file     Active member of club or organization: Not on file     Attends meetings of clubs or organizations: Not on file     Relationship status: Not on file    Intimate partner violence     Fear of current or ex partner: Not on file     Emotionally abused: Not on file     Physically abused: Not on file     Forced sexual activity: Not on file   Other Topics Concern    Not on file   Social History Narrative    Not on file      Family History   Problem Relation Age of Onset    Diabetes Mother        ROS   Pertinent items as per the HPI. Physical Exam:    Visit Vitals  /80   Pulse 64   Resp 21   Ht 5' 6\" (1.676 m)   Wt 93 kg (205 lb)   SpO2 98%   BMI 33.09 kg/m²      Physical Exam  Constitutional:       Appearance: Normal appearance. HENT:      Head: Normocephalic and atraumatic. Right Ear: External ear normal.      Left Ear: External ear normal.      Nose: Nose normal.      Mouth/Throat:      Mouth: Mucous membranes are moist.      Pharynx: Oropharynx is clear. Eyes:      Extraocular Movements: Extraocular movements intact. Conjunctiva/sclera: Conjunctivae normal.      Pupils: Pupils are equal, round, and reactive to light. Neck:      Musculoskeletal: Normal range of motion and neck supple. Vascular: No carotid bruit. Cardiovascular:      Rate and Rhythm: Normal rate and regular rhythm. Pulses: Normal pulses. Heart sounds: Normal heart sounds. Pulmonary:      Effort: Pulmonary effort is normal.      Breath sounds: Normal breath sounds. Abdominal:      General: Bowel sounds are normal.      Palpations: Abdomen is soft. Musculoskeletal: Normal range of motion. Right lower leg: Edema present. Left lower leg: Edema present. Skin:     General: Skin is warm and dry. Capillary Refill: Capillary refill takes less than 2 seconds. Neurological:      General: No focal deficit present. Mental Status: He is alert and oriented to person, place, and time.    Psychiatric:         Mood and Affect: Mood normal. Behavior: Behavior normal.         Thought Content: Thought content normal.          Impression and Plan:    ICD-10-CM ICD-9-CM    1. Leg swelling  M79.89 729.81 DUPLEX LOWER EXT VENOUS BILAT     Orders Placed This Encounter    DUPLEX LOWER EXT VENOUS BILAT    amLODIPine (Norvasc) 10 mg tablet    glimepiride (AMARYL) 4 mg tablet       Bilateral lower extremity swelling with pain on a daily basis. Recommend thigh-high compression stockings for daily wear. Discussed the proper donning and care of the stockings with the patient. Recommend leg elevation up and over the heart for 15 to 20 minutes 4 times per day. Encouraged exercise and weight loss. Will obtain a bilateral lower extremity venous duplex for reflux and discussed with the patient at his next next office visit.         Anoop Prescott MD

## 2021-05-12 DIAGNOSIS — M79.89 LEG SWELLING: ICD-10-CM

## 2021-05-17 ENCOUNTER — OFFICE VISIT (OUTPATIENT)
Dept: CARDIOLOGY CLINIC | Age: 76
End: 2021-05-17
Payer: MEDICARE

## 2021-05-17 VITALS
HEIGHT: 66 IN | WEIGHT: 205 LBS | TEMPERATURE: 98.8 F | HEART RATE: 59 BPM | BODY MASS INDEX: 32.95 KG/M2 | OXYGEN SATURATION: 99 % | DIASTOLIC BLOOD PRESSURE: 54 MMHG | SYSTOLIC BLOOD PRESSURE: 147 MMHG

## 2021-05-17 DIAGNOSIS — E78.5 DYSLIPIDEMIA: ICD-10-CM

## 2021-05-17 DIAGNOSIS — I10 ESSENTIAL HYPERTENSION: ICD-10-CM

## 2021-05-17 DIAGNOSIS — I50.32 CHRONIC DIASTOLIC CONGESTIVE HEART FAILURE (HCC): ICD-10-CM

## 2021-05-17 DIAGNOSIS — F17.209 TOBACCO USE DISORDER, CONTINUOUS: ICD-10-CM

## 2021-05-17 DIAGNOSIS — Z98.61 CAD S/P PERCUTANEOUS CORONARY ANGIOPLASTY: Primary | ICD-10-CM

## 2021-05-17 DIAGNOSIS — I25.10 CAD S/P PERCUTANEOUS CORONARY ANGIOPLASTY: Primary | ICD-10-CM

## 2021-05-17 DIAGNOSIS — E66.9 OBESITY (BMI 30.0-34.9): ICD-10-CM

## 2021-05-17 PROCEDURE — G8754 DIAS BP LESS 90: HCPCS | Performed by: INTERNAL MEDICINE

## 2021-05-17 PROCEDURE — G8432 DEP SCR NOT DOC, RNG: HCPCS | Performed by: INTERNAL MEDICINE

## 2021-05-17 PROCEDURE — 1101F PT FALLS ASSESS-DOCD LE1/YR: CPT | Performed by: INTERNAL MEDICINE

## 2021-05-17 PROCEDURE — G8536 NO DOC ELDER MAL SCRN: HCPCS | Performed by: INTERNAL MEDICINE

## 2021-05-17 PROCEDURE — G8753 SYS BP > OR = 140: HCPCS | Performed by: INTERNAL MEDICINE

## 2021-05-17 PROCEDURE — G8427 DOCREV CUR MEDS BY ELIG CLIN: HCPCS | Performed by: INTERNAL MEDICINE

## 2021-05-17 PROCEDURE — 3017F COLORECTAL CA SCREEN DOC REV: CPT | Performed by: INTERNAL MEDICINE

## 2021-05-17 PROCEDURE — 99214 OFFICE O/P EST MOD 30 MIN: CPT | Performed by: INTERNAL MEDICINE

## 2021-05-17 PROCEDURE — G8417 CALC BMI ABV UP PARAM F/U: HCPCS | Performed by: INTERNAL MEDICINE

## 2021-05-17 RX ORDER — DOXAZOSIN 8 MG/1
8 TABLET ORAL EVERY EVENING
Qty: 90 TAB | Refills: 1 | Status: SHIPPED | OUTPATIENT
Start: 2021-05-17 | End: 2021-11-01

## 2021-05-17 NOTE — PROGRESS NOTES
HISTORY OF PRESENT ILLNESS  Kanika Seen. is a 77 y.o. male. 4/17 improving blood pressure since meds adjusted by Dr. Erika Cortes;    1/17 seen for establishing/changing cardiologist   history of coronary disease, status post PCI(2010) and hypertension, along with conduction system disease    Cardiomyopathy  The history is provided by the medical records (ischemic). This is a chronic problem. Associated symptoms include shortness of breath. Pertinent negatives include no chest pain and no headaches. Hypertension  The history is provided by the medical records and patient. This is a chronic problem. Associated symptoms include shortness of breath. Pertinent negatives include no chest pain and no headaches. Cholesterol Problem  The history is provided by the medical records. This is a chronic problem. Associated symptoms include shortness of breath. Pertinent negatives include no chest pain and no headaches. Shortness of Breath  The history is provided by the patient. This is a recurrent problem. The problem occurs intermittently. The current episode started more than 1 week ago. The problem has not changed since onset. Associated symptoms include leg swelling. Pertinent negatives include no fever, no headaches, no cough, no wheezing, no PND, no orthopnea, no chest pain, no vomiting, no rash and no claudication. The problem's precipitants include exercise (10-15 mt walk; 5/21 100 ft; ). Leg Swelling  The history is provided by the patient. This is a recurrent problem. The current episode started more than 1 week ago (6/16). The problem occurs every several days. The problem has been gradually improving. Associated symptoms include shortness of breath. Pertinent negatives include no chest pain and no headaches. The symptoms are aggravated by standing. The symptoms are relieved by sleep. CHF  The history is provided by the medical records. This is a chronic problem.  Associated symptoms include shortness of breath. Pertinent negatives include no chest pain and no headaches. Review of Systems   Constitutional: Negative for chills, fever, malaise/fatigue and weight loss. HENT: Negative for nosebleeds. Eyes: Negative for discharge. Respiratory: Positive for shortness of breath. Negative for cough and wheezing. Cardiovascular: Positive for leg swelling. Negative for chest pain, palpitations, orthopnea, claudication and PND. Gastrointestinal: Negative for diarrhea, nausea and vomiting. Genitourinary: Negative for dysuria and hematuria. Musculoskeletal: Negative for joint pain. Skin: Negative for rash. Neurological: Negative for dizziness, seizures, loss of consciousness and headaches. Endo/Heme/Allergies: Negative for polydipsia. Does not bruise/bleed easily. Psychiatric/Behavioral: Negative for depression and substance abuse. The patient does not have insomnia. No Known Allergies    Past Medical History:   Diagnosis Date    [facility]     CAD (coronary artery disease), native coronary artery August 2010    s/p non-ST-elevation myocardial infarction, s/p PCI with BMS (Vision 4x18mm) distal RCA with 45% distal LAD  and mild LCx disease. mild-moderate LV systolic dysfunction (60/52).  Diabetes mellitus type II     Dyslipidemia     ED (erectile dysfunction)     GERD (gastroesophageal reflux disease)     History of BPH     History of echocardiogram 5/18/2011    EF 65%. Mod-marked increased wall thickness. Gr 1 DDfx. No significant valvular heart disease.  Hyperlipidemia     Hypertension     Ischemic cardiomyopathy     recovery of LV syst fct  Echo May 2011 LV EF 65%    MI ([facility] infarction) [facility]     Obesity     RBBB (right bundle branch block with left anterior fascicular block)     S/P cardiac cath 8/26/2010    LM patent. dLAD 45%. CX mild. dRCA 100% thrombotic (S/P cath thrombectomy & Vision 4 x 18-mm BMS). EF 40%.   Posterobasal, diaphrag, apical hypk.  Shingles 2012    Tobacco use disorder, continuous        Family History   Problem Relation Age of Onset    Diabetes Mother        Social History     Tobacco Use    Smoking status: Former Smoker     Packs/day: 0.50     Years: 44.00     Pack years: 22.00     Types: Cigarettes     Quit date: 2020     Years since quittin.9    Smokeless tobacco: Never Used   Substance Use Topics    Alcohol use: No    Drug use: No        Current Outpatient Medications   Medication Sig    amLODIPine (Norvasc) 10 mg tablet Take 10 mg by mouth daily.  glimepiride (AMARYL) 4 mg tablet Take  by mouth every morning.  cloNIDine HCL (CATAPRES) 0.2 mg tablet Take 1 Tab by mouth two (2) times a day.  bumetanide (BUMEX) 1 mg tablet take 1/2 tablet by mouth twice a day    doxazosin (CARDURA) 4 mg tablet Take 1 Tab by mouth every evening.  clopidogreL (Plavix) 75 mg tab Take 1 Tab by mouth daily.  isosorbide mononitrate ER (IMDUR) 30 mg tablet Take 1 Tab by mouth daily.  insulin glargine (Lantus Solostar U-100 Insulin) 100 unit/mL (3 mL) inpn 30 Units by SubCUTAneous route daily.  carvediloL (COREG) 25 mg tablet Take 1 Tab by mouth two (2) times daily (with meals).  gabapentin (NEURONTIN) 100 mg capsule Take 100 mg by mouth two (2) times a day.  nitroglycerin (NITROSTAT) 0.4 mg SL tablet 1 Tab by SubLINGual route every five (5) minutes as needed for Chest Pain for up to 3 doses. Indications: Angina    cholecalciferol (Vitamin D3) (1000 Units /25 mcg) tablet Take  by mouth.  dutasteride (AVODART) 0.5 mg capsule Take 0.5 mg by mouth daily.  hydrALAZINE (APRESOLINE) 100 mg tablet Take 100 mg by mouth three (3) times daily.  simvastatin (ZOCOR) 40 mg tablet Take 1 Tab by mouth nightly.  pantoprazole (PROTONIX) 40 mg tablet Take 40 mg by mouth daily.  aspirin 81 mg tablet Take 81 mg by mouth daily.     OTHER BMP in 1 week  Dx: CHF  Fax results to      No current facility-administered medications for this visit. Past Surgical History:   Procedure Laterality Date    COLONOSCOPY N/A 4/23/2019    COLONOSCOPY performed by Lucho Menard MD at [facility] ENDOSCOPY    HX CATARACT REMOVAL      HX CORONARY ARTERY BYPASS GRAFT      HX CORONARY STENT PLACEMENT  2010    HX HEART CATHETERIZATION  08/26/10    1. Normal systemic pressure. 2. Moderate elevation of left sided filling pressures. 3. Mild to moderate left ventricular systolic dysfunction distinct regional wall motion abnormalities. 4. Coronary disease with thrombotic occlusion of the distal right coronary artery and moderate left anterior descending disease. 5. Successful percutaneous coronary intervention with catheter thrombectomy.  HX HEMORRHOIDECTOMY      HX MOHS PROCEDURES Left 2002    HX TRABECULECTOMY         Diagnostic Studies: Old records reviewed and show as follows  EKG tracings reviewed by me today. 3/17 Nuc Stress  Conclusion:   1. Nondiagnostic EKG changes of ischemia due to abnormal baseline EKG at 89% of predicted maximal heart rate. 2. Normal functional capacity. 3. Severely hypertensive blood pressure response and appropriate heart rate response. 4. No ischemic symptoms or arrhythmias seen. 5. Perfusion image report to follow. Conclusion:   1. Normal perfusion scan. 2. Normal wall motion and ejection fraction. 3. Low risk scan. 3/17 ECHO  SUMMARY:  Left ventricle: Ejection fraction was estimated to be 65 %. There were no  regional wall motion abnormalities. There was severe concentric  hypertrophy. Features were consistent with a pseudonormal left ventricular  filling pattern, with concomitant abnormal relaxation and increased  filling pressure (grade 2 diastolic dysfunction). 04/20/20   CARDIAC PROCEDURE 04/23/2020 4/23/2020    Narrative Double vessel coronary artery disease. S/p ptca/stent to mid LAD. Known BMS to distal RCA with moderate lesion.   Recommend intense risk factor modification. Signed by: Vishnu Sal MD     06/01/20   ECHO ADULT FOLLOW-UP OR LIMITED 06/03/2020 6/3/2020    Narrative · Normal cavity size and systolic function (ejection fraction normal). Severe concentric hypertrophy. Estimated left ventricular ejection   fraction is 60 - 65%. Visually measured ejection fraction. No regional   wall motion abnormality noted. Normal left ventricular strain. · Global longitudinal strain is -20.90%  · Tricuspid regurgitation is inadequate for estimation of right   ventricular systolic pressure. Signed by: Amalia Sutherland MD     Visit Vitals  BP (!) 147/54 (BP 1 Location: Right arm, BP Patient Position: Sitting, BP Cuff Size: Large adult)   Pulse (!) 59   Temp 98.8 °F (37.1 °C) (Temporal)   Ht 5' 6\" (1.676 m)   Wt 93 kg (205 lb)   SpO2 99%   BMI 33.09 kg/m²       Mr. Hall Neither has a reminder for a \"due or due soon\" health maintenance. I have asked that he contact his primary care provider for follow-up on this health maintenance. 9/10 Card Cath  IMPRESSION  1. Normal systemic pressure. 2. Moderate elevation of left-sided filling pressures. 3. Mild to moderate left ventricular systolic dysfunction with distinct  regional wall motion abnormalities. 4. Coronary disease as described above with a thrombotic occlusion of the  distal right coronary artery and moderate distal left anterior descending  disease. 5. Successful percutaneous coronary intervention with catheter  thrombectomy, followed by bare metal stent deployment with a Vision 4 x 18  mm stent in the distal right coronary artery. Physical Exam   Constitutional: He is oriented to person, place, and time. He appears well-developed and well-nourished. No distress. Obese   HENT:   Head: Normocephalic and atraumatic. Mouth/Throat: Normal dentition. Eyes: Right eye exhibits no discharge. Left eye exhibits no discharge. No scleral icterus. Neck: Neck supple. No JVD present.  Carotid bruit is not present. No thyromegaly present. Cardiovascular: Normal rate, regular rhythm, S1 normal, S2 normal, normal heart sounds and intact distal pulses. Exam reveals decreased pulses. Exam reveals no gallop and no friction rub. No murmur heard. Pulmonary/Chest: Effort normal and breath sounds normal. He has no wheezes. He has no rales. Abdominal: Soft. He exhibits no mass. There is no abdominal tenderness. Musculoskeletal:         General: No edema. Lymphadenopathy:        Right cervical: No superficial cervical adenopathy present. Left cervical: No superficial cervical adenopathy present. Neurological: He is alert and oriented to person, place, and time. Skin: Skin is warm and dry. No rash noted. Psychiatric: He has a normal mood and affect. His behavior is normal.       ASSESSMENT and PLAN    Patient advised to stop smoking to reduce future cardiovascular events. I have reviewed/discussed the above normal BMI with the patient. I have recommended the following interventions: dietary management education, guidance, and counseling, encourage exercise and monitor weight . Vira Libman HLD: 3/17 LDL31;   Results for Nita Lewis (MRN [id number]) as of 5/17/2021 14:09   Ref. Range 6/29/2020 09:23   Triglyceride Latest Ref Range: <150 MG/DL 84   Cholesterol, total Latest Ref Range: <200 MG/   HDL Cholesterol Latest Ref Range: 40 - 60 MG/DL 41   CHOL/HDL Ratio Latest Ref Range: 0 - 5.0   3.0   VLDL, calculated Latest Units: MG/DL 16.8   LDL, calculated Latest Ref Range: 0 - 100 MG/DL 64.2         2/21 Follow-up after CHF admission which was after a few weeks of non-STEMI when patient had LAD stent. He also has CKD. Creatinine had increased. Apparently he was not discharged on diuretics as wife is not giving any. Edema and shortness of breath are worsening again. Start Bumex and follow-up electrolytes. CAD clinically stable. Blood pressure is controlled.   Reduce clonidine to twice daily as I am adding Bumex. Labs as ordered. Had a detailed discussion with patient and wife regarding diet to avoid salty foods and importance of controlling blood pressure. Diagnoses and all orders for this visit:    1. CAD S/P percutaneous coronary angioplasty    2. Chronic diastolic congestive heart failure (HCC)  -     METABOLIC PANEL, BASIC; Future    3. Essential hypertension  -     doxazosin (CARDURA) 8 mg tablet; Take 1 Tab by mouth every evening.  -     METABOLIC PANEL, BASIC; Future    4. Dyslipidemia  -     HEPATIC FUNCTION PANEL; Future  -     LIPID PANEL; Future    5. Obesity (BMI 30.0-34.9)    6. Tobacco use disorder, continuous  Comments:  quit 2019        Pertinent laboratory and test data reviewed and discussed with patient. See patient instructions also for other medical advice given    Medications Discontinued During This Encounter   Medication Reason    aspirin 81 mg tablet Therapy Completed    doxazosin (CARDURA) 4 mg tablet        Follow-up and Dispositions    · Return in about 6 months (around 11/17/2021), or if symptoms worsen or fail to improve, for post test, BP log x 4-5 days post med changes. 5/17/2021 CHF symptoms improving but still NYHA class II-III. Blood pressure mildly elevated. Increase doxazosin and follow home chart. Discussed in detail regarding diet and exercise. Mediterranean diet guidelines printed. He will try to walk regularly. CAD stable. Discontinue aspirin and will continue on the Plavix.

## 2021-05-17 NOTE — PROGRESS NOTES
1. Have you been to the ER, urgent care clinic since your last visit? Hospitalized since your last visit? No    2. Have you seen or consulted any other health care providers outside of the [address] [place] [address] since your last visit? Include any pap smears or colon screening. No     3. Since your last visit, have you had any of the following symptoms? Yes      shortness of breath and swelling in legs/arms. Explain:swelling feet and ankles     4. Have you had any blood work, X-rays or cardiac testing? No    5. Where do you normally have your labs drawn? [facility]       6. Do you need any refills today?    No

## 2021-05-17 NOTE — PATIENT INSTRUCTIONS
Medications Discontinued During This Encounter Medication Reason  aspirin 81 mg tablet Therapy Completed  doxazosin (CARDURA) 4 mg tablet After the recommended changes have been made in blood pressure medicines, patient advised to keep BP/HR(pulse rate) chart twice daily and bring us results in next 4 to 5 days. Patient may send the results via \"My Chart\" if desired. Please rest for 5-10 minutes before checking blood pressure. Sit on a comfortable chair without crossing the legs and put your arm on a table. We recommend that you use an upper arm cuff. Check the blood pressure 3 times each time you check the blood pressure and record the lowest reading. If you check the blood pressure in both arms, use the higher reading. Learning About the [facility] Diet What is the Mediterranean diet? The Mediterranean diet is a style of eating rather than a diet plan. It features foods eaten in [place], [place], [place] and [place], and other countries along the [facility]. It emphasizes eating foods like fish, fruits, vegetables, beans, high-fiber breads and whole grains, nuts, and olive oil. This style of eating includes limited red meat, cheese, and sweets. Why choose the Mediterranean diet? A Mediterranean-style diet may improve heart health. It contains more fat than other heart-healthy diets. But the fats are mainly from nuts, unsaturated oils (such as fish oils and olive oil), and certain nut or seed oils (such as canola, soybean, or flaxseed oil). These fats may help protect the heart and blood vessels. How can you get started on the Mediterranean diet? Here are some things you can do to switch to a more Mediterranean way of eating. What to eat · Eat a variety of fruits and vegetables each day, such as grapes, blueberries, tomatoes, broccoli, peppers, figs, olives, spinach, eggplant, beans, lentils, and chickpeas.  
· Eat a variety of whole-grain foods each day, such as oats, brown rice, and whole wheat bread, pasta, and couscous. · Eat fish at least 2 times a week. Try tuna, salmon, mackerel, lake trout, herring, or sardines. · Eat moderate amounts of low-fat dairy products, such as milk, cheese, or yogurt. · Eat moderate amounts of poultry and eggs. · Choose healthy (unsaturated) fats, such as nuts, olive oil, and certain nut or seed oils like canola, soybean, and flaxseed. · Limit unhealthy (saturated) fats, such as butter, palm oil, and coconut oil. And limit fats found in animal products, such as meat and dairy products made with whole milk. Try to eat red meat only a few times a month in very small amounts. · Limit sweets and desserts to only a few times a week. This includes sugar-sweetened drinks like soda. The Mediterranean diet may also include red wine with your meal1 glass each day for women and up to 2 glasses a day for men. Tips for eating at home · Use herbs, spices, garlic, lemon zest, and citrus juice instead of salt to add flavor to foods. · Add avocado slices to your sandwich instead of espitia. · Have fish for lunch or dinner instead of red meat. Brush the fish with olive oil, and broil or grill it. · Sprinkle your salad with seeds or nuts instead of cheese. · Cook with olive or canola oil instead of butter or oils that are high in saturated fat. · Switch from 2% milk or whole milk to 1% or fat-free milk. · Dip raw vegetables in a vinaigrette dressing or hummus instead of dips made from mayonnaise or sour cream. 
· Have a piece of fruit for dessert instead of a piece of cake. Try baked apples, or have some dried fruit. Tips for eating out · Try broiled, grilled, baked, or poached fish instead of having it fried or breaded. · Ask your  to have your meals prepared with olive oil instead of butter. · Order dishes made with marinara sauce or sauces made from olive oil. Avoid sauces made from cream or mayonnaise.  
· Choose whole-grain breads, whole wheat pasta and pizza crust, brown rice, beans, and lentils. · Cut back on butter or margarine on bread. Instead, you can dip your bread in a small amount of olive oil. · Ask for a side salad or grilled vegetables instead of french fries or chips. Where can you learn more? Go to http://www.[employer]/ Enter 282-151-4374 in the search box to learn more about \"Learning About the Mediterranean Diet. \" Current as of: December 17, 2020               Content Version: 12.8 © 4347-9595 [employer]. Care instructions adapted under license by [employer] (which disclaims liability or warranty for this information). If you have questions about a medical condition or this instruction, always ask your healthcare professional. [address] any warranty or liability for your use of this information.

## 2021-06-23 ENCOUNTER — HOSPITAL ENCOUNTER (OUTPATIENT)
Dept: LAB | Age: 76
Discharge: HOME | End: 2021-06-23
Payer: MEDICARE

## 2021-06-23 ENCOUNTER — TRANSCRIBE ORDER (OUTPATIENT)
Dept: REGISTRATION | Age: 76
End: 2021-06-23

## 2021-06-23 DIAGNOSIS — N18.4 CHRONIC KIDNEY DISEASE, STAGE IV (SEVERE) (HCC): ICD-10-CM

## 2021-06-23 DIAGNOSIS — I10 ESSENTIAL HYPERTENSION, MALIGNANT: ICD-10-CM

## 2021-06-23 DIAGNOSIS — E11.29 TYPE II OR UNSPECIFIED TYPE DIABETES MELLITUS WITH RENAL MANIFESTATIONS, NOT STATED AS UNCONTROLLED(250.40) (HCC): ICD-10-CM

## 2021-06-23 DIAGNOSIS — I10 ESSENTIAL HYPERTENSION, MALIGNANT: Primary | ICD-10-CM

## 2021-06-23 LAB
25(OH)D3 SERPL-MCNC: 29.8 NG/ML (ref 30–100)
ALBUMIN SERPL-MCNC: 2.7 G/DL (ref 3.4–5)
ANION GAP SERPL CALC-SCNC: 2 MMOL/L (ref 3–18)
BUN SERPL-MCNC: 42 MG/DL (ref 7–18)
BUN/CREAT SERPL: 10 (ref 12–20)
CALCIUM SERPL-MCNC: 8.6 MG/DL (ref 8.5–10.1)
CALCIUM SERPL-MCNC: 8.6 MG/DL (ref 8.5–10.1)
CHLORIDE SERPL-SCNC: 111 MMOL/L (ref 100–111)
CO2 SERPL-SCNC: 27 MMOL/L (ref 21–32)
CREAT SERPL-MCNC: 4.16 MG/DL (ref 0.6–1.3)
CREAT UR-MCNC: 101 MG/DL (ref 30–125)
ERYTHROCYTE [DISTWIDTH] IN BLOOD BY AUTOMATED COUNT: 14.4 % (ref 11.6–14.5)
GLUCOSE SERPL-MCNC: 82 MG/DL (ref 74–99)
HCT VFR BLD AUTO: 32 % (ref 36–48)
HGB BLD-MCNC: 10.2 G/DL (ref 13–16)
MCH RBC QN AUTO: 24.8 PG (ref 24–34)
MCHC RBC AUTO-ENTMCNC: 31.9 G/DL (ref 31–37)
MCV RBC AUTO: 77.7 FL (ref 74–97)
PHOSPHATE SERPL-MCNC: 4.3 MG/DL (ref 2.5–4.9)
PLATELET # BLD AUTO: 234 K/UL (ref 135–420)
PMV BLD AUTO: 10.9 FL (ref 9.2–11.8)
POTASSIUM SERPL-SCNC: 5.1 MMOL/L (ref 3.5–5.5)
PROT UR-MCNC: 614 MG/DL
PTH-INTACT SERPL-MCNC: 208.3 PG/ML (ref 18.4–88)
RBC # BLD AUTO: 4.12 M/UL (ref 4.35–5.65)
SODIUM SERPL-SCNC: 140 MMOL/L (ref 136–145)
WBC # BLD AUTO: 8.6 K/UL (ref 4.6–13.2)

## 2021-06-23 PROCEDURE — 83883 ASSAY NEPHELOMETRY NOT SPEC: CPT

## 2021-06-23 PROCEDURE — 36415 COLL VENOUS BLD VENIPUNCTURE: CPT

## 2021-06-23 PROCEDURE — 83970 ASSAY OF PARATHORMONE: CPT

## 2021-06-23 PROCEDURE — 82784 ASSAY IGA/IGD/IGG/IGM EACH: CPT

## 2021-06-23 PROCEDURE — 80069 RENAL FUNCTION PANEL: CPT

## 2021-06-23 PROCEDURE — 82306 VITAMIN D 25 HYDROXY: CPT

## 2021-06-23 PROCEDURE — 84156 ASSAY OF PROTEIN URINE: CPT

## 2021-06-23 PROCEDURE — 85027 COMPLETE CBC AUTOMATED: CPT

## 2021-06-23 PROCEDURE — 82570 ASSAY OF URINE CREATININE: CPT

## 2021-06-24 LAB
KAPPA LC FREE SER-MCNC: 122.3 MG/L (ref 3.3–19.4)
KAPPA LC FREE/LAMBDA FREE SER: 2.98 {RATIO} (ref 0.26–1.65)
LAMBDA LC FREE SERPL-MCNC: 41 MG/L (ref 5.7–26.3)

## 2021-06-25 LAB
IGA SERPL-MCNC: 135 MG/DL (ref 61–437)
IGG SERPL-MCNC: 1746 MG/DL (ref 603–1613)
IGM SERPL-MCNC: 104 MG/DL (ref 15–143)
PROT PATTERN SERPL IFE-IMP: ABNORMAL

## 2021-07-12 ENCOUNTER — OFFICE VISIT (OUTPATIENT)
Dept: VASCULAR SURGERY | Age: 76
End: 2021-07-12
Payer: MEDICARE

## 2021-07-12 VITALS
OXYGEN SATURATION: 98 % | SYSTOLIC BLOOD PRESSURE: 164 MMHG | RESPIRATION RATE: 20 BRPM | HEART RATE: 52 BPM | DIASTOLIC BLOOD PRESSURE: 78 MMHG

## 2021-07-12 DIAGNOSIS — R60.0 EDEMA OF BOTH LOWER LEGS: Primary | ICD-10-CM

## 2021-07-12 PROCEDURE — 99213 OFFICE O/P EST LOW 20 MIN: CPT | Performed by: SURGERY

## 2021-07-12 NOTE — PROGRESS NOTES
Katherine Clements. Chief Complaint   Patient presents with    Leg Swelling       History and Physical    Katherine Quinteros is a 77 y.o. male with CAD, diabetes, hyperlipidemia, GERD, hypertension and obesity. He follows up today for bilateral lower extremity edema. Thigh-high compression stockings and elevation have been recommended at his last visit. The most recent PVL performed on 6/24/2021 was reviewed and discussed with the patient:    Venous duplex does not show superficial venous insufficiency. No evidence of deep venous thrombosis in the lower extremities bilaterally. Deep venous reflux in the right common femoral vein. No evidence of great or small saphenous vein reflux identified bilaterally. Lymph nodes visualized in the groin bilaterally. Monophasic right posterior tibial artery flow and abrupt flow in the left posterior tibial artery suggestive of distal occlusive disease. Past Medical History:   Diagnosis Date    ACS (acute coronary syndrome) ([address]     CAD (coronary artery disease), native coronary artery August 2010    s/p non-ST-elevation myocardial infarction, s/p PCI with BMS (Vision 4x18mm) distal RCA with 45% distal LAD  and mild LCx disease. mild-moderate LV systolic dysfunction (25/77).  Diabetes mellitus type II     Dyslipidemia     ED (erectile dysfunction)     GERD (gastroesophageal reflux disease)     History of BPH     History of echocardiogram 5/18/2011    EF 65%. Mod-marked increased wall thickness. Gr 1 DDfx. No significant valvular heart disease.  Hyperlipidemia     Hypertension     Ischemic cardiomyopathy     recovery of LV syst fct  Echo May 2011 LV EF 65%    MI (myocardial infarction) [facility]     Obesity     RBBB (right bundle branch block with left anterior fascicular block)     S/P cardiac cath 8/26/2010    LM patent. dLAD 45%. CX mild. dRCA 100% thrombotic (S/P cath thrombectomy & Vision 4 x 18-mm BMS). EF 40%.   Cesar Miller, apical hypk.  Shingles 4/2012    Tobacco use disorder, continuous      Past Surgical History:   Procedure Laterality Date    COLONOSCOPY N/A 4/23/2019    COLONOSCOPY performed by Shila Garcia [facility] at [facility]    HX CATARACT REMOVAL      HX CORONARY ARTERY BYPASS GRAFT      HX CORONARY STENT PLACEMENT  2010    HX HEART CATHETERIZATION  08/26/10    1. Normal systemic pressure. 2. Moderate elevation of left sided filling pressures. 3. Mild to moderate left ventricular systolic dysfunction distinct regional wall motion abnormalities. 4. Coronary disease with thrombotic occlusion of the distal right coronary artery and moderate left anterior descending disease. 5. Successful percutaneous coronary intervention with catheter thrombectomy.  HX HEMORRHOIDECTOMY      HX MOHS PROCEDURES Left 2002    HX TRABECULECTOMY       Patient Active Problem List   Diagnosis Code    CAD S/P percutaneous coronary angioplasty I25.10, Z80.64    Ischemic cardiomyopathy I25.5    Essential hypertension I10    Dyslipidemia E78.5    Diabetes mellitus, type 2 ([facility] E11.9    Obesity E66.9    RBBB (right bundle branch block with left anterior fascicular block) I45.2    Tobacco use disorder, continuous F17.209    Obesity (BMI 30.0-34. 9) E66.9    NSTEMI (non-ST elevated myocardial infarction) ([facility]) I21.4    ACS (acute coronary syndrome) ([facility]) I24.9    Hypertensive urgency I16.0    Postsurgical percutaneous transluminal coronary angioplasty status Z98.61    CHF (congestive heart failure) ([facility]) I50.9    Chronic diastolic congestive heart failure ([facility]) I50.32     Current Outpatient Medications   Medication Sig Dispense Refill    doxazosin (CARDURA) 8 mg tablet Take 1 Tab by mouth every evening. 90 Tab 1    amLODIPine (Norvasc) 10 mg tablet Take 10 mg by mouth daily.  glimepiride (AMARYL) 4 mg tablet Take  by mouth every morning.       cloNIDine HCL (CATAPRES) 0.2 mg tablet Take 1 Tab by mouth two (2) times a day. 180 Tab 1    bumetanide (BUMEX) 1 mg tablet take 1/2 tablet by mouth twice a day 60 Tab 1    clopidogreL (Plavix) 75 mg tab Take 1 Tab by mouth daily. 90 Tab 3    isosorbide mononitrate ER (IMDUR) 30 mg tablet Take 1 Tab by mouth daily. 30 Tab 0    insulin glargine (Lantus Solostar U-100 Insulin) 100 unit/mL (3 mL) inpn 30 Units by SubCUTAneous route daily. 1 Pen 0    carvediloL (COREG) 25 mg tablet Take 1 Tab by mouth two (2) times daily (with meals). 60 Tab 0    gabapentin (NEURONTIN) 100 mg capsule Take 100 mg by mouth two (2) times a day.  nitroglycerin (NITROSTAT) 0.4 mg SL tablet 1 Tab by SubLINGual route every five (5) minutes as needed for Chest Pain for up to 3 doses. Indications: Angina 25 Tab 0    cholecalciferol (Vitamin D3) (1000 Units /25 mcg) tablet Take  by mouth.  dutasteride (AVODART) 0.5 mg capsule Take 0.5 mg by mouth daily.  hydrALAZINE (APRESOLINE) 100 mg tablet Take 100 mg by mouth three (3) times daily.  simvastatin (ZOCOR) 40 mg tablet Take 1 Tab by mouth nightly. 30 Tab 11    pantoprazole (PROTONIX) 40 mg tablet Take 40 mg by mouth daily.       OTHER BMP in 1 week  Dx: CHF  Fax results to  (Patient not taking: Reported on 2021) 1 Each 0     No Known Allergies  Social History     Socioeconomic History    Marital status:      Spouse name: Not on file    Number of children: Not on file    Years of education: Not on file    Highest education level: Not on file   Occupational History    Not on file   Tobacco Use    Smoking status: Former Smoker     Packs/day: 0.50     Years: 44.00     Pack years: 22.00     Types: Cigarettes     Quit date: 2020     Years since quittin.1    Smokeless tobacco: Never Used   Vaping Use    Vaping Use: Never used   Substance and Sexual Activity    Alcohol use: No    Drug use: No    Sexual activity: Not on file   Other Topics Concern    Not on file   Social History Narrative    Not on file Social Determinants of Health     Financial Resource Strain:     Difficulty of Paying Living Expenses:    Food Insecurity:     Worried About Running Out of Food in the Last Year:     [address] in the Last Year:    Transportation Needs:     Lack of Transportation (Medical):  Lack of Transportation (Non-Medical):    Physical Activity:     Days of Exercise per Week:     Minutes of Exercise per Session:    Stress:     Feeling of Stress :    Social Connections:     Frequency of Communication with Friends and Family:     Frequency of Social Gatherings with Friends and Family:     Attends Yazdanism Services:     Active Member of Clubs or Organizations:     Attends Club or Organization Meetings:     Marital Status:    Intimate Partner Violence:     Fear of Current or Ex-Partner:     Emotionally Abused:     Physically Abused:     Sexually Abused:       Family History   Problem Relation Age of Onset    Diabetes Mother        Physical Exam:    Visit Vitals  BP (!) 160/80 (BP 1 Location: Left upper arm, BP Patient Position: Sitting, BP Cuff Size: Adult)   Pulse (!) 52   Resp 20   SpO2 98%        Constitutional:  Patient is well developed, well nourished, and not distressed. HEENT: atraumatic, normocephalic, wearing a mask. Eyes:   Cunjunctivae clear, no scleral icterus  Neck:   No JVD present. Cardiovascular:  Normal rate, regular rhythm  Pulmonary/Chest: Effort normal   Extremities: Normal range of motion. 2+ bilateral lower extremity pitting edema, extending to the knees. Digits no cyanosis or clubbing  Neurological:  he  is alert and oriented x3 . Motor & sensory grossly intact in all 4 limbs. Psych: Appropriate mood and affect. Skin:  Skin is warm and dry. No rash noted. No erythema. No ulcers. Impression and Plan:  Taya Garcia is a 77 y.o. male with bilateral lower extremity edema. 1.  Bilateral lower extremity edema.   Bilateral lower extremity venous duplex shows no evidence of superficial venous insufficiency. Patient has not worn compression stockings or elevated lower extremities as discussed at his prior appointment. He states he is having difficulty getting the stockings on. He bought the compression stockings at [facility]. We will measure him in the office today to provide him with the information to order well fitting stockings. I recommend knee-high, rather than thigh-high, stockings and I have provided him and his wife with online resources to obtain medical grade compression stockings. They confirm that they are able to order online. I also reiterated the importance of leg elevation. Discussed elevating 3 times a day for 15 to 20 minutes. This should be steep elevation with his toes above his nose. In addition, we discussed the effect of obesity and sedentary lifestyle on lower extremity edema. I encouraged him to increase his activity by walking more. He will return in follow-up in 3 months. 2.  Bilateral lower extremity peripheral arterial disease with moderate disease in the right SFA and mild disease in the left tibial vessels. This appears to remain asymptomatic at this time. 3.  Mild bilateral carotid artery disease. This is appropriately followed by his primary care physician. Patient on simvastatin and Plavix. We reviewed the plan with the patient and the patient understands. Rachael Carrillo MD    PLEASE NOTE:  This document has been produced using voice recognition software. Unrecognized errors in transcription may be present.

## 2021-07-12 NOTE — PROGRESS NOTES
1. Have you been to an emergency room or urgent care clinic since your last visit? No     Hospitalized since your last visit? If yes, where, when, and reason for visit? No     2. Have you seen or consulted any other health care providers outside of the [facility] since your last visit including any procedures, health maintenance items. If yes, where, when and reason for visit?  Yes ; pcp                 3 most recent PHQ Screens 7/12/2021   Little interest or pleasure in doing things Not at all   Feeling down, depressed, irritable, or hopeless Not at all   Total Score PHQ 2 0

## 2021-07-21 ENCOUNTER — HOSPITAL ENCOUNTER (OUTPATIENT)
Dept: LAB | Age: 76
Discharge: HOME | End: 2021-07-21
Payer: MEDICARE

## 2021-07-21 ENCOUNTER — TRANSCRIBE ORDER (OUTPATIENT)
Dept: REGISTRATION | Age: 76
End: 2021-07-21

## 2021-07-21 DIAGNOSIS — N18.4 CHRONIC KIDNEY DISEASE, STAGE IV (SEVERE) (HCC): Primary | ICD-10-CM

## 2021-07-21 DIAGNOSIS — N18.4 CHRONIC KIDNEY DISEASE, STAGE IV (SEVERE) (HCC): ICD-10-CM

## 2021-07-21 LAB
ANION GAP SERPL CALC-SCNC: 7 MMOL/L (ref 3–18)
BUN SERPL-MCNC: 55 MG/DL (ref 7–18)
BUN/CREAT SERPL: 12 (ref 12–20)
CALCIUM SERPL-MCNC: 8.4 MG/DL (ref 8.5–10.1)
CHLORIDE SERPL-SCNC: 112 MMOL/L (ref 100–111)
CO2 SERPL-SCNC: 24 MMOL/L (ref 21–32)
CREAT SERPL-MCNC: 4.51 MG/DL (ref 0.6–1.3)
GLUCOSE SERPL-MCNC: 123 MG/DL (ref 74–99)
POTASSIUM SERPL-SCNC: 4.3 MMOL/L (ref 3.5–5.5)
SODIUM SERPL-SCNC: 143 MMOL/L (ref 136–145)

## 2021-07-21 PROCEDURE — 80048 BASIC METABOLIC PNL TOTAL CA: CPT

## 2021-07-21 PROCEDURE — 36415 COLL VENOUS BLD VENIPUNCTURE: CPT

## 2021-07-27 ENCOUNTER — DOCUMENTATION ONLY (OUTPATIENT)
Dept: NEPHROLOGY | Age: 76
End: 2021-07-27

## 2021-07-27 DIAGNOSIS — I50.33 ACUTE ON CHRONIC DIASTOLIC CONGESTIVE HEART FAILURE (HCC): ICD-10-CM

## 2021-07-27 RX ORDER — BUMETANIDE 1 MG/1
TABLET ORAL
Qty: 60 TABLET | Refills: 1 | Status: SHIPPED | OUTPATIENT
Start: 2021-07-27 | End: 2021-12-16 | Stop reason: ALTCHOICE

## 2021-07-27 NOTE — PROGRESS NOTES
Sade Holloway  Appointment: 2021 1:00 PM  Location: [address] [place] [address]  Patient #: [id number]  : 1945  Undefined / Language: [place] / Race: Black or   Male      History of Present Illness Yisel Donahue MD; 2021 2:28 PM)  The patient is a 77year old male who presents for a Recheck of Chronic kidney disease. This is classified as stage 5. Note: Patient is a 52-year-old -American male who has been referred from Dr. Anthony Lujan for evaluation for chronic kidney disease. Patient has longstanding history of diabetes which is not adequately controlled with hemoglobin A1c of 8.3, hypertension for the last 10 years appears to be uncontrolled, does have diastolic dysfunction, obesity, ischemic cardiomyopathy, history of smoking.      PMH  Baseline creat in 2 range since 2019. His last creatinine was 2.5 with a EGFR of 30. Justino Elder did have BPH diagnosis and is on Proscar, but denies any obstructive symptoms.  He did have kidney ultrasound in 2019 which did not show any obstructive uropathy. Ana Cristina Mathis does have proteinuria which needs to be quantified.      Interval history:  recent labs with creat 4.5  denies urinary symptoms  leg swelling +, SOB  proteinuria ++  low albumin  BP high , 350-258 systolic  on 5 BP agents , compliant  appetite ok  diastolic CHF sees Dr Sol Theodore  had dialysis education , prefers [facility]    Problem List/Past Medical (Brianna Kendall; 2021 1:07 PM)  CAD (coronary artery disease) (I25.10)    Ischemic cardiomyopathy (I25.5)    RBBB (right bundle branch block with left anterior fascicular block) (I45.2)    Tobacco abuse (Z72.0)    Obesity with body mass index 30 or greater (E66.9)    CKD (chronic kidney disease), stage V (N18.5)    Diabetes mellitus with renal manifestation (E11.29)    Hypertension (I10)    Problems Reconciled      Allergies (Brianna Kendall; 2021 1:08 PM)  No Known Drug Allergies   [2020]:   Allergies Reconciled      Social History (Brianna Kendall; 7/21/2021 10:25 AM)  Tobacco use   Current every day smoker. Non Drinker/No Alcohol Use      Medication History (Cristel Buckley; 7/27/2021 1:12 PM)  Coreg  (25MG Tablet, 1 (one) Oral two times daily, Taken starting 02/05/2020) Active. Calcitriol  (0.25MCG Capsule, 1 (one) Oral Monday, Wednesday, Friday, Taken starting 06/30/2021) Active. cloNIDine HCl  (0.3MG Tablet, 1 (one) Oral two times daily, Taken starting 06/30/2021) Active. Lasix  (40MG Tablet, 1 (one) Oral daily, Taken starting 06/30/2021) Active. amLODIPine Besylate  (10MG Tablet, 1 Oral Daily) Active. Vitamin D3  (25 MCG(1000 UT) Capsule, Oral daily) Active. Glimepiride  (4MG Tablet, Oral) Active. Simvastatin  (40MG Tablet, Oral) Active. Pantoprazole Sodium  (40MG Tablet DR, Oral) Active. Lantus  (100UNIT/ML Solution, Subcutaneous) Active. Dutasteride  (0.5MG Capsule, Oral) Active. Nitrostat  (0.4MG Tab Sublingual, Sublingual) Active. Neurontin  (100MG Capsule, Oral two times daily) Active. hydrALAZINE HCl  (100MG Tablet, Oral three times daily) Active. Medications Reconciled     Health Maintenance History (German Blanco; 7/21/2021 10:26 AM)  Flu Vaccine   [11/2020]: PCP Office  Pneumovax   [06/2019]: PCP Office  Colonoscopy, Screening   [04/23/2019]: Noemi Leblanc MD        Review of Systems (Aditi Barbosa MD; 7/27/2021 2:28 PM)  General Not Present- Anorexia, Chills, Fatigue and Fever. Skin Not Present- Bruising, Pruritus, Rash and Ulcer. HEENT Not Present- Dry Mucous Membranes, Dysgeusia, Oral Ulcers, Periorbital Puffiness and Sore Throat. Respiratory Not Present- Cough, Difficulty Breathing on Exertion, Dyspnea and Hemoptysis. Cardiovascular Present- Difficulty Breathing Lying Down, Difficulty Breathing On Exertion and Edema. Not Present- Chest Pain, Claudications, Orthopnea, Palpitations, Paroxysmal Nocturnal Dyspnea and Swelling of Extremities.   Gastrointestinal Not Present- Abdominal Pain, Abdominal Swelling, Constipation, Diarrhea, Hematochezia, Melena, Nausea and Vomiting. Male Genitourinary Not Present- Change in Urinary Stream, Dysuria, Frequency, Hematuria, Hesitancy, Nocturia and Urgency. Musculoskeletal Not Present- Joint Pain, Joint Redness, Joint Stiffness, Joint Swelling, Leg Cramps and Myalgia. Neurological Not Present- Dizziness, Headaches, Syncope and Trouble walking. Endocrine Not Present- Appetite Changes, Excessive Thirst, Polydipsia and Polyuria. Hematology Not Present- Abnormal Bleeding and Easy Bruising. Vitals (Charmayne Sultana; 7/27/2021 1:13 PM)  7/27/2021 1:12 PM  Weight: 204 lb   Height: 66 in   Body Surface Area: 2.02 m²   Body Mass Index: 32.93 kg/m²    Pain Level: 0/10    Temp.: 98° F (Oral)    Pulse: 71 (Regular)    P. OX: 98% (Room air)  BP: 148/70(Sitting, Left Arm, Standard)              Physical Exam Vishal Pop MD; 7/27/2021 2:29 PM)  General  General Appearance - Not in acute distress. Build & Nutrition - Well nourished and Well developed. Integumentary  General Characteristics  Overall examination of the patient's skin reveals - no rashes. Eye  Sclera/Conjunctiva - Bilateral - Nonicteric. ENMT  Mouth and Throat  Oral Cavity/Oropharynx - Gingiva - no ulcerations noted, No excessive growth of soft tissue present. Oral Mucosa - moist. Oropharynx - No presence of white exudate noted. Chest and Lung Exam  Auscultation  Breath sounds - Normal and Clear. Adventitious sounds - No Adventitious sounds. Cardiovascular  Cardiovascular examination reveals  - on palpation PMI is normal in location and amplitude, no palpable S3 or S4. Normal cardiac borders. , normal heart sounds, regular rate and rhythm with no murmurs, carotid auscultation reveals no bruits, abdominal aorta auscultation reveals no bruits, normal pedal pulses bilaterally and no digital clubbing, cyanosis, edema, increased warmth or tenderness.   Edema  Location - The edema involves the lower exremities and bilaterally. Severity - 2+. Abdomen  Inspection  Inspection of the abdomen reveals - No Abnormal pulsations. Palpation/Percussion  Palpation and Percussion of the abdomen reveal - Soft, Non Tender, No hepatosplenomegaly and No Palpable abdominal masses. Auscultation  Auscultation of the abdomen reveals - Bowel sounds normal and No Abdominal bruits. Note:  distended      Neurologic  Neurologic evaluation reveals  - alert and oriented x 3 with no impairment of recent or remote memory. Lymphatic  General Lymphatics  Description - No Cervical lymphadeopathy. Assessment & Plan Dakota Alvarado MD; 7/27/2021 2:34 PM)    CKD (chronic kidney disease), stage V (N18.5) <[id number]>  Impression:     #1 chronic kidney disease stage 5, etiology appears to be diabetic nephropathy with nephrotic syndrome , hypertension nephrosclerosis as well. He does have significant proteinuria > 5 gms , Blood pressure better uncontrolled, from volume overload, adding Lasix 40 mg BID , continue BP meds. clonidine to 0.3 mg BID . Also hemoglobin A1c needs to improve to 7 or below. Smoking cessation would also be very helpful. Recommend low-salt diet, maintain good hydration avoid NSAIDs. His CKD has progressed rapidly. has had dialysis education , prefers in center. Refer to vascular for AVF placement and TDC to initiate HD .  Also has referral to Dr Arsh Browne hem/onc for anemia of CKD and also elevated k/l ratio and abnormal immunofixation    #2 diabetic nephropathy , as above  #3 proteinuria likely from #2, mild paraproteinemia + , refer to hem/onc Dr Arsh Browne  #4 resistant  Hypertension, uncontrolled, as above  #5 diastolic dysfunction, Lasix BID , will need to start HD fairly soon d/t his volume overload and resistant HTN  #6 ischemic cardiomyopathy    F/U in 6 weeks with labs   Vascular refer  Hem/onc refer    Current Plans  PTH INTACT (91690)  RENAL FUNCTION PANEL (32780)  Future Plans  3/1/2021: RENAL FUNCTION PANEL (21327) - one time  3/1/2021: iPTH (36482) - one time  3/1/2021: VITAMIN D 25 LEVEL (96533) - one time  3/1/2021: NEPHELOMETRY, [address] [place] [address] (20062) - one time  3/1/2021: IMMUNOFIXATION / SERUM (78069) - one time    Diabetes mellitus with renal manifestation (E11.29) <HCC18>    Current Plans  SPOT PROTEIN URINE (32157)  SPOT URINE CREATININE(22812)  Future Plans  3/1/2021: SPOT PROTEIN URINE (78695) - one time  3/1/2021: SPOT URINE CREATININE(17851) - one time    Hypertension (I10)    Current Plans  CBC (06031)  Future Plans  3/1/2021: CBC (69496) - one time    Portal Access Education (Z71.9)    Current Plans  Pt Education - How to [address] using Patient Portal and 3rd Party Apps: discussed with patient and provided information.     Obesity with body mass index 30 or greater (E66.9)    Current Plans  LIFESTYLE EDUCATION REGARDING DIET (71488)  Pt Education - Healthy Diet: Brief Version *: healthy diet    Ischemic cardiomyopathy (I25.5)      Tobacco abuse (Z72.0)    Signed electronically by Corey Burch MD (7/27/2021 2:34 PM)

## 2021-08-09 ENCOUNTER — TRANSCRIBE ORDER (OUTPATIENT)
Dept: REGISTRATION | Age: 76
End: 2021-08-09

## 2021-08-09 ENCOUNTER — HOSPITAL ENCOUNTER (OUTPATIENT)
Dept: GENERAL RADIOLOGY | Age: 76
Discharge: HOME | End: 2021-08-09
Payer: MEDICARE

## 2021-08-09 ENCOUNTER — HOSPITAL ENCOUNTER (OUTPATIENT)
Dept: LAB | Age: 76
Discharge: HOME | End: 2021-08-09
Payer: MEDICARE

## 2021-08-09 ENCOUNTER — HOSPITAL ENCOUNTER (OUTPATIENT)
Dept: PREADMISSION TESTING | Age: 76
Discharge: HOME | End: 2021-08-09
Payer: MEDICARE

## 2021-08-09 DIAGNOSIS — Z01.818 OTHER SPECIFIED PRE-OPERATIVE EXAMINATION: ICD-10-CM

## 2021-08-09 DIAGNOSIS — N18.5 CHRONIC KIDNEY DISEASE, STAGE V (HCC): Primary | ICD-10-CM

## 2021-08-09 DIAGNOSIS — N18.5 CHRONIC KIDNEY DISEASE, STAGE V (HCC): ICD-10-CM

## 2021-08-09 DIAGNOSIS — Z01.818 OTHER SPECIFIED PRE-OPERATIVE EXAMINATION: Primary | ICD-10-CM

## 2021-08-09 LAB
ALBUMIN SERPL-MCNC: 2.8 G/DL (ref 3.4–5)
ANION GAP SERPL CALC-SCNC: 4 MMOL/L (ref 3–18)
ANION GAP SERPL CALC-SCNC: 5 MMOL/L (ref 3–18)
BASOPHILS # BLD: 0 K/UL (ref 0–0.1)
BASOPHILS NFR BLD: 0 % (ref 0–2)
BUN SERPL-MCNC: 54 MG/DL (ref 7–18)
BUN SERPL-MCNC: 55 MG/DL (ref 7–18)
BUN/CREAT SERPL: 12 (ref 12–20)
BUN/CREAT SERPL: 12 (ref 12–20)
CALCIUM SERPL-MCNC: 8.3 MG/DL (ref 8.5–10.1)
CALCIUM SERPL-MCNC: 8.4 MG/DL (ref 8.5–10.1)
CHLORIDE SERPL-SCNC: 110 MMOL/L (ref 100–111)
CHLORIDE SERPL-SCNC: 111 MMOL/L (ref 100–111)
CO2 SERPL-SCNC: 28 MMOL/L (ref 21–32)
CO2 SERPL-SCNC: 28 MMOL/L (ref 21–32)
CREAT SERPL-MCNC: 4.53 MG/DL (ref 0.6–1.3)
CREAT SERPL-MCNC: 4.56 MG/DL (ref 0.6–1.3)
DIFFERENTIAL METHOD BLD: ABNORMAL
EOSINOPHIL # BLD: 0.6 K/UL (ref 0–0.4)
EOSINOPHIL NFR BLD: 9 % (ref 0–5)
ERYTHROCYTE [DISTWIDTH] IN BLOOD BY AUTOMATED COUNT: 13.9 % (ref 11.6–14.5)
ERYTHROCYTE [DISTWIDTH] IN BLOOD BY AUTOMATED COUNT: 13.9 % (ref 11.6–14.5)
GLUCOSE SERPL-MCNC: 158 MG/DL (ref 74–99)
GLUCOSE SERPL-MCNC: 159 MG/DL (ref 74–99)
HBV SURFACE AB SER QL IA: NEGATIVE
HBV SURFACE AB SERPL IA-ACNC: <3.1 MIU/ML
HBV SURFACE AG SER QL: <0.1 INDEX
HBV SURFACE AG SER QL: NEGATIVE
HCT VFR BLD AUTO: 30.2 % (ref 36–48)
HCT VFR BLD AUTO: 30.4 % (ref 36–48)
HCV AB SER IA-ACNC: 0.03 INDEX
HCV AB SERPL QL IA: NEGATIVE
HCV COMMENT,HCGAC: NORMAL
HEP BS AB COMMENT,HBSAC: ABNORMAL
HGB BLD-MCNC: 10.1 G/DL (ref 13–16)
HGB BLD-MCNC: 10.2 G/DL (ref 13–16)
LYMPHOCYTES # BLD: 1.1 K/UL (ref 0.9–3.6)
LYMPHOCYTES NFR BLD: 16 % (ref 21–52)
MCH RBC QN AUTO: 25.6 PG (ref 24–34)
MCH RBC QN AUTO: 25.8 PG (ref 24–34)
MCHC RBC AUTO-ENTMCNC: 33.4 G/DL (ref 31–37)
MCHC RBC AUTO-ENTMCNC: 33.6 G/DL (ref 31–37)
MCV RBC AUTO: 76.6 FL (ref 74–97)
MCV RBC AUTO: 76.8 FL (ref 74–97)
MONOCYTES # BLD: 0.6 K/UL (ref 0.05–1.2)
MONOCYTES NFR BLD: 9 % (ref 3–10)
NEUTS SEG # BLD: 4.6 K/UL (ref 1.8–8)
NEUTS SEG NFR BLD: 67 % (ref 40–73)
PHOSPHATE SERPL-MCNC: 4.4 MG/DL (ref 2.5–4.9)
PLATELET # BLD AUTO: 199 K/UL (ref 135–420)
PLATELET # BLD AUTO: 209 K/UL (ref 135–420)
PMV BLD AUTO: 11.6 FL (ref 9.2–11.8)
PMV BLD AUTO: 11.6 FL (ref 9.2–11.8)
POTASSIUM SERPL-SCNC: 4.4 MMOL/L (ref 3.5–5.5)
POTASSIUM SERPL-SCNC: 4.6 MMOL/L (ref 3.5–5.5)
RBC # BLD AUTO: 3.94 M/UL (ref 4.35–5.65)
RBC # BLD AUTO: 3.96 M/UL (ref 4.35–5.65)
SODIUM SERPL-SCNC: 143 MMOL/L (ref 136–145)
SODIUM SERPL-SCNC: 143 MMOL/L (ref 136–145)
WBC # BLD AUTO: 7 K/UL (ref 4.6–13.2)
WBC # BLD AUTO: 7 K/UL (ref 4.6–13.2)

## 2021-08-09 PROCEDURE — 80069 RENAL FUNCTION PANEL: CPT

## 2021-08-09 PROCEDURE — 86706 HEP B SURFACE ANTIBODY: CPT

## 2021-08-09 PROCEDURE — 93005 ELECTROCARDIOGRAM TRACING: CPT

## 2021-08-09 PROCEDURE — 36415 COLL VENOUS BLD VENIPUNCTURE: CPT

## 2021-08-09 PROCEDURE — 87340 HEPATITIS B SURFACE AG IA: CPT

## 2021-08-09 PROCEDURE — 86803 HEPATITIS C AB TEST: CPT

## 2021-08-09 PROCEDURE — 80048 BASIC METABOLIC PNL TOTAL CA: CPT

## 2021-08-09 PROCEDURE — 86704 HEP B CORE ANTIBODY TOTAL: CPT

## 2021-08-09 PROCEDURE — 71046 X-RAY EXAM CHEST 2 VIEWS: CPT

## 2021-08-09 PROCEDURE — 85025 COMPLETE CBC W/AUTO DIFF WBC: CPT

## 2021-08-09 PROCEDURE — 85027 COMPLETE CBC AUTOMATED: CPT

## 2021-08-09 RX ORDER — SIMVASTATIN 80 MG/1
80 TABLET, FILM COATED ORAL
COMMUNITY
End: 2022-05-02 | Stop reason: ALTCHOICE

## 2021-08-09 RX ORDER — FUROSEMIDE 40 MG/1
40 TABLET ORAL 2 TIMES DAILY
COMMUNITY
End: 2022-05-02 | Stop reason: ALTCHOICE

## 2021-08-09 RX ORDER — HYDRALAZINE HYDROCHLORIDE 50 MG/1
50 TABLET, FILM COATED ORAL 3 TIMES DAILY
Status: ON HOLD | COMMUNITY
End: 2022-08-08 | Stop reason: SDUPTHER

## 2021-08-09 RX ORDER — CLONIDINE HYDROCHLORIDE 0.3 MG/1
0.3 TABLET ORAL DAILY
COMMUNITY
Start: 2021-06-30 | End: 2022-05-02

## 2021-08-09 RX ORDER — CALCITRIOL 0.25 UG/1
1 CAPSULE ORAL DAILY
COMMUNITY
Start: 2021-07-01 | End: 2022-05-02 | Stop reason: ALTCHOICE

## 2021-08-09 NOTE — PERIOP NOTES
PRE-SURGICAL INSTRUCTIONS        Patient's Name:  Pam Long. Today's Date:  8/9/2021              Surgery Date:  8/12/2021                1. Do NOT eat or drink anything, including candy, gum, or ice chips after midnight on 8/11, unless you have specific instructions from your surgeon or anesthesia provider to do so.  2. You may brush your teeth before coming to the hospital.  3. No smoking 24 hours prior to the day of surgery. 4. No alcohol 24 hours prior to the day of surgery. 5. No recreational drugs for one week prior to the day of surgery. 6. Leave all valuables, including money/purse, at home. 7. Remove all jewelry, nail polish, acrylic nails, and makeup (including mascara); no lotions powders, deodorant, or perfume/cologne/after shave on the skin. 8. Glasses/contact lenses and dentures may be worn to the hospital.  They will be removed prior to surgery. 9. Call your doctor if symptoms of a cold or illness develop within 24-48 hours prior to your surgery. 10.  AN ADULT MUST DRIVE YOU HOME AFTER OUTPATIENT SURGERY. 11.  If you are having an outpatient procedure, please make arrangements for a responsible adult to be with you for 24 hours after your surgery. 12. One visitor may accompany you. Everyone must wear a mask and social distance. Special Instructions:      Bring any pertinent legal medical records. Covid card. Take these medications the morning of surgery with a sip of water:  BP medications  Follow physician instructions about insulin. Follow physician instructions about stopping anticoagulants. Patient was not told to hold Plavis . Last dose 8/9. Told to call office to clear with . On the day of surgery, come in the main entrance of DR. PAPA SALEEM. Let the  at the desk know you are there for surgery. A staff member will come escort you to the surgical area on the second floor.     If you have any questions or concerns, please do not hesitate to call:     (Prior to the day of surgery) [facility] department:  [phone]   (Day of surgery) Pre-Op department:  [phone]    These surgical instructions were reviewed with Hansa Vincent (wife) during the [facility] phone call.

## 2021-08-10 LAB
ATRIAL RATE: 60 BPM
CALCULATED P AXIS, ECG09: 20 DEGREES
CALCULATED R AXIS, ECG10: -81 DEGREES
CALCULATED T AXIS, ECG11: 138 DEGREES
DIAGNOSIS, 93000: NORMAL
HBV CORE AB SERPL QL IA: NEGATIVE
P-R INTERVAL, ECG05: 304 MS
Q-T INTERVAL, ECG07: 450 MS
QRS DURATION, ECG06: 176 MS
QTC CALCULATION (BEZET), ECG08: 450 MS
VENTRICULAR RATE, ECG03: 60 BPM

## 2021-08-11 ENCOUNTER — TRANSCRIBE ORDER (OUTPATIENT)
Dept: SCHEDULING | Age: 76
End: 2021-08-11

## 2021-08-11 ENCOUNTER — ANESTHESIA EVENT (OUTPATIENT)
Dept: CARDIOTHORACIC SURGERY | Age: 76
End: 2021-08-11
Payer: MEDICARE

## 2021-08-11 DIAGNOSIS — D47.2 MONOCLONAL GAMMOPATHY: Primary | ICD-10-CM

## 2021-08-12 ENCOUNTER — HOSPITAL ENCOUNTER (OUTPATIENT)
Age: 76
Setting detail: OUTPATIENT SURGERY
Discharge: HOME | End: 2021-08-12
Attending: SURGERY | Admitting: SURGERY
Payer: MEDICARE

## 2021-08-12 ENCOUNTER — ANESTHESIA (OUTPATIENT)
Dept: CARDIOTHORACIC SURGERY | Age: 76
End: 2021-08-12
Payer: MEDICARE

## 2021-08-12 ENCOUNTER — APPOINTMENT (OUTPATIENT)
Dept: GENERAL RADIOLOGY | Age: 76
End: 2021-08-12
Attending: SURGERY
Payer: MEDICARE

## 2021-08-12 VITALS
HEIGHT: 66 IN | RESPIRATION RATE: 20 BRPM | DIASTOLIC BLOOD PRESSURE: 89 MMHG | SYSTOLIC BLOOD PRESSURE: 178 MMHG | HEART RATE: 75 BPM | BODY MASS INDEX: 31.92 KG/M2 | OXYGEN SATURATION: 95 % | TEMPERATURE: 97.2 F | WEIGHT: 198.6 LBS

## 2021-08-12 DIAGNOSIS — N18.6 ESRD (END STAGE RENAL DISEASE) (HCC): Primary | ICD-10-CM

## 2021-08-12 LAB
GLUCOSE BLD STRIP.AUTO-MCNC: 145 MG/DL (ref 70–110)
GLUCOSE BLD STRIP.AUTO-MCNC: 169 MG/DL (ref 70–110)

## 2021-08-12 PROCEDURE — 2709999900 HC NON-CHARGEABLE SUPPLY: Performed by: SURGERY

## 2021-08-12 PROCEDURE — 76060000034 HC ANESTHESIA 1.5 TO 2 HR: Performed by: SURGERY

## 2021-08-12 PROCEDURE — 76010000129 HC CV SURG 1.5 TO 2 HR: Performed by: SURGERY

## 2021-08-12 PROCEDURE — 99100 ANES PT EXTEME AGE<1 YR&>70: CPT | Performed by: ANESTHESIOLOGY

## 2021-08-12 PROCEDURE — 77030010507 HC ADH SKN DERMBND J&J -B: Performed by: SURGERY

## 2021-08-12 PROCEDURE — 77030010512 HC APPL CLP LIG J&J -C: Performed by: SURGERY

## 2021-08-12 PROCEDURE — 74011250636 HC RX REV CODE- 250/636: Performed by: NURSE ANESTHETIST, CERTIFIED REGISTERED

## 2021-08-12 PROCEDURE — 76210000016 HC OR PH I REC 1 TO 1.5 HR: Performed by: SURGERY

## 2021-08-12 PROCEDURE — 74011636637 HC RX REV CODE- 636/637: Performed by: NURSE ANESTHETIST, CERTIFIED REGISTERED

## 2021-08-12 PROCEDURE — 74011250636 HC RX REV CODE- 250/636: Performed by: SURGERY

## 2021-08-12 PROCEDURE — 82962 GLUCOSE BLOOD TEST: CPT

## 2021-08-12 PROCEDURE — 77030002933 HC SUT MCRYL J&J -A: Performed by: SURGERY

## 2021-08-12 PROCEDURE — 74011250637 HC RX REV CODE- 250/637: Performed by: NURSE ANESTHETIST, CERTIFIED REGISTERED

## 2021-08-12 PROCEDURE — 74011000250 HC RX REV CODE- 250: Performed by: SURGERY

## 2021-08-12 PROCEDURE — 74011000250 HC RX REV CODE- 250: Performed by: NURSE ANESTHETIST, CERTIFIED REGISTERED

## 2021-08-12 PROCEDURE — C1894 INTRO/SHEATH, NON-LASER: HCPCS | Performed by: SURGERY

## 2021-08-12 PROCEDURE — 76210000021 HC REC RM PH II 0.5 TO 1 HR: Performed by: SURGERY

## 2021-08-12 PROCEDURE — C1750 CATH, HEMODIALYSIS,LONG-TERM: HCPCS | Performed by: SURGERY

## 2021-08-12 PROCEDURE — 99100 ANES PT EXTEME AGE<1 YR&>70: CPT | Performed by: NURSE ANESTHETIST, CERTIFIED REGISTERED

## 2021-08-12 PROCEDURE — 77030002986 HC SUT PROL J&J -A: Performed by: SURGERY

## 2021-08-12 PROCEDURE — 01844 ANES VASC SHUNT/SHUNT REVJ: CPT | Performed by: NURSE ANESTHETIST, CERTIFIED REGISTERED

## 2021-08-12 PROCEDURE — 01844 ANES VASC SHUNT/SHUNT REVJ: CPT | Performed by: ANESTHESIOLOGY

## 2021-08-12 PROCEDURE — 74011250636 HC RX REV CODE- 250/636: Performed by: ANESTHESIOLOGY

## 2021-08-12 RX ORDER — HEPARIN SODIUM 1000 [USP'U]/ML
INJECTION, SOLUTION INTRAVENOUS; SUBCUTANEOUS AS NEEDED
Status: DISCONTINUED | OUTPATIENT
Start: 2021-08-12 | End: 2021-08-12 | Stop reason: HOSPADM

## 2021-08-12 RX ORDER — LABETALOL HCL 20 MG/4 ML
10 SYRINGE (ML) INTRAVENOUS
Status: COMPLETED | OUTPATIENT
Start: 2021-08-12 | End: 2021-08-12

## 2021-08-12 RX ORDER — LIDOCAINE HYDROCHLORIDE 20 MG/ML
INJECTION, SOLUTION EPIDURAL; INFILTRATION; INTRACAUDAL; PERINEURAL AS NEEDED
Status: DISCONTINUED | OUTPATIENT
Start: 2021-08-12 | End: 2021-08-12 | Stop reason: HOSPADM

## 2021-08-12 RX ORDER — PROPOFOL 10 MG/ML
VIAL (ML) INTRAVENOUS
Status: DISCONTINUED | OUTPATIENT
Start: 2021-08-12 | End: 2021-08-12 | Stop reason: HOSPADM

## 2021-08-12 RX ORDER — SODIUM CHLORIDE, SODIUM LACTATE, POTASSIUM CHLORIDE, CALCIUM CHLORIDE 600; 310; 30; 20 MG/100ML; MG/100ML; MG/100ML; MG/100ML
25 INJECTION, SOLUTION INTRAVENOUS CONTINUOUS
Status: DISCONTINUED | OUTPATIENT
Start: 2021-08-12 | End: 2021-08-12 | Stop reason: HOSPADM

## 2021-08-12 RX ORDER — HEPARIN SODIUM 200 [USP'U]/100ML
INJECTION, SOLUTION INTRAVENOUS
Status: DISCONTINUED
Start: 2021-08-12 | End: 2021-08-12 | Stop reason: HOSPADM

## 2021-08-12 RX ORDER — FAMOTIDINE 20 MG/1
20 TABLET, FILM COATED ORAL ONCE
Status: COMPLETED | OUTPATIENT
Start: 2021-08-12 | End: 2021-08-12

## 2021-08-12 RX ORDER — PROPOFOL 10 MG/ML
INJECTION, EMULSION INTRAVENOUS AS NEEDED
Status: DISCONTINUED | OUTPATIENT
Start: 2021-08-12 | End: 2021-08-12 | Stop reason: HOSPADM

## 2021-08-12 RX ORDER — HYDROCODONE BITARTRATE AND ACETAMINOPHEN 5; 325 MG/1; MG/1
1-2 TABLET ORAL
Qty: 40 TABLET | Refills: 0 | Status: SHIPPED | OUTPATIENT
Start: 2021-08-12 | End: 2021-08-19

## 2021-08-12 RX ORDER — HYDRALAZINE HYDROCHLORIDE 20 MG/ML
10 INJECTION INTRAMUSCULAR; INTRAVENOUS ONCE
Status: COMPLETED | OUTPATIENT
Start: 2021-08-12 | End: 2021-08-12

## 2021-08-12 RX ORDER — LIDOCAINE HYDROCHLORIDE 10 MG/ML
INJECTION, SOLUTION EPIDURAL; INFILTRATION; INTRACAUDAL; PERINEURAL AS NEEDED
Status: DISCONTINUED | OUTPATIENT
Start: 2021-08-12 | End: 2021-08-12 | Stop reason: HOSPADM

## 2021-08-12 RX ORDER — INSULIN LISPRO 100 [IU]/ML
INJECTION, SOLUTION INTRAVENOUS; SUBCUTANEOUS ONCE
Status: COMPLETED | OUTPATIENT
Start: 2021-08-12 | End: 2021-08-12

## 2021-08-12 RX ORDER — HEPARIN SODIUM 5000 [USP'U]/ML
INJECTION, SOLUTION INTRAVENOUS; SUBCUTANEOUS
Status: DISCONTINUED
Start: 2021-08-12 | End: 2021-08-12 | Stop reason: HOSPADM

## 2021-08-12 RX ORDER — LIDOCAINE HYDROCHLORIDE 10 MG/ML
INJECTION, SOLUTION EPIDURAL; INFILTRATION; INTRACAUDAL; PERINEURAL
Status: DISCONTINUED
Start: 2021-08-12 | End: 2021-08-12 | Stop reason: HOSPADM

## 2021-08-12 RX ORDER — HEPARIN SODIUM 5000 [USP'U]/ML
INJECTION, SOLUTION INTRAVENOUS; SUBCUTANEOUS AS NEEDED
Status: DISCONTINUED | OUTPATIENT
Start: 2021-08-12 | End: 2021-08-12 | Stop reason: HOSPADM

## 2021-08-12 RX ORDER — SODIUM CHLORIDE 0.9 % (FLUSH) 0.9 %
5-40 SYRINGE (ML) INJECTION EVERY 8 HOURS
Status: DISCONTINUED | OUTPATIENT
Start: 2021-08-12 | End: 2021-08-12 | Stop reason: HOSPADM

## 2021-08-12 RX ORDER — HEPARIN SODIUM 200 [USP'U]/100ML
INJECTION, SOLUTION INTRAVENOUS
Status: COMPLETED | OUTPATIENT
Start: 2021-08-12 | End: 2021-08-12

## 2021-08-12 RX ORDER — SODIUM CHLORIDE 0.9 % (FLUSH) 0.9 %
5-40 SYRINGE (ML) INJECTION AS NEEDED
Status: DISCONTINUED | OUTPATIENT
Start: 2021-08-12 | End: 2021-08-12 | Stop reason: HOSPADM

## 2021-08-12 RX ORDER — FENTANYL CITRATE 50 UG/ML
INJECTION, SOLUTION INTRAMUSCULAR; INTRAVENOUS AS NEEDED
Status: DISCONTINUED | OUTPATIENT
Start: 2021-08-12 | End: 2021-08-12 | Stop reason: HOSPADM

## 2021-08-12 RX ORDER — MIDAZOLAM HYDROCHLORIDE 1 MG/ML
INJECTION, SOLUTION INTRAMUSCULAR; INTRAVENOUS AS NEEDED
Status: DISCONTINUED | OUTPATIENT
Start: 2021-08-12 | End: 2021-08-12 | Stop reason: HOSPADM

## 2021-08-12 RX ADMIN — SODIUM CHLORIDE, SODIUM LACTATE, POTASSIUM CHLORIDE, AND CALCIUM CHLORIDE 25 ML/HR: 600; 310; 30; 20 INJECTION, SOLUTION INTRAVENOUS at 10:15

## 2021-08-12 RX ADMIN — FAMOTIDINE 20 MG: 20 TABLET ORAL at 10:20

## 2021-08-12 RX ADMIN — FENTANYL CITRATE 50 MCG: 50 INJECTION, SOLUTION INTRAMUSCULAR; INTRAVENOUS at 12:29

## 2021-08-12 RX ADMIN — PROPOFOL 75 MCG/KG/MIN: 10 INJECTION, EMULSION INTRAVENOUS at 12:15

## 2021-08-12 RX ADMIN — FENTANYL CITRATE 50 MCG: 50 INJECTION, SOLUTION INTRAMUSCULAR; INTRAVENOUS at 12:15

## 2021-08-12 RX ADMIN — HEPARIN SODIUM 2500 UNITS: 1000 INJECTION, SOLUTION INTRAVENOUS; SUBCUTANEOUS at 13:11

## 2021-08-12 RX ADMIN — LIDOCAINE HYDROCHLORIDE 40 MG: 20 INJECTION, SOLUTION EPIDURAL; INFILTRATION; INTRACAUDAL; PERINEURAL at 12:14

## 2021-08-12 RX ADMIN — MIDAZOLAM HYDROCHLORIDE 2 MG: 2 INJECTION, SOLUTION INTRAMUSCULAR; INTRAVENOUS at 12:10

## 2021-08-12 RX ADMIN — HYDRALAZINE HYDROCHLORIDE 10 MG: 20 INJECTION INTRAMUSCULAR; INTRAVENOUS at 11:10

## 2021-08-12 RX ADMIN — INSULIN LISPRO 3 UNITS: 100 INJECTION, SOLUTION INTRAVENOUS; SUBCUTANEOUS at 10:18

## 2021-08-12 RX ADMIN — PROPOFOL 50 MG: 10 INJECTION, EMULSION INTRAVENOUS at 12:15

## 2021-08-12 RX ADMIN — Medication 10 ML: at 11:11

## 2021-08-12 RX ADMIN — WATER 2 G: 1 INJECTION INTRAMUSCULAR; INTRAVENOUS; SUBCUTANEOUS at 12:20

## 2021-08-12 RX ADMIN — LABETALOL HYDROCHLORIDE 10 MG: 5 INJECTION, SOLUTION INTRAVENOUS at 10:19

## 2021-08-12 NOTE — DISCHARGE INSTRUCTIONS
Patient Education        Hemodialysis Vascular Access: Care Instructions  Overview  Hemodialysis, or dialysis, is the use of a machine to remove wastes from your blood. You need it if your kidneys are not able to remove wastes on their own. A dialysis access is the place in your arm, or sometimes in your leg, where a doctor creates a blood vessel that carries a large flow of blood. Your doctor creates an access during a minor surgery. You need to take care of your access to keep it working and to prevent infection. When you have dialysis, two needles are placed in this blood vessel and are connected to the dialysis machine. Your blood flows out of one needle and into the machine to be cleaned. Then your cleaned blood flows back into your body through the other needle. Sometimes a doctor makes a short-term access through a tube, called a catheter, placed in your neck, upper chest, or groin. Follow-up care is a key part of your treatment and safety. Be sure to make and go to all appointments, and call your doctor if you are having problems. It's also a good idea to know your test results and keep a list of the medicines you take. How can you care for yourself at home? · After your doctor creates an access, keep it dry for at least 2 days. · Squeeze a soft ball or other object as instructed after the access is placed. This will help blood flow through the access and help prevent blood clots. · After you have dialysis, check to see if the access bleeds or swells. Let your doctor know if your arm bleeds or swells. · Do not lift anything heavy with the arm that has the access. · Do not bump your arm. · Don't wear tight clothing or jewelry over the access. · Don't sleep with your access arm under your body. · Have blood drawn or blood pressure taken from your other arm. · Keep the access clean and dry. · Don't put cream or lotion on or near the access. When should you call for help?    Call your doctor now or seek immediate medical care if:    · You have signs of infection, such as:  ? Increased pain, swelling, warmth, or redness around the access. ? Red streaks leading from the access. ? Pus draining from the access. ? A fever.     · You do not feel a pulse in your access. Watch closely for changes in your health, and be sure to contact your doctor if:    · You do not get better as expected. Where can you learn more? Go to http://www.gray.com/  Enter L169 in the search box to learn more about \"Hemodialysis Vascular Access: Care Instructions. \"  Current as of: December 17, 2020               Content Version: 12.8  © 7939-7439 [employer]. Care instructions adapted under license by [employer] (which disclaims liability or warranty for this information). If you have questions about a medical condition or this instruction, always ask your healthcare professional. [place] [address] any warranty or liability for your use of this information. DISCHARGE SUMMARY from Nurse    PATIENT INSTRUCTIONS:    After general anesthesia or intravenous sedation, for 24 hours or while taking prescription Narcotics:  · Limit your activities  · Do not drive and operate hazardous machinery  · Do not make important personal or business decisions  · Do  not drink alcoholic beverages  · If you have not urinated within 8 hours after discharge, please contact your surgeon on call.     Report the following to your surgeon:  · Excessive pain, swelling, redness or odor of or around the surgical area  · Temperature over 100.5  · Nausea and vomiting lasting longer than 4 hours or if unable to take medications  · Any signs of decreased circulation or nerve impairment to extremity: change in color, persistent  numbness, tingling, coldness or increase pain  · Any questions

## 2021-08-12 NOTE — ANESTHESIA PREPROCEDURE EVALUATION
Relevant Problems   No relevant active problems       Anesthetic History   No history of anesthetic complications            Review of Systems / Medical History  Patient summary reviewed and pertinent labs reviewed    Pulmonary  Within defined limits                 Neuro/Psych   Within defined limits           Cardiovascular    Hypertension: well controlled        Dysrhythmias   Past MI and CAD    Exercise tolerance: >4 METS     GI/Hepatic/Renal     GERD    Renal disease: CRI       Endo/Other    Diabetes: well controlled, type 2    Morbid obesity     Other Findings              Physical Exam    Airway  Mallampati: III  TM Distance: 4 - 6 cm  Neck ROM: decreased range of motion   Mouth opening: Normal     Cardiovascular    Rhythm: regular  Rate: normal         Dental    Dentition: Poor dentition     Pulmonary  Breath sounds clear to auscultation               Abdominal  GI exam deferred       Other Findings            Anesthetic Plan    ASA: 3  Anesthesia type: MAC and general - backup          Induction: Intravenous  Anesthetic plan and risks discussed with: Patient

## 2021-08-12 NOTE — H&P
Surgery History and Physical    Subjective:      Shena Son. is a 77 y.o.  male who presents with need to start dialysis. Patient Active Problem List    Diagnosis Date Noted    ESRD (end stage renal disease) ([facility] [address] 08/12/2021    Edema of both lower legs 07/12/2021    Chronic diastolic congestive heart failure ([address] 05/17/2021    CHF (congestive heart failure) ([facility] [address] 06/01/2020    Postsurgical percutaneous transluminal coronary angioplasty status 05/18/2020    NSTEMI (non-ST elevated myocardial infarction) ([address] 04/20/2020    ACS [facility] syndrome[facility] 04/20/2020    Hypertensive urgency 04/20/2020    Obesity (BMI 30.0-34.9) 11/19/2018    Ischemic cardiomyopathy     Essential hypertension     Dyslipidemia     Diabetes mellitus, type 2 (HCC)     Obesity     RBBB (right bundle branch block with left anterior fascicular block)     Tobacco use disorder, continuous     CAD S/P percutaneous coronary angioplasty 08/01/2010     Past Medical History:   Diagnosis Date    ACS (acute coronary syndrome) ([facility] [address]     CAD (coronary artery disease), native coronary artery August 2010    s/p non-ST-elevation myocardial infarction, s/p PCI with BMS (Vision 4x18mm) distal RCA with 45% distal LAD  and mild LCx disease. mild-moderate LV systolic dysfunction (52/81).  Chronic kidney disease     Diabetes mellitus type II     IDDM    Dyslipidemia     ED (erectile dysfunction)     Edema of both lower legs 7/12/2021    GERD (gastroesophageal reflux disease)     Heart failure ([address]     History of BPH     History of echocardiogram 5/18/2011    EF 65%. Mod-marked increased wall thickness. Gr 1 DDfx. No significant valvular heart disease.     Hyperlipidemia     Hypertension     Ischemic cardiomyopathy     recovery of LV syst fct  Echo May 2011 LV EF 65%    MI (myocardial infarction) ([facility] [address] [facility]     Obesity     RBBB (right bundle branch block with left anterior fascicular block)     S/P cardiac cath 8/26/2010    LM patent. dLAD 45%. CX mild. dRCA 100% thrombotic (S/P cath thrombectomy & Vision 4 x 18-mm BMS). EF 40%. Posterobasal, diaphrag, apical hypk.  Shingles 4/2012    Tobacco use disorder, continuous       Past Surgical History:   Procedure Laterality Date    COLONOSCOPY N/A 4/23/2019    [facility] ENDOSCOPY    HX CATARACT REMOVAL Bilateral     HX HEART CATHETERIZATION  08/26/10    Stent    HX HEART CATHETERIZATION  2019    Stent    HX HEMORRHOIDECTOMY      HX ROTATOR CUFF REPAIR Left     HX TRABECULECTOMY        Social History     Tobacco Use    Smoking status: Light Tobacco Smoker     Packs/day: 0.50     Years: 44.00     Pack years: 22.00     Types: Cigarettes    Smokeless tobacco: Never Used   Substance Use Topics    Alcohol use: No      Family History   Problem Relation Age of Onset    Diabetes Mother       Prior to Admission medications    Medication Sig Start Date End Date Taking? Authorizing Provider   furosemide (LASIX) 40 mg tablet Take 40 mg by mouth two (2) times a day. Yes Provider, Historical   cloNIDine HCL (CATAPRES) 0.3 mg tablet Take 0.3 mg by mouth daily. 6/30/21  Yes Provider, Historical   calcitRIOL (ROCALTROL) 0.25 mcg capsule Take 1 Capsule by mouth daily. 7/1/21  Yes Provider, Historical   simvastatin (Zocor) 80 mg tablet Take 80 mg by mouth nightly. Yes Provider, Historical   hydrALAZINE (APRESOLINE) 50 mg tablet Take 25 mg by mouth three (3) times daily. Yes Provider, Historical   bumetanide (BUMEX) 1 mg tablet take 1/2 tablet by mouth twice a day 7/27/21  Yes Eugenio Mcrae MD   doxazosin (CARDURA) 8 mg tablet Take 1 Tab by mouth every evening. 5/17/21  Yes Eugenio Mcrae MD   amLODIPine (Norvasc) 10 mg tablet Take 10 mg by mouth daily. Yes Provider, Historical   glimepiride (AMARYL) 4 mg tablet Take  by mouth every morning.    Yes Provider, Historical   insulin glargine (Lantus Solostar U-100 Insulin) 100 unit/mL (3 mL) inpn 30 Units by SubCUTAneous route daily. 20  Yes Jordan Guzman MD   carvediloL (COREG) 25 mg tablet Take 1 Tab by mouth two (2) times daily (with meals). 20  Yes Yg Dowling MD   gabapentin (NEURONTIN) 100 mg capsule Take 100 mg by mouth two (2) times a day. Yes Provider, Historical   cholecalciferol (Vitamin D3) (1000 Units /25 mcg) tablet Take 1,000 Units by mouth daily. Yes Provider, Historical   dutasteride (AVODART) 0.5 mg capsule Take 0.5 mg by mouth daily. Yes Provider, Historical   pantoprazole (PROTONIX) 40 mg tablet Take 40 mg by mouth daily. Yes Provider, Historical   clopidogreL (Plavix) 75 mg tab Take 1 Tab by mouth daily. 20   Frida Mackenzie MD   isosorbide mononitrate ER (IMDUR) 30 mg tablet Take 1 Tab by mouth daily. 20   Jordan Guzman MD   nitroglycerin (NITROSTAT) 0.4 mg SL tablet 1 Tab by SubLINGual route every five (5) minutes as needed for Chest Pain for up to 3 doses. Indications: Angina 17   Frida Mackenzie MD     No Known Allergies      Review of Systems:    A comprehensive review of systems was negative except for that written in the History of Present Illness. Objective:     Patient Vitals for the past 8 hrs:   Temp Pulse Resp SpO2 Height Weight   21 0948 97.9 °F (36.6 °C) 61 17 100 % 5' 6\" (1.676 m) 90.1 kg (198 lb 9.6 oz)       Temp (24hrs), Av.9 °F (36.6 °C), Min:97.9 °F (36.6 °C), Max:97.9 °F (36.6 °C)      Physical Exam:  LUNG: clear to auscultation bilaterally, HEART: S1, S2 normal, ABDOMEN: soft, non-tender. Bowel sounds normal. No masses,  no organomegaly    Labs: No results found for this or any previous visit (from the past 24 hour(s)).     Data Review:    BMP:   Lab Results   Component Value Date/Time    Glucose 158 (H) 2021 08:52 AM    Sodium 143 2021 08:52 AM    Potassium 4.4 2021 08:52 AM    Chloride 111 2021 08:52 AM    CO2 28 08/09/2021 08:52 AM    BUN 55 (H) 08/09/2021 08:52 AM    Creatinine 4.53 (H) 08/09/2021 08:52 AM    Calcium 8.4 (L) 08/09/2021 08:52 AM       Assessment:     Active Problems:    ESRD (end stage renal disease) ([facility] [address] (8/12/2021)        Plan:     Plan for tunneled dialysis catheter placement in right arm fistula versus graft    Signed By: Whit Hollis MD     August 12, 2021

## 2021-08-12 NOTE — ANESTHESIA POSTPROCEDURE EVALUATION
Procedure(s):  RIGHT ARTERIO VENOUS FISTULA CREATION  PLACEMENT OF TUNNELED DIALYSIS CATHETER.     MAC    Anesthesia Post Evaluation      Multimodal analgesia: multimodal analgesia used between 6 hours prior to anesthesia start to PACU discharge  Patient location during evaluation: bedside  Patient participation: complete - patient participated  Level of consciousness: awake  Pain management: adequate  Airway patency: patent  Anesthetic complications: no  Cardiovascular status: stable  Respiratory status: acceptable  Hydration status: acceptable  Post anesthesia nausea and vomiting:  controlled      INITIAL Post-op Vital signs:   Vitals Value Taken Time   /89 08/12/21 1523   Temp     Pulse 75 08/12/21 1523   Resp 20 08/12/21 1523   SpO2 95 % 08/12/21 1523

## 2021-08-12 NOTE — OP NOTES
Preoperative diagnosis: Renal failure    Postoperative diagnosis: Same    Procedures performed:  #1  Creation right arm brachiocephalic fistula, Dinh fistula. #2  Placement of right internal jugular vein tunneled dialysis catheter with fluoroscopy  #3  Ultrasound of right internal jugular vein with interpretation      Cultures: None    Specimens: None    Drains: None    Estimated blood loss: Less than 50 mL    Assistants: None    Implants: Please see above    Complications: None    Anesthesia: Moderate conscious sedation delete. Indications for the procedure:  Rufus Laura is a 77 y.o. need for dialysis access. Patient was given the appropriate risk and benefits of the procedure including but not limited to bleeding, infection, damage to adjacent structures, MI, stroke, death, loss of lower extremity, need for further surgery. Patient was understanding of all the risks and underwent a procedure. Operative findings:   #1  Right arm brachiocephalic fistula without complication. Right integument jugular vein is patent without thrombus. Right tunneled dialysis catheter placed without difficulty. Okay to start using catheter as needed    Procedure:  Patient was correctly identified in the pre operative area and taken to the operating room in stable condition. Patient had pre-incision timeout prior to any incision. Patient was prepped and draped in the normal sterile fashion according to CDC guidelines aseptic technique. First his neck and chest were prepped and localized 1% lidocaine I used an ultrasound to identify the right IJ which was compressible and patent. Stored a picture here for the chart. Did a single puncture to the vein and placed a wire into the central system. Made an incision here below the clavicle and tunneled a 23 cm palindrome from the lower incision to the guidewire site. Inserted this into the central system without difficulty.   Flushes and aspirates easily and was locked with concentrated heparin solution caps were applied. Suture was placed at the skin site for Monocryl. Catheter was secured with 2-0 Prolene's. Sterile dressings applied. Right arm was prepped and made localization of the antecubital fossa next made incision here exposing the brachial artery which is appropriate. Exposed the cephalic vein also which is appropriate. Anticoagulated the patient embolized the cephalic vein and clipped it distally allowing for transposition. Again arterial controls using Vesseloops made arteriotomy 11 blade scalpel and [place] scissors. Spatulated the graft a little and sewed it end-to-side to the artery using 6-0 Prolene suture circular in standard fashion. At the completion of this I opened the controls and there was a nice thrill in the fistula and a good pulse in the arm. Irrigated and closed the fascia with interrupted 3-0 Monocryl. 4 Monocryl and Dermabond glue for the skin. He was transferred to the recovery area in stable condition.

## 2021-09-02 ENCOUNTER — TRANSCRIBE ORDER (OUTPATIENT)
Dept: INTERVENTIONAL RADIOLOGY/VASCULAR | Age: 76
End: 2021-09-02

## 2021-09-02 DIAGNOSIS — D64.9 ANEMIA: Primary | ICD-10-CM

## 2021-09-10 ENCOUNTER — HOSPITAL ENCOUNTER (OUTPATIENT)
Dept: INTERVENTIONAL RADIOLOGY/VASCULAR | Age: 76
Discharge: HOME | End: 2021-09-10
Attending: INTERNAL MEDICINE | Admitting: RADIOLOGY
Payer: MEDICARE

## 2021-09-10 VITALS
OXYGEN SATURATION: 97 % | SYSTOLIC BLOOD PRESSURE: 178 MMHG | HEIGHT: 66 IN | HEART RATE: 72 BPM | WEIGHT: 195 LBS | BODY MASS INDEX: 31.34 KG/M2 | RESPIRATION RATE: 23 BRPM | DIASTOLIC BLOOD PRESSURE: 75 MMHG

## 2021-09-10 DIAGNOSIS — D64.9 ANEMIA: ICD-10-CM

## 2021-09-10 LAB
ANION GAP SERPL CALC-SCNC: 4 MMOL/L (ref 3–18)
APTT PPP: 30.7 SEC (ref 23–36.4)
BASOPHILS # BLD: 0 K/UL (ref 0–0.1)
BASOPHILS NFR BLD: 1 % (ref 0–2)
BUN SERPL-MCNC: 39 MG/DL (ref 7–18)
BUN/CREAT SERPL: 7 (ref 12–20)
CALCIUM SERPL-MCNC: 9 MG/DL (ref 8.5–10.1)
CHLORIDE SERPL-SCNC: 102 MMOL/L (ref 100–111)
CO2 SERPL-SCNC: 31 MMOL/L (ref 21–32)
CREAT SERPL-MCNC: 5.45 MG/DL (ref 0.6–1.3)
DIFFERENTIAL METHOD BLD: ABNORMAL
EOSINOPHIL # BLD: 0.6 K/UL (ref 0–0.4)
EOSINOPHIL NFR BLD: 7 % (ref 0–5)
ERYTHROCYTE [DISTWIDTH] IN BLOOD BY AUTOMATED COUNT: 13.9 % (ref 11.6–14.5)
GLUCOSE SERPL-MCNC: 87 MG/DL (ref 74–99)
HCT VFR BLD AUTO: 33.2 % (ref 36–48)
HGB BLD-MCNC: 11.1 G/DL (ref 13–16)
INR PPP: 1.1 (ref 0.8–1.2)
LYMPHOCYTES # BLD: 1.5 K/UL (ref 0.9–3.6)
LYMPHOCYTES NFR BLD: 17 % (ref 21–52)
MCH RBC QN AUTO: 25.5 PG (ref 24–34)
MCHC RBC AUTO-ENTMCNC: 33.4 G/DL (ref 31–37)
MCV RBC AUTO: 76.1 FL (ref 78–100)
MONOCYTES # BLD: 1 K/UL (ref 0.05–1.2)
MONOCYTES NFR BLD: 11 % (ref 3–10)
NEUTS SEG # BLD: 5.4 K/UL (ref 1.8–8)
NEUTS SEG NFR BLD: 64 % (ref 40–73)
PLATELET # BLD AUTO: 248 K/UL (ref 135–420)
PMV BLD AUTO: 10.6 FL (ref 9.2–11.8)
POTASSIUM SERPL-SCNC: 3.9 MMOL/L (ref 3.5–5.5)
PROTHROMBIN TIME: 14.2 SEC (ref 11.5–15.2)
RBC # BLD AUTO: 4.36 M/UL (ref 4.35–5.65)
SODIUM SERPL-SCNC: 137 MMOL/L (ref 136–145)
WBC # BLD AUTO: 8.5 K/UL (ref 4.6–13.2)

## 2021-09-10 PROCEDURE — 74011250636 HC RX REV CODE- 250/636: Performed by: RADIOLOGY

## 2021-09-10 PROCEDURE — 88311 DECALCIFY TISSUE: CPT

## 2021-09-10 PROCEDURE — 85730 THROMBOPLASTIN TIME PARTIAL: CPT

## 2021-09-10 PROCEDURE — 88237 TISSUE CULTURE BONE MARROW: CPT

## 2021-09-10 PROCEDURE — 99152 MOD SED SAME PHYS/QHP 5/>YRS: CPT

## 2021-09-10 PROCEDURE — 88280 CHROMOSOME KARYOTYPE STUDY: CPT

## 2021-09-10 PROCEDURE — 85610 PROTHROMBIN TIME: CPT

## 2021-09-10 PROCEDURE — 85025 COMPLETE CBC W/AUTO DIFF WBC: CPT

## 2021-09-10 PROCEDURE — 88184 FLOWCYTOMETRY/ TC 1 MARKER: CPT

## 2021-09-10 PROCEDURE — 88313 SPECIAL STAINS GROUP 2: CPT

## 2021-09-10 PROCEDURE — 74011000250 HC RX REV CODE- 250: Performed by: RADIOLOGY

## 2021-09-10 PROCEDURE — 88264 CHROMOSOME ANALYSIS 20-25: CPT

## 2021-09-10 PROCEDURE — 88305 TISSUE EXAM BY PATHOLOGIST: CPT

## 2021-09-10 PROCEDURE — 80048 BASIC METABOLIC PNL TOTAL CA: CPT

## 2021-09-10 PROCEDURE — 88185 FLOWCYTOMETRY/TC ADD-ON: CPT

## 2021-09-10 RX ORDER — SODIUM CHLORIDE 9 MG/ML
20 INJECTION, SOLUTION INTRAVENOUS CONTINUOUS
Status: DISCONTINUED | OUTPATIENT
Start: 2021-09-10 | End: 2021-09-10 | Stop reason: HOSPADM

## 2021-09-10 RX ORDER — FENTANYL CITRATE 50 UG/ML
12.5-5 INJECTION, SOLUTION INTRAMUSCULAR; INTRAVENOUS
Status: DISCONTINUED | OUTPATIENT
Start: 2021-09-10 | End: 2021-09-10 | Stop reason: HOSPADM

## 2021-09-10 RX ORDER — ONDANSETRON 2 MG/ML
4 INJECTION INTRAMUSCULAR; INTRAVENOUS
Status: DISCONTINUED | OUTPATIENT
Start: 2021-09-10 | End: 2021-09-10 | Stop reason: HOSPADM

## 2021-09-10 RX ORDER — FLUMAZENIL 0.1 MG/ML
0.2 INJECTION INTRAVENOUS AS NEEDED
Status: DISCONTINUED | OUTPATIENT
Start: 2021-09-10 | End: 2021-09-10 | Stop reason: HOSPADM

## 2021-09-10 RX ORDER — LIDOCAINE HYDROCHLORIDE 10 MG/ML
30 INJECTION, SOLUTION EPIDURAL; INFILTRATION; INTRACAUDAL; PERINEURAL ONCE
Status: COMPLETED | OUTPATIENT
Start: 2021-09-10 | End: 2021-09-10

## 2021-09-10 RX ORDER — NALOXONE HYDROCHLORIDE 0.4 MG/ML
0.4 INJECTION, SOLUTION INTRAMUSCULAR; INTRAVENOUS; SUBCUTANEOUS AS NEEDED
Status: DISCONTINUED | OUTPATIENT
Start: 2021-09-10 | End: 2021-09-10 | Stop reason: HOSPADM

## 2021-09-10 RX ORDER — MIDAZOLAM HYDROCHLORIDE 1 MG/ML
.5-2 INJECTION, SOLUTION INTRAMUSCULAR; INTRAVENOUS
Status: DISCONTINUED | OUTPATIENT
Start: 2021-09-10 | End: 2021-09-10 | Stop reason: HOSPADM

## 2021-09-10 RX ADMIN — FENTANYL CITRATE 50 MCG: 50 INJECTION INTRAMUSCULAR; INTRAVENOUS at 09:35

## 2021-09-10 RX ADMIN — MIDAZOLAM 1 MG: 1 INJECTION INTRAMUSCULAR; INTRAVENOUS at 09:35

## 2021-09-10 RX ADMIN — MIDAZOLAM 1 MG: 1 INJECTION INTRAMUSCULAR; INTRAVENOUS at 09:40

## 2021-09-10 RX ADMIN — FENTANYL CITRATE 50 MCG: 50 INJECTION INTRAMUSCULAR; INTRAVENOUS at 09:40

## 2021-09-10 RX ADMIN — LIDOCAINE HYDROCHLORIDE 30 ML: 10 INJECTION, SOLUTION EPIDURAL; INFILTRATION; INTRACAUDAL; PERINEURAL at 09:30

## 2021-09-10 NOTE — ROUTINE PROCESS
TRANSFER - OUT REPORT:    Verbal report given to Ananya ORNELAS on [facility].  being transferred to [facility] for routine progression of care       Report consisted of patients Situation, Background, Assessment and   Recommendations(SBAR). Information from the following report(s) SBAR, Kardex, Procedure Summary, Intake/Output, MAR and Recent Results was reviewed with the receiving nurse. Lines:   Peripheral IV 09/10/21 Left Antecubital (Active)        Opportunity for questions and clarification was provided.       Patient transported with:   Registered Nurse

## 2021-09-10 NOTE — PROCEDURES
RADIOLOGY POST PROCEDURE NOTE     September 10, 2021       2:49 PM     Preoperative Diagnosis:   Anemia. Postoperative Diagnosis:  Same. :  Dr. Kimberley Pretty    Assistant:  None. Type of Anesthesia: 1% plain lidocaine and IV moderate sedation. Procedure/Description:  Image guided bone marrow Bx. Findings:   No bleeding. Estimated blood Loss:  Minimal    Specimen Removed:   yes    Blood transfusions:  None. Implants:  None.     Complications: None    Condition: Stable    Discharge Plan:  discharge home     Krysten Houston MD

## 2021-09-10 NOTE — H&P
History and Physical    Patient: Beryle Oyster. Sex: male       DOA: 9/10/2021  YOB: 1945      Age:  77 y.o.     LOS:  LOS: 0 days        HPI:     Beryle Oyster. is a 77 y.o. male who has anemia here for a bone marrow biopsy and aspiration with moderate sedation. Past Medical History:   Diagnosis Date    [facility] [address]     CAD (coronary artery disease), native coronary artery August 2010    s/p non-ST-elevation myocardial infarction, s/p PCI with BMS (Vision 4x18mm) distal RCA with 45% distal LAD  and mild LCx disease. mild-moderate LV systolic dysfunction (58/07).  Chronic kidney disease     Diabetes mellitus type II     IDDM    Dyslipidemia     ED (erectile dysfunction)     Edema of both lower legs 7/12/2021    GERD (gastroesophageal reflux disease)     Heart failure ([facility] [address]     History of BPH     History of echocardiogram 5/18/2011    EF 65%. Mod-marked increased wall thickness. Gr 1 DDfx. No significant valvular heart disease.  Hyperlipidemia     Hypertension     Ischemic cardiomyopathy     recovery of LV syst fct  Echo May 2011 LV EF 65%    MI (myocardial infarction) [facility] [address]     Obesity     RBBB (right bundle branch block with left anterior fascicular block)     S/P cardiac cath 8/26/2010    LM patent. dLAD 45%. CX mild. dRCA 100% thrombotic (S/P cath thrombectomy & Vision 4 x 18-mm BMS). EF 40%. Posterobasal, diaphrag, apical hypk.       Shingles 4/2012    Tobacco use disorder, continuous        Past Surgical History:   Procedure Laterality Date    COLONOSCOPY N/A 4/23/2019    COLONOS[facility] performed by [facility]en [facility] at [facility] ENDOSCOPY    HX CATARACT REMOVAL Bilateral     HX HEART CATHETERIZATION  08/26/10    Stent    HX HEART CATHETERIZATION  2019    Stent    HX HEMORRHOIDECTOMY      HX ROTATOR CUFF REPAIR Left     HX TRABECULECTOMY Family History   Problem Relation Age of Onset    Diabetes Mother        Social History     Socioeconomic History    Marital status:      Spouse name: Not on file    Number of children: Not on file    Years of education: Not on file    Highest education level: Not on file   Tobacco Use    Smoking status: Light Tobacco Smoker     Packs/day: 0.50     Years: 44.00     Pack years: 22.00     Types: Cigarettes    Smokeless tobacco: Never Used   Vaping Use    Vaping Use: Never used   Substance and Sexual Activity    Alcohol use: No    Drug use: Never     Social Determinants of Health     Financial Resource Strain:     Difficulty of Paying Living Expenses:    Food Insecurity:     Worried About Running Out of Food in the Last Year:     [address] in the Last Year:    Transportation Needs:     Lack of Transportation (Medical):  Lack of Transportation (Non-Medical):    Physical Activity:     Days of Exercise per Week:     Minutes of Exercise per Session:    Stress:     Feeling of Stress :    Social Connections:     Frequency of Communication with Friends and Family:     Frequency of Social Gatherings with Friends and Family:     Attends [employer] Services:     Active Member of Clubs or Organizations:     Attends Club or Organization Meetings:     Marital Status:        Prior to Admission medications    Medication Sig Start Date End Date Taking? Authorizing Provider   furosemide (LASIX) 40 mg tablet Take 40 mg by mouth two (2) times a day. Provider, Historical   cloNIDine HCL (CATAPRES) 0.3 mg tablet Take 0.3 mg by mouth daily. 6/30/21   Provider, Historical   calcitRIOL (ROCALTROL) 0.25 mcg capsule Take 1 Capsule by mouth daily. 7/1/21   Provider, Historical   simvastatin (Zocor) 80 mg tablet Take 80 mg by mouth nightly. Provider, Historical   hydrALAZINE (APRESOLINE) 50 mg tablet Take 25 mg by mouth three (3) times daily.     Provider, Historical   bumetanide (BUMEX) 1 mg tablet take 1/2 tablet by mouth twice a day 7/27/21   Honey Almonte MD   doxazosin (CARDURA) 8 mg tablet Take 1 Tab by mouth every evening. 5/17/21   Honey Almonte MD   amLODIPine (Norvasc) 10 mg tablet Take 10 mg by mouth daily. Provider, Historical   glimepiride (AMARYL) 4 mg tablet Take  by mouth every morning. Provider, Historical   clopidogreL (Plavix) 75 mg tab Take 1 Tab by mouth daily. 7/2/20   Honey Almonte MD   isosorbide mononitrate ER (IMDUR) 30 mg tablet Take 1 Tab by mouth daily. 6/6/20   Marley Beal MD   insulin glargine (Lantus Solostar U-100 Insulin) 100 unit/mL (3 mL) inpn 30 Units by SubCUTAneous route daily. 6/5/20   Marley Beal MD   carvediloL (COREG) 25 mg tablet Take 1 Tab by mouth two (2) times daily (with meals). 4/24/20   Hunter Esposito MD   gabapentin (NEURONTIN) 100 mg capsule Take 100 mg by mouth two (2) times a day. Provider, Historical   nitroglycerin (NITROSTAT) 0.4 mg SL tablet 1 Tab by SubLINGual route every five (5) minutes as needed for Chest Pain for up to 3 doses. Indications: Angina  Patient not taking: Reported on 8/12/2021 11/6/17   Honey Almonte MD   cholecalciferol (Vitamin D3) (1000 Units /25 mcg) tablet Take 1,000 Units by mouth daily. Provider, Historical   dutasteride (AVODART) 0.5 mg capsule Take 0.5 mg by mouth daily. Provider, Historical   pantoprazole (PROTONIX) 40 mg tablet Take 40 mg by mouth daily. Provider, Historical       No Known Allergies    Physical Exam:      Visit Vitals  Ht 5' 6\" (1.676 m)   Wt 88.5 kg (195 lb)   BMI 31.47 kg/m²     Physical Exam:  Mallampati 2 ASA 3  General: A&O x 4, NAD  Heart: RRR  Lungs: Normal work of breathing on room air    Labs Reviewed: All lab results for the last 24 hours reviewed.     Assessment/Plan     The patient is an appropriate candidate to undergo the planned procedure and sedation    DION Gaines

## 2021-09-10 NOTE — DISCHARGE INSTRUCTIONS

## 2021-09-14 ENCOUNTER — TRANSCRIBE ORDER (OUTPATIENT)
Dept: REGISTRATION | Age: 76
End: 2021-09-14

## 2021-09-14 ENCOUNTER — HOSPITAL ENCOUNTER (OUTPATIENT)
Dept: GENERAL RADIOLOGY | Age: 76
Discharge: HOME | End: 2021-09-14
Payer: MEDICARE

## 2021-09-14 DIAGNOSIS — D47.2 MONOCLONAL GAMMOPATHY: ICD-10-CM

## 2021-09-14 DIAGNOSIS — D47.2 MONOCLONAL GAMMOPATHY: Primary | ICD-10-CM

## 2021-09-14 PROCEDURE — 77075 RADEX OSSEOUS SURVEY COMPL: CPT

## 2021-09-27 DIAGNOSIS — Z98.61 POSTSURGICAL PERCUTANEOUS TRANSLUMINAL CORONARY ANGIOPLASTY STATUS: ICD-10-CM

## 2021-09-27 DIAGNOSIS — Z98.61 CAD S/P PERCUTANEOUS CORONARY ANGIOPLASTY: ICD-10-CM

## 2021-09-27 DIAGNOSIS — I25.10 CAD S/P PERCUTANEOUS CORONARY ANGIOPLASTY: ICD-10-CM

## 2021-09-27 RX ORDER — CLOPIDOGREL BISULFATE 75 MG/1
TABLET ORAL
Qty: 90 TABLET | Refills: 3 | Status: SHIPPED | OUTPATIENT
Start: 2021-09-27 | End: 2022-08-08

## 2021-10-13 ENCOUNTER — OFFICE VISIT (OUTPATIENT)
Dept: VASCULAR SURGERY | Age: 76
End: 2021-10-13

## 2021-10-13 VITALS
OXYGEN SATURATION: 98 % | WEIGHT: 195 LBS | BODY MASS INDEX: 31.34 KG/M2 | DIASTOLIC BLOOD PRESSURE: 60 MMHG | HEIGHT: 66 IN | HEART RATE: 44 BPM | SYSTOLIC BLOOD PRESSURE: 150 MMHG

## 2021-10-13 DIAGNOSIS — N18.6 ESRD (END STAGE RENAL DISEASE) (HCC): Primary | ICD-10-CM

## 2021-10-13 NOTE — PROGRESS NOTES
Vascular Surgery Note    16-year-old gentleman underwent right brachiocephalic fistula creation with Dr. Sj Tanner. Patient had an ultrasound in the office with him last week and is scheduled to follow-up with him next week. I advised the patient to see Dr. Sj Tanner as he already had an ultrasound there and he is scheduled to see him next week. The patient was not charged for this visit.     Adeola Todd MD

## 2021-10-13 NOTE — PROGRESS NOTES
1. Have you been to an emergency room or urgent care clinic since your last visit? No    Hospitalized since your last visit? If yes, where, when, and reason for visit? No    2. Have you seen or consulted any other health care providers outside of the [facility] since your last visit including any procedures, health maintenance items. If yes, where, when and reason for visit?  Yes, Dialysis

## 2021-11-01 ENCOUNTER — OFFICE VISIT (OUTPATIENT)
Dept: CARDIOLOGY CLINIC | Age: 76
End: 2021-11-01
Payer: MEDICARE

## 2021-11-01 VITALS
BODY MASS INDEX: 31.02 KG/M2 | HEIGHT: 66 IN | WEIGHT: 193 LBS | DIASTOLIC BLOOD PRESSURE: 52 MMHG | OXYGEN SATURATION: 97 % | SYSTOLIC BLOOD PRESSURE: 119 MMHG | HEART RATE: 62 BPM

## 2021-11-01 DIAGNOSIS — N52.9 ERECTILE DYSFUNCTION, UNSPECIFIED ERECTILE DYSFUNCTION TYPE: ICD-10-CM

## 2021-11-01 DIAGNOSIS — Z98.61 CAD S/P PERCUTANEOUS CORONARY ANGIOPLASTY: Primary | ICD-10-CM

## 2021-11-01 DIAGNOSIS — E78.5 DYSLIPIDEMIA: ICD-10-CM

## 2021-11-01 DIAGNOSIS — Z98.61 POSTSURGICAL PERCUTANEOUS TRANSLUMINAL CORONARY ANGIOPLASTY STATUS: ICD-10-CM

## 2021-11-01 DIAGNOSIS — F17.209 TOBACCO USE DISORDER, CONTINUOUS: ICD-10-CM

## 2021-11-01 DIAGNOSIS — I10 ESSENTIAL HYPERTENSION: ICD-10-CM

## 2021-11-01 DIAGNOSIS — I50.32 CHRONIC DIASTOLIC CONGESTIVE HEART FAILURE (HCC): ICD-10-CM

## 2021-11-01 DIAGNOSIS — I25.10 CAD S/P PERCUTANEOUS CORONARY ANGIOPLASTY: Primary | ICD-10-CM

## 2021-11-01 PROCEDURE — G9231 DOC ESRD DIA TRANS PREG: HCPCS | Performed by: INTERNAL MEDICINE

## 2021-11-01 PROCEDURE — G8432 DEP SCR NOT DOC, RNG: HCPCS | Performed by: INTERNAL MEDICINE

## 2021-11-01 PROCEDURE — G8427 DOCREV CUR MEDS BY ELIG CLIN: HCPCS | Performed by: INTERNAL MEDICINE

## 2021-11-01 PROCEDURE — 1101F PT FALLS ASSESS-DOCD LE1/YR: CPT | Performed by: INTERNAL MEDICINE

## 2021-11-01 PROCEDURE — G8536 NO DOC ELDER MAL SCRN: HCPCS | Performed by: INTERNAL MEDICINE

## 2021-11-01 PROCEDURE — 3017F COLORECTAL CA SCREEN DOC REV: CPT | Performed by: INTERNAL MEDICINE

## 2021-11-01 PROCEDURE — G8417 CALC BMI ABV UP PARAM F/U: HCPCS | Performed by: INTERNAL MEDICINE

## 2021-11-01 PROCEDURE — 99214 OFFICE O/P EST MOD 30 MIN: CPT | Performed by: INTERNAL MEDICINE

## 2021-11-01 RX ORDER — DOXAZOSIN 2 MG/1
2 TABLET ORAL EVERY EVENING
Qty: 90 TABLET | Refills: 1 | Status: SHIPPED | OUTPATIENT
Start: 2021-11-01

## 2021-11-01 NOTE — PROGRESS NOTES
HISTORY OF PRESENT ILLNESS  Gina Marrufo is a 77 y.o. male. Follow-up of ischemic cardiomyopathy, hypertension, hyperlipidemia, obesity, CHF    11/21 started on dialysis in 9/21 4/17 improving blood pressure since meds adjusted by Dr. Steven Gutierrez;    1/17 seen for establishing/changing cardiologist   history of coronary disease, status post PCI(2010) and hypertension, along with conduction system disease    Shortness of Breath  The history is provided by the patient. This is a recurrent problem. The problem occurs intermittently. The current episode started more than 1 week ago. The problem has been gradually improving. Associated symptoms include leg swelling. Pertinent negatives include no fever, no headaches, no cough, no wheezing, no PND, no orthopnea, no chest pain, no vomiting, no rash and no claudication. The problem's precipitants include exercise (10-15 mt walk; 5/21 100 ft; 11/21 400 ft; ). Leg Swelling  The history is provided by the patient. This is a recurrent problem. The current episode started more than 1 week ago (6/16). The problem occurs every several days. The problem has been resolved. Associated symptoms include shortness of breath. Pertinent negatives include no chest pain and no headaches. The symptoms are aggravated by standing. The symptoms are relieved by sleep. Follow-up  Associated symptoms include shortness of breath. Pertinent negatives include no chest pain and no headaches. Review of Systems   Constitutional: Negative for chills, fever, malaise/fatigue and weight loss. HENT: Negative for nosebleeds. Eyes: Negative for discharge. Respiratory: Positive for shortness of breath. Negative for cough and wheezing. Cardiovascular: Positive for leg swelling. Negative for chest pain, palpitations, orthopnea, claudication and PND. Gastrointestinal: Negative for diarrhea, nausea and vomiting. Genitourinary: Negative for dysuria and hematuria.    Musculoskeletal: Negative for joint pain. Skin: Negative for rash. Neurological: Negative for dizziness, seizures, loss of consciousness and headaches. Endo/Heme/Allergies: Negative for polydipsia. Does not bruise/bleed easily. Psychiatric/Behavioral: Negative for depression and substance abuse. The patient does not have insomnia. No Known Allergies    Past Medical History:   Diagnosis Date    ACS [facility]acute coronary syndrome) ([facility] [address]     CAD (coronary artery disease), native coronary artery August 2010    s/p non-ST-elevation myocardial infarction, s/p PCI with BMS (Vision 4x18mm) distal RCA with 45% distal LAD  and mild LCx disease. mild-moderate LV systolic dysfunction (70/75).  Chronic kidney disease     Diabetes mellitus type II     IDDM    Dyslipidemia     ED (erectile dysfunction)     Edema of both lower legs 7/12/2021    GERD (gastroesophageal reflux disease)     Heart failure ([facility] [address]     History of BPH     History of echocardiogram 5/18/2011    EF 65%. Mod-marked increased wall thickness. Gr 1 DDfx. No significant valvular heart disease.  Hyperlipidemia     Hypertension     Ischemic cardiomyopathy     recovery of LV syst fct  Echo May 2011 LV EF 65%    MI (myocardial infarction) ([facility] [address]     Obesity     RBBB (right bundle branch block with left anterior fascicular block)     S/P cardiac cath 8/26/2010    LM patent. dLAD 45%. CX mild. dRCA 100% thrombotic (S/P cath thrombectomy & Vision 4 x 18-mm BMS). EF 40%. Posterobasal, diaphrag, apical hypk.       Shingles 4/2012    Tobacco use disorder, continuous        Family History   Problem Relation Age of Onset    Diabetes Mother        Social History     Tobacco Use    Smoking status: Light Tobacco Smoker     Packs/day: 0.50     Years: 44.00     Pack years: 22.00     Types: Cigarettes    Smokeless tobacco: Never Used   Vaping Use    Vaping Use: Never used   Substance Use Topics    Alcohol use: No    Drug use: Never        Current Outpatient Medications   Medication Sig    clopidogreL (PLAVIX) 75 mg tab take 1 tablet by mouth once daily    furosemide (LASIX) 40 mg tablet Take 40 mg by mouth two (2) times a day.  cloNIDine HCL (CATAPRES) 0.3 mg tablet Take 0.3 mg by mouth daily.  calcitRIOL (ROCALTROL) 0.25 mcg capsule Take 1 Capsule by mouth daily.  simvastatin (Zocor) 80 mg tablet Take 80 mg by mouth nightly.  hydrALAZINE (APRESOLINE) 50 mg tablet Take 25 mg by mouth three (3) times daily.  bumetanide (BUMEX) 1 mg tablet take 1/2 tablet by mouth twice a day    doxazosin (CARDURA) 8 mg tablet Take 1 Tab by mouth every evening.  amLODIPine (Norvasc) 10 mg tablet Take 10 mg by mouth daily.  glimepiride (AMARYL) 4 mg tablet Take  by mouth every morning.  insulin glargine (Lantus Solostar U-100 Insulin) 100 unit/mL (3 mL) inpn 30 Units by SubCUTAneous route daily.  carvediloL (COREG) 25 mg tablet Take 1 Tab by mouth two (2) times daily (with meals).  gabapentin (NEURONTIN) 100 mg capsule Take 100 mg by mouth two (2) times a day.  cholecalciferol (Vitamin D3) (1000 Units /25 mcg) tablet Take 1,000 Units by mouth daily.  dutasteride (AVODART) 0.5 mg capsule Take 0.5 mg by mouth daily.  pantoprazole (PROTONIX) 40 mg tablet Take 40 mg by mouth daily. No current facility-administered medications for this visit. Past Surgical History:   Procedure Laterality Date    COLONOSCOPY N/A 4/23/2019    COL[facility]z [facility] ENDOSCOPY    HX CATARACT REMOVAL Bilateral     HX HEART CATHETERIZATION  08/26/10    Stent    HX HEART CATHETERIZATION  2019    Stent    HX HEMORRHOIDECTOMY      HX ROTATOR CUFF REPAIR Left     HX TRABECULECTOMY      IR BX BONE MARROW DIAGNOSTIC  9/10/2021       Diagnostic Studies: Old records reviewed and show as follows  EKG tracings reviewed by me today. 3/17 Nuc Stress  Conclusion:   1.  Nondiagnostic EKG changes of ischemia due to abnormal baseline EKG at 89% of predicted maximal heart rate. 2. Normal functional capacity. 3. Severely hypertensive blood pressure response and appropriate heart rate response. 4. No ischemic symptoms or arrhythmias seen. 5. Perfusion image report to follow. Conclusion:   1. Normal perfusion scan. 2. Normal wall motion and ejection fraction. 3. Low risk scan. 3/17 ECHO  SUMMARY:  Left ventricle: Ejection fraction was estimated to be 65 %. There were no  regional wall motion abnormalities. There was severe concentric  hypertrophy. Features were consistent with a pseudonormal left ventricular  filling pattern, with concomitant abnormal relaxation and increased  filling pressure (grade 2 diastolic dysfunction). 04/20/20   CARDIAC PROCEDURE 04/23/2020 4/23/2020    Narrative Double vessel coronary artery disease. S/p ptca/stent to mid LAD. Known BMS to distal RCA with moderate lesion. Recommend intense risk factor modification. Signed by: Jigar Martin MD     06/01/20   ECHO ADULT FOLLOW-UP OR LIMITED 06/03/2020 6/3/2020    Narrative · Normal cavity size and systolic function (ejection fraction normal). Severe concentric hypertrophy. Estimated left ventricular ejection   fraction is 60 - 65%. Visually measured ejection fraction. No regional   wall motion abnormality noted. Normal left ventricular strain. · Global longitudinal strain is -20.90%  · Tricuspid regurgitation is inadequate for estimation of right   ventricular systolic pressure. Signed by: Jefe Hsieh MD     Visit Vitals  BP (!) 119/52 (BP 1 Location: Left upper arm, BP Patient Position: Sitting, BP Cuff Size: Adult)   Pulse 62   Ht 5' 6\" (1.676 m)   Wt 87.5 kg (193 lb)   SpO2 97%   BMI 31.15 kg/m²       Mr. Akilah Pop has a reminder for a \"due or due soon\" health maintenance. I have asked that he contact his primary care provider for follow-up on this health maintenance. 9/10 Card Cath  IMPRESSION  1.  Normal systemic pressure. 2. Moderate elevation of left-sided filling pressures. 3. Mild to moderate left ventricular systolic dysfunction with distinct  regional wall motion abnormalities. 4. Coronary disease as described above with a thrombotic occlusion of the  distal right coronary artery and moderate distal left anterior descending  disease. 5. Successful percutaneous coronary intervention with catheter  thrombectomy, followed by bare metal stent deployment with a Vision 4 x 18  mm stent in the distal right coronary artery. Physical Exam  Constitutional:       General: He is not in acute distress. Appearance: He is well-developed. He is obese. Comments: Obese   HENT:      Head: Normocephalic and atraumatic. Mouth/Throat:      Dentition: Normal dentition. Eyes:      General: No scleral icterus. Right eye: No discharge. Left eye: No discharge. Neck:      Thyroid: No thyromegaly. Vascular: No carotid bruit or JVD. Cardiovascular:      Rate and Rhythm: Normal rate and regular rhythm. Pulses: Intact distal pulses. Decreased pulses. Heart sounds: Normal heart sounds, S1 normal and S2 normal. No murmur heard. No friction rub. No gallop. Pulmonary:      Effort: Pulmonary effort is normal.      Breath sounds: Normal breath sounds. No wheezing or rales. Abdominal:      Palpations: Abdomen is soft. There is no mass. Tenderness: There is no abdominal tenderness. Musculoskeletal:      Cervical back: Neck supple. Right lower leg: No edema. Left lower leg: No edema. Lymphadenopathy:      Cervical:      Right cervical: No superficial cervical adenopathy. Left cervical: No superficial cervical adenopathy. Skin:     General: Skin is warm and dry. Findings: No rash. Neurological:      Mental Status: He is alert and oriented to person, place, and time.    Psychiatric:         Behavior: Behavior normal.         ASSESSMENT and PLAN    Patient advised to stop smoking to reduce future cardiovascular events. I have reviewed/discussed the above normal BMI with the patient. I have recommended the following interventions: dietary management education, guidance, and counseling, encourage exercise and monitor weight . Heike Rey HLD: 3/17 LDL31;   Results for Vamsi Ramires (MRN [id number]) as of 5/17/2021 14:09   Ref. Range 6/29/2020 09:23   Triglyceride Latest Ref Range: <150 MG/DL 84   Cholesterol, total Latest Ref Range: <200 MG/   HDL Cholesterol Latest Ref Range: 40 - 60 MG/DL 41   CHOL/HDL Ratio Latest Ref Range: 0 - 5.0   3.0   VLDL, calculated Latest Units: MG/DL 16.8   LDL, calculated Latest Ref Range: 0 - 100 MG/DL 64.2         2/21 Follow-up after CHF admission which was after a few weeks of non-STEMI when patient had LAD stent. He also has CKD. Creatinine had increased. Apparently he was not discharged on diuretics as wife is not giving any. Edema and shortness of breath are worsening again. Start Bumex and follow-up electrolytes. CAD clinically stable. Blood pressure is controlled. Reduce clonidine to twice daily as I am adding Bumex. Labs as ordered. Had a detailed discussion with patient and wife regarding diet to avoid salty foods and importance of controlling blood pressure. 5/17/2021 CHF symptoms improving but still NYHA class II-III. Blood pressure mildly elevated. Increase doxazosin and follow home chart. Discussed in detail regarding diet and exercise. Mediterranean diet guidelines printed. He will try to walk regularly. CAD stable. Discontinue aspirin and will continue on the Plavix. Diagnoses and all orders for this visit:    1. CAD S/P percutaneous coronary angioplasty    2. Essential hypertension  -     doxazosin (CARDURA) 2 mg tablet; Take 1 Tablet by mouth every evening. 3. Postsurgical percutaneous transluminal coronary angioplasty status    4. Chronic diastolic congestive heart failure ([address]    5. Dyslipidemia  -     LIPID PANEL; Future  -     HEPATIC FUNCTION PANEL; Future    6. Tobacco use disorder, continuous    7. Erectile dysfunction, unspecified erectile dysfunction type        Pertinent laboratory and test data reviewed and discussed with patient. See patient instructions also for other medical advice given    Medications Discontinued During This Encounter   Medication Reason    doxazosin (CARDURA) 8 mg tablet        Follow-up and Dispositions    · Return in about 6 months (around 5/1/2022), or if symptoms worsen or fail to improve, for post test.       11/1/2021 CAD stable. Edema has resolved since dialysis started. Blood pressure low normal and little lower in dialysis at times per patient. Reduce doxazosin to 2 mg daily. Blood pressure to be watched closely and may be able to reduce more medications which can also be done by dialysis center. Lipids need to be followed but were excellent a year ago. Tobacco cessation reinforced and he will try to stop it completely. Would wait on any medications for erectile dysfunction as his blood pressure is reducing for now and let it stabilize before we try those medications.

## 2021-11-01 NOTE — PATIENT INSTRUCTIONS
Medications Discontinued During This Encounter   Medication Reason    doxazosin (CARDURA) 8 mg tablet           Learning About Benefits From Quitting Smoking  How does quitting smoking make you healthier? If you're thinking about quitting smoking, you may have a few reasons to be smoke-free. Your health may be one of them. · When you quit smoking, you lower your risks for cancer, lung disease, heart attack, stroke, blood vessel disease, and blindness from macular degeneration. · When you're smoke-free, you get sick less often, and you heal faster. You are less likely to get colds, flu, bronchitis, and pneumonia. · As a nonsmoker, you may find that your mood is better and you are less stressed. When and how will you feel healthier? Quitting has real health benefits that start from day 1 of being smoke-free. And the longer you stay smoke-free, the healthier you get and the better you feel. The first hours  · After just 20 minutes, your blood pressure and heart rate go down. That means there's less stress on your heart and blood vessels. · Within 12 hours, the level of carbon monoxide in your blood drops back to normal. That makes room for more oxygen. With more oxygen in your body, you may notice that you have more energy than when you smoked. After 2 weeks  · Your lungs start to work better. · Your risk of heart attack starts to drop. After 1 month  · When your lungs are clear, you cough less and breathe deeper, so it's easier to be active. · Your sense of taste and smell return. That means you can enjoy food more than you have since you started smoking. Over the years  · Over the years, your risks of heart disease, heart attack, and stroke are lower. · After 10 years, your risk of dying from lung cancer is cut by about half. And your risk for many other types of cancer is lower too. How would quitting help others in your life?   When you quit smoking, you improve the health of everyone who now breathes in your smoke. · Their heart, lung, and cancer risks drop, much like yours. · They are sick less. For babies and small children, living smoke-free means they're less likely to have ear infections, pneumonia, and bronchitis. · If you're a woman who is or will be pregnant someday, quitting smoking means a healthier . · Children who are close to you are less likely to become adult smokers. Where can you learn more? Go to http://www.gray.com/  Enter O319 in the search box to learn more about \"Learning About Benefits From Quitting Smoking. \"  Current as of: 2021               Content Version: 13.0  © 9979-9396 [employer]. Care instructions adapted under license by [employer] (which disclaims liability or warranty for this information). If you have questions about a medical condition or this instruction, always ask your healthcare professional. [address] any warranty or liability for your use of this information.

## 2021-11-01 NOTE — PROGRESS NOTES
1. Have you been to the ER, urgent care clinic since your last visit? Hospitalized since your last visit? No     2. Have you seen or consulted any other health care providers outside of the [address] [place] [address] since your last visit? Include any pap smears or colon screening. No     3. Since your last visit, have you had any of the following symptoms? None    4. Have you had any blood work, X-rays or cardiac testing? No     Requested: NO     In [facility]: YES    5. Where do you normally have your labs drawn? [address]    6. Do you need any refills today?    No

## 2022-03-18 PROBLEM — N18.6 ESRD (END STAGE RENAL DISEASE) (HCC): Status: ACTIVE | Noted: 2021-08-12

## 2022-03-18 PROBLEM — E66.811 OBESITY (BMI 30.0-34.9): Status: ACTIVE | Noted: 2018-11-19

## 2022-03-18 PROBLEM — I50.32 CHRONIC DIASTOLIC CONGESTIVE HEART FAILURE (HCC): Status: ACTIVE | Noted: 2021-05-17

## 2022-03-18 PROBLEM — E66.9 OBESITY (BMI 30.0-34.9): Status: ACTIVE | Noted: 2018-11-19

## 2022-03-19 PROBLEM — R60.0 EDEMA OF BOTH LOWER LEGS: Status: ACTIVE | Noted: 2021-07-12

## 2022-03-19 PROBLEM — I24.9 ACS (ACUTE CORONARY SYNDROME) (HCC): Status: ACTIVE | Noted: 2020-04-20

## 2022-03-19 PROBLEM — I16.0 HYPERTENSIVE URGENCY: Status: ACTIVE | Noted: 2020-04-20

## 2022-03-19 PROBLEM — I50.9 CHF (CONGESTIVE HEART FAILURE) (HCC): Status: ACTIVE | Noted: 2020-06-01

## 2022-03-19 PROBLEM — I21.4 NSTEMI (NON-ST ELEVATED MYOCARDIAL INFARCTION) (HCC): Status: ACTIVE | Noted: 2020-04-20

## 2022-03-20 PROBLEM — Z98.61 POSTSURGICAL PERCUTANEOUS TRANSLUMINAL CORONARY ANGIOPLASTY STATUS: Status: ACTIVE | Noted: 2020-05-18

## 2022-05-02 ENCOUNTER — OFFICE VISIT (OUTPATIENT)
Dept: CARDIOLOGY CLINIC | Age: 77
End: 2022-05-02
Payer: MEDICARE

## 2022-05-02 VITALS
HEART RATE: 65 BPM | SYSTOLIC BLOOD PRESSURE: 141 MMHG | WEIGHT: 188 LBS | HEIGHT: 66 IN | DIASTOLIC BLOOD PRESSURE: 59 MMHG | BODY MASS INDEX: 30.22 KG/M2

## 2022-05-02 DIAGNOSIS — Z98.61 CAD S/P PERCUTANEOUS CORONARY ANGIOPLASTY: Primary | ICD-10-CM

## 2022-05-02 DIAGNOSIS — F17.209 TOBACCO USE DISORDER, CONTINUOUS: ICD-10-CM

## 2022-05-02 DIAGNOSIS — Z98.61 POSTSURGICAL PERCUTANEOUS TRANSLUMINAL CORONARY ANGIOPLASTY STATUS: ICD-10-CM

## 2022-05-02 DIAGNOSIS — E78.5 DYSLIPIDEMIA: ICD-10-CM

## 2022-05-02 DIAGNOSIS — I10 ESSENTIAL HYPERTENSION: ICD-10-CM

## 2022-05-02 DIAGNOSIS — I50.32 CHRONIC DIASTOLIC CONGESTIVE HEART FAILURE (HCC): ICD-10-CM

## 2022-05-02 DIAGNOSIS — I25.10 CAD S/P PERCUTANEOUS CORONARY ANGIOPLASTY: Primary | ICD-10-CM

## 2022-05-02 PROCEDURE — 3017F COLORECTAL CA SCREEN DOC REV: CPT | Performed by: INTERNAL MEDICINE

## 2022-05-02 PROCEDURE — G8427 DOCREV CUR MEDS BY ELIG CLIN: HCPCS | Performed by: INTERNAL MEDICINE

## 2022-05-02 PROCEDURE — G8536 NO DOC ELDER MAL SCRN: HCPCS | Performed by: INTERNAL MEDICINE

## 2022-05-02 PROCEDURE — G8432 DEP SCR NOT DOC, RNG: HCPCS | Performed by: INTERNAL MEDICINE

## 2022-05-02 PROCEDURE — G9231 DOC ESRD DIA TRANS PREG: HCPCS | Performed by: INTERNAL MEDICINE

## 2022-05-02 PROCEDURE — 99214 OFFICE O/P EST MOD 30 MIN: CPT | Performed by: INTERNAL MEDICINE

## 2022-05-02 PROCEDURE — 1101F PT FALLS ASSESS-DOCD LE1/YR: CPT | Performed by: INTERNAL MEDICINE

## 2022-05-02 PROCEDURE — G8417 CALC BMI ABV UP PARAM F/U: HCPCS | Performed by: INTERNAL MEDICINE

## 2022-05-02 RX ORDER — ASPIRIN 325 MG
TABLET, DELAYED RELEASE (ENTERIC COATED) ORAL
COMMUNITY
End: 2022-08-08

## 2022-05-02 RX ORDER — ATORVASTATIN CALCIUM 40 MG/1
40 TABLET, FILM COATED ORAL DAILY
Qty: 30 TABLET | Refills: 5 | Status: SHIPPED | OUTPATIENT
Start: 2022-05-02 | End: 2022-08-08

## 2022-05-02 NOTE — PROGRESS NOTES
HISTORY OF PRESENT ILLNESS  Rufus Laura is a 77 y.o. male. Follow-up of ischemic cardiomyopathy, hypertension, hyperlipidemia, obesity, CHF    11/21 started on dialysis in 9/21 4/17 improving blood pressure since meds adjusted by Dr. Maximilian Townsend;    1/17 seen for establishing/changing cardiologist   history of coronary disease, status post PCI(2010) and hypertension, along with conduction system disease    Follow-up  Associated symptoms include shortness of breath. Pertinent negatives include no chest pain and no headaches. Shortness of Breath  The history is provided by the patient. This is a recurrent problem. The problem occurs intermittently. The current episode started more than 1 week ago. The problem has been rapidly improving (with HD). Pertinent negatives include no fever, no headaches, no cough, no wheezing, no PND, no orthopnea, no chest pain, no vomiting, no rash, no leg swelling and no claudication. The problem's precipitants include exercise (10-15 mt walk; 5/21 100 ft; 11/21 400 ft; ). Review of Systems   Constitutional: Negative for chills, fever, malaise/fatigue and weight loss. HENT: Negative for nosebleeds. Eyes: Negative for discharge. Respiratory: Positive for shortness of breath. Negative for cough and wheezing. Cardiovascular: Negative for chest pain, palpitations, orthopnea, claudication, leg swelling and PND. Gastrointestinal: Negative for diarrhea, nausea and vomiting. Genitourinary: Negative for dysuria and hematuria. Musculoskeletal: Negative for joint pain. Skin: Negative for rash. Neurological: Negative for dizziness, seizures, loss of consciousness and headaches. Endo/Heme/Allergies: Negative for polydipsia. Does not bruise/bleed easily. Psychiatric/Behavioral: Negative for depression and substance abuse. The patient does not have insomnia.       No Known Allergies    Past Medical History:   Diagnosis Date    ACS [facility]acute coronary syndrome) [facility] [address]     CAD (coronary artery disease), native coronary artery August 2010    s/p non-ST-elevation myocardial infarction, s/p PCI with BMS (Vision 4x18mm) distal RCA with 45% distal LAD  and mild LCx disease. mild-moderate LV systolic dysfunction (95/01).  Chronic kidney disease     Diabetes mellitus type II     IDDM    Dyslipidemia     ED (erectile dysfunction)     Edema of both lower legs 7/12/2021    GERD (gastroesophageal reflux disease)     Heart failure ([address]     History of BPH     History of echocardiogram 5/18/2011    EF 65%. Mod-marked increased wall thickness. Gr 1 DDfx. No significant valvular heart disease.  Hyperlipidemia     Hypertension     Ischemic cardiomyopathy     recovery of LV syst fct  Echo May 2011 LV EF 65%    MI (myocardial infarction) ([address]     Obesity     RBBB (right bundle branch block with left anterior fascicular block)     S/P cardiac cath 8/26/2010    LM patent. dLAD 45%. CX mild. dRCA 100% thrombotic (S/P cath thrombectomy & Vision 4 x 18-mm BMS). EF 40%. Posterobasal, diaphrag, apical hypk.  Shingles 4/2012    Tobacco use disorder, continuous        Family History   Problem Relation Age of Onset    Diabetes Mother        Social History     Tobacco Use    Smoking status: Current Every Day Smoker     Packs/day: 0.25     Years: 44.00     Pack years: 11.00     Types: Cigarettes    Smokeless tobacco: Never Used    Tobacco comment: pack last for a couple of weeks   Vaping Use    Vaping Use: Never used   Substance Use Topics    Alcohol use: No    Drug use: Never        Current Outpatient Medications   Medication Sig    cholecalciferol (VITAMIN D3) (50,000 UNITS /1250 MCG) capsule Take  by mouth every seven (7) days.  iron sucrose (VENOFER) 100 mg iron/5 mL injection 100 mg by IntraVENous route once.  doxazosin (CARDURA) 2 mg tablet Take 1 Tablet by mouth every evening.  simvastatin (Zocor) 80 mg tablet Take 80 mg by mouth nightly.     hydrALAZINE (APRESOLINE) 50 mg tablet Take 25 mg by mouth three (3) times daily.  insulin glargine (Lantus Solostar U-100 Insulin) 100 unit/mL (3 mL) inpn 30 Units by SubCUTAneous route daily.  carvediloL (COREG) 25 mg tablet Take 1 Tab by mouth two (2) times daily (with meals).  cholecalciferol (Vitamin D3) (1000 Units /25 mcg) tablet Take 1,000 Units by mouth daily.  dutasteride (AVODART) 0.5 mg capsule Take 0.5 mg by mouth daily.  pantoprazole (PROTONIX) 40 mg tablet Take 40 mg by mouth daily.  clopidogreL (PLAVIX) 75 mg tab take 1 tablet by mouth once daily (Patient not taking: Reported on 5/2/2022)     No current facility-administered medications for this visit. Past Surgical History:   Procedure Laterality Date    COLONOSCOPY N/A 4/23/2019    COLONOSCOPY performed by Marcella Ashraf MD at [facility] ENDOSCOPY    HX CATARACT REMOVAL Bilateral     HX HEART CATHETERIZATION  08/26/10    Stent    HX HEART CATHETERIZATION  2019    Stent    HX HEMORRHOIDECTOMY      HX ROTATOR CUFF REPAIR Left     HX TRABECULECTOMY      IR BX BONE MARROW DIAGNOSTIC  9/10/2021       Diagnostic Studies: Old records reviewed and show as follows  EKG tracings reviewed by me today. 3/17 Nuc Stress  Conclusion:   1. Nondiagnostic EKG changes of ischemia due to abnormal baseline EKG at 89% of predicted maximal heart rate. 2. Normal functional capacity. 3. Severely hypertensive blood pressure response and appropriate heart rate response. 4. No ischemic symptoms or arrhythmias seen. 5. Perfusion image report to follow. Conclusion:   1. Normal perfusion scan. 2. Normal wall motion and ejection fraction. 3. Low risk scan. 3/17 ECHO  SUMMARY:  Left ventricle: Ejection fraction was estimated to be 65 %. There were no  regional wall motion abnormalities. There was severe concentric  hypertrophy.  Features were consistent with a pseudonormal left ventricular  filling pattern, with concomitant abnormal relaxation and increased  filling pressure (grade 2 diastolic dysfunction). 04/20/20   CARDIAC PROCEDURE 04/23/2020 4/23/2020    Narrative Double vessel coronary artery disease. S/p ptca/stent to mid LAD. Known BMS to distal RCA with moderate lesion. Recommend intense risk factor modification. Signed by: Danita Gamboa MD     06/01/20   ECHO ADULT FOLLOW-UP OR LIMITED 06/03/2020 6/3/2020    Narrative · Normal cavity size and systolic function (ejection fraction normal). Severe concentric hypertrophy. Estimated left ventricular ejection   fraction is 60 - 65%. Visually measured ejection fraction. No regional   wall motion abnormality noted. Normal left ventricular strain. · Global longitudinal strain is -20.90%  · Tricuspid regurgitation is inadequate for estimation of right   ventricular systolic pressure. Signed by: Buster Ley MD     Visit Vitals  BP (!) 141/59   Pulse 65   Ht 5' 6\" (1.676 m)   Wt 85.3 kg (188 lb)   BMI 30.34 kg/m²       Mr. Drea Mcdowell has a reminder for a \"due or due soon\" health maintenance. I have asked that he contact his primary care provider for follow-up on this health maintenance. 9/10 Card Cath  IMPRESSION  1. Normal systemic pressure. 2. Moderate elevation of left-sided filling pressures. 3. Mild to moderate left ventricular systolic dysfunction with distinct  regional wall motion abnormalities. 4. Coronary disease as described above with a thrombotic occlusion of the  distal right coronary artery and moderate distal left anterior descending  disease. 5. Successful percutaneous coronary intervention with catheter  thrombectomy, followed by bare metal stent deployment with a Vision 4 x 18  mm stent in the distal right coronary artery. Physical Exam  Constitutional:       General: He is not in acute distress. Appearance: He is well-developed. He is obese. Comments: Obese   HENT:      Head: Normocephalic and atraumatic.       Mouth/Throat: Dentition: Normal dentition. Eyes:      General: No scleral icterus. Right eye: No discharge. Left eye: No discharge. Neck:      Thyroid: No thyromegaly. Vascular: No carotid bruit or JVD. Cardiovascular:      Rate and Rhythm: Normal rate and regular rhythm. Pulses: Intact distal pulses. Decreased pulses. Heart sounds: Normal heart sounds, S1 normal and S2 normal. No murmur heard. No friction rub. No gallop. Pulmonary:      Effort: Pulmonary effort is normal.      Breath sounds: Normal breath sounds. No wheezing or rales. Abdominal:      Palpations: Abdomen is soft. There is no mass. Tenderness: There is no abdominal tenderness. Musculoskeletal:      Cervical back: Neck supple. Right lower leg: No edema. Left lower leg: No edema. Lymphadenopathy:      Cervical:      Right cervical: No superficial cervical adenopathy. Left cervical: No superficial cervical adenopathy. Skin:     General: Skin is warm and dry. Findings: No rash. Neurological:      Mental Status: He is alert and oriented to person, place, and time. Psychiatric:         Behavior: Behavior normal.         ASSESSMENT and PLAN    Patient advised to stop smoking to reduce future cardiovascular events. I have reviewed/discussed the above normal BMI with the patient. I have recommended the following interventions: dietary management education, guidance, and counseling, encourage exercise and monitor weight . Ninfa Sequeira HLD: 3/17 LDL31;   Results for Shahbaz Samson (MRN [id number]) as of 5/17/2021 14:09   Ref.  Range 6/29/2020 09:23   Triglyceride Latest Ref Range: <150 MG/DL 84   Cholesterol, total Latest Ref Range: <200 MG/   HDL Cholesterol Latest Ref Range: 40 - 60 MG/DL 41   CHOL/HDL Ratio Latest Ref Range: 0 - 5.0   3.0   VLDL, calculated Latest Units: MG/DL 16.8   LDL, calculated Latest Ref Range: 0 - 100 MG/DL 64.2     PCI: 2010 RCA; 4/20 LAD;    2/21 Follow-up after CHF admission which was after a few weeks of non-STEMI when patient had LAD stent. He also has CKD. Creatinine had increased. Apparently he was not discharged on diuretics as wife is not giving any. Edema and shortness of breath are worsening again. Start Bumex and follow-up electrolytes. CAD clinically stable. Blood pressure is controlled. Reduce clonidine to twice daily as I am adding Bumex. Labs as ordered. Had a detailed discussion with patient and wife regarding diet to avoid salty foods and importance of controlling blood pressure. 5/17/2021 CHF symptoms improving but still NYHA class II-III. Blood pressure mildly elevated. Increase doxazosin and follow home chart. Discussed in detail regarding diet and exercise. Mediterranean diet guidelines printed. He will try to walk regularly. CAD stable. Discontinue aspirin and will continue on the Plavix. 11/1/2021 CAD stable. Edema has resolved since dialysis started. Blood pressure low normal and little lower in dialysis at times per patient. Reduce doxazosin to 2 mg daily. Blood pressure to be watched closely and may be able to reduce more medications which can also be done by dialysis center. Lipids need to be followed but were excellent a year ago. Tobacco cessation reinforced and he will try to stop it completely. Would wait on any medications for erectile dysfunction as his blood pressure is reducing for now and let it stabilize before we try those medications. Diagnoses and all orders for this visit:    1. CAD S/P percutaneous coronary angioplasty    2. Chronic diastolic congestive heart failure ([address]    3. Essential hypertension    4. Postsurgical percutaneous transluminal coronary angioplasty status    5. Tobacco use disorder, continuous    6. Dyslipidemia  -     atorvastatin (LIPITOR) 40 mg tablet; Take 1 Tablet by mouth daily.  -     LIPID PANEL; Future  -     HEPATIC FUNCTION PANEL;  Future        Pertinent laboratory and test data reviewed and discussed with patient. See patient instructions also for other medical advice given    Medications Discontinued During This Encounter   Medication Reason    calcitRIOL (ROCALTROL) 0.25 mcg capsule Therapy Completed    amLODIPine (Norvasc) 10 mg tablet DISCONTINUED BY ANOTHER CLINICIAN    cloNIDine HCL (CATAPRES) 0.3 mg tablet Not A Current Medication    furosemide (LASIX) 40 mg tablet DISCONTINUED BY ANOTHER CLINICIAN    gabapentin (NEURONTIN) 100 mg capsule DISCONTINUED BY ANOTHER CLINICIAN    glimepiride (AMARYL) 4 mg tablet DISCONTINUED BY ANOTHER CLINICIAN    simvastatin (Zocor) 80 mg tablet Alternate Therapy       Follow-up and Dispositions    · Return in about 6 months (around 11/2/2022), or if symptoms worsen or fail to improve, for post test.       5/2/2022 CAD stable without any significant symptoms. He has lost all the edema and is happy with his dialysis. Will change simvastatin to Lipitor and follow the lipid profile and LFTs as such a high-dose simvastatin would not be recommended. Blood pressure medicines have gone down significantly and I agree. Discussed smoking cessation and he will try to follow. Healthy diet discussed. He already has Mediterranean diet guidelines. CHF is compensated and he seems to be in NYHA class II. I requested him to count how many times he can go around the yard as he likes to walk there.

## 2022-05-02 NOTE — PATIENT INSTRUCTIONS
Medications Discontinued During This Encounter   Medication Reason    calcitRIOL (ROCALTROL) 0.25 mcg capsule Therapy Completed    amLODIPine (Norvasc) 10 mg tablet DISCONTINUED BY ANOTHER CLINICIAN    cloNIDine HCL (CATAPRES) 0.3 mg tablet Not A Current Medication    furosemide (LASIX) 40 mg tablet DISCONTINUED BY ANOTHER CLINICIAN    gabapentin (NEURONTIN) 100 mg capsule DISCONTINUED BY ANOTHER CLINICIAN    glimepiride (AMARYL) 4 mg tablet DISCONTINUED BY ANOTHER CLINICIAN    simvastatin (Zocor) 80 mg tablet Alternate Therapy          A Healthy Heart: Care Instructions  Your Care Instructions     Coronary artery disease, also called heart disease, occurs when a substance called plaque builds up in the vessels that supply oxygen-rich blood to your heart muscle. This can narrow the blood vessels and reduce blood flow. A heart attack happens when blood flow is completely blocked. A high-fat diet, smoking, and other factors increase the risk of heart disease. Your doctor has found that you have a chance of having heart disease. You can do lots of things to keep your heart healthy. It may not be easy, but you can change your diet, exercise more, and quit smoking. These steps really work to lower your chance of heart disease. Follow-up care is a key part of your treatment and safety. Be sure to make and go to all appointments, and call your doctor if you are having problems. It's also a good idea to know your test results and keep a list of the medicines you take. How can you care for yourself at home? Diet    · Use less salt when you cook and eat. This helps lower your blood pressure. Taste food before salting. Add only a little salt when you think you need it. With time, your taste buds will adjust to less salt.     · Eat fewer snack items, fast foods, canned soups, and other high-salt, high-fat, processed foods.     · Read food labels and try to avoid saturated and trans fats.  They increase your risk of heart disease by raising cholesterol levels.     · Limit the amount of solid fat-butter, margarine, and shortening-you eat. Use olive, peanut, or canola oil when you cook. Bake, broil, and steam foods instead of frying them.     · Eat a variety of fruit and vegetables every day. Dark green, deep orange, red, or yellow fruits and vegetables are especially good for you. Examples include spinach, carrots, peaches, and berries.     · Foods high in fiber can reduce your cholesterol and provide important vitamins and minerals. High-fiber foods include whole-grain cereals and breads, oatmeal, beans, brown rice, citrus fruits, and apples.     · Eat lean proteins. Heart-healthy proteins include seafood, lean meats and poultry, eggs, beans, peas, nuts, seeds, and soy products.     · Limit drinks and foods with added sugar. These include candy, desserts, and soda pop. Lifestyle changes    · If your doctor recommends it, get more exercise. Walking is a good choice. Bit by bit, increase the amount you walk every day. Try for at least 30 minutes on most days of the week. You also may want to swim, bike, or do other activities.     · Do not smoke. If you need help quitting, talk to your doctor about stop-smoking programs and medicines. These can increase your chances of quitting for good. Quitting smoking may be the most important step you can take to protect your heart. It is never too late to quit.     · Limit alcohol to 2 drinks a day for men and 1 drink a day for women. Too much alcohol can cause health problems.     · Manage other health problems such as diabetes, high blood pressure, and high cholesterol. If you think you may have a problem with alcohol or drug use, talk to your doctor. Medicines    · Take your medicines exactly as prescribed. Call your doctor if you think you are having a problem with your medicine.     · If your doctor recommends aspirin, take the amount directed each day.  Make sure you take aspirin and not another kind of pain reliever, such as acetaminophen (Tylenol). When should you call for help? Call 911 if you have symptoms of a heart attack. These may include:    · Chest pain or pressure, or a strange feeling in the chest.     · Sweating.     · Shortness of breath.     · Pain, pressure, or a strange feeling in the back, neck, jaw, or upper belly or in one or both shoulders or arms.     · Lightheadedness or sudden weakness.     · A fast or irregular heartbeat. After you call 911, the  may tell you to chew 1 adult-strength or 2 to 4 low-dose aspirin. Wait for an ambulance. Do not try to drive yourself. Watch closely for changes in your health, and be sure to contact your doctor if you have any problems. Where can you learn more? Go to http://www.ridley.com/  Enter F075 in the search box to learn more about \"A Healthy Heart: Care Instructions. \"  Current as of: January 10, 2022               Content Version: 13.2  © 2006-2022 [employer]. Care instructions adapted under license by [employer] (which disclaims liability or warranty for this information). If you have questions about a medical condition or this instruction, always ask your healthcare professional. [address] any warranty or liability for your use of this information.

## 2022-05-02 NOTE — PROGRESS NOTES
1. Have you been to the ER, urgent care clinic since your last visit? Hospitalized since your last visit?    no    2. Have you seen or consulted any other health care providers outside of the [address] [place] [address] since your last visit? Include any pap smears or colon screening. Yes Dr. Jeffery Goldmann    3. Since your last visit, have you had any of the following symptoms? rosa         4. Have you had any blood work, X-rays or cardiac testing? Yes Where:           5. Where do you normally have your labs drawn?   [facility]    6. Do you need any refills today?   no

## 2022-06-15 ENCOUNTER — HOSPITAL ENCOUNTER (OUTPATIENT)
Dept: LAB | Age: 77
Discharge: HOME | End: 2022-06-15
Payer: MEDICARE

## 2022-06-15 DIAGNOSIS — E78.5 DYSLIPIDEMIA: ICD-10-CM

## 2022-06-15 LAB
ALBUMIN SERPL-MCNC: 3.7 G/DL (ref 3.4–5)
ALBUMIN/GLOB SERPL: 0.9 {RATIO} (ref 0.8–1.7)
ALP SERPL-CCNC: 112 U/L (ref 45–117)
ALT SERPL-CCNC: 18 U/L (ref 16–61)
AST SERPL-CCNC: 14 U/L (ref 10–38)
BILIRUB DIRECT SERPL-MCNC: 0.1 MG/DL (ref 0–0.2)
BILIRUB SERPL-MCNC: 0.5 MG/DL (ref 0.2–1)
CHOLEST SERPL-MCNC: 88 MG/DL
GLOBULIN SER CALC-MCNC: 3.9 G/DL (ref 2–4)
HDLC SERPL-MCNC: 38 MG/DL (ref 40–60)
HDLC SERPL: 2.3 {RATIO} (ref 0–5)
LDLC SERPL CALC-MCNC: 33.8 MG/DL (ref 0–100)
LIPID PROFILE,FLP: ABNORMAL
PROT SERPL-MCNC: 7.6 G/DL (ref 6.4–8.2)
TRIGL SERPL-MCNC: 81 MG/DL (ref ?–150)
VLDLC SERPL CALC-MCNC: 16.2 MG/DL

## 2022-06-15 PROCEDURE — 80076 HEPATIC FUNCTION PANEL: CPT

## 2022-06-15 PROCEDURE — 80061 LIPID PANEL: CPT

## 2022-06-15 PROCEDURE — 36415 COLL VENOUS BLD VENIPUNCTURE: CPT

## 2022-06-16 ENCOUNTER — TELEPHONE (OUTPATIENT)
Dept: CARDIOLOGY CLINIC | Age: 77
End: 2022-06-16

## 2022-06-16 NOTE — TELEPHONE ENCOUNTER
----- Message from Hugh Maradiaga NP sent at 6/16/2022 11:12 AM EDT -----  Labs reviewed, no significant abnormalities

## 2022-06-16 NOTE — TELEPHONE ENCOUNTER
Spoke with pt, advised labs reviewed no significant abnormalities. No questions or concerns at this time.

## 2022-08-06 ENCOUNTER — APPOINTMENT (OUTPATIENT)
Dept: GENERAL RADIOLOGY | Age: 77
DRG: 280 | End: 2022-08-06
Attending: STUDENT IN AN ORGANIZED HEALTH CARE EDUCATION/TRAINING PROGRAM
Payer: MEDICARE

## 2022-08-06 ENCOUNTER — HOSPITAL ENCOUNTER (INPATIENT)
Age: 77
LOS: 2 days | Discharge: HOME | DRG: 280 | End: 2022-08-08
Attending: STUDENT IN AN ORGANIZED HEALTH CARE EDUCATION/TRAINING PROGRAM | Admitting: EMERGENCY MEDICINE
Payer: MEDICARE

## 2022-08-06 DIAGNOSIS — I24.9 ACUTE CORONARY SYNDROME (HCC): Primary | ICD-10-CM

## 2022-08-06 DIAGNOSIS — E78.5 DYSLIPIDEMIA: ICD-10-CM

## 2022-08-06 DIAGNOSIS — I25.10 CAD (CORONARY ARTERY DISEASE): ICD-10-CM

## 2022-08-06 LAB
ALBUMIN SERPL-MCNC: 2.8 G/DL (ref 3.4–5)
ALBUMIN/GLOB SERPL: 0.8 {RATIO} (ref 0.8–1.7)
ALP SERPL-CCNC: 101 U/L (ref 45–117)
ALT SERPL-CCNC: 22 U/L (ref 16–61)
ANION GAP SERPL CALC-SCNC: 5 MMOL/L (ref 3–18)
APTT PPP: 25.3 SEC (ref 23–36.4)
AST SERPL-CCNC: 11 U/L (ref 10–38)
ATRIAL RATE: 44 BPM
BASOPHILS # BLD: 0 K/UL (ref 0–0.1)
BASOPHILS # BLD: 0 K/UL (ref 0–0.1)
BASOPHILS NFR BLD: 0 % (ref 0–2)
BASOPHILS NFR BLD: 0 % (ref 0–2)
BILIRUB SERPL-MCNC: 0.2 MG/DL (ref 0.2–1)
BNP SERPL-MCNC: 1507 PG/ML (ref 0–1800)
BUN SERPL-MCNC: 17 MG/DL (ref 7–18)
BUN/CREAT SERPL: 6 (ref 12–20)
CALCIUM SERPL-MCNC: 7.3 MG/DL (ref 8.5–10.1)
CALCULATED R AXIS, ECG10: -89 DEGREES
CALCULATED T AXIS, ECG11: 81 DEGREES
CHLORIDE SERPL-SCNC: 105 MMOL/L (ref 100–111)
CHOLEST SERPL-MCNC: 94 MG/DL
CO2 SERPL-SCNC: 31 MMOL/L (ref 21–32)
CREAT SERPL-MCNC: 2.78 MG/DL (ref 0.6–1.3)
DIAGNOSIS, 93000: NORMAL
DIFFERENTIAL METHOD BLD: ABNORMAL
DIFFERENTIAL METHOD BLD: ABNORMAL
EOSINOPHIL # BLD: 0.4 K/UL (ref 0–0.4)
EOSINOPHIL # BLD: 0.4 K/UL (ref 0–0.4)
EOSINOPHIL NFR BLD: 5 % (ref 0–5)
EOSINOPHIL NFR BLD: 5 % (ref 0–5)
ERYTHROCYTE [DISTWIDTH] IN BLOOD BY AUTOMATED COUNT: 13.8 % (ref 11.6–14.5)
ERYTHROCYTE [DISTWIDTH] IN BLOOD BY AUTOMATED COUNT: 13.9 % (ref 11.6–14.5)
EST. AVERAGE GLUCOSE BLD GHB EST-MCNC: 163 MG/DL
GLOBULIN SER CALC-MCNC: 3.6 G/DL (ref 2–4)
GLUCOSE SERPL-MCNC: 257 MG/DL (ref 74–99)
HBA1C MFR BLD: 7.3 % (ref 4.2–5.6)
HCT VFR BLD AUTO: 30.5 % (ref 36–48)
HCT VFR BLD AUTO: 31.6 % (ref 36–48)
HDLC SERPL-MCNC: 30 MG/DL (ref 40–60)
HDLC SERPL: 3.1 {RATIO} (ref 0–5)
HGB BLD-MCNC: 10.5 G/DL (ref 13–16)
HGB BLD-MCNC: 11 G/DL (ref 13–16)
IMM GRANULOCYTES # BLD AUTO: 0 K/UL (ref 0–0.04)
IMM GRANULOCYTES # BLD AUTO: 0 K/UL (ref 0–0.04)
IMM GRANULOCYTES NFR BLD AUTO: 0 % (ref 0–0.5)
IMM GRANULOCYTES NFR BLD AUTO: 0 % (ref 0–0.5)
LDLC SERPL CALC-MCNC: 2 MG/DL (ref 0–100)
LIPID PROFILE,FLP: ABNORMAL
LYMPHOCYTES # BLD: 1.4 K/UL (ref 0.9–3.6)
LYMPHOCYTES # BLD: 1.6 K/UL (ref 0.9–3.6)
LYMPHOCYTES NFR BLD: 15 % (ref 21–52)
LYMPHOCYTES NFR BLD: 22 % (ref 21–52)
MCH RBC QN AUTO: 27.4 PG (ref 24–34)
MCH RBC QN AUTO: 27.5 PG (ref 24–34)
MCHC RBC AUTO-ENTMCNC: 34.4 G/DL (ref 31–37)
MCHC RBC AUTO-ENTMCNC: 34.8 G/DL (ref 31–37)
MCV RBC AUTO: 78.6 FL (ref 78–100)
MCV RBC AUTO: 79.8 FL (ref 78–100)
MONOCYTES # BLD: 0.6 K/UL (ref 0.05–1.2)
MONOCYTES # BLD: 0.8 K/UL (ref 0.05–1.2)
MONOCYTES NFR BLD: 8 % (ref 3–10)
MONOCYTES NFR BLD: 9 % (ref 3–10)
NEUTS SEG # BLD: 4.7 K/UL (ref 1.8–8)
NEUTS SEG # BLD: 6.3 K/UL (ref 1.8–8)
NEUTS SEG NFR BLD: 64 % (ref 40–73)
NEUTS SEG NFR BLD: 71 % (ref 40–73)
NRBC # BLD: 0 K/UL (ref 0–0.01)
NRBC # BLD: 0 K/UL (ref 0–0.01)
NRBC BLD-RTO: 0 PER 100 WBC
NRBC BLD-RTO: 0 PER 100 WBC
PLATELET # BLD AUTO: 165 K/UL (ref 135–420)
PLATELET # BLD AUTO: 193 K/UL (ref 135–420)
PMV BLD AUTO: 10.7 FL (ref 9.2–11.8)
PMV BLD AUTO: 11.4 FL (ref 9.2–11.8)
POTASSIUM SERPL-SCNC: 2.9 MMOL/L (ref 3.5–5.5)
PROT SERPL-MCNC: 6.4 G/DL (ref 6.4–8.2)
Q-T INTERVAL, ECG07: 420 MS
QRS DURATION, ECG06: 174 MS
QTC CALCULATION (BEZET), ECG08: 522 MS
RBC # BLD AUTO: 3.82 M/UL (ref 4.35–5.65)
RBC # BLD AUTO: 4.02 M/UL (ref 4.35–5.65)
SODIUM SERPL-SCNC: 141 MMOL/L (ref 136–145)
TRIGL SERPL-MCNC: 310 MG/DL (ref ?–150)
TROPONIN-HIGH SENSITIVITY: 1251 NG/L (ref 0–78)
TROPONIN-HIGH SENSITIVITY: 226 NG/L (ref 0–78)
TROPONIN-HIGH SENSITIVITY: 93 NG/L (ref 0–78)
VENTRICULAR RATE, ECG03: 93 BPM
VLDLC SERPL CALC-MCNC: 62 MG/DL
WBC # BLD AUTO: 7.3 K/UL (ref 4.6–13.2)
WBC # BLD AUTO: 8.9 K/UL (ref 4.6–13.2)

## 2022-08-06 PROCEDURE — 80061 LIPID PANEL: CPT

## 2022-08-06 PROCEDURE — 2709999900 HC NON-CHARGEABLE SUPPLY

## 2022-08-06 PROCEDURE — 71045 X-RAY EXAM CHEST 1 VIEW: CPT

## 2022-08-06 PROCEDURE — 85025 COMPLETE CBC W/AUTO DIFF WBC: CPT

## 2022-08-06 PROCEDURE — 74011250636 HC RX REV CODE- 250/636: Performed by: STUDENT IN AN ORGANIZED HEALTH CARE EDUCATION/TRAINING PROGRAM

## 2022-08-06 PROCEDURE — 99223 1ST HOSP IP/OBS HIGH 75: CPT | Performed by: EMERGENCY MEDICINE

## 2022-08-06 PROCEDURE — 74011250637 HC RX REV CODE- 250/637: Performed by: EMERGENCY MEDICINE

## 2022-08-06 PROCEDURE — 94762 N-INVAS EAR/PLS OXIMTRY CONT: CPT

## 2022-08-06 PROCEDURE — 80053 COMPREHEN METABOLIC PANEL: CPT

## 2022-08-06 PROCEDURE — 83880 ASSAY OF NATRIURETIC PEPTIDE: CPT

## 2022-08-06 PROCEDURE — 85730 THROMBOPLASTIN TIME PARTIAL: CPT

## 2022-08-06 PROCEDURE — 93005 ELECTROCARDIOGRAM TRACING: CPT

## 2022-08-06 PROCEDURE — 83036 HEMOGLOBIN GLYCOSYLATED A1C: CPT

## 2022-08-06 PROCEDURE — 96374 THER/PROPH/DIAG INJ IV PUSH: CPT

## 2022-08-06 PROCEDURE — 65270000046 HC RM TELEMETRY

## 2022-08-06 PROCEDURE — 99285 EMERGENCY DEPT VISIT HI MDM: CPT

## 2022-08-06 PROCEDURE — 74011250636 HC RX REV CODE- 250/636: Performed by: INTERNAL MEDICINE

## 2022-08-06 PROCEDURE — 84484 ASSAY OF TROPONIN QUANT: CPT

## 2022-08-06 RX ORDER — MAGNESIUM SULFATE 100 %
4 CRYSTALS MISCELLANEOUS AS NEEDED
Status: DISCONTINUED | OUTPATIENT
Start: 2022-08-06 | End: 2022-08-08 | Stop reason: HOSPADM

## 2022-08-06 RX ORDER — DUTASTERIDE 0.5 MG/1
0.5 CAPSULE, LIQUID FILLED ORAL DAILY
Status: DISCONTINUED | OUTPATIENT
Start: 2022-08-07 | End: 2022-08-08 | Stop reason: HOSPADM

## 2022-08-06 RX ORDER — GUAIFENESIN 100 MG/5ML
81 LIQUID (ML) ORAL DAILY
Status: DISCONTINUED | OUTPATIENT
Start: 2022-08-07 | End: 2022-08-08 | Stop reason: HOSPADM

## 2022-08-06 RX ORDER — ACETAMINOPHEN 325 MG/1
650 TABLET ORAL
Status: DISCONTINUED | OUTPATIENT
Start: 2022-08-06 | End: 2022-08-08 | Stop reason: HOSPADM

## 2022-08-06 RX ORDER — HEPARIN SODIUM 1000 [USP'U]/ML
4000 INJECTION, SOLUTION INTRAVENOUS; SUBCUTANEOUS ONCE
Status: COMPLETED | OUTPATIENT
Start: 2022-08-06 | End: 2022-08-06

## 2022-08-06 RX ORDER — POLYETHYLENE GLYCOL 3350 17 G/17G
17 POWDER, FOR SOLUTION ORAL DAILY PRN
Status: DISCONTINUED | OUTPATIENT
Start: 2022-08-06 | End: 2022-08-08 | Stop reason: HOSPADM

## 2022-08-06 RX ORDER — HEPARIN SODIUM 5000 [USP'U]/ML
5000 INJECTION, SOLUTION INTRAVENOUS; SUBCUTANEOUS EVERY 8 HOURS
Status: DISCONTINUED | OUTPATIENT
Start: 2022-08-06 | End: 2022-08-06

## 2022-08-06 RX ORDER — POTASSIUM CHLORIDE 7.45 MG/ML
10 INJECTION INTRAVENOUS
Status: COMPLETED | OUTPATIENT
Start: 2022-08-06 | End: 2022-08-06

## 2022-08-06 RX ORDER — HEPARIN SODIUM 10000 [USP'U]/100ML
11.7-25 INJECTION, SOLUTION INTRAVENOUS
Status: DISCONTINUED | OUTPATIENT
Start: 2022-08-06 | End: 2022-08-08

## 2022-08-06 RX ORDER — POTASSIUM CHLORIDE 7.45 MG/ML
10 INJECTION INTRAVENOUS
Status: DISCONTINUED | OUTPATIENT
Start: 2022-08-06 | End: 2022-08-06 | Stop reason: SDUPTHER

## 2022-08-06 RX ORDER — ACETAMINOPHEN 650 MG/1
650 SUPPOSITORY RECTAL
Status: DISCONTINUED | OUTPATIENT
Start: 2022-08-06 | End: 2022-08-08 | Stop reason: HOSPADM

## 2022-08-06 RX ORDER — PANTOPRAZOLE SODIUM 40 MG/1
40 TABLET, DELAYED RELEASE ORAL DAILY
Status: DISCONTINUED | OUTPATIENT
Start: 2022-08-07 | End: 2022-08-08 | Stop reason: HOSPADM

## 2022-08-06 RX ORDER — ATORVASTATIN CALCIUM 40 MG/1
40 TABLET, FILM COATED ORAL
Status: DISCONTINUED | OUTPATIENT
Start: 2022-08-06 | End: 2022-08-08 | Stop reason: HOSPADM

## 2022-08-06 RX ORDER — MELATONIN
1000 DAILY
Status: DISCONTINUED | OUTPATIENT
Start: 2022-08-07 | End: 2022-08-08 | Stop reason: HOSPADM

## 2022-08-06 RX ORDER — SODIUM CHLORIDE 0.9 % (FLUSH) 0.9 %
5-40 SYRINGE (ML) INJECTION EVERY 8 HOURS
Status: DISCONTINUED | OUTPATIENT
Start: 2022-08-06 | End: 2022-08-08 | Stop reason: HOSPADM

## 2022-08-06 RX ORDER — HYDRALAZINE HYDROCHLORIDE 25 MG/1
100 TABLET, FILM COATED ORAL 3 TIMES DAILY
Status: DISCONTINUED | OUTPATIENT
Start: 2022-08-06 | End: 2022-08-08 | Stop reason: HOSPADM

## 2022-08-06 RX ORDER — CARVEDILOL 3.12 MG/1
3.12 TABLET ORAL 2 TIMES DAILY WITH MEALS
Status: DISCONTINUED | OUTPATIENT
Start: 2022-08-06 | End: 2022-08-07

## 2022-08-06 RX ORDER — INSULIN LISPRO 100 [IU]/ML
INJECTION, SOLUTION INTRAVENOUS; SUBCUTANEOUS
Status: DISCONTINUED | OUTPATIENT
Start: 2022-08-06 | End: 2022-08-08 | Stop reason: HOSPADM

## 2022-08-06 RX ORDER — DEXTROSE MONOHYDRATE 100 MG/ML
0-250 INJECTION, SOLUTION INTRAVENOUS AS NEEDED
Status: DISCONTINUED | OUTPATIENT
Start: 2022-08-06 | End: 2022-08-08 | Stop reason: HOSPADM

## 2022-08-06 RX ORDER — SODIUM CHLORIDE 0.9 % (FLUSH) 0.9 %
5-40 SYRINGE (ML) INJECTION AS NEEDED
Status: DISCONTINUED | OUTPATIENT
Start: 2022-08-06 | End: 2022-08-08 | Stop reason: HOSPADM

## 2022-08-06 RX ORDER — DOXAZOSIN 4 MG/1
2 TABLET ORAL EVERY EVENING
Status: DISCONTINUED | OUTPATIENT
Start: 2022-08-06 | End: 2022-08-08 | Stop reason: HOSPADM

## 2022-08-06 RX ADMIN — POTASSIUM CHLORIDE 10 MEQ: 7.46 INJECTION, SOLUTION INTRAVENOUS at 16:39

## 2022-08-06 RX ADMIN — HEPARIN SODIUM 4000 UNITS: 1000 INJECTION, SOLUTION INTRAVENOUS; SUBCUTANEOUS at 23:50

## 2022-08-06 RX ADMIN — CARVEDILOL 3.12 MG: 6.25 TABLET, FILM COATED ORAL at 19:47

## 2022-08-06 RX ADMIN — POTASSIUM BICARBONATE 20 MEQ: 782 TABLET, EFFERVESCENT ORAL at 19:47

## 2022-08-06 RX ADMIN — DOXAZOSIN 2 MG: 4 TABLET ORAL at 20:38

## 2022-08-06 RX ADMIN — HEPARIN SODIUM 11.7 UNITS/KG/HR: 10000 INJECTION, SOLUTION INTRAVENOUS at 23:48

## 2022-08-06 NOTE — ED NOTES
Critical trop noted of 226- MD aware. Pt updated on POC, denies any current CP reports he passed gas and feels better.

## 2022-08-06 NOTE — Clinical Note
Status[de-identified] INPATIENT [101]   Type of Bed: Telemetry [19]   Cardiac Monitoring Required?: Yes   Inpatient Hospitalization Certified Necessary for the Following Reasons: 3.  Patient receiving treatment that can only be provided in an inpatient setting (further clarification in H&P documentation)   Admitting Diagnosis: NSTEMI (non-ST elevated myocardial infarction) [facility]) [[id number]]   Admitting Physician: Molly Meng   Attending Physician: Molly Meng   Estimated Length of Stay: 2 Midnights   Discharge Plan[de-identified] Home with Office Follow-up

## 2022-08-06 NOTE — H&P
Hospitalist Admission History and Physical    NAME:  Kiko Gonzalez. :   1945   MRN:   [id number]     PCP:  Ian Harvey MD  Date/Time:  2022  Subjective:   CHIEF COMPLAINT: Chest pain    HISTORY OF PRESENT ILLNESS:     This is a 64-year-old male with a history of coronary artery disease and hypertension and diabetes and end-stage renal disease who presented to the ED after he had some chest pain after he finished his dialysis. Patient was evaluated in the ED and and admission was recommended. When I saw the patient he was sitting in bed and currently denies any chest pain. Patient states that chest pain he had was like indigestion in the left side of his chest.  Patient describes the pain as nonradiating and nonreproducible. Patient was given aspirin by the paramedics. No history of any fever chills or cough. No history of any shortness of breath. No history of any headaches numbness or focal weakness. No history of any abdominal pain urinary complaints diarrhea or rectal bleeding. No history of any leg swelling or rash. Patient states that he has received his COVID vaccines and booster        Past Medical History:   Diagnosis Date    ACS (acute coronary syndrome) ([facility] [address]     CAD (coronary artery disease), native coronary artery 2010    s/p non-ST-elevation myocardial infarction, s/p PCI with BMS (Vision 4x18mm) distal RCA with 45% distal LAD  and mild LCx disease. mild-moderate LV systolic dysfunction (82/49). Chronic kidney disease     Diabetes mellitus type II     IDDM    Dyslipidemia     ED (erectile dysfunction)     Edema of both lower legs 2021    GERD (gastroesophageal reflux disease)     Heart failure (HCC)     History of BPH     History of echocardiogram 2011    EF 65%. Mod-marked increased wall thickness. Gr 1 DDfx. No significant valvular heart disease.     Hyperlipidemia     Hypertension     Ischemic cardiomyopathy     recovery of LV syst fct  Echo May 2011 LV EF 65%    MI (myocardial infarction) ([facility] [address]     Obesity     RBBB (right bundle branch block with left anterior fascicular block)     S/P cardiac cath 8/26/2010    LM patent. dLAD 45%. CX mild. dRCA 100% thrombotic (S/P cath thrombectomy & Vision 4 x 18-mm BMS). EF 40%. Posterobasal, diaphrag, apical hypk. Shingles 4/2012    Tobacco use disorder, continuous         Past Surgical History:   Procedure Laterality Date    COLONOSCOPY N/A 4/23/2019    COLONOSCOPY performed by Queen Julien MD at [facility]    HX CATARACT REMOVAL Bilateral     HX HEART CATHETERIZATION  08/26/10    Stent    HX HEART CATHETERIZATION  2019    Stent    HX HEMORRHOIDECTOMY      HX ROTATOR CUFF REPAIR Left     HX TRABECULECTOMY      IR BX BONE MARROW DIAGNOSTIC  9/10/2021       Social History     Tobacco Use    Smoking status: Every Day     Packs/day: 0.25     Years: 44.00     Pack years: 11.00     Types: Cigarettes    Smokeless tobacco: Never    Tobacco comments:     pack last for a couple of weeks   Substance Use Topics    Alcohol use: No        Family History   Problem Relation Age of Onset    Diabetes Mother         No Known Allergies     Prior to Admission Medications   Prescriptions Last Dose Informant Patient Reported? Taking?   atorvastatin (LIPITOR) 40 mg tablet   No No   Sig: Take 1 Tablet by mouth daily. carvediloL (COREG) 25 mg tablet   No No   Sig: Take 1 Tab by mouth two (2) times daily (with meals). cholecalciferol (VITAMIN D3) (50,000 UNITS /1250 MCG) capsule   Yes No   Sig: Take  by mouth every seven (7) days. cholecalciferol (Vitamin D3) (1000 Units /25 mcg) tablet   Yes No   Sig: Take 1,000 Units by mouth daily. clopidogreL (PLAVIX) 75 mg tab   No No   Sig: take 1 tablet by mouth once daily   Patient not taking: Reported on 5/2/2022   doxazosin (CARDURA) 2 mg tablet   No No   Sig: Take 1 Tablet by mouth every evening. dutasteride (AVODART) 0.5 mg capsule   Yes No   Sig: Take 0.5 mg by mouth daily. hydrALAZINE (APRESOLINE) 50 mg tablet   Yes No   Sig: Take 25 mg by mouth three (3) times daily. insulin glargine (Lantus Solostar U-100 Insulin) 100 unit/mL (3 mL) inpn   No No   Si Units by SubCUTAneous route daily. iron sucrose (VENOFER) 100 mg iron/5 mL injection   Yes No   Si mg by IntraVENous route once. pantoprazole (PROTONIX) 40 mg tablet   Yes No   Sig: Take 40 mg by mouth daily. Facility-Administered Medications: None       REVIEW OF SYSTEMS:    Review of Systems  GENERAL: Patient alert, awake and oriented times 3, able to communicate full sentences and not in distress. HEENT: No change in vision, no earache, tinnitus, sore throat or sinus congestion. NECK: No pain or stiffness. PULMONARY: No shortness of breath, cough or wheeze. Cardiovascular: no pnd or orthopnea, patient did have some chest pain earlier today  GASTROINTESTINAL: No abdominal pain, nausea, vomiting or diarrhea, melena or bright red blood per rectum. GENITOURINARY: No urinary frequency, urgency, hesitancy or dysuria. MUSCULOSKELETAL: No back pain, no leg pain  DERMATOLOGIC: No rash, no itching, no lesions. ENDOCRINE: No polyuria, polydipsia, no heat or cold intolerance. No recent change in weight. HEMATOLOGICAL: No anemia or easy bruising or bleeding. NEUROLOGIC: No headache, seizures, numbness, tingling or weakness. Objective:   VITALS:    Visit Vitals  BP (!) 156/59   Pulse 79   Temp 97.7 °F (36.5 °C)   Resp 22   Ht 5' 6\" (1.676 m)   Wt 85.3 kg (188 lb)   SpO2 98%   BMI 30.34 kg/m²     Temp (24hrs), Av.7 °F (36.5 °C), Min:97.7 °F (36.5 °C), Max:97.7 °F (36.5 °C)      PHYSICAL EXAM:   General:    Lying in bed in no acute distress. HEENT:  Pupils equal.  Sclera anicteric. Conjunctiva pink. Mucous membranes                           Moist, no ear or nasal discharge  Neck:  Supple. Trachea midline. No accessory muscle use. No thyromegaly.                            No jugular venous distention, no carotid bruit  CV:                  Regular rate and rhythm. S1S2+  Lungs:   Clear to auscultation bilaterally. No Wheezing or Rhonchi. No rales. Abdomen:   Soft, non-tender. Not distended. Bowel sounds normal.   Extremities: No cyanosis. No edema. Pulses 1+ b/l  Neurologic: Alert and oriented X 3. Follows commands, responds appropriately. No focal neurological deficit was noted  Skin:                Warm and dry. No rashes. LAB DATA REVIEWED:    No components found for: [place]  Recent Labs     08/06/22  1254      K 2.9*      CO2 31   BUN 17   CREA 2.78*   *   CA 7.3*   ALB 2.8*   WBC 8.9   HGB 11.0*   HCT 31.6*            CBC WITH AUTOMATED DIFF    Collection Time: 08/06/22 12:54 PM   Result Value Ref Range    WBC 8.9 4.6 - 13.2 K/uL    RBC 4.02 (L) 4.35 - 5.65 M/uL    HGB 11.0 (L) 13.0 - 16.0 g/dL    HCT 31.6 (L) 36.0 - 48.0 %    MCV 78.6 78.0 - 100.0 FL    MCH 27.4 24.0 - 34.0 PG    MCHC 34.8 31.0 - 37.0 g/dL    RDW 13.8 11.6 - 14.5 %    PLATELET 811 887 - 989 K/uL    MPV 10.7 9.2 - 11.8 FL    NRBC 0.0 0  WBC    ABSOLUTE NRBC 0.00 0.00 - 0.01 K/uL    NEUTROPHILS 71 40 - 73 %    LYMPHOCYTES 15 (L) 21 - 52 %    MONOCYTES 8 3 - 10 %    EOSINOPHILS 5 0 - 5 %    BASOPHILS 0 0 - 2 %    IMMATURE GRANULOCYTES 0 0.0 - 0.5 %    ABS. NEUTROPHILS 6.3 1.8 - 8.0 K/UL    ABS. LYMPHOCYTES 1.4 0.9 - 3.6 K/UL    ABS. MONOCYTES 0.8 0.05 - 1.2 K/UL    ABS. EOSINOPHILS 0.4 0.0 - 0.4 K/UL    ABS. BASOPHILS 0.0 0.0 - 0.1 K/UL    ABS. IMM. GRANS. 0.0 0.00 - 0.04 K/UL    DF AUTOMATED                 Assessment/Plan:      Active Problems:    [facility] (4/20/2020)      Acute coronary syndrome ([facility] (8/6/2022)    Hypertension  Dyslipidemia  Hypokalemia  End-stage renal disease  Ongoing tobacco abuse  ___________________________________________________  PLAN:    Admit to telemetry.   Trend troponins  Cardiology has been consulted in the ED  Continue aspirin and statin. Add low-dose beta-blocker. Check echocardiogram  Monitor blood pressure. Continue hydralazine and doxazosin  Continue Avodart  Sliding scale insulin, check HbA1c  Replete potassium. Nephrology consult for hemodialysis management. Next hemodialysis will be Tuesday. Patient was hemodialyzed earlier today. I discussed plan of care with the patient and he verbalized understanding and agreed.   I also discussed level of care and patient wishes to be a full code          Prophylaxis:  []Lovenox  []Coumadin  [x]Hep SQ  []SCDs  []H2B/PPI    Discussed Code Status:    [x]Full Code      []DNR     ___________________________________________________    Care Plan discussed with:    [x]Patient   []Family    []ED Care Manager  []ED Doc   []Specialist :    Total Time Coordinating Admission: 70  minutes    []Total Critical Care Time:     ___________________________________________________  Admitting Physician: Adilson Danielson MD

## 2022-08-06 NOTE — Clinical Note
TRANSFER - OUT REPORT:     Verbal report given to: Vik Lawler RN. Report consisted of patient's Situation, Background, Assessment and   Recommendations(SBAR). Opportunity for questions and clarification was provided. Patient transported with a Registered Nurse. Patient transported to: holding area.

## 2022-08-06 NOTE — ED TRIAGE NOTES
Assumed care of pt in rm 8. Pt here for CP. Received Asprin and Nitro per EMS. Pt is A Ox 4, lung sounds clear, abd round soft non tender skin intact. 18 ga PIV pre hospital to Left hand, labs and EKG obtained. Dr. Yordan Wahl signed EKG no stemi at this time. Concerning EKG- MD consulted cardiology.

## 2022-08-06 NOTE — Clinical Note
TRANSFER - IN REPORT:     Verbal report received from: [facility], RN. Report consisted of patient's Situation, Background, Assessment and   Recommendations(SBAR). Opportunity for questions and clarification was provided. Assessment completed upon patient's arrival to unit and care assumed. Patient transported with a Registered Nurse.

## 2022-08-06 NOTE — ED PROVIDER NOTES
EMERGENCY DEPARTMENT HISTORY AND PHYSICAL EXAM    1:00 PM      Date: 8/6/2022  Patient Name: Phoenix Sevilla. History of Presenting Illness     No chief complaint on file. History Provided By: Patient  Location/Duration/Severity/Modifying factors   The patient is a 69-year-old male with a history of prior MI x2 status post stents, diabetes hypertension hyperlipidemia, end-stage renal disease on dialysis presenting due to chest pain. Patient states that after having his dialysis this morning he ate a [employer] sandwich and shortly afterwards started to have some chest pain in his left upper chest.  Patient describes the pain as indigestion when asked if it was similar to his prior heart attacks he says no but this feels worse. Symptoms continued through the morning so patient called EMS. EMS states that they gave patient 324 of aspirin and sublingual nitrogen which led to decrease the patient's pain from 7 down to 4. Upon evaluation in the emergency department patient looks comfortable in no acute distress. Patient denies any infectious symptoms prior to this episode, patient also denies any cough or shortness of breath. PCP: Stacey Arita MD    Current Outpatient Medications   Medication Sig Dispense Refill    cholecalciferol (VITAMIN D3) (50,000 UNITS /1250 MCG) capsule Take  by mouth every seven (7) days. iron sucrose (VENOFER) 100 mg iron/5 mL injection 100 mg by IntraVENous route once. atorvastatin (LIPITOR) 40 mg tablet Take 1 Tablet by mouth daily. 30 Tablet 5    doxazosin (CARDURA) 2 mg tablet Take 1 Tablet by mouth every evening. 90 Tablet 1    clopidogreL (PLAVIX) 75 mg tab take 1 tablet by mouth once daily (Patient not taking: Reported on 5/2/2022) 90 Tablet 3    hydrALAZINE (APRESOLINE) 50 mg tablet Take 25 mg by mouth three (3) times daily. insulin glargine (Lantus Solostar U-100 Insulin) 100 unit/mL (3 mL) inpn 30 Units by SubCUTAneous route daily.  1 Pen 0    carvediloL (COREG) 25 mg tablet Take 1 Tab by mouth two (2) times daily (with meals). 60 Tab 0    cholecalciferol (Vitamin D3) (1000 Units /25 mcg) tablet Take 1,000 Units by mouth daily. dutasteride (AVODART) 0.5 mg capsule Take 0.5 mg by mouth daily. pantoprazole (PROTONIX) 40 mg tablet Take 40 mg by mouth daily. Past History     Past Medical History:  Past Medical History:   Diagnosis Date    ACS (acute coronary syndrome) ([facility] [address]     CAD (coronary artery disease), native coronary artery August 2010    s/p non-ST-elevation myocardial infarction, s/p PCI with BMS (Vision 4x18mm) distal RCA with 45% distal LAD  and mild LCx disease. mild-moderate LV systolic dysfunction (02/98). Chronic kidney disease     Diabetes mellitus type II     IDDM    Dyslipidemia     ED (erectile dysfunction)     Edema of both lower legs 7/12/2021    GERD (gastroesophageal reflux disease)     Heart failure (HCC)     History of BPH     History of echocardiogram 5/18/2011    EF 65%. Mod-marked increased wall thickness. Gr 1 DDfx. No significant valvular heart disease. Hyperlipidemia     Hypertension     Ischemic cardiomyopathy     recovery of LV syst fct  Echo May 2011 LV EF 65%    MI (myocardial infarction) ([facility] [address]     Obesity     RBBB (right bundle branch block with left anterior fascicular block)     S/P cardiac cath 8/26/2010    LM patent. dLAD 45%. CX mild. dRCA 100% thrombotic (S/P cath thrombectomy & Vision 4 x 18-mm BMS). EF 40%. Posterobasal, diaphrag, apical hypk.       Shingles 4/2012    Tobacco use disorder, continuous        Past Surgical History:  Past Surgical History:   Procedure Laterality Date    COLONOSCOPY N/A 4/23/2019    COLONOSCOPY performed by Yary Kraus MD at [facility] ENDOSCOPY    HX CATARACT REMOVAL Bilateral     HX HEART CATHETERIZATION  08/26/10    Stent    HX HEART CATHETERIZATION  2019    Stent    HX HEMORRHOIDECTOMY      HX ROTATOR CUFF REPAIR Left     HX TRABECULECTOMY      IR BX BONE MARROW DIAGNOSTIC  9/10/2021       Family History:  Family History   Problem Relation Age of Onset    Diabetes Mother        Social History:  Social History     Tobacco Use    Smoking status: Every Day     Packs/day: 0.25     Years: 44.00     Pack years: 11.00     Types: Cigarettes    Smokeless tobacco: Never    Tobacco comments:     pack last for a couple of weeks   Vaping Use    Vaping Use: Never used   Substance Use Topics    Alcohol use: No    Drug use: Never       Allergies:  No Known Allergies      Review of Systems       Review of Systems   Constitutional:  Negative for activity change, fatigue and fever. HENT:  Negative for congestion and rhinorrhea. Eyes:  Negative for visual disturbance. Respiratory:  Negative for shortness of breath. Cardiovascular:  Positive for chest pain. Negative for palpitations. Gastrointestinal:  Negative for abdominal pain, diarrhea, nausea and vomiting. Genitourinary:  Negative for dysuria and hematuria. Musculoskeletal:  Negative for back pain. Skin:  Negative for rash. Neurological:  Negative for dizziness, weakness and light-headedness. All other systems reviewed and are negative. Physical Exam   There were no vitals taken for this visit. Physical Exam  Vitals and nursing note reviewed. Constitutional:       General: He is not in acute distress. Appearance: Normal appearance. He is well-developed. He is not ill-appearing. HENT:      Head: Normocephalic and atraumatic. Eyes:      Extraocular Movements: Extraocular movements intact. Conjunctiva/sclera: Conjunctivae normal.   Cardiovascular:      Rate and Rhythm: Normal rate. Rhythm irregular. Heart sounds: Normal heart sounds. No murmur heard. No friction rub. No gallop. Pulmonary:      Effort: Pulmonary effort is normal.      Breath sounds: Normal breath sounds. Chest:      Chest wall: No tenderness. Abdominal:      General: Bowel sounds are normal. There is no distension. Palpations: Abdomen is soft. Tenderness: There is no abdominal tenderness. Musculoskeletal:         General: No tenderness. Normal range of motion. Cervical back: Normal range of motion. Skin:     General: Skin is warm and dry. Neurological:      General: No focal deficit present. Mental Status: He is alert and oriented to person, place, and time. Coordination: Coordination normal.   Psychiatric:         Behavior: Behavior normal.         Thought Content: Thought content normal.         Judgment: Judgment normal.         Diagnostic Study Results     Labs -  Recent Results (from the past 12 hour(s))   EKG, 12 LEAD, INITIAL    Collection Time: 08/06/22 12:44 PM   Result Value Ref Range    Ventricular Rate 93 BPM    Atrial Rate 44 BPM    QRS Duration 174 ms    Q-T Interval 420 ms    QTC Calculation (Bezet) 522 ms    Calculated R Axis -89 degrees    Calculated T Axis 81 degrees    Diagnosis       Undetermined rhythm  Left axis deviation  Right bundle branch block  Left ventricular hypertrophy with repolarization abnormality ( R in aVL ,   Romhilt-Toussaint )  Cannot rule out Septal infarct (cited on or before 06-AUG-2022)  Abnormal ECG  When compared with ECG of 09-AUG-2021 09:34,  Current undetermined rhythm precludes rhythm comparison, needs review  Serial changes of Septal infarct present         Radiologic Studies -   XR CHEST PORT    (Results Pending)         Medical Decision Making   I am the first provider for this patient. I reviewed the vital signs, available nursing notes, past medical history, past surgical history, family history and social history. Vital Signs-Reviewed the patient's vital signs. EKG:     Records Reviewed: Nursing Notes (Time of Review: 1:00 PM)    ED Course: Progress Notes, Reevaluation, and Consults:         Provider Notes (Medical Decision Making):   MDM  Number of Diagnoses or Management Options  Diagnosis management comments:  The patient is a 44-year-old male with a history of prior MI x2 status post stents, diabetes hypertension hyperlipidemia, end-stage renal disease on dialysis presenting due to chest pain. Differential includes ACS, angina, arrhythmia, pneumonia, musculoskeletal pain. Patient is very high risk for cardiac pathology so possible admission for further cardiac work-up. Discussed with CBC BMP troponin EKG chest x-ray. Patient with a heart score of 6 before troponin so has a moderate risk for major adverse cardiac event. Procedures    Critical Care Time: none      Diagnosis     Clinical Impression: No diagnosis found. Disposition: Pending results but possible admission    Follow-up Information    None          Patient's Medications   Start Taking    No medications on file   Continue Taking    ATORVASTATIN (LIPITOR) 40 MG TABLET    Take 1 Tablet by mouth daily. CARVEDILOL (COREG) 25 MG TABLET    Take 1 Tab by mouth two (2) times daily (with meals). CHOLECALCIFEROL (VITAMIN D3) (1000 UNITS /25 MCG) TABLET    Take 1,000 Units by mouth daily. CHOLECALCIFEROL (VITAMIN D3) (50,000 UNITS /1250 MCG) CAPSULE    Take  by mouth every seven (7) days. CLOPIDOGREL (PLAVIX) 75 MG TAB    take 1 tablet by mouth once daily    DOXAZOSIN (CARDURA) 2 MG TABLET    Take 1 Tablet by mouth every evening. DUTASTERIDE (AVODART) 0.5 MG CAPSULE    Take 0.5 mg by mouth daily. HYDRALAZINE (APRESOLINE) 50 MG TABLET    Take 25 mg by mouth three (3) times daily. INSULIN GLARGINE (LANTUS SOLOSTAR U-100 INSULIN) 100 UNIT/ML (3 ML) INPN    30 Units by SubCUTAneous route daily. IRON SUCROSE (VENOFER) 100 MG IRON/5 ML INJECTION    100 mg by IntraVENous route once. PANTOPRAZOLE (PROTONIX) 40 MG TABLET    Take 40 mg by mouth daily. These Medications have changed    No medications on file   Stop Taking    No medications on file     Disclaimer: Sections of this note are dictated using utilizing voice recognition software.   Minor typographical errors may be present. If questions arise, please do not hesitate to contact me or call our department.

## 2022-08-07 ENCOUNTER — APPOINTMENT (OUTPATIENT)
Dept: NON INVASIVE DIAGNOSTICS | Age: 77
DRG: 280 | End: 2022-08-07
Attending: EMERGENCY MEDICINE
Payer: MEDICARE

## 2022-08-07 LAB
ANION GAP SERPL CALC-SCNC: 6 MMOL/L (ref 3–18)
APTT PPP: 116.8 SEC (ref 23–36.4)
APTT PPP: 135.4 SEC (ref 23–36.4)
APTT PPP: 65.8 SEC (ref 23–36.4)
BASOPHILS # BLD: 0 K/UL (ref 0–0.1)
BASOPHILS NFR BLD: 0 % (ref 0–2)
BUN SERPL-MCNC: 28 MG/DL (ref 7–18)
BUN/CREAT SERPL: 7 (ref 12–20)
CALCIUM SERPL-MCNC: 8.6 MG/DL (ref 8.5–10.1)
CHLORIDE SERPL-SCNC: 103 MMOL/L (ref 100–111)
CO2 SERPL-SCNC: 31 MMOL/L (ref 21–32)
CREAT SERPL-MCNC: 3.91 MG/DL (ref 0.6–1.3)
DIFFERENTIAL METHOD BLD: ABNORMAL
ECHO AO ROOT DIAM: 3.6 CM
ECHO AO ROOT INDEX: 1.85 CM/M2
ECHO AV AREA PEAK VELOCITY: 2.2 CM2
ECHO AV AREA PEAK VELOCITY: 2.3 CM2
ECHO AV AREA VTI: 2.7 CM2
ECHO AV AREA/BSA VTI: 1.4 CM2/M2
ECHO AV MEAN GRADIENT: 7 MMHG
ECHO AV MEAN VELOCITY: 1.2 M/S
ECHO AV PEAK GRADIENT: 13 MMHG
ECHO AV PEAK GRADIENT: 15 MMHG
ECHO AV PEAK VELOCITY: 1.8 M/S
ECHO AV PEAK VELOCITY: 1.9 M/S
ECHO AV VTI: 36.2 CM
ECHO EST RA PRESSURE: 3 MMHG
ECHO LA VOL 2C: 78 ML (ref 18–58)
ECHO LA VOL 4C: 54 ML (ref 18–58)
ECHO LA VOLUME AREA LENGTH: 69 ML
ECHO LA VOLUME INDEX A2C: 40 ML/M2 (ref 16–34)
ECHO LA VOLUME INDEX A4C: 28 ML/M2 (ref 16–34)
ECHO LA VOLUME INDEX AREA LENGTH: 35 ML/M2 (ref 16–34)
ECHO LV E' LATERAL VELOCITY: 8 CM/S
ECHO LV E' SEPTAL VELOCITY: 8 CM/S
ECHO LV FRACTIONAL SHORTENING: 35 % (ref 28–44)
ECHO LV INTERNAL DIMENSION DIASTOLE INDEX: 2.05 CM/M2
ECHO LV INTERNAL DIMENSION DIASTOLIC: 4 CM (ref 4.2–5.9)
ECHO LV INTERNAL DIMENSION SYSTOLIC INDEX: 1.33 CM/M2
ECHO LV INTERNAL DIMENSION SYSTOLIC: 2.6 CM
ECHO LV IVSD: 2.4 CM (ref 0.6–1)
ECHO LV MASS 2D: 440.5 G (ref 88–224)
ECHO LV MASS INDEX 2D: 225.9 G/M2 (ref 49–115)
ECHO LV POSTERIOR WALL DIASTOLIC: 2 CM (ref 0.6–1)
ECHO LV RELATIVE WALL THICKNESS RATIO: 1
ECHO LVOT AREA: 3.1 CM2
ECHO LVOT AV VTI INDEX: 0.85
ECHO LVOT DIAM: 2 CM
ECHO LVOT MEAN GRADIENT: 4 MMHG
ECHO LVOT PEAK GRADIENT: 7 MMHG
ECHO LVOT PEAK VELOCITY: 1.4 M/S
ECHO LVOT STROKE VOLUME INDEX: 49.6 ML/M2
ECHO LVOT SV: 96.7 ML
ECHO LVOT VTI: 30.8 CM
ECHO MV A VELOCITY: 1.23 M/S
ECHO MV E DECELERATION TIME (DT): 171.6 MS
ECHO MV E VELOCITY: 0.94 M/S
ECHO MV E/A RATIO: 0.76
ECHO MV E/E' LATERAL: 11.75
ECHO MV E/E' RATIO (AVERAGED): 11.75
ECHO MV E/E' SEPTAL: 11.75
ECHO RIGHT VENTRICULAR SYSTOLIC PRESSURE (RVSP): 9 MMHG
ECHO RV TAPSE: 2.3 CM (ref 1.7–?)
ECHO TV REGURGITANT MAX VELOCITY: 1.26 M/S
ECHO TV REGURGITANT PEAK GRADIENT: 6 MMHG
EOSINOPHIL # BLD: 0.5 K/UL (ref 0–0.4)
EOSINOPHIL NFR BLD: 6 % (ref 0–5)
ERYTHROCYTE [DISTWIDTH] IN BLOOD BY AUTOMATED COUNT: 13.8 % (ref 11.6–14.5)
GLUCOSE BLD STRIP.AUTO-MCNC: 163 MG/DL (ref 70–110)
GLUCOSE BLD STRIP.AUTO-MCNC: 208 MG/DL (ref 70–110)
GLUCOSE BLD STRIP.AUTO-MCNC: 247 MG/DL (ref 70–110)
GLUCOSE BLD STRIP.AUTO-MCNC: 94 MG/DL (ref 70–110)
GLUCOSE SERPL-MCNC: 71 MG/DL (ref 74–99)
HCT VFR BLD AUTO: 31.3 % (ref 36–48)
HGB BLD-MCNC: 10.5 G/DL (ref 13–16)
IMM GRANULOCYTES # BLD AUTO: 0 K/UL (ref 0–0.04)
IMM GRANULOCYTES NFR BLD AUTO: 0 % (ref 0–0.5)
LYMPHOCYTES # BLD: 2 K/UL (ref 0.9–3.6)
LYMPHOCYTES NFR BLD: 26 % (ref 21–52)
MAGNESIUM SERPL-MCNC: 1.9 MG/DL (ref 1.6–2.6)
MCH RBC QN AUTO: 26.9 PG (ref 24–34)
MCHC RBC AUTO-ENTMCNC: 33.5 G/DL (ref 31–37)
MCV RBC AUTO: 80.3 FL (ref 78–100)
MONOCYTES # BLD: 0.7 K/UL (ref 0.05–1.2)
MONOCYTES NFR BLD: 9 % (ref 3–10)
NEUTS SEG # BLD: 4.4 K/UL (ref 1.8–8)
NEUTS SEG NFR BLD: 58 % (ref 40–73)
NRBC # BLD: 0 K/UL (ref 0–0.01)
NRBC BLD-RTO: 0 PER 100 WBC
PLATELET # BLD AUTO: 171 K/UL (ref 135–420)
PMV BLD AUTO: 11.4 FL (ref 9.2–11.8)
POTASSIUM SERPL-SCNC: 3.6 MMOL/L (ref 3.5–5.5)
RBC # BLD AUTO: 3.9 M/UL (ref 4.35–5.65)
SODIUM SERPL-SCNC: 140 MMOL/L (ref 136–145)
TROPONIN-HIGH SENSITIVITY: 1472 NG/L (ref 0–78)
WBC # BLD AUTO: 7.6 K/UL (ref 4.6–13.2)

## 2022-08-07 PROCEDURE — 2709999900 HC NON-CHARGEABLE SUPPLY

## 2022-08-07 PROCEDURE — 74011250636 HC RX REV CODE- 250/636: Performed by: INTERNAL MEDICINE

## 2022-08-07 PROCEDURE — 83735 ASSAY OF MAGNESIUM: CPT

## 2022-08-07 PROCEDURE — 99222 1ST HOSP IP/OBS MODERATE 55: CPT | Performed by: INTERNAL MEDICINE

## 2022-08-07 PROCEDURE — 74011250637 HC RX REV CODE- 250/637: Performed by: HOSPITALIST

## 2022-08-07 PROCEDURE — 74011636637 HC RX REV CODE- 636/637: Performed by: EMERGENCY MEDICINE

## 2022-08-07 PROCEDURE — 85025 COMPLETE CBC W/AUTO DIFF WBC: CPT

## 2022-08-07 PROCEDURE — 74011000250 HC RX REV CODE- 250: Performed by: EMERGENCY MEDICINE

## 2022-08-07 PROCEDURE — 36415 COLL VENOUS BLD VENIPUNCTURE: CPT

## 2022-08-07 PROCEDURE — APPNB45 APP NON BILLABLE 31-45 MINUTES: Performed by: NURSE PRACTITIONER

## 2022-08-07 PROCEDURE — 74011250637 HC RX REV CODE- 250/637: Performed by: EMERGENCY MEDICINE

## 2022-08-07 PROCEDURE — 82962 GLUCOSE BLOOD TEST: CPT

## 2022-08-07 PROCEDURE — 85730 THROMBOPLASTIN TIME PARTIAL: CPT

## 2022-08-07 PROCEDURE — 84484 ASSAY OF TROPONIN QUANT: CPT

## 2022-08-07 PROCEDURE — 99232 SBSQ HOSP IP/OBS MODERATE 35: CPT | Performed by: HOSPITALIST

## 2022-08-07 PROCEDURE — 65270000046 HC RM TELEMETRY

## 2022-08-07 PROCEDURE — 93306 TTE W/DOPPLER COMPLETE: CPT

## 2022-08-07 PROCEDURE — 80048 BASIC METABOLIC PNL TOTAL CA: CPT

## 2022-08-07 RX ORDER — CARVEDILOL 12.5 MG/1
12.5 TABLET ORAL
Status: COMPLETED | OUTPATIENT
Start: 2022-08-07 | End: 2022-08-07

## 2022-08-07 RX ORDER — SIMVASTATIN 80 MG/1
80 TABLET, FILM COATED ORAL
COMMUNITY

## 2022-08-07 RX ORDER — CARVEDILOL 12.5 MG/1
12.5 TABLET ORAL 2 TIMES DAILY WITH MEALS
Status: DISCONTINUED | OUTPATIENT
Start: 2022-08-07 | End: 2022-08-07

## 2022-08-07 RX ORDER — HYDRALAZINE HYDROCHLORIDE 20 MG/ML
10 INJECTION INTRAMUSCULAR; INTRAVENOUS
Status: DISCONTINUED | OUTPATIENT
Start: 2022-08-07 | End: 2022-08-08 | Stop reason: HOSPADM

## 2022-08-07 RX ORDER — CARVEDILOL 25 MG/1
25 TABLET ORAL EVERY 12 HOURS
Status: DISCONTINUED | OUTPATIENT
Start: 2022-08-07 | End: 2022-08-08 | Stop reason: HOSPADM

## 2022-08-07 RX ADMIN — CHOLECALCIFEROL TAB 25 MCG (1000 UNIT) 1000 UNITS: 25 TAB at 09:08

## 2022-08-07 RX ADMIN — ACETAMINOPHEN 650 MG: 325 TABLET, FILM COATED ORAL at 00:18

## 2022-08-07 RX ADMIN — HYDRALAZINE HYDROCHLORIDE 100 MG: 25 TABLET, FILM COATED ORAL at 16:45

## 2022-08-07 RX ADMIN — Medication 4 UNITS: at 00:23

## 2022-08-07 RX ADMIN — HEPARIN SODIUM 11.7 UNITS/KG/HR: 10000 INJECTION, SOLUTION INTRAVENOUS at 23:38

## 2022-08-07 RX ADMIN — SODIUM CHLORIDE, PRESERVATIVE FREE 10 ML: 5 INJECTION INTRAVENOUS at 23:17

## 2022-08-07 RX ADMIN — ATORVASTATIN CALCIUM 40 MG: 40 TABLET, FILM COATED ORAL at 23:09

## 2022-08-07 RX ADMIN — DUTASTERIDE 0.5 MG: 0.5 CAPSULE, LIQUID FILLED ORAL at 09:11

## 2022-08-07 RX ADMIN — DOXAZOSIN 2 MG: 4 TABLET ORAL at 16:45

## 2022-08-07 RX ADMIN — HYDRALAZINE HYDROCHLORIDE 100 MG: 25 TABLET, FILM COATED ORAL at 09:08

## 2022-08-07 RX ADMIN — CARVEDILOL 12.5 MG: 12.5 TABLET, FILM COATED ORAL at 16:46

## 2022-08-07 RX ADMIN — PANTOPRAZOLE SODIUM 40 MG: 40 TABLET, DELAYED RELEASE ORAL at 09:08

## 2022-08-07 RX ADMIN — ASPIRIN 81 MG CHEWABLE TABLET 81 MG: 81 TABLET CHEWABLE at 09:08

## 2022-08-07 RX ADMIN — CARVEDILOL 3.12 MG: 6.25 TABLET, FILM COATED ORAL at 09:08

## 2022-08-07 RX ADMIN — SODIUM CHLORIDE, PRESERVATIVE FREE 10 ML: 5 INJECTION INTRAVENOUS at 00:22

## 2022-08-07 RX ADMIN — HYDRALAZINE HYDROCHLORIDE 100 MG: 25 TABLET, FILM COATED ORAL at 23:09

## 2022-08-07 RX ADMIN — HYDRALAZINE HYDROCHLORIDE 100 MG: 25 TABLET, FILM COATED ORAL at 00:18

## 2022-08-07 RX ADMIN — ATORVASTATIN CALCIUM 40 MG: 40 TABLET, FILM COATED ORAL at 00:18

## 2022-08-07 RX ADMIN — Medication 4 UNITS: at 18:51

## 2022-08-07 RX ADMIN — Medication 2 UNITS: at 22:30

## 2022-08-07 NOTE — CONSULTS
Cardiology Initial Patient Referral Note    Cardiology referral request from Dr. Socorro Manrique for evaluation and management/treatment of NSTEMI    Date of  Admission: 8/6/2022 12:46 PM   Primary Care Physician:  Augusta ePrez MD    Attending Cardiologist: Dr. Dacia Max  Patient seen and examined independently. This patient with known CAD and prior catheter intervention in 2010 developed left precordial discomfort shortly after his dialysis treatment yesterday. He was in [facility] and  believed he was having indigestion and took his pantoprazole. He is uncertain how long the discomfort lasted but it was likely less than 1 hour. The PRS was summoned. Cardiac biomarkers are as noted below. Twelve-lead ECG demonstrates sinus rhythm with frequent PACs, right bundle branch block, left axis deviation. Patient is anticoagulated with continuous IV infusion of heparin. He is on carvedilol for beta-blockade. Plan is for cardiac catheterization to be done in tomorrow or sooner if clinical situation dictates. Agree with assessment and plan as noted below. Mary Mancini MD     Assessment:     Hospital Problems  Date Reviewed: 5/2/2022            Codes Class Noted POA    Acute coronary syndrome [facility]) ICD-10-CM: I24.9  ICD-9-CM: 411.1  8/6/2022 Unknown        NSTEMI (non-ST elevated myocardial infarction) [facility]) ICD-10-CM: I21.4  ICD-9-CM: 410.70  4/20/2020 Unknown           -NSTEMI, Trop 226, 1,251, 1,472  -EKG - SR with first degree AVB  -ESRD on HD Creat 3.9  -Echo EF 60-65% (6/2020) with global   longitudinal strain -  CAD - 2 vessel disease by cath 4/2020 - s/p PTCA / stent to mid LAD, know BMS to distal RCA with moderate lesion.  -Tobacco abuse    Primary cardiologist Dr. Neliad Good:     NSTEMI  - currently pain-free  -Echo today  -Continue BB, aspirin and statin  -B/P is controlled with current medication  -Nephrology is following - patient will likely need HD following cath.   -NPO after midnight for cardiac cath in AM  -Encouraged smoking cesssation     History of Present Illness: This is a 77 y.o. male admitted for NSTEMI (non-ST elevated myocardial infarction) ([facility] [I21.4]  Acute coronary syndrome ([email or web address] [I24.9]. Mr. Celena Granados has PMH of CAD s/p MADHAVI and BMS, Hypertension, HLD, ESRD on HD, and ongoing tobacco abuse. He developed chest pressure / pain following dialysis yesterday, which he thought was indigestion. Pain was non-radiating, he denies associated shortness of breath, diaphoresis or palpitations. He was transported to ER and found to have NSTEMI. He is currently pain free and on Heparin drip. Cardiac risk factors: smoking/ tobacco exposure, dyslipidemia, diabetes mellitus, sedentary life style, male gender, hypertension      Review of Symptoms:  Except as stated above include:  Constitutional:  negative  Respiratory:  negative  Cardiovascular:  negative  Gastrointestinal: negative  Genitourinary:  negative  Musculoskeletal:  Negative  Neurological:  Negative  Dermatological:  Negative  Endocrinological: Negative  Psychological:  Negative    Pertinent items are noted in HPI. Past Medical History:     Past Medical History:   Diagnosis Date    ACS (acute coronary syndrome) ([facility]     CAD (coronary artery disease), native coronary artery August 2010    s/p non-ST-elevation myocardial infarction, s/p PCI with BMS (Vision 4x18mm) distal RCA with 45% distal LAD  and mild LCx disease. mild-moderate LV systolic dysfunction (52/20). Chronic kidney disease     Diabetes mellitus type II     IDDM    Dyslipidemia     ED (erectile dysfunction)     Edema of both lower legs 7/12/2021    GERD (gastroesophageal reflux disease)     Heart failure (HCC)     History of BPH     History of echocardiogram 5/18/2011    EF 65%. Mod-marked increased wall thickness. Gr 1 DDfx. No significant valvular heart disease.     Hyperlipidemia     Hypertension     Ischemic cardiomyopathy     recovery of LV syst fct  Echo May 2011 LV EF 65%    MI (myocardial infarction) ([facility]     Obesity     RBBB (right bundle branch block with left anterior fascicular block)     S/P cardiac cath 8/26/2010    LM patent. dLAD 45%. CX mild. dRCA 100% thrombotic (S/P cath thrombectomy & Vision 4 x 18-mm BMS). EF 40%. Posterobasal, diaphrag, apical hypk.       Shingles 4/2012    Tobacco use disorder, continuous          Social History:     Social History     Socioeconomic History    Marital status:    Tobacco Use    Smoking status: Every Day     Packs/day: 0.25     Years: 44.00     Pack years: 11.00     Types: Cigarettes    Smokeless tobacco: Never    Tobacco comments:     pack last for a couple of weeks   Vaping Use    Vaping Use: Never used   Substance and Sexual Activity    Alcohol use: No    Drug use: Never        Family History:     Family History   Problem Relation Age of Onset    Diabetes Mother         Medications:   No Known Allergies     Current Facility-Administered Medications   Medication Dose Route Frequency    atorvastatin (LIPITOR) tablet 40 mg  40 mg Oral QHS    cholecalciferol (VITAMIN D3) (1000 Units /25 mcg) tablet 1,000 Units  1,000 Units Oral DAILY    doxazosin (CARDURA) tablet 2 mg  2 mg Oral QPM    dutasteride (AVODART) capsule 0.5 mg  0.5 mg Oral DAILY    pantoprazole (PROTONIX) tablet 40 mg  40 mg Oral DAILY    hydrALAZINE (APRESOLINE) tablet 100 mg  100 mg Oral TID    aspirin chewable tablet 81 mg  81 mg Oral DAILY    carvediloL (COREG) tablet 3.125 mg  3.125 mg Oral BID WITH MEALS    sodium chloride (NS) flush 5-40 mL  5-40 mL IntraVENous Q8H    sodium chloride (NS) flush 5-40 mL  5-40 mL IntraVENous PRN    acetaminophen (TYLENOL) tablet 650 mg  650 mg Oral Q6H PRN    Or    acetaminophen (TYLENOL) suppository 650 mg  650 mg Rectal Q6H PRN    polyethylene glycol (MIRALAX) packet 17 g  17 g Oral DAILY PRN    insulin lispro (HUMALOG) injection   SubCUTAneous AC&HS    glucose chewable tablet 16 g  4 Tablet Oral PRN    glucagon (GLUCAGEN) injection 1 mg  1 mg IntraMUSCular PRN    dextrose 10% infusion 0-250 mL  0-250 mL IntraVENous PRN    heparin (porcine) 25,000 units in 0.45% saline 250 ml infusion  11.7-25 Units/kg/hr IntraVENous TITRATE         Physical Exam:   Visit Vitals  BP (!) 179/74   Pulse 62   Temp 97.5 °F (36.4 °C)   Resp 18   Ht 5' 6\" (1.676 m)   Wt 85.3 kg (188 lb)   SpO2 98%   BMI 30.34 kg/m²       TELE: normal sinus rhythm    BP Readings from Last 3 Encounters:   08/07/22 (!) 179/74   05/02/22 (!) 141/59   11/01/21 (!) 119/52     Pulse Readings from Last 3 Encounters:   08/07/22 62   05/02/22 65   11/01/21 62     Wt Readings from Last 3 Encounters:   08/06/22 85.3 kg (188 lb)   05/02/22 85.3 kg (188 lb)   11/01/21 87.5 kg (193 lb)       General:  alert, cooperative, no distress, appears stated age  Neck:  no JVD  Lungs:  clear to auscultation bilaterally  Heart:  regular rate and rhythm, S1, S2 normal, no murmur, click, rub or gallop  Abdomen:  abdomen is soft without significant tenderness, masses, organomegaly or guarding  Extremities:  extremities normal, atraumatic, no cyanosis or edema  Skin: Warm and dry.  no hyperpigmentation, vitiligo, or suspicious lesions  Neuro: alert, oriented x3, affect appropriate, no focal neurological deficits, moves all extremities well, no involuntary movements, reflexes at knee and ankle intact  Psych: non focal     Data Review:     Recent Labs     08/07/22  0303 08/06/22  2055 08/06/22  1254   WBC 7.6 7.3 8.9   HGB 10.5* 10.5* 11.0*   HCT 31.3* 30.5* 31.6*    165 193     Recent Labs     08/07/22  0303 08/06/22  1254    141   K 3.6 2.9*    105   CO2 31 31   GLU 71* 257*   BUN 28* 17   CREA 3.91* 2.78*   CA 8.6 7.3*   MG 1.9  --    ALB  --  2.8*   ALT  --  22       Results for orders placed or performed during the hospital encounter of 08/06/22   EKG, 12 LEAD, INITIAL   Result Value Ref Range    Ventricular Rate 93 BPM    Atrial Rate 44 BPM    QRS Duration 174 ms    Q-T Interval 420 ms    QTC Calculation (Bezet) 522 ms    Calculated R Axis -89 degrees    Calculated T Axis 81 degrees    Diagnosis       Normal sinus rhythm  Left axis deviation  Right bundle branch block  Left ventricular hypertrophy with repolarization abnormality  Cannot rule out Septal infarct (cited on or before 06-AUG-2022)  Abnormal ECG  When compared with ECG of 09-AUG-2021 09:34,  related related RBBB changes  Confirmed by Talitha Libman, M.D., [address] [place] [address] ([id number]) on 2022 10:06:52 PM     Results for orders placed or performed during the hospital encounter of 16   ECG HOLTER MONITOR, UP TO [facility] 70                    [address] [facility], [place], [address]                                         Test Date:    2016  Pat Name:     Mckenzie Alvarado             Department:     Patient ID:   [id number]                Room:           Gender:       Male                     Technician:     :          1945               Requested By: Dr. Vivian Staton  Order Number: [id number]                Reading MD:   Domenica Morocho MD                    Interpretive Statements  Mckenzie Alvarado, was in Sinus Rhythm with IVCD and a First Degree AV Block. The average heart rate, excluding ectopy, was 70 BPM with a minimum of 57 BPM   at  05:36D2 and a maximum of 101 BPM at   13:14D1. Heart beats, including ectopy, totaled 37535 beats. VENTRICULAR ECTOPICS totaled 58  averaging  2.5 per hour  with 58 single, 0   paired, 0 trigeminy and 0 R on T.    SUPRAVENTRICULAR ECTOPICS totaled 23  averaging  1.0 per hour  ,with 23   single and 0 paired beats. 1. Rhythm is sinus. 2. First degree AV block. Intraventricular conduction delay is present. 3. (58) single ve's.  4. (23) single sve's. 5. No symptoms in diary.         Electronically signed by Domenica Morocho MD on 2016  3:14PM CDT   Results for orders placed or performed in visit on 05/25/16   [facility] POC EKG ROUTINE W/ 12 LEADS, INTER & REP    Narrative    Sinus rhythm with first degree AV block, RBBB and LAFB       All Cardiac Markers in the last 24 hours:  No results found for: CPK, CK, CKMMB, CKMB, RCK3, CKMBT, CKNDX, CKND1, MARIBEL, TROPT, TROIQ, LALA, TROPT, TNIPOC, BNP, BNPP    Last Lipid:    Lab Results   Component Value Date/Time    Cholesterol, total 94 08/06/2022 12:54 PM    HDL Cholesterol 30 (L) 08/06/2022 12:54 PM    LDL, calculated 2 08/06/2022 12:54 PM    Triglyceride 310 (H) 08/06/2022 12:54 PM    CHOL/HDL Ratio 3.1 08/06/2022 12:54 PM       Cardiographics:     EKG Results       Procedure 720 Value Units Date/Time    EKG, 12 LEAD, INITIAL [[id number]] Collected: 08/06/22 1244    Order Status: Completed Updated: 08/06/22 2207     Ventricular Rate 93 BPM      Atrial Rate 44 BPM      QRS Duration 174 ms      Q-T Interval 420 ms      QTC Calculation (Bezet) 522 ms      Calculated R Axis -89 degrees      Calculated T Axis 81 degrees      Diagnosis --     Normal sinus rhythm  Left axis deviation  Right bundle branch block  Left ventricular hypertrophy with repolarization abnormality  Cannot rule out Septal infarct (cited on or before 06-AUG-2022)  Abnormal ECG  When compared with ECG of 09-AUG-2021 09:34,  related related RBBB changes  Confirmed by Sita David M.D., [facility] ([id number]) on 8/6/2022 10:06:52 PM            06/01/20    ECHO ADULT FOLLOW-UP OR LIMITED 06/03/2020 6/3/2020    Interpretation Summary  · Normal cavity size and systolic function (ejection fraction normal). Severe concentric hypertrophy. Estimated left ventricular ejection fraction is 60 - 65%. Visually measured ejection fraction. No regional wall motion abnormality noted. Normal left ventricular strain. · Global longitudinal strain is -20.90%  · Tricuspid regurgitation is inadequate for estimation of right ventricular systolic pressure.     Signed by: Modesto Wright MD on 6/3/2020 11:32 AM      04/20/20    NUCLEAR CARDIAC STRESS TEST 04/22/2020 4/22/2020    Interpretation Summary  · Baseline ECG: Sinus bradycardia, right bundle branch blockLeft anterior bifascicular block Left ventricular hypertrophy with wide QRS widening Cannot rule out septal infarct, age undetermined T wave abnormality, consider inferolateral ischemia . With 1st degree AV block  · Gated SPECT: Left ventricular function post-stress was abnormal. Calculated ejection fraction is 42%. There is no evidence of transient ischemic dilation (TID). The TID ratio is 1.07.  · Inconclusive stress test.  · Left ventricular perfusion is abnormal.  · Myocardial perfusion imaging defect 1: There is a defect that is moderate to large in size with a moderate reduction in uptake present in the mid-apical anterior and anteroseptal location(s) that is predominantly reversible. There is abnormal wall motion in the defect area. Viability in the area is good. The defect appears to be infarction with maria e-infarct ischemia. Perfusion defect was visually present without quantitative evidence. · Myocardial perfusion imaging defect 2: There is a defect that is small in size with a mild reduction in uptake affecting the apical to mid inferior location(s) that is predominantly reversible. There is normal wall motion in the defect area. Viability in the area is good. The defect appears to be ischemia. Perfusion defect was visually present without quantitative evidence. · Myocardial perfusion imaging defect 1: caused by soft tissue. · Positive myocardial perfusion imaging. Myocardial perfusion imaging supports a high risk stress test.    Signed by: Bety Zuniga MD on 4/22/2020  2:53 PM    04/20/20    CARDIAC PROCEDURE 04/23/2020 4/23/2020    Conclusion  Double vessel coronary artery disease. S/p ptca/stent to mid LAD. Known BMS to distal RCA with moderate lesion. Recommend intense risk factor modification.     Signed by: Gabbie Quiñones MD on 4/23/2020  4:38 PM      XR Results (most recent):  [facility]    XR CHEST PORT    Narrative  CHEST PORTABLE 1304 hours    COMPARISON: August 2021. INDICATIONS: Chest pain. FINDINGS:    Portable single view chest demonstrates:    Lungs: Clear. Cardiac Silhouette And Mediastinal Contours: Normal.    Pleural Spaces: No pneumothorax or pleural effusion evident. Bones And Soft Tissues: Unremarkable for age. Impression  No active or acute cardiopulmonary process is evident.         Signed By: Flo Lara NP     August 7, 2022

## 2022-08-07 NOTE — PROGRESS NOTES
[facility]omy called to confirm next PTT time. Recent results disregarded d/t pt starting heparin drip after arriving onto unit. Next PTT ordered for 0545.

## 2022-08-07 NOTE — ED NOTES
Pt medicated per MAR, resting comfortable after eating dinner, on full cardiac monitor. Orders noted for admission, pt agreeable.

## 2022-08-07 NOTE — CONSULTS
Consult Note    Assessment:   - ESRD  - CP with Hx of CAD   - Anemia of CKD   - HTN     Recommendations:   - Continue HD TTS , Volume status looks good   - Cardiology following on Heparin gtt and planned for heart cath in am   - EPO with HD   - Renal diet   - Follow labs       Consult requested by: Giancarlo Mosley MD    ADMIT DATE: 8/6/2022  CONSULT DATE: August 7, 2022                 Admission diagnosis: CP    Reason for Nephrology Consultation:  ESRD    HPI: Bess Valdovinos is a 77 y.o. male BLACK/ with PMHx of ESRD / HTN / DM who is being admitted with CP that he describes as indigestion after he was done with his HD yesterday , he didn't have any Radiation and when arrived to hospital it disappeared . He goes to [address] [place] [address] good way for dialysis and he doesn't remember the name of his Nephrologist . He has AVF and denies any LE edema . Past Medical History:   Diagnosis Date    ACS (acute coronary syndrome) ([facility] [address]     CAD (coronary artery disease), native coronary artery August 2010    s/p non-ST-elevation myocardial infarction, s/p PCI with BMS (Vision 4x18mm) distal RCA with 45% distal LAD  and mild LCx disease. mild-moderate LV systolic dysfunction (44/10). Chronic kidney disease     Diabetes mellitus type II     IDDM    Dyslipidemia     ED (erectile dysfunction)     Edema of both lower legs 7/12/2021    GERD (gastroesophageal reflux disease)     Heart failure (HCC)     History of BPH     History of echocardiogram 5/18/2011    EF 65%. Mod-marked increased wall thickness. Gr 1 DDfx. No significant valvular heart disease. Hyperlipidemia     Hypertension     Ischemic cardiomyopathy     recovery of LV syst fct  Echo May 2011 LV EF 65%    MI (myocardial infarction) ([facility] [address] [facility]     Obesity     RBBB (right bundle branch block with left anterior fascicular block)     S/P cardiac cath 8/26/2010    LM patent. dLAD 45%. CX mild.   dRCA 100% thrombotic (S/P cath thrombectomy & Vision 4 x 18-mm BMS).  EF 40%. Posterobasal, diaphrag, apical hypk. Shingles 4/2012    Tobacco use disorder, continuous       Past Surgical History:   Procedure Laterality Date    COLONOSCOPY N/A 4/23/2019    COLONOSCOPY performed by Shagufta Ramos MD at [facility] ENDOSCOPY    HX CATARACT REMOVAL Bilateral     HX HEART CATHETERIZATION  08/26/10    Stent    HX HEART CATHETERIZATION  2019    Stent    HX HEMORRHOIDECTOMY      HX ROTATOR CUFF REPAIR Left     HX TRABECULECTOMY      IR BX BONE MARROW DIAGNOSTIC  9/10/2021       Social History     Socioeconomic History    Marital status:      Spouse name: Not on file    Number of children: Not on file    Years of education: Not on file    Highest education level: Not on file   Occupational History    Not on file   Tobacco Use    Smoking status: Every Day     Packs/day: 0.25     Years: 44.00     Pack years: 11.00     Types: Cigarettes    Smokeless tobacco: Never    Tobacco comments:     pack last for a couple of weeks   Vaping Use    Vaping Use: Never used   Substance and Sexual Activity    Alcohol use: No    Drug use: Never    Sexual activity: Not on file   Other Topics Concern    Not on file   Social History Narrative    Not on file     Social Determinants of Health     Financial Resource Strain: Not on file   Food Insecurity: Not on file   Transportation Needs: Not on file   Physical Activity: Not on file   Stress: Not on file   Social Connections: Not on file   Intimate Partner Violence: Not on file   Housing Stability: Not on file       Family History   Problem Relation Age of Onset    Diabetes Mother      No Known Allergies     Home Medications:     Medications Prior to Admission   Medication Sig    simvastatin (ZOCOR) 80 mg tablet Take 80 mg by mouth nightly. iron sucrose (VENOFER) 100 mg iron/5 mL injection 100 mg by IntraVENous route once. doxazosin (CARDURA) 2 mg tablet Take 1 Tablet by mouth every evening.     hydrALAZINE (APRESOLINE) 50 mg tablet Take 50 mg by mouth three (3) times daily. 2 tabs three times a day    insulin glargine (Lantus Solostar U-100 Insulin) 100 unit/mL (3 mL) inpn 30 Units by SubCUTAneous route daily. (Patient taking differently: 35 Units by SubCUTAneous route daily.)    cholecalciferol (VITAMIN D3) (1000 Units /25 mcg) tablet Take 1,000 Units by mouth daily. dutasteride (AVODART) 0.5 mg capsule Take 0.5 mg by mouth daily. pantoprazole (PROTONIX) 40 mg tablet Take 40 mg by mouth daily. cholecalciferol (VITAMIN D3) (50,000 UNITS /1250 MCG) capsule Take  by mouth every seven (7) days. (Patient not taking: Reported on 8/7/2022)    atorvastatin (LIPITOR) 40 mg tablet Take 1 Tablet by mouth daily. (Patient not taking: Reported on 8/7/2022)    clopidogreL (PLAVIX) 75 mg tab take 1 tablet by mouth once daily (Patient not taking: Reported on 5/2/2022)    carvediloL (COREG) 25 mg tablet Take 1 Tab by mouth two (2) times daily (with meals).  (Patient not taking: Reported on 8/7/2022)       Current Inpatient Medications:     Current Facility-Administered Medications   Medication Dose Route Frequency    atorvastatin (LIPITOR) tablet 40 mg  40 mg Oral QHS    cholecalciferol (VITAMIN D3) (1000 Units /25 mcg) tablet 1,000 Units  1,000 Units Oral DAILY    doxazosin (CARDURA) tablet 2 mg  2 mg Oral QPM    dutasteride (AVODART) capsule 0.5 mg  0.5 mg Oral DAILY    pantoprazole (PROTONIX) tablet 40 mg  40 mg Oral DAILY    hydrALAZINE (APRESOLINE) tablet 100 mg  100 mg Oral TID    aspirin chewable tablet 81 mg  81 mg Oral DAILY    carvediloL (COREG) tablet 3.125 mg  3.125 mg Oral BID WITH MEALS    sodium chloride (NS) flush 5-40 mL  5-40 mL IntraVENous Q8H    sodium chloride (NS) flush 5-40 mL  5-40 mL IntraVENous PRN    acetaminophen (TYLENOL) tablet 650 mg  650 mg Oral Q6H PRN    Or    acetaminophen (TYLENOL) suppository 650 mg  650 mg Rectal Q6H PRN    polyethylene glycol (MIRALAX) packet 17 g  17 g Oral DAILY PRN    insulin lispro (HUMALOG) injection SubCUTAneous AC&HS    glucose chewable tablet 16 g  4 Tablet Oral PRN    glucagon (GLUCAGEN) injection 1 mg  1 mg IntraMUSCular PRN    dextrose 10% infusion 0-250 mL  0-250 mL IntraVENous PRN    heparin (porcine) 25,000 units in 0.45% saline 250 ml infusion  11.7-25 Units/kg/hr IntraVENous TITRATE       Review of Systems:   No fever or chills. No sore throat. No cough or hemoptysis. No shortness of breath , + chest pain. No orthopnea or paroxysmal nocturnal dyspnea. Good appetite. No nausea, vomiting, abdominal pain, melena or hematochezia. No constipation or diarrhea. No dysuria, no gross hematuria of voiding difficulties. No ankle swelling, no joint paints. No muscle aches. No skin changes. No dizziness or lightheadedness. No headaches. Physical Assessment:     Vitals:    08/06/22 2038 08/06/22 2345 08/07/22 0402 08/07/22 0745   BP: (!) 181/74 (!) 176/79 (!) 167/72 (!) 179/74   Pulse: 73 66 70 62   Resp: 24 20 19 18   Temp:  98.2 °F (36.8 °C) 98 °F (36.7 °C) 97.5 °F (36.4 °C)   SpO2: 97% 97% 98% 98%   Weight:       Height:         Last 3 Recorded Weights in this Encounter    08/06/22 1345   Weight: 85.3 kg (188 lb)     Admission weight: Weight: 85.3 kg (188 lb) (08/06/22 1345)      Intake/Output Summary (Last 24 hours) at 8/7/2022 0808  Last data filed at 8/7/2022 0431  Gross per 24 hour   Intake 321 ml   Output 150 ml   Net 171 ml       Patient is in no apparent distress. HEENT: Head is normocephalic and atraumatic. Pupils are round, equal, reactive to light. Sclerae are anicteric. Oropharynx clear. Neck: no cervical lymphadenopathy or thyromegaly. Lungs: good air entry, clear to auscultation bilaterally. Trachea at the midline. Cardiovascular system: S1, S2, regular rate and rhythm. No murmurs, gallops or rubs. Abdomen: soft, non tender, non distended. Positive bowel sounds. No hepatosplenomegaly. No abdominal bruits. Extremities: no clubbing, cyanosis or edema. Strong dorsalis pedis pulses.  Brisk capillary refill on the toes bilaterally. Integumentary: skin is grossly intact. Neurologic: Alert, oriented time three. Cooperative and appropriate. No gross motor or sensory deficits. AVF with good thrill / bruit       Data Review:    Labs: Results:       Chemistry Recent Labs     08/07/22  0303 08/06/22  1254   GLU 71* 257*    141   K 3.6 2.9*    105   CO2 31 31   BUN 28* 17   CREA 3.91* 2.78*   CA 8.6 7.3*   AGAP 6 5   BUCR 7* 6*   AP  --  101   TP  --  6.4   ALB  --  2.8*   GLOB  --  3.6   AGRAT  --  0.8         CBC w/Diff Recent Labs     08/07/22 0303 08/06/22 2055 08/06/22  1254   WBC 7.6 7.3 8.9   RBC 3.90* 3.82* 4.02*   HGB 10.5* 10.5* 11.0*   HCT 31.3* 30.5* 31.6*    165 193   GRANS 58 64 71   LYMPH 26 22 15*   EOS 6* 5 5         Iron/Ferritin No results for input(s): IRON in the last 72 hours. No lab exists for component: TIBCCALC   PTH/VIT D No results for input(s): PTH in the last 72 hours.     No lab exists for component: Raina Borja MD  [facility]  August 7, 2022

## 2022-08-07 NOTE — PROGRESS NOTES
Bedside shift change report given to Lexis Da Silva RN (oncoming nurse). Report included the following information SBAR, recent results, MAR, and ED summary.

## 2022-08-07 NOTE — PROGRESS NOTES
Nursing called to notify that his troponin continue to up-trend. Will start him on heparin gtt for NSTEMI. He is on ASA, atorvastatin, Carvedilol per guideline directed therapy. Spoke with ER nursing to coordinate care.     Signed By: Cornelius Gardner MD     August 6, 2022

## 2022-08-07 NOTE — PROGRESS NOTES
[facility] Hospitalist Group  Progress Note    Patient: Twana Cheadle. Age: 77 y.o. : 1945 MR#: [id number] SSN: [id number]  Date/Time: 2022     Subjective: Patient lying in bed, feels fine, denies any complaints. No chest pain. Assessment/Plan:     Hospital Problems  Date Reviewed: 2022            Codes Class Noted POA    Acute coronary syndrome [facility]) ICD-10-CM: I24.9  ICD-9-CM: 411.1  2022 Unknown        NSTEMI (non-ST elevated myocardial infarction) [facility]) ICD-10-CM: I21.4  ICD-9-CM: 410.70  2020 Unknown         NSTEMI  Hypertension, uncontrolled  Dyslipidemia  Hypokalemia  End-stage renal disease  Ongoing tobacco abuse  ___________________________________________________  PLAN:    Continue IV heparin drip, aspirin, beta-blocker and statin   Pending echocardiogram  Cardiology consulted, input noted. Plan for coronary angiogram tomorrow  Continue hydralazine and doxazosin  Continue Avodart  Sliding scale insulin, if blood sugar elevated, will add long-acting insulin  Hemodialysis per nephrology      Discussed with the patient about above plan care and answered all his questions. Offered to talk to family, patient sees he is already updated his wife.       Case discussed with:  [x]Patient  []Family  [x]Nursing  []Case Management  DVT Prophylaxis:  []Lovenox  [x]Hep IV  []SCDs  []Coumadin   []Eliquis/Xarelto     Objective:   VS: Visit Vitals  BP (!) 194/87 (BP 1 Location: Left upper arm, BP Patient Position: At rest)   Pulse 74   Temp 97.6 °F (36.4 °C)   Resp 18   Ht 5' 6\" (1.676 m)   Wt 85.3 kg (188 lb)   SpO2 98%   BMI 30.34 kg/m²      Tmax/24hrs: Temp (24hrs), Av.7 °F (36.5 °C), Min:97.4 °F (36.3 °C), Max:98.2 °F (36.8 °C)  IOBRIEF  Intake/Output Summary (Last 24 hours) at 2022 1517  Last data filed at 2022 0431  Gross per 24 hour   Intake 321 ml   Output 150 ml   Net 171 ml       General:  Alert, cooperative, no acute distress    HEENT: PERSONNY, anicteric sclerae. Pulmonary:  CTA Bilaterally. No Wheezing/Rales. Cardiovascular: Regular rate and Rhythm. GI:  Soft, Non distended, Non tender. + Bowel sounds. Extremities:  No edema. No calf tenderness. Psych: Good insight. Not anxious or agitated. Neurologic: Alert and oriented X 3. Moves all ext.   Additional:    Medications:   Current Facility-Administered Medications   Medication Dose Route Frequency    carvediloL (COREG) tablet 12.5 mg  12.5 mg Oral BID WITH MEALS    hydrALAZINE (APRESOLINE) 20 mg/mL injection 10 mg  10 mg IntraVENous Q6H PRN    atorvastatin (LIPITOR) tablet 40 mg  40 mg Oral QHS    cholecalciferol (VITAMIN D3) (1000 Units /25 mcg) tablet 1,000 Units  1,000 Units Oral DAILY    doxazosin (CARDURA) tablet 2 mg  2 mg Oral QPM    dutasteride (AVODART) capsule 0.5 mg  0.5 mg Oral DAILY    pantoprazole (PROTONIX) tablet 40 mg  40 mg Oral DAILY    hydrALAZINE (APRESOLINE) tablet 100 mg  100 mg Oral TID    aspirin chewable tablet 81 mg  81 mg Oral DAILY    sodium chloride (NS) flush 5-40 mL  5-40 mL IntraVENous Q8H    sodium chloride (NS) flush 5-40 mL  5-40 mL IntraVENous PRN    acetaminophen (TYLENOL) tablet 650 mg  650 mg Oral Q6H PRN    Or    acetaminophen (TYLENOL) suppository 650 mg  650 mg Rectal Q6H PRN    polyethylene glycol (MIRALAX) packet 17 g  17 g Oral DAILY PRN    insulin lispro (HUMALOG) injection   SubCUTAneous AC&HS    glucose chewable tablet 16 g  4 Tablet Oral PRN    glucagon (GLUCAGEN) injection 1 mg  1 mg IntraMUSCular PRN    dextrose 10% infusion 0-250 mL  0-250 mL IntraVENous PRN    heparin (porcine) 25,000 units in 0.45% saline 250 ml infusion  11.7-25 Units/kg/hr IntraVENous TITRATE       Labs:    Recent Results (from the past 24 hour(s))   TROPONIN-HIGH SENSITIVITY    Collection Time: 08/06/22  8:55 PM   Result Value Ref Range    Troponin-High Sensitivity 1,251 (HH) 0 - 78 ng/L   CBC WITH AUTOMATED DIFF    Collection Time: 08/06/22  8:55 PM   Result Value Ref Range WBC 7.3 4.6 - 13.2 K/uL    RBC 3.82 (L) 4.35 - 5.65 M/uL    HGB 10.5 (L) 13.0 - 16.0 g/dL    HCT 30.5 (L) 36.0 - 48.0 %    MCV 79.8 78.0 - 100.0 FL    MCH 27.5 24.0 - 34.0 PG    MCHC 34.4 31.0 - 37.0 g/dL    RDW 13.9 11.6 - 14.5 %    PLATELET 185 867 - 874 K/uL    MPV 11.4 9.2 - 11.8 FL    NRBC 0.0 0  WBC    ABSOLUTE NRBC 0.00 0.00 - 0.01 K/uL    NEUTROPHILS 64 40 - 73 %    LYMPHOCYTES 22 21 - 52 %    MONOCYTES 9 3 - 10 %    EOSINOPHILS 5 0 - 5 %    BASOPHILS 0 0 - 2 %    IMMATURE GRANULOCYTES 0 0.0 - 0.5 %    ABS. NEUTROPHILS 4.7 1.8 - 8.0 K/UL    ABS. LYMPHOCYTES 1.6 0.9 - 3.6 K/UL    ABS. MONOCYTES 0.6 0.05 - 1.2 K/UL    ABS. EOSINOPHILS 0.4 0.0 - 0.4 K/UL    ABS. BASOPHILS 0.0 0.0 - 0.1 K/UL    ABS. IMM.  GRANS. 0.0 0.00 - 0.04 K/UL    DF AUTOMATED     PTT    Collection Time: 08/06/22  8:55 PM   Result Value Ref Range    aPTT 25.3 23.0 - 36.4 SEC   GLUCOSE, POC    Collection Time: 08/07/22 12:17 AM   Result Value Ref Range    Glucose (POC) 208 (H) 70 - 110 mg/dL   TROPONIN-HIGH SENSITIVITY    Collection Time: 08/07/22  3:03 AM   Result Value Ref Range    Troponin-High Sensitivity 1,472 (HH) 0 - 78 ng/L   METABOLIC PANEL, BASIC    Collection Time: 08/07/22  3:03 AM   Result Value Ref Range    Sodium 140 136 - 145 mmol/L    Potassium 3.6 3.5 - 5.5 mmol/L    Chloride 103 100 - 111 mmol/L    CO2 31 21 - 32 mmol/L    Anion gap 6 3.0 - 18 mmol/L    Glucose 71 (L) 74 - 99 mg/dL    BUN 28 (H) 7.0 - 18 MG/DL    Creatinine 3.91 (H) 0.6 - 1.3 MG/DL    BUN/Creatinine ratio 7 (L) 12 - 20      GFR est AA 18 (L) >60 ml/min/1.73m2    GFR est non-AA 15 (L) >60 ml/min/1.73m2    Calcium 8.6 8.5 - 10.1 MG/DL   MAGNESIUM    Collection Time: 08/07/22  3:03 AM   Result Value Ref Range    Magnesium 1.9 1.6 - 2.6 mg/dL   PTT    Collection Time: 08/07/22  3:03 AM   Result Value Ref Range    aPTT 135.4 (H) 23.0 - 36.4 SEC   CBC WITH AUTOMATED DIFF    Collection Time: 08/07/22  3:03 AM   Result Value Ref Range    WBC 7.6 4.6 - 13.2 K/uL    RBC 3.90 (L) 4.35 - 5.65 M/uL    HGB 10.5 (L) 13.0 - 16.0 g/dL    HCT 31.3 (L) 36.0 - 48.0 %    MCV 80.3 78.0 - 100.0 FL    MCH 26.9 24.0 - 34.0 PG    MCHC 33.5 31.0 - 37.0 g/dL    RDW 13.8 11.6 - 14.5 %    PLATELET 120 866 - 288 K/uL    MPV 11.4 9.2 - 11.8 FL    NRBC 0.0 0  WBC    ABSOLUTE NRBC 0.00 0.00 - 0.01 K/uL    NEUTROPHILS 58 40 - 73 %    LYMPHOCYTES 26 21 - 52 %    MONOCYTES 9 3 - 10 %    EOSINOPHILS 6 (H) 0 - 5 %    BASOPHILS 0 0 - 2 %    IMMATURE GRANULOCYTES 0 0.0 - 0.5 %    ABS. NEUTROPHILS 4.4 1.8 - 8.0 K/UL    ABS. LYMPHOCYTES 2.0 0.9 - 3.6 K/UL    ABS. MONOCYTES 0.7 0.05 - 1.2 K/UL    ABS. EOSINOPHILS 0.5 (H) 0.0 - 0.4 K/UL    ABS. BASOPHILS 0.0 0.0 - 0.1 K/UL    ABS. IMM.  GRANS. 0.0 0.00 - 0.04 K/UL    DF AUTOMATED     PTT    Collection Time: 08/07/22  5:45 AM   Result Value Ref Range    aPTT 116.8 (H) 23.0 - 36.4 SEC   GLUCOSE, POC    Collection Time: 08/07/22  6:57 AM   Result Value Ref Range    Glucose (POC) 94 70 - 110 mg/dL   ECHO ADULT COMPLETE    Collection Time: 08/07/22 10:35 AM   Result Value Ref Range    IVSd 2.4 (A) 0.6 - 1.0 cm    LVIDd 4.0 (A) 4.2 - 5.9 cm    LVIDs 2.6 cm    LVOT Diameter 2.0 cm    LVPWd 2.0 (A) 0.6 - 1.0 cm    LVOT Peak Gradient 7 mmHg    LVOT Mean Gradient 4 mmHg    LVOT SV 96.7 ml    LVOT Peak Velocity 1.4 m/s    LVOT VTI 30.8 cm    LA Volume A/L 69 mL    LA Volume 2C 78 (A) 18 - 58 mL    LA Volume 4C 54 18 - 58 mL    AV Area by Peak Velocity 2.2 cm2    AV Area by Peak Velocity 2.3 cm2    AV Area by VTI 2.7 cm2    AV Peak Gradient 15 mmHg    AV Peak Gradient 13 mmHg    AV Mean Gradient 7 mmHg    AV Peak Velocity 1.9 m/s    AV Peak Velocity 1.8 m/s    AV Mean Velocity 1.2 m/s    AV VTI 36.2 cm    MV A Velocity 1.23 m/s    MV E Wave Deceleration Time 171.6 ms    MV E Velocity 0.94 m/s    LV E' Lateral Velocity 8 cm/s    LV E' Septal Velocity 8 cm/s    TAPSE 2.3 1.7 cm    TR Peak Gradient 6 mmHg    TR Max Velocity 1.26 m/s    Aortic Root 3.6 cm Fractional Shortening 2D 35 28 - 44 %    LVIDd Index 2.05 cm/m2    LVIDs Index 1.33 cm/m2    LV RWT Ratio 1.00     LV Mass 2D 440.5 (A) 88 - 224 g    LV Mass 2D Index 225.9 (A) 49 - 115 g/m2    MV E/A 0.76     E/E' Ratio (Averaged) 11.75     E/E' Lateral 11.75     E/E' Septal 11.75     LA Volume Index A/L 35 16 - 34 mL/m2    LVOT Stroke Volume Index 49.6 mL/m2    LVOT Area 3.1 cm2    LA Volume Index 2C 40 (A) 16 - 34 mL/m2    LA Volume Index 4C 28 16 - 34 mL/m2    Ao Root Index 1.85 cm/m2    LVOT:AV VTI Index 0.85     RAND/BSA VTI 1.4 cm2/m2    Est. RA Pressure 3 mmHg    RVSP 9 mmHg       Signed By: Phil Buchanan MD     August 7, 2022      Disclaimer: Sections of this note are dictated using utilizing voice recognition software. Minor typographical errors may be present. If questions arise, please do not hesitate to contact me or call our department.

## 2022-08-07 NOTE — ED NOTES
Rec'd a call from Otf Grant RN on 4S stating the pt is on the floor and is requesting report. Verbal report provided and apologies for pt arriving without a report. Spoke with Nicki Maravilla regarding the issue. TRANSFER - OUT REPORT:    Verbal report given to Otf Grant RN(name) on [facility].  being transferred to 4S(unit) for routine progression of care       Report consisted of patients Situation, Background, Assessment and   Recommendations(SBAR). Information from the following report(s) ED Summary, MAR, and Recent Results was reviewed with the receiving nurse. Lines:   Peripheral IV 08/06/22 Left;Posterior Hand (Active)       Peripheral IV 08/06/22 Left Antecubital (Active)   Site Assessment Clean, dry, & intact 08/06/22 2102   Phlebitis Assessment 0 08/06/22 2102   Infiltration Assessment 0 08/06/22 2102   Dressing Status Clean, dry, & intact 08/06/22 2102   Dressing Type Transparent 08/06/22 2102        Opportunity for questions and clarification was provided.       Patient transported with:   [employer]

## 2022-08-07 NOTE — ROUTINE PROCESS
Bedside and Verbal shift change report given to JOHNSON Suarez (oncoming nurse) by Kallie Tolbert RN (offgoing nurse). Report included the following information SBAR, Kardex, and Recent Results.

## 2022-08-08 VITALS
BODY MASS INDEX: 30.22 KG/M2 | TEMPERATURE: 98.1 F | DIASTOLIC BLOOD PRESSURE: 75 MMHG | WEIGHT: 188 LBS | RESPIRATION RATE: 17 BRPM | OXYGEN SATURATION: 96 % | HEIGHT: 66 IN | HEART RATE: 60 BPM | SYSTOLIC BLOOD PRESSURE: 185 MMHG

## 2022-08-08 LAB
ANION GAP SERPL CALC-SCNC: 5 MMOL/L (ref 3–18)
APTT PPP: 86.9 SEC (ref 23–36.4)
BASOPHILS # BLD: 0 K/UL (ref 0–0.1)
BASOPHILS NFR BLD: 0 % (ref 0–2)
BUN SERPL-MCNC: 38 MG/DL (ref 7–18)
BUN/CREAT SERPL: 8 (ref 12–20)
CALCIUM SERPL-MCNC: 8.8 MG/DL (ref 8.5–10.1)
CHLORIDE SERPL-SCNC: 103 MMOL/L (ref 100–111)
CO2 SERPL-SCNC: 29 MMOL/L (ref 21–32)
CREAT SERPL-MCNC: 4.59 MG/DL (ref 0.6–1.3)
DIFFERENTIAL METHOD BLD: ABNORMAL
EOSINOPHIL # BLD: 0.5 K/UL (ref 0–0.4)
EOSINOPHIL NFR BLD: 7 % (ref 0–5)
ERYTHROCYTE [DISTWIDTH] IN BLOOD BY AUTOMATED COUNT: 13.8 % (ref 11.6–14.5)
GLUCOSE BLD STRIP.AUTO-MCNC: 189 MG/DL (ref 70–110)
GLUCOSE BLD STRIP.AUTO-MCNC: 91 MG/DL (ref 70–110)
GLUCOSE BLD STRIP.AUTO-MCNC: 95 MG/DL (ref 70–110)
GLUCOSE SERPL-MCNC: 127 MG/DL (ref 74–99)
HCT VFR BLD AUTO: 30.9 % (ref 36–48)
HGB BLD-MCNC: 10.5 G/DL (ref 13–16)
IMM GRANULOCYTES # BLD AUTO: 0 K/UL (ref 0–0.04)
IMM GRANULOCYTES NFR BLD AUTO: 0 % (ref 0–0.5)
LYMPHOCYTES # BLD: 1.7 K/UL (ref 0.9–3.6)
LYMPHOCYTES NFR BLD: 24 % (ref 21–52)
MCH RBC QN AUTO: 27 PG (ref 24–34)
MCHC RBC AUTO-ENTMCNC: 34 G/DL (ref 31–37)
MCV RBC AUTO: 79.4 FL (ref 78–100)
MONOCYTES # BLD: 0.6 K/UL (ref 0.05–1.2)
MONOCYTES NFR BLD: 9 % (ref 3–10)
NEUTS SEG # BLD: 4.3 K/UL (ref 1.8–8)
NEUTS SEG NFR BLD: 60 % (ref 40–73)
NRBC # BLD: 0 K/UL (ref 0–0.01)
NRBC BLD-RTO: 0 PER 100 WBC
PLATELET # BLD AUTO: 185 K/UL (ref 135–420)
PMV BLD AUTO: 11.5 FL (ref 9.2–11.8)
POTASSIUM SERPL-SCNC: 4.3 MMOL/L (ref 3.5–5.5)
RBC # BLD AUTO: 3.89 M/UL (ref 4.35–5.65)
SODIUM SERPL-SCNC: 137 MMOL/L (ref 136–145)
WBC # BLD AUTO: 7.2 K/UL (ref 4.6–13.2)

## 2022-08-08 PROCEDURE — 74011636637 HC RX REV CODE- 636/637: Performed by: EMERGENCY MEDICINE

## 2022-08-08 PROCEDURE — 82962 GLUCOSE BLOOD TEST: CPT

## 2022-08-08 PROCEDURE — 77030022017 HC DRSG HEMO QCLOT ZMED -A: Performed by: INTERNAL MEDICINE

## 2022-08-08 PROCEDURE — B2111ZZ FLUOROSCOPY OF MULTIPLE CORONARY ARTERIES USING LOW OSMOLAR CONTRAST: ICD-10-PCS | Performed by: INTERNAL MEDICINE

## 2022-08-08 PROCEDURE — 77030013797 HC KT TRNSDUC PRSSR EDWD -A: Performed by: INTERNAL MEDICINE

## 2022-08-08 PROCEDURE — 74011000250 HC RX REV CODE- 250: Performed by: INTERNAL MEDICINE

## 2022-08-08 PROCEDURE — C1760 CLOSURE DEV, VASC: HCPCS | Performed by: INTERNAL MEDICINE

## 2022-08-08 PROCEDURE — 74011250636 HC RX REV CODE- 250/636: Performed by: INTERNAL MEDICINE

## 2022-08-08 PROCEDURE — 74011250637 HC RX REV CODE- 250/637: Performed by: EMERGENCY MEDICINE

## 2022-08-08 PROCEDURE — 77030040934 HC CATH DIAG DXTERITY MEDT -A: Performed by: INTERNAL MEDICINE

## 2022-08-08 PROCEDURE — 99152 MOD SED SAME PHYS/QHP 5/>YRS: CPT | Performed by: INTERNAL MEDICINE

## 2022-08-08 PROCEDURE — C1894 INTRO/SHEATH, NON-LASER: HCPCS | Performed by: INTERNAL MEDICINE

## 2022-08-08 PROCEDURE — 36415 COLL VENOUS BLD VENIPUNCTURE: CPT

## 2022-08-08 PROCEDURE — B2151ZZ FLUOROSCOPY OF LEFT HEART USING LOW OSMOLAR CONTRAST: ICD-10-PCS | Performed by: INTERNAL MEDICINE

## 2022-08-08 PROCEDURE — 85730 THROMBOPLASTIN TIME PARTIAL: CPT

## 2022-08-08 PROCEDURE — 4A023N7 MEASUREMENT OF CARDIAC SAMPLING AND PRESSURE, LEFT HEART, PERCUTANEOUS APPROACH: ICD-10-PCS | Performed by: INTERNAL MEDICINE

## 2022-08-08 PROCEDURE — 99239 HOSP IP/OBS DSCHRG MGMT >30: CPT | Performed by: HOSPITALIST

## 2022-08-08 PROCEDURE — 93458 L HRT ARTERY/VENTRICLE ANGIO: CPT | Performed by: INTERNAL MEDICINE

## 2022-08-08 PROCEDURE — 74011250637 HC RX REV CODE- 250/637: Performed by: HOSPITALIST

## 2022-08-08 PROCEDURE — 99232 SBSQ HOSP IP/OBS MODERATE 35: CPT | Performed by: INTERNAL MEDICINE

## 2022-08-08 PROCEDURE — 74011000636 HC RX REV CODE- 636: Performed by: INTERNAL MEDICINE

## 2022-08-08 PROCEDURE — 80048 BASIC METABOLIC PNL TOTAL CA: CPT

## 2022-08-08 PROCEDURE — 74011000250 HC RX REV CODE- 250: Performed by: EMERGENCY MEDICINE

## 2022-08-08 PROCEDURE — 85025 COMPLETE CBC W/AUTO DIFF WBC: CPT

## 2022-08-08 RX ORDER — MIDAZOLAM HYDROCHLORIDE 1 MG/ML
INJECTION, SOLUTION INTRAMUSCULAR; INTRAVENOUS
Status: DISCONTINUED
Start: 2022-08-08 | End: 2022-08-08 | Stop reason: HOSPADM

## 2022-08-08 RX ORDER — NITROGLYCERIN 0.4 MG/1
0.4 TABLET SUBLINGUAL
Qty: 25 EACH | Refills: 0 | Status: SHIPPED | OUTPATIENT
Start: 2022-08-08

## 2022-08-08 RX ORDER — LOSARTAN POTASSIUM 25 MG/1
25 TABLET ORAL DAILY
Status: DISCONTINUED | OUTPATIENT
Start: 2022-08-08 | End: 2022-08-08 | Stop reason: HOSPADM

## 2022-08-08 RX ORDER — LIDOCAINE HYDROCHLORIDE 10 MG/ML
INJECTION, SOLUTION EPIDURAL; INFILTRATION; INTRACAUDAL; PERINEURAL AS NEEDED
Status: DISCONTINUED | OUTPATIENT
Start: 2022-08-08 | End: 2022-08-08 | Stop reason: HOSPADM

## 2022-08-08 RX ORDER — HEPARIN SODIUM 200 [USP'U]/100ML
INJECTION, SOLUTION INTRAVENOUS
Status: DISCONTINUED
Start: 2022-08-08 | End: 2022-08-08 | Stop reason: HOSPADM

## 2022-08-08 RX ORDER — CARVEDILOL 25 MG/1
25 TABLET ORAL 2 TIMES DAILY WITH MEALS
Qty: 60 TABLET | Refills: 0 | Status: SHIPPED | OUTPATIENT
Start: 2022-08-08 | End: 2022-09-07

## 2022-08-08 RX ORDER — INSULIN GLARGINE 100 [IU]/ML
15 INJECTION, SOLUTION SUBCUTANEOUS DAILY
Qty: 1 PEN | Refills: 0 | Status: SHIPPED
Start: 2022-08-08

## 2022-08-08 RX ORDER — VERAPAMIL HYDROCHLORIDE 2.5 MG/ML
INJECTION, SOLUTION INTRAVENOUS
Status: DISCONTINUED
Start: 2022-08-08 | End: 2022-08-08 | Stop reason: WASHOUT

## 2022-08-08 RX ORDER — FENTANYL CITRATE 50 UG/ML
INJECTION, SOLUTION INTRAMUSCULAR; INTRAVENOUS AS NEEDED
Status: DISCONTINUED | OUTPATIENT
Start: 2022-08-08 | End: 2022-08-08 | Stop reason: HOSPADM

## 2022-08-08 RX ORDER — GUAIFENESIN 100 MG/5ML
81 LIQUID (ML) ORAL DAILY
Qty: 30 TABLET | Refills: 0 | Status: SHIPPED | OUTPATIENT
Start: 2022-08-09 | End: 2022-09-08

## 2022-08-08 RX ORDER — SODIUM CHLORIDE 0.9 % (FLUSH) 0.9 %
5-40 SYRINGE (ML) INJECTION EVERY 8 HOURS
Status: DISCONTINUED | OUTPATIENT
Start: 2022-08-08 | End: 2022-08-08 | Stop reason: HOSPADM

## 2022-08-08 RX ORDER — HEPARIN SODIUM 1000 [USP'U]/ML
INJECTION, SOLUTION INTRAVENOUS; SUBCUTANEOUS
Status: DISCONTINUED
Start: 2022-08-08 | End: 2022-08-08 | Stop reason: WASHOUT

## 2022-08-08 RX ORDER — LOSARTAN POTASSIUM 25 MG/1
25 TABLET ORAL DAILY
Qty: 30 TABLET | Refills: 0 | Status: SHIPPED | OUTPATIENT
Start: 2022-08-08 | End: 2022-09-07

## 2022-08-08 RX ORDER — FENTANYL CITRATE 50 UG/ML
INJECTION, SOLUTION INTRAMUSCULAR; INTRAVENOUS
Status: DISCONTINUED
Start: 2022-08-08 | End: 2022-08-08 | Stop reason: HOSPADM

## 2022-08-08 RX ORDER — SODIUM CHLORIDE 0.9 % (FLUSH) 0.9 %
5-40 SYRINGE (ML) INJECTION AS NEEDED
Status: DISCONTINUED | OUTPATIENT
Start: 2022-08-08 | End: 2022-08-08 | Stop reason: HOSPADM

## 2022-08-08 RX ORDER — HEPARIN SODIUM 200 [USP'U]/100ML
INJECTION, SOLUTION INTRAVENOUS
Status: COMPLETED | OUTPATIENT
Start: 2022-08-08 | End: 2022-08-08

## 2022-08-08 RX ORDER — HYDRALAZINE HYDROCHLORIDE 100 MG/1
50 TABLET, FILM COATED ORAL 3 TIMES DAILY
Qty: 45 TABLET | Refills: 0 | Status: SHIPPED | OUTPATIENT
Start: 2022-08-08 | End: 2022-09-07

## 2022-08-08 RX ORDER — LIDOCAINE HYDROCHLORIDE 10 MG/ML
INJECTION, SOLUTION EPIDURAL; INFILTRATION; INTRACAUDAL; PERINEURAL
Status: DISCONTINUED
Start: 2022-08-08 | End: 2022-08-08 | Stop reason: HOSPADM

## 2022-08-08 RX ORDER — MIDAZOLAM HYDROCHLORIDE 1 MG/ML
INJECTION, SOLUTION INTRAMUSCULAR; INTRAVENOUS AS NEEDED
Status: DISCONTINUED | OUTPATIENT
Start: 2022-08-08 | End: 2022-08-08 | Stop reason: HOSPADM

## 2022-08-08 RX ADMIN — DOXAZOSIN 2 MG: 4 TABLET ORAL at 17:17

## 2022-08-08 RX ADMIN — HYDRALAZINE HYDROCHLORIDE 100 MG: 25 TABLET, FILM COATED ORAL at 17:16

## 2022-08-08 RX ADMIN — LOSARTAN POTASSIUM 25 MG: 25 TABLET, FILM COATED ORAL at 17:17

## 2022-08-08 RX ADMIN — SODIUM CHLORIDE, PRESERVATIVE FREE 10 ML: 5 INJECTION INTRAVENOUS at 06:00

## 2022-08-08 RX ADMIN — PANTOPRAZOLE SODIUM 40 MG: 40 TABLET, DELAYED RELEASE ORAL at 08:54

## 2022-08-08 RX ADMIN — CARVEDILOL 25 MG: 25 TABLET, FILM COATED ORAL at 08:54

## 2022-08-08 RX ADMIN — ASPIRIN 81 MG CHEWABLE TABLET 81 MG: 81 TABLET CHEWABLE at 08:54

## 2022-08-08 RX ADMIN — CHOLECALCIFEROL TAB 25 MCG (1000 UNIT) 1000 UNITS: 25 TAB at 08:54

## 2022-08-08 RX ADMIN — HYDRALAZINE HYDROCHLORIDE 100 MG: 25 TABLET, FILM COATED ORAL at 08:54

## 2022-08-08 RX ADMIN — Medication 2 UNITS: at 17:20

## 2022-08-08 NOTE — ACP (ADVANCE CARE PLANNING)
Advance Care Planning   Advance Care Planning Inpatient Note  [facility]  Spiritual Care Department    Today's Date: 8/8/2022  Unit: [facility] [address] [place] [address]     Upon review of chart and communication with care team, patient's decision making abilities are not in question. Patient was/were present in the room during visit. Goals of ACP Conversation:  Discuss Advance Care planning documents  Facilitate a discussion related to patient's goals of care as they align with the patient's values and beliefs    Health Care Decision Makers:      Primary Decision Maker: Alisha Hartman - Spouse - [phone]    Summary:  Aasa [address] (Patient Wishes) on file:  None     Assessment:    Patient did not express interest in completing an Advance Medical Directive. He expressed a wish that his wife ROSE MARY LITTLE would make medical decisions for him if needed.     Interventions:  Discussed and provided education on state decision maker hierarchy  Encouraged ongoing ACP conversation with future decision makers and loved ones    Care Preferences Communicated:  No    Outcomes/Plan:  ACP Discussion Refused    Chaplain Alyssa on 8/8/2022 at 1:49 PM

## 2022-08-08 NOTE — ROUTINE PROCESS
TRANSFER - IN REPORT:    Verbal report [facility]LO[facility]  being received from Cath Lab(unit) for routine progression of care      Report consisted of patients Situation, Background, Assessment and   Recommendations(SBAR). Information from the following report(s) SBAR, Kardex, Procedure Summary, and Recent Results was reviewed with the receiving nurse. Opportunity for questions and clarification was provided. Assessment completed upon patients arrival to unit and care assumed.

## 2022-08-08 NOTE — PROGRESS NOTES
conducted an initial consultation and Spiritual Assessment for Wendy Barragan, who is a 77 y. o.,male. Patient's Primary Language is: [place]. According to the patient's EMR Uatsdin Affiliation is: No Buddhism. The reason the Patient came to the hospital is:   Patient Active Problem List    Diagnosis Date Noted    Acute coronary syndrome ([facility] 08/06/2022    ESRD (end stage renal disease) ([facility] 08/12/2021    Edema of both lower legs 07/12/2021    Chronic diastolic congestive heart failure ([facility] 05/17/2021    CHF (congestive heart failure) ([facility] 06/01/2020    Postsurgical percutaneous transluminal coronary angioplasty status 05/18/2020    NSTEMI (non-ST elevated myocardial infarction) ([facility] 04/20/2020    ACS (acute coronary syndrome) ([facility] 04/20/2020    Hypertensive urgency 04/20/2020    Obesity (BMI 30.0-34.9) 11/19/2018    Ischemic cardiomyopathy     Essential hypertension     Dyslipidemia     Diabetes mellitus, type 2 (HCC)     Obesity     RBBB (right bundle branch block with left anterior fascicular block)     Tobacco use disorder, continuous     CAD S/P percutaneous coronary angioplasty 08/01/2010        The  provided the following Interventions:  Initiated a relationship of care and support with this introductory visit. Explored issues of yesenia, belief, spirituality and Latter-day/ritual needs while hospitalized. Listened empathically as he shared his wish to go home and his frustration about not receiving his lunch as promised in the Cath lab. Shared his concerns with his nurse Светлана Cha who along with the unit coordinator Ivy Boswell were already looking in to the matter. Addressed Advance Medical directives but he declined interest and confirmed his wife would speak on his behalf. Chart reviewed. The following outcomes where achieved:  Patient expressed feelings about current hospitalization. Patient expressed gratitude for 's visit.     Assessment:  Patient does not have any Episcopalian/cultural needs that will affect patient's preferences in health care. There are no spiritual or Episcopalian issues which require intervention at this time. Plan:  Chaplains will continue to follow and will provide pastoral care on an as needed/requested basis.  recommends bedside caregivers page  on duty if patient shows signs of acute spiritual or emotional distress.     [address] [place]   [phone]

## 2022-08-08 NOTE — PROGRESS NOTES
D/C order noted for today. Orders reviewed. No needs identified at this time. Patient said his wife will be transporting patient home at time of discharge. CM remains available if needed.              Carine Singh -[phone]

## 2022-08-08 NOTE — DISCHARGE SUMMARY
Discharge Summary    Patient: Nancy Haddad MRN: [id number]  CSN: [id number]    YOB: 1945  Age: 77 y.o. Sex: male    DOA: 8/6/2022 LOS:  LOS: 2 days        Disposition: Home    Discharge Date: 8/8/2022    Admission Diagnosis: NSTEMI (non-ST elevated myocardial infarction) ([facility] [I21.4]  Acute coronary syndrome ([facility] [I24.9]    Discharge Diagnosis:      NSTEMI post coronary angiogram, no critical CAD  Hypertension, uncontrolled  Dyslipidemia  Hypokalemia  End-stage renal disease  Ongoing tobacco abuse    Discharge Condition: Stable      PHYSICAL EXAM  Visit Vitals  BP (!) 168/76   Pulse (!) 51   Temp 97.9 °F (36.6 °C)   Resp 15   Ht 5' 6\" (1.676 m)   Wt 85.3 kg (188 lb)   SpO2 99%   BMI 30.34 kg/m²       General: Alert, cooperative, no acute distress    HEENT: PERRLA, EOMI. Anicteric sclerae. Lungs:  CTA Bilaterally. No Wheezing/Rales. Heart:             Regular rate and Rhythm. Abdomen: Soft, Non distended, Non tender. + Bowel sounds. Extremities: No edema. Psych:   Good insight. Not anxious or agitated. Neurologic:  AA, oriented X 3. Moves all ext                                 Hospital Course: This is a 20-year-old male with a history of coronary artery disease and hypertension and diabetes and end-stage renal disease who presented to the ED after he had some chest pain after he finished his dialysis. Patient emergency room noted to have elevated troponin, was started on IV heparin drip and cardiology was consulted. Patient was admitted to hospital.  Patient chest pain resolved, did not have any further episodes of chest pain. Cardiology saw the patient, recommended continue IV heparin and plan for coronary angiogram.  Patient underwent coronary angiogram today, did not show any signs of critical CAD. Cardiology recommended continue medical treatment no further intervention. Patient also underwent echocardiogram, which showed ejection fraction of 70%.   Discussed with cardiology team, recommend okay for discharge from the standpoint. Patient noted to have uncontrolled hypertension, discussed with nephrology team and medications have been adjusted. Patient states his blood pressure always runs on the higher side. Patient is currently being chest pain-free, patient states he had a lot of gas few days and he thinks this most likely is acid reflux caused the pain. Per patient he has been not taking his PPI, recommended to resume his PPI. Patient is currently hemodynamically medically stable for discharge. Patient blood sugars been stable off insulin, discussed with the patient to start Lantus at 15 units and slowly go up based on his blood sugars. Patient understood and agreed with my plan. Discussed with nephrology team, okay for discharge and they will follow up in the dialysis center tomorrow. Discussed with the patient about discharge plan, follow-up appointments, new medications and side effects. Discussed with him about continuing PPI. Patient understood and agreed with my plan. I offered to talk to his family, patient states he will update his family. Procedures:   08/06/22    CARDIAC PROCEDURE 08/08/2022 8/8/2022    Conclusion  · Right dominant system. · Dominant RCA with distal 60% ISR. Does not appear unstable. · Left main is patent. · LAD is patent. Previously stented segment is widely patent. · Circumflex is patent. There is mid/distal focal 40-50% stenosis noted. · LVEDP is 22 mmHg. Overall left ventricular systolic function is normal with EF 65%. Signed by: Nataliia Castellanos MD on 8/8/2022 11:14 AM       Consults:   Dr. Arturo Pereira, cardiology    Imaging studies:   08/06/22    ECHO ADULT COMPLETE 08/07/2022 8/7/2022    Interpretation Summary  Formatting of this result is different from the original.      Left Ventricle: Normal left ventricular systolic function with a visually estimated EF of 70 - 75%.  Left ventricle size is normal. Severely increased wall thickness. Normal wall motion. Normal diastolic function. Signed by: Jessica Mendoza MD on 8/7/2022 11:32 AM       Discharge Medications:     Current Discharge Medication List        START taking these medications    Details   aspirin 81 mg chewable tablet Take 1 Tablet by mouth in the morning for 30 days. Qty: 30 Tablet, Refills: 0      nitroglycerin (NITROSTAT) 0.4 mg SL tablet 1 Tablet by SubLINGual route every five (5) minutes as needed for Chest Pain (Max 3 doses). Qty: 25 Each, Refills: 0      losartan (COZAAR) 25 mg tablet Take 1 Tablet by mouth in the morning for 30 days. Qty: 30 Tablet, Refills: 0           CONTINUE these medications which have CHANGED    Details   carvediloL (COREG) 25 mg tablet Take 1 Tablet by mouth two (2) times daily (with meals) for 30 days. Qty: 60 Tablet, Refills: 0      hydrALAZINE (APRESOLINE) 100 mg tablet Take 0.5 Tablets by mouth three (3) times daily for 30 days. 2 tabs three times a day  Qty: 45 Tablet, Refills: 0      insulin glargine (Lantus Solostar U-100 Insulin) 100 unit/mL (3 mL) inpn Take 15 Units by SubCUTAneous route in the morning. Qty: 1 Pen, Refills: 0           CONTINUE these medications which have NOT CHANGED    Details   simvastatin (ZOCOR) 80 mg tablet Take 80 mg by mouth nightly. doxazosin (CARDURA) 2 mg tablet Take 1 Tablet by mouth every evening. Qty: 90 Tablet, Refills: 1    Associated Diagnoses: Essential hypertension      cholecalciferol (VITAMIN D3) (1000 Units /25 mcg) tablet Take 1,000 Units by mouth daily. dutasteride (AVODART) 0.5 mg capsule Take 0.5 mg by mouth daily. pantoprazole (PROTONIX) 40 mg tablet Take 40 mg by mouth daily.            STOP taking these medications       iron sucrose (VENOFER) 100 mg iron/5 mL injection Comments:   Reason for Stopping:         cholecalciferol (VITAMIN D3) (50,000 UNITS /1250 MCG) capsule Comments:   Reason for Stopping:         atorvastatin (LIPITOR) 40 mg tablet Comments:   Reason for Stopping:         clopidogreL (PLAVIX) 75 mg tab Comments:   Reason for Stopping: It is important that you take the medication exactly as they are prescribed. Keep your medication in the bottles provided by the pharmacist and keep a list of the medication names, dosages, and times to be taken in your wallet. Do not take other medications without consulting your doctor. DIET:  Cardiac Diet and Diabetic Diet    ACTIVITY: Activity as tolerated    ADDITIONAL INFORMATION: If you experience any of the following symptoms but not limited to Fever, chills, nausea, vomiting, diarrhea, change in mentation, falling, bleeding, shortness of breath, chest pain, please call your primary care physician or return to the emergency room if you cannot get hold of your doctor:     FOLLOW UP CARE:  Dr. Chiquis Pop in 5-7 days. Please call and set up an appointment. Dr. Cranston Najjar, Cardiology in 2 week    Minutes spent on discharge: 40 minutes spent coordinating this discharge (review instructions/follow-up, prescriptions, preparing report for sign off)    Chichi Smith MD  8/8/2022 3:28 PM    Disclaimer: Sections of this note are dictated using utilizing voice recognition software. Minor typographical errors may be present. If questions arise, please do not hesitate to contact me or call our department.

## 2022-08-08 NOTE — PROGRESS NOTES
Reason for Admission:  NSTEMI (non-ST elevated myocardial infarction) ([facility] [I21.4]  Acute coronary syndrome ([facility] [address] [facility] [I24.9]                 RUR Score:    11%            Plan for utilizing home health:    No, not at this time. Patient said he is ambulatory, and self-care. Likelihood of Readmission:   LOW                         Transition of Care Plan:              Initial assessment completed with patient. Cognitive status of patient: oriented to time, place, person and situation. Face sheet information confirmed:  yes. The patient designates his wife Lawernce Degree [phone] to participate in his discharge plan and to receive any needed information. This patient lives in a single family home with his wife, with no steps to enter. Patient is able to navigate steps as needed. Prior to hospitalization, patient was considered to be independent with ADLs/IADLS : yes . Patient has a current ACP document on file: no.      Healthcare Decision Maker:   Primary Decision Maker: Alisha Hartman - Spouse - [phone]    Click here to complete [place] including selection of the Healthcare Decision Maker Relationship (ie \"Primary\")      The patient's wife will be available to transport patient home upon discharge. The patient already has  [employer] ,  medical equipment available in the home. Patient is not currently active with home health. Patient has not stayed in a skilled nursing facility or rehab. This patient is on dialysis :yes. If yes, hemodialysis patient and receives treatment on Tuesday/Thursday/Saturday at [facility] [phone]    Chair time is 5:30 am.   Pt is transported to/from dialysis by patient's wife. Currently, the discharge plan is Home with Family Assistance. The patient states that he can obtain his medications from the pharmacy, and take his medications as directed.     Patient's current insurance is [facility] [employer]. Care Management Interventions  PCP Verified by CM:  Yes  Mode of Transport at Discharge: Self (Patient's wife will be transporting patient home at time of discharge. )  Transition of Care Consult (CM Consult): Discharge Planning  Discharge Durable Medical Equipment: No  Physical Therapy Consult: No  Occupational Therapy Consult: No  Speech Therapy Consult: No  Support Systems: Spouse/Significant Other (Patient lives with his wife.)  Confirm Follow Up Transport: Family  Discharge Location  Patient Expects to be Discharged to[de-identified] Home with family assistance        Santi Bland RN  Case Management [phone]

## 2022-08-08 NOTE — DIABETES MGMT
Diabetes/ Glycemic Control Plan of Care  Recommendations:   Blood glucose this am 95 mg/dl  Continue corrective insulin coverage as needed  Had 2 BG results >200 mg/dl yesterday. Recommend adding Lantus 5 units daily. Will continue inpatient monitoring. Assessment:   DX:   1. Acute coronary syndrome ([address]        2. CAD (coronary artery disease)  CARDIAC PROCEDURE    CARDIAC PROCEDURE         Fasting/ Morning blood glucose:   Lab Results   Component Value Date/Time    Glucose 127 (H) 08/08/2022 03:14 AM    Glucose (POC) 95 08/08/2022 07:19 AM    Glucose, POC 67 (L) 09/19/2019 08:34 AM     IV Fluids containing dextrose:   none  Steroids:  none     Blood glucose values:        Latest Reference Range & Units 8/7/22 00:17 8/7/22 06:57 8/7/22 18:37 8/7/22 22:05 8/8/22 07:19   GLUCOSE,FAST - POC 70 - 110 mg/dL 208 (H) 94 247 (H) 163 (H) 95   (H): Data is abnormally high  Within target range (70-180mg/dL):    Current insulin orders:   Lispro corrective insulin coverage AC&HS as needed. Total Daily Dose previous 24 hours = 10 units   Current A1c:   Lab Results   Component Value Date/Time    Hemoglobin A1c 7.3 (H) 08/06/2022 12:54 PM      Equivalent  to an average Blood Glucose of 163 mg/dl for 2-3 months prior to admission  Adequate glycemic control PTA:  yes   Nutrition/Diet:   Active Orders   Diet    ADULT DIET Regular; 4 carb choices (60 gm/meal)      Meal Intake:  No data found. Supplement Intake:  No data found. Home diabetes medications:   Key Antihyperglycemic Medications               insulin glargine (Lantus Solostar U-100 Insulin) 100 unit/mL (3 mL) inpn (Taking) 30 Units by SubCUTAneous route daily. Plan/Goals:   Blood glucose will be within target of 70 - 180 mg/dl within 72 hours  Reinforce dietary and medication compliance at home.           Education:  [] Refer to Diabetes Education Record                       [x] Education not indicated at this time   Pt not in room at time of visit.     Versa Leisure, [facility]

## 2022-08-08 NOTE — ROUTINE PROCESS
shift change report given to [facility] RITARN (oncoming nurse).  Report included the following information SBAR, Intake/Output, MAR, Recent Results, and Pre Procedure Checklist.

## 2022-08-08 NOTE — ROUTINE PROCESS
TRANSFER - OUT REPORT:    Verbal report given to JOHNSON Oro(name) on [facility].  being transferred to Cat Lab(unit) for ordered procedure       Report consisted of patients Situation, Background, Assessment and   Recommendations(SBAR). Information from the following report(s) SBAR, Kardex, and Recent Results was reviewed with the receiving nurse. Lines:   Peripheral IV 08/06/22 Left;Posterior Hand (Active)   Site Assessment Clean, dry, & intact 08/07/22 2338   Phlebitis Assessment 0 08/07/22 1619   Infiltration Assessment 0 08/07/22 1619   Dressing Status Clean, dry, & intact 08/07/22 1619   Dressing Type Transparent 08/07/22 1619   Hub Color/Line Status Pink 08/07/22 1619   Alcohol Cap Used Yes 08/07/22 1619       Peripheral IV 08/06/22 Left Antecubital (Active)   Site Assessment Clean, dry, & intact 08/07/22 2338   Phlebitis Assessment 0 08/07/22 1619   Infiltration Assessment 0 08/07/22 1619   Dressing Status Clean, dry, & intact 08/07/22 1619   Dressing Type Transparent 08/07/22 1619   Hub Color/Line Status Pink 08/07/22 1619   Alcohol Cap Used Yes 08/07/22 1619        Opportunity for questions and clarification was provided.       Patient transported with:   mydala

## 2022-08-08 NOTE — ROUTINE PROCESS
Cath holding summary     Patient escorted to cath holding from inpatient room #406, alert and oriented x 4, voicing no complaints. Changed into gown and placed on monitor. NPO since MN. Lab results, med rec and H&P reviewed on chart. PIV x 2 inserted PTA; flushed and patent. TRANSFER - OUT REPORT:    Verbal report given to Carmelo Yu RN (name) on [facility].  being transferred to Cath Lab (unit) for ordered procedure     Report consisted of patients Situation, Background, Assessment and   Recommendations(SBAR). Information from the following report(s) SBAR, Intake/Output, MAR, Recent Results, Alarm Parameters , and Pre Procedure Checklist was reviewed with the receiving nurse. Lines:   Peripheral IV 08/06/22 Left;Posterior Hand (Active)   Site Assessment Clean, dry, & intact 08/08/22 0923   Phlebitis Assessment 0 08/08/22 0923   Infiltration Assessment 0 08/07/22 1619   Dressing Status Clean, dry, & intact 08/08/22 0923   Dressing Type Transparent 08/08/22 0923   Hub Color/Line Status Infusing 08/08/22 0923   Alcohol Cap Used Yes 08/07/22 1619       Peripheral IV 08/06/22 Left Antecubital (Active)   Site Assessment Clean, dry, & intact 08/08/22 0923   Phlebitis Assessment 0 08/08/22 0923   Infiltration Assessment 0 08/07/22 1619   Dressing Status Clean, dry, & intact 08/08/22 0923   Dressing Type Transparent 08/08/22 0923   Hub Color/Line Status Flushed 08/08/22 0923   Alcohol Cap Used Yes 08/07/22 1619   Opportunity for questions and clarification was provided. Patient transported with:   Registered Nurse    TRANSFER - IN REPORT:    Verbal report received from [facility] [place] (name) on [facility].  being received from Cath Lab (unit) for ordered procedure    Report consisted of patients Situation, Background, Assessment and   Recommendations(SBAR).    Information from the following report(s) SBAR, Procedure Summary, Intake/Output, MAR, and Procedure Verification was reviewed with the receiving nurse. Opportunity for questions and clarification was provided. Assessment completed upon patients arrival to unit and care assumed. Right femoral artery access, site closure with Perclose applied in lab. Dressing is CDI. No hematomas or bleeding at site noted. NAD and placed on cardiac monitor. [id number]  Verbal report given to Beverley Arroyo RN. Patient to be transported back to inpatient room. Bedrest until 1600 today.

## 2022-08-08 NOTE — PROGRESS NOTES
TRANSFER - IN REPORT:    Verbal report received from Natalio Jacobo RN(name) on [facility].  being received from [address] [place] [address]([facility]) for ordered procedure      Report consisted of patients Situation, Background, Assessment and   Recommendations(SBAR). Information from the following report(s) SBAR, Intake/Output, and MAR was reviewed with the receiving nurse. Opportunity for questions and clarification was provided. Assessment completed upon patients arrival to unit and care assumed.

## 2022-08-08 NOTE — DISCHARGE INSTRUCTIONS
Discharge Instructions    Patient: Olivia Mckeon MRN: [id number]  CSN: [id number]    YOB: 1945  Age: 77 y.o. Sex: male    DOA: 2022 LOS: [unfilled]  Discharge Date: [unfilled]     DIET:  Cardiac Diet and Diabetic Diet    ACTIVITY: Activity as tolerated    ADDITIONAL INFORMATION: If you experience any of the following symptoms but not limited to Fever, chills, nausea, vomiting, diarrhea, change in mentation, falling, bleeding, shortness of breath, chest pain, please call your primary care physician or return to the emergency room if you cannot get hold of your doctor:     FOLLOW UP CARE:  Dr. Joanna Benitez in 5-7 days. Please call and set up an appointment. Dr. Saúl Mcelroy, Cardiology in 2 week      Fatimah Parson MD  2022 2:55 PM    Patient armband removed and shredded  [employer] Activation    Thank you for requesting access to [address] Please follow the instructions below to securely access and download your online medical record. BookitNow! allows you to send messages to your doctor, view your test results, renew your prescriptions, schedule appointments, and more. How Do I Sign Up? In your internet browser, go to [email or web address]  Click on the First Time User? Click Here link in the Sign In box. You will be redirect to the New Member Sign Up page. Enter your BookitNow! Access Code exactly as it appears below. You will not need to use this code after youve completed the sign-up process. If you do not sign up before the expiration date, you must request a new code. BookitNow! Access Code: Activation code not generated  Current BookitNow! Status: Active (This is the date your T[employer]st[employer] access code will )    Enter the last four digits of your Social Security Number (xxxx) and Date of Birth (mm/dd/yyyy) as indicated and click Submit. You will be taken to the next sign-up page. Create a BookitNow! ID.  This will be your BookitNow! login ID and cannot be changed, so think of one that is secure and easy to remember. Create a [employer] password. You can change your password at any time. Enter your Password Reset Question and Answer. This can be used at a later time if you forget your password. Enter your e-mail address. You will receive e-mail notification when new information is available in [address] Click Sign Up. You can now view and download portions of your medical record. Click the [employer] link to download a portable copy of your medical information. Additional Information    If you have questions, please visit the Frequently Asked Questions section of the [employer] website at [email or web address] Remember, [employer]omo is NOT to be used for urgent needs. For medical emergencies, dial 911. As part of the discharge instructions, medications already given today were discussed with the patient. The next dose due of all ordered meds was highlighted as part of the medication discharge instructions. Discussed with the patient the importance of taking medications as directed, as well as the side effects and adverse reactions to medications ordered. DISCHARGE SUMMARY from Nurse    PATIENT INSTRUCTIONS:    After general anesthesia or intravenous sedation, for 24 hours or while taking prescription Narcotics:  Limit your activities  Do not drive and operate hazardous machinery  Do not make important personal or business decisions  Do  not drink alcoholic beverages  If you have not urinated within 8 hours after discharge, please contact your surgeon on call.     Report the following to your surgeon:  Excessive pain, swelling, redness or odor of or around the surgical area  Temperature over 100.5  Nausea and vomiting lasting longer than 4 hours or if unable to take medications  Any signs of decreased circulation or nerve impairment to extremity: change in color, persistent  numbness, tingling, coldness or increase pain  Any questions    What to do at Home:  Recommended activity: Activity as tolerated,  No heavy lifting, cutting grass, excessive walking, straining. If coughing apply pressure to right groin. Right foot cool or discolored. Decrease pulse in right leg. If you experience any of the following symptoms shortness of breath, chest pain, bleeding from cath site in right groin, swelling from right groin, temp 101 or above, please follow up with Emergency Department or Primary MD.    *  Please give a list of your current medications to your Primary Care Provider. *  Please update this list whenever your medications are discontinued, doses are      changed, or new medications (including over-the-counter products) are added. *  Please carry medication information at all times in case of emergency situations. These are general instructions for a healthy lifestyle:    No smoking/ No tobacco products/ Avoid exposure to second hand smoke  Surgeon General's Warning:  Quitting smoking now greatly reduces serious risk to your health. Obesity, smoking, and sedentary lifestyle greatly increases your risk for illness    A healthy diet, regular physical exercise & weight monitoring are important for maintaining a healthy lifestyle    You may be retaining fluid if you have a history of heart failure or if you experience any of the following symptoms:  Weight gain of 3 pounds or more overnight or 5 pounds in a week, increased swelling in our hands or feet or shortness of breath while lying flat in bed. Please call your doctor as soon as you notice any of these symptoms; do not wait until your next office visit. The discharge information has been reviewed with the patient. The patient verbalized understanding.   Discharge medications reviewed with the patient and appropriate educational materials and side effects teaching were provided.   ___________________________________________________________________________________________________________________________________

## 2022-08-08 NOTE — PROGRESS NOTES
Cardiology Progress Note    Admit Date: 8/6/2022  Attending Cardiologist: Dr. Natasha Hdez:     -NSTEMI  -Echo 8/7/2022 with LVEF 70-75% with normal wall motion, severe LVH, normal diastolic function, normal RV size and systolic function, no significant valvular disease  -2-vessel CAD, [facility] 04/2020 with PTCA/stent to mid LAD, prior BMS to distal RCA with moderate lesion  -ESRD, on HD T/R/S  -Tobacco abuse    Primary cardiologist is Dr. Ann Shields    Plan:     Addendum: Independently seen and evaluated. Agree with below. We will proceed with coronary angiography as planned. All questions answered. He agrees with the plan. -Pt NPO for cardiac catheterization this morning, all questions answered, he is in agreement with proceeding with cath as planned.  -Continue ASA, Coreg, Lipitor.   -Remains on Heparin infusion pending procedure. -Volume management per nephrology, appreciate assistance. -Further recommendations based on test results. Subjective:     No new complaints. Denies recurrent chest pain.     Objective:      Patient Vitals for the past 8 hrs:   Temp Pulse Resp BP SpO2   08/08/22 0753 98 °F (36.7 °C) 66 16 (!) 180/75 99 %   08/08/22 0400 98.4 °F (36.9 °C) 62 18 -- 98 %         Patient Vitals for the past 96 hrs:   Weight   08/07/22 1034 85.3 kg (188 lb)   08/06/22 1345 85.3 kg (188 lb)                Current Facility-Administered Medications   Medication Dose Route Frequency Last Admin    hydrALAZINE (APRESOLINE) 20 mg/mL injection 10 mg  10 mg IntraVENous Q6H PRN      carvediloL (COREG) tablet 25 mg  25 mg Oral Q12H      atorvastatin (LIPITOR) tablet 40 mg  40 mg Oral QHS 40 mg at 08/07/22 2309    cholecalciferol (VITAMIN D3) (1000 Units /25 mcg) tablet 1,000 Units  1,000 Units Oral DAILY 1,000 Units at 08/07/22 0908    doxazosin (CARDURA) tablet 2 mg  2 mg Oral QPM 2 mg at 08/07/22 1645    dutasteride (AVODART) capsule 0.5 mg  0.5 mg Oral DAILY 0.5 mg at 08/07/22 0911    pantoprazole (PROTONIX) tablet 40 mg  40 mg Oral DAILY 40 mg at 08/07/22 0908    hydrALAZINE (APRESOLINE) tablet 100 mg  100 mg Oral  mg at 08/07/22 2309    aspirin chewable tablet 81 mg  81 mg Oral DAILY 81 mg at 08/07/22 0908    sodium chloride (NS) flush 5-40 mL  5-40 mL IntraVENous Q8H 10 mL at 08/08/22 0600    sodium chloride (NS) flush 5-40 mL  5-40 mL IntraVENous PRN      acetaminophen (TYLENOL) tablet 650 mg  650 mg Oral Q6H  mg at 08/07/22 0018    Or    acetaminophen (TYLENOL) suppository 650 mg  650 mg Rectal Q6H PRN      polyethylene glycol (MIRALAX) packet 17 g  17 g Oral DAILY PRN      insulin lispro (HUMALOG) injection   SubCUTAneous AC&HS 2 Units at 08/07/22 2230    glucose chewable tablet 16 g  4 Tablet Oral PRN      glucagon (GLUCAGEN) injection 1 mg  1 mg IntraMUSCular PRN      dextrose 10% infusion 0-250 mL  0-250 mL IntraVENous PRN      heparin (porcine) 25,000 units in 0.45% saline 250 ml infusion  11.7-25 Units/kg/hr IntraVENous TITRATE 11.7 Units/kg/hr at 08/08/22 0742         Intake/Output Summary (Last 24 hours) at 8/8/2022 6132  Last data filed at 8/8/2022 0745  Gross per 24 hour   Intake 470 ml   Output 550 ml   Net -80 ml       Physical Exam:  General:  alert, cooperative, no distress, appears stated age  Neck:  supple  Lungs:  clear to auscultation bilaterally  Heart:  regular rate and rhythm  Abdomen:  abdomen is soft without significant tenderness, masses, organomegaly or guarding  Extremities:  atraumatic, no edema    Visit Vitals  BP (!) 180/75 (BP 1 Location: Left upper arm)   Pulse 66   Temp 98 °F (36.7 °C)   Resp 16   Ht 5' 6\" (1.676 m)   Wt 85.3 kg (188 lb)   SpO2 99%   BMI 30.34 kg/m²       Data Review:     Labs: Results:       Chemistry Recent Labs     08/08/22  0314 08/07/22  0303 08/06/22  1254   * 71* 257*    140 141   K 4.3 3.6 2.9*    103 105   CO2 29 31 31   BUN 38* 28* 17   CREA 4.59* 3.91* 2.78*   CA 8.8 8.6 7.3*   MG  --  1.9  --    AGAP 5 6 5   BUCR 8* 7* 6* AP  --   --  101   TP  --   --  6.4   ALB  --   --  2.8*   GLOB  --   --  3.6   AGRAT  --   --  0.8      CBC w/Diff Recent Labs     08/08/22  0314 08/07/22  0303 08/06/22 2055   WBC 7.2 7.6 7.3   RBC 3.89* 3.90* 3.82*   HGB 10.5* 10.5* 10.5*   HCT 30.9* 31.3* 30.5*    171 165   GRANS 60 58 64   LYMPH 24 26 22   EOS 7* 6* 5      Coagulation Recent Labs     08/08/22  0052 08/07/22  1409   APTT 86.9* 65.8*       Lipid Panel Lab Results   Component Value Date/Time    Cholesterol, total 94 08/06/2022 12:54 PM    HDL Cholesterol 30 (L) 08/06/2022 12:54 PM    LDL, calculated 2 08/06/2022 12:54 PM    VLDL, calculated 62 08/06/2022 12:54 PM    Triglyceride 310 (H) 08/06/2022 12:54 PM    CHOL/HDL Ratio 3.1 08/06/2022 12:54 PM      Liver Enzymes Recent Labs     08/06/22  1254   TP 6.4   ALB 2.8*           Signed By: Nancy Darling PA-C     August 8, 2022

## 2022-11-14 ENCOUNTER — OFFICE VISIT (OUTPATIENT)
Dept: CARDIOLOGY CLINIC | Age: 77
End: 2022-11-14
Payer: MEDICARE

## 2022-11-14 VITALS
BODY MASS INDEX: 28.77 KG/M2 | HEIGHT: 66 IN | OXYGEN SATURATION: 98 % | WEIGHT: 179 LBS | DIASTOLIC BLOOD PRESSURE: 67 MMHG | HEART RATE: 82 BPM | SYSTOLIC BLOOD PRESSURE: 169 MMHG

## 2022-11-14 DIAGNOSIS — Z99.2 ESRD ON HEMODIALYSIS (HCC): ICD-10-CM

## 2022-11-14 DIAGNOSIS — I10 ESSENTIAL HYPERTENSION: ICD-10-CM

## 2022-11-14 DIAGNOSIS — F17.209 TOBACCO USE DISORDER, CONTINUOUS: ICD-10-CM

## 2022-11-14 DIAGNOSIS — Z98.61 CAD S/P PERCUTANEOUS CORONARY ANGIOPLASTY: ICD-10-CM

## 2022-11-14 DIAGNOSIS — I25.10 CAD S/P PERCUTANEOUS CORONARY ANGIOPLASTY: ICD-10-CM

## 2022-11-14 DIAGNOSIS — Z98.61 POSTSURGICAL PERCUTANEOUS TRANSLUMINAL CORONARY ANGIOPLASTY STATUS: ICD-10-CM

## 2022-11-14 DIAGNOSIS — E78.5 DYSLIPIDEMIA: ICD-10-CM

## 2022-11-14 DIAGNOSIS — I50.32 CHRONIC DIASTOLIC CONGESTIVE HEART FAILURE (HCC): Primary | ICD-10-CM

## 2022-11-14 DIAGNOSIS — N18.6 ESRD ON HEMODIALYSIS (HCC): ICD-10-CM

## 2022-11-14 PROCEDURE — G8427 DOCREV CUR MEDS BY ELIG CLIN: HCPCS | Performed by: INTERNAL MEDICINE

## 2022-11-14 PROCEDURE — 1101F PT FALLS ASSESS-DOCD LE1/YR: CPT | Performed by: INTERNAL MEDICINE

## 2022-11-14 PROCEDURE — 1123F ACP DISCUSS/DSCN MKR DOCD: CPT | Performed by: INTERNAL MEDICINE

## 2022-11-14 PROCEDURE — G8432 DEP SCR NOT DOC, RNG: HCPCS | Performed by: INTERNAL MEDICINE

## 2022-11-14 PROCEDURE — G9231 DOC ESRD DIA TRANS PREG: HCPCS | Performed by: INTERNAL MEDICINE

## 2022-11-14 PROCEDURE — 3074F SYST BP LT 130 MM HG: CPT | Performed by: INTERNAL MEDICINE

## 2022-11-14 PROCEDURE — G8536 NO DOC ELDER MAL SCRN: HCPCS | Performed by: INTERNAL MEDICINE

## 2022-11-14 PROCEDURE — G8417 CALC BMI ABV UP PARAM F/U: HCPCS | Performed by: INTERNAL MEDICINE

## 2022-11-14 PROCEDURE — 3078F DIAST BP <80 MM HG: CPT | Performed by: INTERNAL MEDICINE

## 2022-11-14 PROCEDURE — 99214 OFFICE O/P EST MOD 30 MIN: CPT | Performed by: INTERNAL MEDICINE

## 2022-11-14 RX ORDER — AMLODIPINE BESYLATE 5 MG/1
5 TABLET ORAL DAILY
Qty: 90 TABLET | Refills: 1 | Status: SHIPPED | OUTPATIENT
Start: 2022-11-14

## 2022-11-14 RX ORDER — ATORVASTATIN CALCIUM 20 MG/1
20 TABLET, FILM COATED ORAL DAILY
Qty: 90 TABLET | Refills: 1 | Status: SHIPPED | OUTPATIENT
Start: 2022-11-14

## 2022-11-14 RX ORDER — HYDRALAZINE HYDROCHLORIDE 50 MG/1
25 TABLET, FILM COATED ORAL 3 TIMES DAILY
COMMUNITY

## 2022-11-14 NOTE — PROGRESS NOTES
1. Have you been to the ER, urgent care clinic since your last visit? Hospitalized since your last visit? Yes When: 8/2022 Where: [facility] Reason for visit: chest pain    2. Have you seen or consulted any other health care providers outside of the [address] [place] [address] since your last visit? Include any pap smears or colon screening. Yes Where: Dr. Hugh Sotomayor      3. Since your last visit, have you had any of the following symptoms? Chest pain. 4.  Have you had any blood work, X-rays or cardiac testing? Yes Where: [facility]          In [facility]: YES    5. Where do you normally have your labs drawn? [facility]    6. Do you need any refills today?    no

## 2022-11-14 NOTE — PATIENT INSTRUCTIONS
Medications Discontinued During This Encounter   Medication Reason    simvastatin (ZOCOR) 80 mg tablet Alternate Therapy       After the recommended changes have been made in blood pressure medicines, patient advised to keep BP/HR(pulse rate) chart twice daily and bring us results in next 4 to 5 days. Patient may send the results via \"My Chart\" if desired. Please rest for 5-10 minutes before checking blood pressure. Sit on a comfortable chair without crossing the legs and put your arm on a table. We recommend that you use an upper arm cuff. Check the blood pressure 3 times each time you check the blood pressure and record the lowest reading. If you check the blood pressure in both arms, use the higher reading. A Healthy Heart: Care Instructions  Your Care Instructions     Coronary artery disease, also called heart disease, occurs when a substance called plaque builds up in the vessels that supply oxygen-rich blood to your heart muscle. This can narrow the blood vessels and reduce blood flow. A heart attack happens when blood flow is completely blocked. A high-fat diet, smoking, and other factors increase the risk of heart disease. Your doctor has found that you have a chance of having heart disease. You can do lots of things to keep your heart healthy. It may not be easy, but you can change your diet, exercise more, and quit smoking. These steps really work to lower your chance of heart disease. Follow-up care is a key part of your treatment and safety. Be sure to make and go to all appointments, and call your doctor if you are having problems. It's also a good idea to know your test results and keep a list of the medicines you take. How can you care for yourself at home? Diet    Use less salt when you cook and eat. This helps lower your blood pressure. Taste food before salting. Add only a little salt when you think you need it. With time, your taste buds will adjust to less salt.      Eat fewer snack items, fast foods, canned soups, and other high-salt, high-fat, processed foods. Read food labels and try to avoid saturated and trans fats. They increase your risk of heart disease by raising cholesterol levels. Limit the amount of solid fat-butter, margarine, and shortening-you eat. Use olive, peanut, or canola oil when you cook. Bake, broil, and steam foods instead of frying them. Eat a variety of fruit and vegetables every day. Dark green, deep orange, red, or yellow fruits and vegetables are especially good for you. Examples include spinach, carrots, peaches, and berries. Foods high in fiber can reduce your cholesterol and provide important vitamins and minerals. High-fiber foods include whole-grain cereals and breads, oatmeal, beans, brown rice, citrus fruits, and apples. Eat lean proteins. Heart-healthy proteins include seafood, lean meats and poultry, eggs, beans, peas, nuts, seeds, and soy products. Limit drinks and foods with added sugar. These include candy, desserts, and soda pop. Lifestyle changes    If your doctor recommends it, get more exercise. Walking is a good choice. Bit by bit, increase the amount you walk every day. Try for at least 30 minutes on most days of the week. You also may want to swim, bike, or do other activities. Do not smoke. If you need help quitting, talk to your doctor about stop-smoking programs and medicines. These can increase your chances of quitting for good. Quitting smoking may be the most important step you can take to protect your heart. It is never too late to quit. Limit alcohol to 2 drinks a day for men and 1 drink a day for women. Too much alcohol can cause health problems. Manage other health problems such as diabetes, high blood pressure, and high cholesterol. If you think you may have a problem with alcohol or drug use, talk to your doctor. Medicines    Take your medicines exactly as prescribed.  Call your doctor if you think you are having a problem with your medicine. If your doctor recommends aspirin, take the amount directed each day. Make sure you take aspirin and not another kind of pain reliever, such as acetaminophen (Tylenol). When should you call for help? Call 911 if you have symptoms of a heart attack. These may include:    Chest pain or pressure, or a strange feeling in the chest.     Sweating. Shortness of breath. Pain, pressure, or a strange feeling in the back, neck, jaw, or upper belly or in one or both shoulders or arms. Lightheadedness or sudden weakness. A fast or irregular heartbeat. After you call 911, the  may tell you to chew 1 adult-strength or 2 to 4 low-dose aspirin. Wait for an ambulance. Do not try to drive yourself. Watch closely for changes in your health, and be sure to contact your doctor if you have any problems. Where can you learn more? Go to http://www.ridley.com/  Enter F075 in the search box to learn more about \"A Healthy Heart: Care Instructions. \"  Current as of: January 10, 2022               Content Version: 13.4  © 2006-2022 [employer]. Care instructions adapted under license by [employer] (which disclaims liability or warranty for this information). If you have questions about a medical condition or this instruction, always ask your healthcare professional. [place] [address] any warranty or liability for your use of this information.

## 2022-11-14 NOTE — PROGRESS NOTES
HISTORY OF PRESENT ILLNESS  Ashtyn Moreira is a 69 y.o. male. Follow-up of ischemic cardiomyopathy, hypertension, hyperlipidemia, obesity, CHF    11/21 started on dialysis in 9/21 4/17 improving blood pressure since meds adjusted by Dr. May Riedel;    1/17 seen for establishing/changing cardiologist   history of coronary disease, status post PCI(2010) and hypertension, along with conduction system disease    Follow-up  Associated symptoms include shortness of breath. Pertinent negatives include no chest pain and no headaches. Shortness of Breath  The history is provided by the Patient. This is a recurrent problem. The problem occurs intermittently. The current episode started more than 1 week ago. The problem has been rapidly improving (with HD). Pertinent negatives include no fever, no headaches, no cough, no wheezing, no PND, no orthopnea, no chest pain, no vomiting, no rash, no leg swelling and no claudication. The problem's precipitants include exercise (10-15 mt walk; 5/21 100 ft; 11/21 400 ft; 11/22 fast walk for 50 feet;). Review of Systems   Constitutional:  Negative for chills, fever, malaise/fatigue and weight loss. HENT:  Negative for nosebleeds. Eyes:  Negative for discharge. Respiratory:  Positive for shortness of breath. Negative for cough and wheezing. Cardiovascular:  Negative for chest pain, palpitations, orthopnea, claudication, leg swelling and PND. Gastrointestinal:  Negative for diarrhea, nausea and vomiting. Genitourinary:  Negative for dysuria and hematuria. Musculoskeletal:  Negative for joint pain. Skin:  Negative for rash. Neurological:  Negative for dizziness, seizures, loss of consciousness and headaches. Endo/Heme/Allergies:  Negative for polydipsia. Does not bruise/bleed easily. Psychiatric/Behavioral:  Negative for depression and substance abuse. The patient does not have insomnia.     No Known Allergies    Past Medical History:   Diagnosis Date    ACS (acute coronary syndrome) ([facility])     CAD (coronary artery disease), native coronary artery August 2010    s/p non-ST-elevation myocardial infarction, s/p PCI with BMS (Vision 4x18mm) distal RCA with 45% distal LAD  and mild LCx disease. mild-moderate LV systolic dysfunction (05/39). Chronic kidney disease     Diabetes mellitus type II     IDDM    Dyslipidemia     ED (erectile dysfunction)     Edema of both lower legs 7/12/2021    GERD (gastroesophageal reflux disease)     Heart failure (HCC)     History of BPH     History of echocardiogram 5/18/2011    EF 65%. Mod-marked increased wall thickness. Gr 1 DDfx. No significant valvular heart disease. Hyperlipidemia     Hypertension     Ischemic cardiomyopathy     recovery of LV syst fct  Echo May 2011 LV EF 65%    MI (myocardial infarction) ([address]     Obesity     RBBB (right bundle branch block with left anterior fascicular block)     S/P cardiac cath 8/26/2010    LM patent. dLAD 45%. CX mild. dRCA 100% thrombotic (S/P cath thrombectomy & Vision 4 x 18-mm BMS). EF 40%. Posterobasal, diaphrag, apical hypk. Shingles 4/2012    Tobacco use disorder, continuous        Family History   Problem Relation Age of Onset    Diabetes Mother        Social History     Tobacco Use    Smoking status: Every Day     Packs/day: 0.25     Years: 44.00     Pack years: 11.00     Types: Cigarettes    Smokeless tobacco: Never    Tobacco comments:     pack last for a couple of weeks   Vaping Use    Vaping Use: Never used   Substance Use Topics    Alcohol use: No    Drug use: Never        Current Outpatient Medications   Medication Sig    hydrALAZINE (APRESOLINE) 50 mg tablet Take 25 mg by mouth three (3) times daily. 2 tabs    insulin glargine (Lantus Solostar U-100 Insulin) 100 unit/mL (3 mL) inpn Take 15 Units by SubCUTAneous route in the morning.     nitroglycerin (NITROSTAT) 0.4 mg SL tablet 1 Tablet by SubLINGual route every five (5) minutes as needed for Chest Pain (Max 3 doses). simvastatin (ZOCOR) 80 mg tablet Take 80 mg by mouth nightly. doxazosin (CARDURA) 2 mg tablet Take 1 Tablet by mouth every evening. cholecalciferol (VITAMIN D3) (1000 Units /25 mcg) tablet Take 1,000 Units by mouth daily. dutasteride (AVODART) 0.5 mg capsule Take 0.5 mg by mouth daily. pantoprazole (PROTONIX) 40 mg tablet Take 40 mg by mouth daily. No current facility-administered medications for this visit. Past Surgical History:   Procedure Laterality Date    COLONOSCOPY N/A 4/23/2019    COLONOSCOPY performed by Tona Michael MD at [facility] ENDOSCOPY    HX CATARACT REMOVAL Bilateral     HX HEART CATHETERIZATION  08/26/10    Stent    HX HEART CATHETERIZATION  2019    Stent    HX HEMORRHOIDECTOMY      HX ROTATOR CUFF REPAIR Left     HX TRABECULECTOMY      IR BX BONE MARROW DIAGNOSTIC  9/10/2021       Diagnostic Studies: Old records reviewed and show as follows  EKG tracings reviewed by me today. 3/17 Nuc Stress  Conclusion:   Nondiagnostic EKG changes of ischemia due to abnormal baseline EKG at 89% of predicted maximal heart rate. Normal functional capacity. Severely hypertensive blood pressure response and appropriate heart rate response. No ischemic symptoms or arrhythmias seen. Perfusion image report to follow. Conclusion:   1. Normal perfusion scan. 2. Normal wall motion and ejection fraction. 3. Low risk scan. 3/17 ECHO  SUMMARY:  Left ventricle: Ejection fraction was estimated to be 65 %. There were no  regional wall motion abnormalities. There was severe concentric  hypertrophy. Features were consistent with a pseudonormal left ventricular  filling pattern, with concomitant abnormal relaxation and increased  filling pressure (grade 2 diastolic dysfunction). 04/20/20   CARDIAC PROCEDURE 04/23/2020 4/23/2020    Narrative Double vessel coronary artery disease. S/p ptca/stent to mid LAD. Known BMS to distal RCA with moderate lesion.   Recommend intense risk factor modification. Signed by: Ihsan Friedman MD     06/01/20   ECHO ADULT FOLLOW-UP OR LIMITED 06/03/2020 6/3/2020    Narrative · Normal cavity size and systolic function (ejection fraction normal). Severe concentric hypertrophy. Estimated left ventricular ejection   fraction is 60 - 65%. Visually measured ejection fraction. No regional   wall motion abnormality noted. Normal left ventricular strain. · Global longitudinal strain is -20.90%  · Tricuspid regurgitation is inadequate for estimation of right   ventricular systolic pressure. Signed by: Shant Ramirez MD     Visit Vitals  BP (!) 169/67   Pulse 82   Ht 5' 6\" (1.676 m)   Wt 81.2 kg (179 lb)   SpO2 98%   BMI 28.89 kg/m²       Mr. Farzaneh Osullivan has a reminder for a \"due or due soon\" health maintenance. I have asked that he contact his primary care provider for follow-up on this health maintenance. 9/10 Card Cath  IMPRESSION  1. Normal systemic pressure. 2. Moderate elevation of left-sided filling pressures. 3. Mild to moderate left ventricular systolic dysfunction with distinct  regional wall motion abnormalities. 4. Coronary disease as described above with a thrombotic occlusion of the  distal right coronary artery and moderate distal left anterior descending  disease. 5. Successful percutaneous coronary intervention with catheter  thrombectomy, followed by bare metal stent deployment with a Vision 4 x 18  mm stent in the distal right coronary artery. Physical Exam  Constitutional:       General: He is not in acute distress. Appearance: He is well-developed. He is obese. Comments: Obese   HENT:      Head: Normocephalic and atraumatic. Mouth/Throat:      Dentition: Normal dentition. Eyes:      General: No scleral icterus. Right eye: No discharge. Left eye: No discharge. Neck:      Thyroid: No thyromegaly. Vascular: No carotid bruit or JVD.    Cardiovascular:      Rate and Rhythm: Normal rate and regular rhythm. Pulses: Intact distal pulses. Decreased pulses. Heart sounds: Normal heart sounds, S1 normal and S2 normal. No murmur heard. No friction rub. No gallop. Pulmonary:      Effort: Pulmonary effort is normal.      Breath sounds: Normal breath sounds. No wheezing or rales. Abdominal:      Palpations: Abdomen is soft. There is no mass. Tenderness: There is no abdominal tenderness. Musculoskeletal:      Cervical back: Neck supple. Right lower leg: No edema. Left lower leg: No edema. Lymphadenopathy:      Cervical:      Right cervical: No superficial cervical adenopathy. Left cervical: No superficial cervical adenopathy. Skin:     General: Skin is warm and dry. Findings: No rash. Neurological:      Mental Status: He is alert and oriented to person, place, and time. Psychiatric:         Behavior: Behavior normal.       ASSESSMENT and PLAN    Patient advised to stop smoking to reduce future cardiovascular events. I have reviewed/discussed the above normal BMI with the patient. I have recommended the following interventions: dietary management education, guidance, and counseling, encourage exercise and monitor weight . Lisset Mooney HLD: 3/17 LDL31;   Results for Gina Adame (MRN [id number]) as of 5/17/2021 14:09   Ref. Range 6/29/2020 09:23   Triglyceride Latest Ref Range: <150 MG/DL 84   Cholesterol, total Latest Ref Range: <200 MG/   HDL Cholesterol Latest Ref Range: 40 - 60 MG/DL 41   CHOL/HDL Ratio Latest Ref Range: 0 - 5.0   3.0   VLDL, calculated Latest Units: MG/DL 16.8   LDL, calculated Latest Ref Range: 0 - 100 MG/DL 64.2     PCI: 2010 RCA; 4/20 LAD;    2/21 Follow-up after CHF admission which was after a few weeks of non-STEMI when patient had LAD stent. He also has CKD. Creatinine had increased. Apparently he was not discharged on diuretics as wife is not giving any. Edema and shortness of breath are worsening again.   Start Bumex and follow-up electrolytes. CAD clinically stable. Blood pressure is controlled. Reduce clonidine to twice daily as I am adding Bumex. Labs as ordered. Had a detailed discussion with patient and wife regarding diet to avoid salty foods and importance of controlling blood pressure. 5/17/2021 CHF symptoms improving but still NYHA class II-III. Blood pressure mildly elevated. Increase doxazosin and follow home chart. Discussed in detail regarding diet and exercise. Mediterranean diet guidelines printed. He will try to walk regularly. CAD stable. Discontinue aspirin and will continue on the Plavix. 11/1/2021 CAD stable. Edema has resolved since dialysis started. Blood pressure low normal and little lower in dialysis at times per patient. Reduce doxazosin to 2 mg daily. Blood pressure to be watched closely and may be able to reduce more medications which can also be done by dialysis center. Lipids need to be followed but were excellent a year ago. Tobacco cessation reinforced and he will try to stop it completely. Would wait on any medications for erectile dysfunction as his blood pressure is reducing for now and let it stabilize before we try those medications. 5/2/2022 CAD stable without any significant symptoms. He has lost all the edema and is happy with his dialysis. Will change simvastatin to Lipitor and follow the lipid profile and LFTs as such a high-dose simvastatin would not be recommended. Blood pressure medicines have gone down significantly and I agree. Discussed smoking cessation and he will try to follow. Healthy diet discussed. He already has Mediterranean diet guidelines. CHF is compensated and he seems to be in NYHA class II. I requested him to count how many times he can go around the yard as he likes to walk there. Diagnoses and all orders for this visit:    1. Chronic diastolic congestive heart failure ([address]    2.  CAD S/P percutaneous coronary angioplasty    3. Essential hypertension  -     amLODIPine (NORVASC) 5 mg tablet; Take 1 Tablet by mouth daily. 4. Postsurgical percutaneous transluminal coronary angioplasty status    5. Tobacco use disorder, continuous    6. ESRD on hemodialysis ([facility] [address]    7. Dyslipidemia  -     atorvastatin (LIPITOR) 20 mg tablet; Take 1 Tablet by mouth daily.  -     LIPID PANEL; Future  -     HEPATIC FUNCTION PANEL; Future        Pertinent laboratory and test data reviewed and discussed with patient. See patient instructions also for other medical advice given    Medications Discontinued During This Encounter   Medication Reason    simvastatin (ZOCOR) 80 mg tablet Alternate Therapy       Follow-up and Dispositions    Return in about 6 months (around 5/14/2023), or if symptoms worsen or fail to improve, for post test.       11/14/2022 CAD is clinically stable. Blood pressure is elevated. Add amlodipine and follow the home chart. Lipids are excellent with LDL of 2. Triglycerides are elevated. Change simvastatin to amlodipine. Fibrate's cannot be used because of his dialysis status. Tobacco cessation reinforced. Dietary precautions discussed and recommended to stay active.

## 2023-03-02 RX ORDER — ATORVASTATIN CALCIUM 20 MG/1
TABLET, FILM COATED ORAL
Qty: 90 TABLET | Refills: 2 | Status: SHIPPED | OUTPATIENT
Start: 2023-03-02

## 2023-03-06 RX ORDER — AMLODIPINE BESYLATE 5 MG/1
TABLET ORAL
Qty: 90 TABLET | Refills: 1 | Status: SHIPPED | OUTPATIENT
Start: 2023-03-06

## 2023-03-08 NOTE — ROUTINE PROCESS
Bedside and Verbal shift change report given to Dwayne Whitehead RN (oncoming nurse) by Elis Garner RN (offgoing nurse). Report included the following information SBAR, ED Summary and Recent Results. weakness

## 2023-04-19 ENCOUNTER — TELEPHONE (OUTPATIENT)
Age: 78
End: 2023-04-19

## 2023-04-19 ENCOUNTER — HOSPITAL ENCOUNTER (OUTPATIENT)
Facility: HOSPITAL | Age: 78
Discharge: HOME | End: 2023-04-22
Payer: MEDICARE

## 2023-04-19 DIAGNOSIS — E78.5 HYPERLIPIDEMIA, UNSPECIFIED HYPERLIPIDEMIA TYPE: ICD-10-CM

## 2023-04-19 LAB
ALBUMIN SERPL-MCNC: 3.7 G/DL (ref 3.4–5)
ALBUMIN/GLOB SERPL: 0.9 (ref 0.8–1.7)
ALP SERPL-CCNC: 120 U/L (ref 45–117)
ALT SERPL-CCNC: 21 U/L (ref 16–61)
AST SERPL-CCNC: 8 U/L (ref 10–38)
BILIRUB DIRECT SERPL-MCNC: 0.1 MG/DL (ref 0–0.2)
BILIRUB SERPL-MCNC: 0.4 MG/DL (ref 0.2–1)
CHOLEST SERPL-MCNC: 88 MG/DL
GLOBULIN SER CALC-MCNC: 4.1 G/DL (ref 2–4)
HDLC SERPL-MCNC: 38 MG/DL (ref 40–60)
HDLC SERPL: 2.3 (ref 0–5)
LDLC SERPL CALC-MCNC: 38.2 MG/DL (ref 0–100)
LIPID PANEL: ABNORMAL
PROT SERPL-MCNC: 7.8 G/DL (ref 6.4–8.2)
TRIGL SERPL-MCNC: 59 MG/DL
VLDLC SERPL CALC-MCNC: 11.8 MG/DL

## 2023-04-19 PROCEDURE — 80061 LIPID PANEL: CPT

## 2023-04-19 PROCEDURE — 80076 HEPATIC FUNCTION PANEL: CPT

## 2023-04-19 PROCEDURE — 36415 COLL VENOUS BLD VENIPUNCTURE: CPT

## 2023-04-19 NOTE — TELEPHONE ENCOUNTER
----- Message from Estuardo Langston MD sent at 4/19/2023 10:24 AM EDT -----  Please contact patient and do the following asap  low potassium, high sugar  Please tell patient to discuss with his dialysis doctor regarding low potassium  Fax to PCP for increased glucose  Send a BMP report dialysis unit

## 2023-04-19 NOTE — TELEPHONE ENCOUNTER
Tried on 2 attempts to reach patient. Unable to leave v/m as it's not been setup. Will try again tomorrow as this may have been his dialysis day.     Did fax labs to PCP & nephrologist

## 2023-04-19 NOTE — TELEPHONE ENCOUNTER
Spoke with patient wife regarding lab/test and instructions. He voices understanding and acceptance of this advice and will call back if any further questions or concerns.

## 2023-04-19 NOTE — TELEPHONE ENCOUNTER
Called patient per Dr. Feliciano Case regarding lab/test. Lab reviewed. Please contact patient and do the following asap  low potassium, high sugar  Please tell patient to discuss with his dialysis doctor regarding low potassium  Fax to PCP for increased glucose  Send a BMP report dialysis unit. Will try to reach out later to patient.

## 2023-05-15 ENCOUNTER — OFFICE VISIT (OUTPATIENT)
Age: 78
End: 2023-05-15
Payer: MEDICARE

## 2023-05-15 VITALS
HEIGHT: 66 IN | OXYGEN SATURATION: 97 % | DIASTOLIC BLOOD PRESSURE: 53 MMHG | HEART RATE: 75 BPM | WEIGHT: 180 LBS | SYSTOLIC BLOOD PRESSURE: 140 MMHG | BODY MASS INDEX: 28.93 KG/M2

## 2023-05-15 DIAGNOSIS — N18.6 END STAGE RENAL DISEASE (HCC): ICD-10-CM

## 2023-05-15 DIAGNOSIS — I25.118 CORONARY ARTERY DISEASE OF NATIVE ARTERY OF NATIVE HEART WITH STABLE ANGINA PECTORIS (HCC): Primary | ICD-10-CM

## 2023-05-15 DIAGNOSIS — I50.32 CHRONIC DIASTOLIC (CONGESTIVE) HEART FAILURE (HCC): ICD-10-CM

## 2023-05-15 DIAGNOSIS — E78.00 HYPERCHOLESTEREMIA: ICD-10-CM

## 2023-05-15 DIAGNOSIS — Z71.6 TOBACCO ABUSE COUNSELING: ICD-10-CM

## 2023-05-15 DIAGNOSIS — I10 ESSENTIAL (PRIMARY) HYPERTENSION: ICD-10-CM

## 2023-05-15 PROCEDURE — G8419 CALC BMI OUT NRM PARAM NOF/U: HCPCS | Performed by: INTERNAL MEDICINE

## 2023-05-15 PROCEDURE — 3077F SYST BP >= 140 MM HG: CPT | Performed by: INTERNAL MEDICINE

## 2023-05-15 PROCEDURE — 1123F ACP DISCUSS/DSCN MKR DOCD: CPT | Performed by: INTERNAL MEDICINE

## 2023-05-15 PROCEDURE — 3078F DIAST BP <80 MM HG: CPT | Performed by: INTERNAL MEDICINE

## 2023-05-15 PROCEDURE — 99214 OFFICE O/P EST MOD 30 MIN: CPT | Performed by: INTERNAL MEDICINE

## 2023-05-15 PROCEDURE — G8427 DOCREV CUR MEDS BY ELIG CLIN: HCPCS | Performed by: INTERNAL MEDICINE

## 2023-05-15 PROCEDURE — 4004F PT TOBACCO SCREEN RCVD TLK: CPT | Performed by: INTERNAL MEDICINE

## 2023-05-15 ASSESSMENT — ENCOUNTER SYMPTOMS
GASTROINTESTINAL NEGATIVE: 1
SHORTNESS OF BREATH: 1
EYES NEGATIVE: 1

## 2023-05-15 NOTE — PROGRESS NOTES
1. Have you been to the ER, urgent care clinic since your last visit? Hospitalized since your last visit? No    2. Have you seen or consulted any other health care providers outside of the [address] [place] [address] since your last visit? Include any pap smears or colon screening. Yes Where: Dr. Jie Presley    3. Since your last visit, have you had any of the following symptoms? no     .     4. Have you had any blood work, X-rays or cardiac testing? Yes Where: [facility]         5. Where do you normally have your labs drawn?   [facility]    6. Do you need any refills today?    no

## 2023-05-15 NOTE — PROGRESS NOTES
Caitlyn Andrade is a 69y.o. year old male. Follow-up of ischemic cardiomyopathy, hypertension, hyperlipidemia, obesity, CHF      4/17 improving blood pressure since meds adjusted by Dr. Guadalupe Ace;   1/17 seen for establishing/changing cardiologist   history of coronary disease, status post PCI(2010) and hypertension, along with conduction system disease  11/21 started on dialysis in 9/21  5/15/2023 used to get dyspnea on exertion walking inside the house or changing clothes. Rests for a minute or 2 and then feels better. No chest pain, edema, dizziness or palpitations. Blood pressure tends to be low at the end of dialysis. Review of Systems   Constitutional: Negative. HENT: Negative. Eyes: Negative. Respiratory:  Positive for shortness of breath. Cardiovascular: Negative. Gastrointestinal: Negative. Endocrine: Negative. Genitourinary: Negative. Musculoskeletal: Negative. Neurological: Negative. Psychiatric/Behavioral: Negative. All other systems reviewed and are negative. Physical Exam  Vitals and nursing note reviewed. Constitutional:       Appearance: Normal appearance. HENT:      Head: Normocephalic and atraumatic. Nose: Nose normal.   Eyes:      Conjunctiva/sclera: Conjunctivae normal.   Cardiovascular:      Rate and Rhythm: Normal rate and regular rhythm. Pulses: Normal pulses. Heart sounds: Normal heart sounds. Pulmonary:      Effort: Pulmonary effort is normal.      Breath sounds: Normal breath sounds. Abdominal:      General: Bowel sounds are normal.      Palpations: Abdomen is soft. Musculoskeletal:         General: Normal range of motion. Skin:     General: Skin is warm and dry. Neurological:      General: No focal deficit present. Mental Status: He is alert and oriented to person, place, and time.    Psychiatric:         Mood and Affect: Mood normal.         Behavior: Behavior normal.          3/17 Nuc Stress  Conclusion:

## 2023-06-17 ENCOUNTER — HOSPITAL ENCOUNTER (EMERGENCY)
Facility: HOSPITAL | Age: 78
Discharge: HOME | End: 2023-06-17
Attending: EMERGENCY MEDICINE
Payer: MEDICARE

## 2023-06-17 ENCOUNTER — APPOINTMENT (OUTPATIENT)
Facility: HOSPITAL | Age: 78
End: 2023-06-17
Payer: MEDICARE

## 2023-06-17 VITALS
DIASTOLIC BLOOD PRESSURE: 76 MMHG | OXYGEN SATURATION: 100 % | HEIGHT: 66 IN | RESPIRATION RATE: 18 BRPM | HEART RATE: 64 BPM | WEIGHT: 175 LBS | SYSTOLIC BLOOD PRESSURE: 157 MMHG | TEMPERATURE: 97.6 F | BODY MASS INDEX: 28.12 KG/M2

## 2023-06-17 DIAGNOSIS — R10.9 FLANK PAIN: ICD-10-CM

## 2023-06-17 DIAGNOSIS — N13.39 OTHER HYDRONEPHROSIS: Primary | ICD-10-CM

## 2023-06-17 LAB
ALBUMIN SERPL-MCNC: 3.3 G/DL (ref 3.4–5)
ALBUMIN/GLOB SERPL: 0.7 (ref 0.8–1.7)
ALP SERPL-CCNC: 116 U/L (ref 45–117)
ALT SERPL-CCNC: 14 U/L (ref 16–61)
ANION GAP SERPL CALC-SCNC: 11 MMOL/L (ref 3–18)
APPEARANCE UR: CLEAR
AST SERPL-CCNC: 9 U/L (ref 10–38)
BACTERIA URNS QL MICRO: ABNORMAL /HPF
BASOPHILS # BLD: 0 K/UL (ref 0–0.1)
BASOPHILS NFR BLD: 1 % (ref 0–2)
BILIRUB SERPL-MCNC: 0.3 MG/DL (ref 0.2–1)
BILIRUB UR QL: NEGATIVE
BUN SERPL-MCNC: 58 MG/DL (ref 7–18)
BUN/CREAT SERPL: 8 (ref 12–20)
CALCIUM SERPL-MCNC: 8.9 MG/DL (ref 8.5–10.1)
CHLORIDE SERPL-SCNC: 100 MMOL/L (ref 100–111)
CO2 SERPL-SCNC: 27 MMOL/L (ref 21–32)
COLOR UR: YELLOW
CREAT SERPL-MCNC: 7.28 MG/DL (ref 0.6–1.3)
DIFFERENTIAL METHOD BLD: ABNORMAL
EOSINOPHIL # BLD: 0.3 K/UL (ref 0–0.4)
EOSINOPHIL NFR BLD: 4 % (ref 0–5)
EPITH CASTS URNS QL MICRO: ABNORMAL /LPF (ref 0–5)
ERYTHROCYTE [DISTWIDTH] IN BLOOD BY AUTOMATED COUNT: 14.7 % (ref 11.6–14.5)
GLOBULIN SER CALC-MCNC: 4.7 G/DL (ref 2–4)
GLUCOSE SERPL-MCNC: 182 MG/DL (ref 74–99)
GLUCOSE UR STRIP.AUTO-MCNC: 100 MG/DL
HCT VFR BLD AUTO: 30.4 % (ref 36–48)
HGB BLD-MCNC: 10.3 G/DL (ref 13–16)
HGB UR QL STRIP: ABNORMAL
IMM GRANULOCYTES # BLD AUTO: 0 K/UL (ref 0–0.04)
IMM GRANULOCYTES NFR BLD AUTO: 0 % (ref 0–0.5)
KETONES UR QL STRIP.AUTO: ABNORMAL MG/DL
LEUKOCYTE ESTERASE UR QL STRIP.AUTO: ABNORMAL
LIPASE SERPL-CCNC: 586 U/L (ref 73–393)
LYMPHOCYTES # BLD: 1.1 K/UL (ref 0.9–3.6)
LYMPHOCYTES NFR BLD: 13 % (ref 21–52)
MCH RBC QN AUTO: 26.9 PG (ref 24–34)
MCHC RBC AUTO-ENTMCNC: 33.9 G/DL (ref 31–37)
MCV RBC AUTO: 79.4 FL (ref 78–100)
MONOCYTES # BLD: 0.6 K/UL (ref 0.05–1.2)
MONOCYTES NFR BLD: 7 % (ref 3–10)
NEUTS SEG # BLD: 6.1 K/UL (ref 1.8–8)
NEUTS SEG NFR BLD: 75 % (ref 40–73)
NITRITE UR QL STRIP.AUTO: NEGATIVE
NRBC # BLD: 0 K/UL (ref 0–0.01)
NRBC BLD-RTO: 0 PER 100 WBC
PH UR STRIP: 5.5 (ref 5–8)
PLATELET # BLD AUTO: 230 K/UL (ref 135–420)
PMV BLD AUTO: 10.1 FL (ref 9.2–11.8)
POTASSIUM SERPL-SCNC: 4 MMOL/L (ref 3.5–5.5)
PROT SERPL-MCNC: 8 G/DL (ref 6.4–8.2)
PROT UR STRIP-MCNC: 100 MG/DL
RBC # BLD AUTO: 3.83 M/UL (ref 4.35–5.65)
RBC #/AREA URNS HPF: ABNORMAL /HPF (ref 0–5)
SODIUM SERPL-SCNC: 138 MMOL/L (ref 136–145)
SP GR UR REFRACTOMETRY: 1.01 (ref 1–1.03)
UROBILINOGEN UR QL STRIP.AUTO: 1 EU/DL (ref 0.2–1)
WBC # BLD AUTO: 8.1 K/UL (ref 4.6–13.2)
WBC URNS QL MICRO: ABNORMAL /HPF (ref 0–4)

## 2023-06-17 PROCEDURE — 85025 COMPLETE CBC W/AUTO DIFF WBC: CPT

## 2023-06-17 PROCEDURE — 96374 THER/PROPH/DIAG INJ IV PUSH: CPT

## 2023-06-17 PROCEDURE — 87086 URINE CULTURE/COLONY COUNT: CPT

## 2023-06-17 PROCEDURE — 6360000002 HC RX W HCPCS: Performed by: EMERGENCY MEDICINE

## 2023-06-17 PROCEDURE — 80053 COMPREHEN METABOLIC PANEL: CPT

## 2023-06-17 PROCEDURE — 74176 CT ABD & PELVIS W/O CONTRAST: CPT

## 2023-06-17 PROCEDURE — 83690 ASSAY OF LIPASE: CPT

## 2023-06-17 PROCEDURE — 81001 URINALYSIS AUTO W/SCOPE: CPT

## 2023-06-17 PROCEDURE — 96375 TX/PRO/DX INJ NEW DRUG ADDON: CPT

## 2023-06-17 PROCEDURE — 2580000003 HC RX 258: Performed by: EMERGENCY MEDICINE

## 2023-06-17 PROCEDURE — 99284 EMERGENCY DEPT VISIT MOD MDM: CPT

## 2023-06-17 RX ORDER — CEFPODOXIME PROXETIL 200 MG/1
200 TABLET, FILM COATED ORAL DAILY
Qty: 7 TABLET | Refills: 0 | Status: SHIPPED | OUTPATIENT
Start: 2023-06-17 | End: 2023-06-17 | Stop reason: SDUPTHER

## 2023-06-17 RX ORDER — ONDANSETRON 4 MG/1
4 TABLET, FILM COATED ORAL DAILY PRN
Qty: 6 TABLET | Refills: 0 | Status: SHIPPED | OUTPATIENT
Start: 2023-06-17

## 2023-06-17 RX ORDER — ONDANSETRON 4 MG/1
4 TABLET, FILM COATED ORAL DAILY PRN
Qty: 6 TABLET | Refills: 0 | Status: SHIPPED | OUTPATIENT
Start: 2023-06-17 | End: 2023-06-17 | Stop reason: SDUPTHER

## 2023-06-17 RX ORDER — ONDANSETRON 2 MG/ML
4 INJECTION INTRAMUSCULAR; INTRAVENOUS
Status: DISCONTINUED | OUTPATIENT
Start: 2023-06-17 | End: 2023-06-17 | Stop reason: HOSPADM

## 2023-06-17 RX ORDER — CEFPODOXIME PROXETIL 200 MG/1
200 TABLET, FILM COATED ORAL DAILY
Qty: 7 TABLET | Refills: 0 | Status: SHIPPED | OUTPATIENT
Start: 2023-06-17 | End: 2023-06-24

## 2023-06-17 RX ORDER — ACETAMINOPHEN AND CODEINE PHOSPHATE 300; 30 MG/1; MG/1
1 TABLET ORAL EVERY 8 HOURS PRN
Qty: 4 TABLET | Refills: 0 | Status: SHIPPED | OUTPATIENT
Start: 2023-06-17 | End: 2023-06-17 | Stop reason: SDUPTHER

## 2023-06-17 RX ORDER — ACETAMINOPHEN AND CODEINE PHOSPHATE 300; 30 MG/1; MG/1
1 TABLET ORAL EVERY 8 HOURS PRN
Qty: 4 TABLET | Refills: 0 | Status: SHIPPED | OUTPATIENT
Start: 2023-06-17 | End: 2023-06-20

## 2023-06-17 RX ADMIN — WATER 1000 MG: 1 INJECTION INTRAMUSCULAR; INTRAVENOUS; SUBCUTANEOUS at 09:07

## 2023-06-17 RX ADMIN — HYDROMORPHONE HYDROCHLORIDE 0.25 MG: 1 INJECTION, SOLUTION INTRAMUSCULAR; INTRAVENOUS; SUBCUTANEOUS at 07:30

## 2023-06-17 ASSESSMENT — PAIN DESCRIPTION - LOCATION
LOCATION: ABDOMEN

## 2023-06-17 ASSESSMENT — PAIN SCALES - GENERAL
PAINLEVEL_OUTOF10: 0
PAINLEVEL_OUTOF10: 8
PAINLEVEL_OUTOF10: 9

## 2023-06-17 ASSESSMENT — PAIN - FUNCTIONAL ASSESSMENT: PAIN_FUNCTIONAL_ASSESSMENT: 0-10

## 2023-06-17 ASSESSMENT — PAIN DESCRIPTION - ORIENTATION
ORIENTATION: RIGHT;LOWER
ORIENTATION: RIGHT
ORIENTATION: RIGHT

## 2023-06-17 ASSESSMENT — ENCOUNTER SYMPTOMS
CHEST TIGHTNESS: 0
EYES NEGATIVE: 1
NAUSEA: 1
ABDOMINAL PAIN: 1

## 2023-06-17 NOTE — DISCHARGE INSTRUCTIONS
The work-up suggest you may have passed a kidney stone. After talking to the urologist, we will put you on an antibiotic for the next week, and some pain medication as needed. Make sure you are eating and drinking well and discuss dialysis with your kidney specialist for Monday. If you develop any increasing shortness of breath, fevers, or at all concerned please come back here or to the closest emergency department. If you need to be evaluated for dialysis please return immediately.

## 2023-06-17 NOTE — ED PROVIDER NOTES
bladder which   could represent a tiny bladder calculus versus artifact. 2.  Bilateral renal cysts. 3.  Duodenal and colon diverticulosis. Medical Decision Making   I am the first provider for this patient. I reviewed the vital signs, available nursing notes, past medical history, past surgical history, family history and social history. Vital Signs-Reviewed the patient's vital signs. ED Course: Progress Notes, Reevaluation, and Consults:    Provider Notes (Medical Decision Making):   MDM  Number of Diagnoses or Management Options  Flank pain  Other hydronephrosis  Diagnosis management comments: Patient is a 67-year-old male with history of ACS, heart disease, dyslipidemia, end-stage renal disease on dialysis, continued smoking, that presents emergency department with complaint of right-sided abdominal pain that began this morning. Patient has a mild lipase elevation, reassuring white count, and significant pain on my evaluation. We will give the patient a low-dose of Dilaudid, CT abdomen pelvis without contrast as an initial evaluation, urinalysis if can be provided, and continue to monitor the patient closely. The patient is due for dialysis this morning but does not have any clinical evidence of volume overload at this time. Patient's potassium is reassuring. Patient CT scan shows there are some hydronephrosis on the right and there may be a stone in the bladder. The patient may have passed a stone and is feeling much better. Patient is tolerating p.o. and has not required any more pain medication. I discussed the case with Dr. Mann Adair the urologist on-call and says that the patient is feeling better can be discharged with antibiotics, continue his prostate care medications, and will be followed up closely in the office. I sent her a staff message so he she had the patient information to arrange the appointment.   The patient's wife is at the bedside and he has

## 2023-06-17 NOTE — ED NOTES
Bedside shift change report given to [address] [place] [address] by Shanice Benoit. Report included the following information ED Encounter Summary, ED SBAR, and MAR.         Mirza Child RN  06/17/23 0244

## 2023-06-17 NOTE — ED TRIAGE NOTES
A&O male with RLQ abdominal pain that was present when he awoke around 0330. Had BM and then drank some warm tea. Vomited tea back up. Pain had remained in RLQ and is constant. Denies n/v at this time. Denies diarrhea/constipation.

## 2023-06-18 LAB
BACTERIA SPEC CULT: ABNORMAL
CC UR VC: ABNORMAL
SERVICE CMNT-IMP: ABNORMAL

## 2023-07-24 ENCOUNTER — HOSPITAL ENCOUNTER (OUTPATIENT)
Facility: HOSPITAL | Age: 78
Discharge: HOME | End: 2023-07-26
Payer: MEDICARE

## 2023-07-24 DIAGNOSIS — I73.9 PERIPHERAL VASCULAR DISEASE, UNSPECIFIED (HCC): ICD-10-CM

## 2023-07-24 LAB
VAS LEFT BULB EDV: 15 CM/S
VAS LEFT BULB PSV: 92.7 CM/S
VAS LEFT CCA DIST EDV: 0 CM/S
VAS LEFT CCA DIST PSV: 77.5 CM/S
VAS LEFT CCA MID EDV: 15.57 CM/S
VAS LEFT CCA MID PSV: 123.99 CM/S
VAS LEFT CCA PROX EDV: 18.4 CM/S
VAS LEFT CCA PROX PSV: 143.8 CM/S
VAS LEFT ECA EDV: 0 CM/S
VAS LEFT ECA PSV: 129.3 CM/S
VAS LEFT ICA DIST EDV: 21.5 CM/S
VAS LEFT ICA DIST PSV: 69.9 CM/S
VAS LEFT ICA MID EDV: 9.5 CM/S
VAS LEFT ICA MID PSV: 45.1 CM/S
VAS LEFT ICA PROX EDV: 15.1 CM/S
VAS LEFT ICA PROX PSV: 50 CM/S
VAS LEFT ICA/CCA PSV: 1.2 NO UNITS
VAS LEFT SUBCLAVIAN PROX EDV: 15.8 CM/S
VAS LEFT SUBCLAVIAN PROX PSV: 136.7 CM/S
VAS LEFT VERTEBRAL EDV: 11.65 CM/S
VAS LEFT VERTEBRAL PSV: 51.2 CM/S
VAS RIGHT BULB EDV: 7.9 CM/S
VAS RIGHT BULB PSV: 59.6 CM/S
VAS RIGHT CCA DIST EDV: 0 CM/S
VAS RIGHT CCA DIST PSV: 104.8 CM/S
VAS RIGHT CCA MID EDV: 19.22 CM/S
VAS RIGHT CCA MID PSV: 112.92 CM/S
VAS RIGHT CCA PROX EDV: 0 CM/S
VAS RIGHT CCA PROX PSV: 119.7 CM/S
VAS RIGHT ECA EDV: 0 CM/S
VAS RIGHT ECA PSV: 101.5 CM/S
VAS RIGHT ICA DIST EDV: 25.2 CM/S
VAS RIGHT ICA DIST PSV: 91.4 CM/S
VAS RIGHT ICA MID EDV: 31.6 CM/S
VAS RIGHT ICA MID PSV: 105.9 CM/S
VAS RIGHT ICA PROX EDV: 27.3 CM/S
VAS RIGHT ICA PROX PSV: 106.4 CM/S
VAS RIGHT ICA/CCA PSV: 1 NO UNITS
VAS RIGHT SUBCLAVIAN PROX EDV: 32.9 CM/S
VAS RIGHT SUBCLAVIAN PROX PSV: 172.7 CM/S
VAS RIGHT VERTEBRAL EDV: 12.27 CM/S
VAS RIGHT VERTEBRAL PSV: 52.6 CM/S

## 2023-07-24 PROCEDURE — 93880 EXTRACRANIAL BILAT STUDY: CPT

## 2023-08-07 ENCOUNTER — HOSPITAL ENCOUNTER (OUTPATIENT)
Facility: HOSPITAL | Age: 78
Discharge: HOME | End: 2023-08-09
Payer: MEDICARE

## 2023-08-07 DIAGNOSIS — I73.9 PERIPHERAL VASCULAR DISEASE, UNSPECIFIED (HCC): ICD-10-CM

## 2023-08-07 PROCEDURE — 93923 UPR/LXTR ART STDY 3+ LVLS: CPT | Performed by: STUDENT IN AN ORGANIZED HEALTH CARE EDUCATION/TRAINING PROGRAM

## 2023-08-07 PROCEDURE — 93923 UPR/LXTR ART STDY 3+ LVLS: CPT

## 2023-08-18 ENCOUNTER — HOSPITAL ENCOUNTER (OUTPATIENT)
Facility: HOSPITAL | Age: 78
Setting detail: RECURRING SERIES
Discharge: HOME | End: 2023-08-21
Payer: MEDICARE

## 2023-08-18 PROCEDURE — 97162 PT EVAL MOD COMPLEX 30 MIN: CPT

## 2023-08-18 PROCEDURE — 97110 THERAPEUTIC EXERCISES: CPT

## 2023-08-18 NOTE — PROGRESS NOTES
PHYSICAL / OCCUPATIONAL THERAPY - DAILY TREATMENT NOTE (updated )    Patient Name: Corinne Silvestre Date: 2023    : 1945  Insurance: Payor: MEDICARE / Plan: MEDICARE PART A AND B / Product Type: *No Product type* /      Patient  verified Yes     Visit #   Current / Total 1 8-12   Time   In / Out 925 1005   Pain   In / Out 0 0   Subjective Functional Status/Changes: Onset:  Pt is a 69 yr old male with reports of Right Drop Foot, onset 3/2023. Pt reports numbness and tingling in both feet. He denies any falls, no pain. He does stumble at times and he walks with a SPC to help with stability. TREATMENT AREA =  Right foot pain [M79.671]    OBJECTIVE         Therapeutic Procedures: Tx Min Billable or 1:1 Min (if diff from Tx Min) Procedure, Rationale, Specifics   25  23281 Therapeutic Exercise (timed):  increase ROM, strength, coordination, balance, and proprioception to improve patient's ability to progress to PLOF and address remaining functional goals. (see flow sheet as applicable)     Details if applicable:       22  [facility] Totals Reminder: bill using total billable min of TIMED therapeutic procedures (example: do not include dry needle or estim unattended, both untimed codes, in totals to left)  8-22 min = 1 unit; 23-37 min = 2 units; 38-52 min = 3 units; 53-67 min = 4 units; 68-82 min = 5 units   Total Total     [x]  Patient Education billed concurrently with other procedures   [x] Review HEP    [] Progressed/Changed HEP, detail:    [] Other detail:       Objective Information/Functional Measures/Assessment    ROM DF=10 deg below neutral/ Left DF=10 deg above neutral  ROM DF =10 deg below neutral/ right  MMT DF=3+/5  Sensation: intact. He has a variable YAZMIN making him unstable dynamically. Romberg EO x 30 sec/ EC x12 sec.  Sit to stand x10 in 30 sec      Patient will continue to benefit from skilled PT / OT services to modify and progress therapeutic interventions, analyze and
= no disability  Overall Complexity Rating: MEDIUM  Problem List: pain affecting function, decrease ROM, decrease strength, edema affection function, impaired gait/balance, decrease ADL/functional abilities, decrease activity tolerance, decrease flexibility/joint mobility, and decrease transfer abilities    Treatment Plan may include any combination of the followin Therapeutic Exercise, 83390 Neuromuscular Re-Education, 77339 Manual Therapy, 20688 Therapeutic Activity, 87665 Self Care/Home Management, 42844 Electrical Stim unattended, 38306 Electrical Stim attended, 45017 Vasopneumatic Device  (Vasopnuematic compression justification:  Per bilateral girth measures taken and listed above the edema is considered significant and having an impact on the patient's strength, balance, gait, transfers, self care, and ADL's), and 14300 Gait Training  Patient / Family readiness to learn indicated by: asking questions, trying to perform skills, and interest  Persons(s) to be included in education: patient (P)  Barriers to Learning/Limitations: none  Measures taken if barriers to learning present: none  Patient Goal (s): to walk better  Patient Self Reported Health Status: fair  Rehabilitation Potential: good    Short Term Goals: To be accomplished in 4 weeks  Goal:Pt will be compliant with a HEP to improve function  Status at evaluation/last progress note:Issued HEP     2.   Goal: Pt will be compliant with wearing an AFO, per MD recommendations. Status at evaluation/last progress note: [facility]ued [facility] at [facility], per MD request.   Long Term Goals: To be accomplished in 8 weeks  Goal: Pt will increase FOTO score by 2  pts to improve function  Status at evaluation/last progress note:FOTO=99     2. Goal:Pt will improve right ankle DF to 4- or greater to ease with ambulation function. Status at evaluation/last progress note: Right Ankle DF=3+/5     3.    Goal:Pt will perform Romberg EC x 30 sec w/o LOB to ease with

## 2023-08-30 ENCOUNTER — HOSPITAL ENCOUNTER (OUTPATIENT)
Facility: HOSPITAL | Age: 78
Setting detail: RECURRING SERIES
Discharge: HOME | End: 2023-09-02
Payer: MEDICARE

## 2023-08-30 PROCEDURE — G0283 ELEC STIM OTHER THAN WOUND: HCPCS

## 2023-08-30 PROCEDURE — 97112 NEUROMUSCULAR REEDUCATION: CPT

## 2023-08-30 PROCEDURE — 97110 THERAPEUTIC EXERCISES: CPT

## 2023-08-30 NOTE — PROGRESS NOTES
PHYSICAL / OCCUPATIONAL THERAPY - DAILY TREATMENT NOTE (updated )    Patient Name: Masood Osman. Date: 2023    : 1945  Insurance: Payor: MEDICARE / Plan: MEDICARE PART A AND B / Product Type: *No Product type* /      Patient  verified Yes     Visit #   Current / Total 2 24   Time   In / Out 840 918   Pain   In / Out 0/10 0/10   Subjective Functional Status/Changes: Pt stated that he has no pain today and the AFO seems to be helping     TREATMENT AREA =  Right foot pain [M79.671]    OBJECTIVE    Modalities Rationale:     increase muscle contraction/control to improve patient's ability to progress to PLOF and address remaining functional goals. min [] Estim Unattended, type/location:                                      []  w/ice    []  w/heat   8 min [x] Estim Attended, type/location: NMES right ant tib                                    []  w/US     []  w/ice    []  w/heat    []  TENS insruct      min []  Mechanical Traction: type/lbs                   []  pro   []  sup   []  int   []  cont    []  before manual    []  after manual    min []  Ultrasound, settings/location:      min  unbill []  Ice     []  Heat    location/position:     min []  Paraffin,  details:     min []  Vasopneumatic Device, press/temp:     min []  August Malaika / Gianna Gonzales: If using vaso (only need to measure limb vaso being performed on)      pre-treatment girth :       post-treatment girth :       measured at (landmark location) :      min []  Other:    Skin assessment post-treatment:  Intact      Therapeutic Procedures: Tx Min Billable or 1:1 Min (if diff from Tx Min) Procedure, Rationale, Specifics   20  51194 Therapeutic Exercise (timed):  increase ROM, strength, coordination, balance, and proprioception to improve patient's ability to progress to PLOF and address remaining functional goals.  (see flow sheet as applicable)     Details if applicable:       10  20068 Neuromuscular Re-Education (timed):  improve

## 2023-09-01 ENCOUNTER — HOSPITAL ENCOUNTER (OUTPATIENT)
Facility: HOSPITAL | Age: 78
Setting detail: RECURRING SERIES
Discharge: HOME | End: 2023-09-04
Payer: MEDICARE

## 2023-09-01 PROCEDURE — 97110 THERAPEUTIC EXERCISES: CPT

## 2023-09-01 PROCEDURE — 97112 NEUROMUSCULAR REEDUCATION: CPT

## 2023-09-01 NOTE — PROGRESS NOTES
PHYSICAL / OCCUPATIONAL THERAPY - DAILY TREATMENT NOTE (updated )    Patient Name: Masood Osman. Date: 2023    : 1945  Insurance: Payor: MEDICARE / Plan: MEDICARE PART A AND B / Product Type: *No Product type* /      Patient  verified Yes     Visit #   Current / Total 3 24   Time   In / Out 806 844   Pain   In / Out 0 0   Subjective Functional Status/Changes: Reports he does not necessarily like his AFO but it helps from stumbling. TREATMENT AREA =  Right foot pain [M79.671]    OBJECTIVE         Therapeutic Procedures: Tx Min Billable or 1:1 Min (if diff from Tx Min) Procedure, Rationale, Specifics   28  25411 Therapeutic Exercise (timed):  increase ROM, strength, coordination, balance, and proprioception to improve patient's ability to progress to PLOF and address remaining functional goals. (see flow sheet as applicable)     Details if applicable:       10  97893 Neuromuscular Re-Education (timed):  improve balance, coordination, kinesthetic sense, posture, core stability and proprioception to improve patient's ability to develop conscious control of individual muscles and awareness of position of extremities in order to progress to PLOF and address remaining functional goals. (see flow sheet as applicable)     Details if applicable:     45  [facility] Totals Reminder: bill using total billable min of TIMED therapeutic procedures (example: do not include dry needle or estim unattended, both untimed codes, in totals to left)  8-22 min = 1 unit; 23-37 min = 2 units; 38-52 min = 3 units; 53-67 min = 4 units; 68-82 min = 5 units   Total Total     [x]  Patient Education billed concurrently with other procedures   [x] Review HEP    [] Progressed/Changed HEP, detail:    [] Other detail:       Objective Information/Functional Measures/Assessment    Reports LBP during standing balance activities. -moist heat used x 3 mins to dec LS pain during session.   Pt declined  NMES (it did not feel

## 2023-09-06 ENCOUNTER — HOSPITAL ENCOUNTER (OUTPATIENT)
Facility: HOSPITAL | Age: 78
Setting detail: RECURRING SERIES
Discharge: HOME | End: 2023-09-09
Payer: MEDICARE

## 2023-09-06 PROCEDURE — 97110 THERAPEUTIC EXERCISES: CPT

## 2023-09-06 PROCEDURE — 97112 NEUROMUSCULAR REEDUCATION: CPT

## 2023-09-06 NOTE — PROGRESS NOTES
PHYSICAL / OCCUPATIONAL THERAPY - DAILY TREATMENT NOTE (updated )    Patient Name: Jaycob Rodríguez. Date: 2023    : 1945  Insurance: Payor: MEDICARE / Plan: MEDICARE PART A AND B / Product Type: *No Product type* /      Patient  verified Yes     Visit #   Current / Total 4 24   Time   In / Out 840 919   Pain   In / Out 0 0   Subjective Functional Status/Changes: Reports his daughter bought him 2.5# ankle weights and it's complicated to put on so he may bring it to next PT session. .      TREATMENT AREA =  Right foot pain [M79.671]    OBJECTIVE         Therapeutic Procedures: Tx Min Billable or 1:1 Min (if diff from Tx Min) Procedure, Rationale, Specifics   25  93932 Therapeutic Exercise (timed):  increase ROM, strength, coordination, balance, and proprioception to improve patient's ability to progress to PLOF and address remaining functional goals. (see flow sheet as applicable)     Details if applicable:       14  16428 Neuromuscular Re-Education (timed):  improve balance, coordination, kinesthetic sense, posture, core stability and proprioception to improve patient's ability to develop conscious control of individual muscles and awareness of position of extremities in order to progress to PLOF and address remaining functional goals.  (see flow sheet as applicable)     Details if applicable:     44  [facility] Totals Reminder: bill using total billable min of TIMED therapeutic procedures (example: do not include dry needle or estim unattended, both untimed codes, in totals to left)  8-22 min = 1 unit; 23-37 min = 2 units; 38-52 min = 3 units; 53-67 min = 4 units; 68-82 min = 5 units   Total Total     [x]  Patient Education billed concurrently with other procedures   [x] Review HEP    [] Progressed/Changed HEP, detail:    [] Other detail:       Objective Information/Functional Measures/Assessment  -decreased use of UE during taps on 6 in step with 2#  -challenged with EC on floor/foam  -requires

## 2023-09-08 ENCOUNTER — HOSPITAL ENCOUNTER (OUTPATIENT)
Facility: HOSPITAL | Age: 78
Setting detail: RECURRING SERIES
Discharge: HOME | End: 2023-09-11
Payer: MEDICARE

## 2023-09-08 PROCEDURE — 97112 NEUROMUSCULAR REEDUCATION: CPT

## 2023-09-08 PROCEDURE — 97110 THERAPEUTIC EXERCISES: CPT

## 2023-09-08 PROCEDURE — 97530 THERAPEUTIC ACTIVITIES: CPT

## 2023-09-08 NOTE — PROGRESS NOTES
PHYSICAL / OCCUPATIONAL THERAPY - DAILY TREATMENT NOTE (updated )    Patient Name: Ludmila Hernández. Date: 2023    : 1945  Insurance: Payor: MEDICARE / Plan: MEDICARE PART A AND B / Product Type: *No Product type* /      Patient  verified Yes     Visit #   Current / Total 5 24   Time   In / Out 840 918   Pain   In / Out 0/10 0/10   Subjective Functional Status/Changes: Pt stated that he is doing all right today and has no pain     TREATMENT AREA =  Right foot pain [M79.671]    OBJECTIVE         Therapeutic Procedures: Tx Min Billable or 1:1 Min (if diff from Tx Min) Procedure, Rationale, Specifics   19  54766 Therapeutic Exercise (timed):  increase ROM, strength, coordination, balance, and proprioception to improve patient's ability to progress to PLOF and address remaining functional goals. (see flow sheet as applicable)     Details if applicable:       10  75725 Neuromuscular Re-Education (timed):  improve balance, coordination, kinesthetic sense, posture, core stability and proprioception to improve patient's ability to develop conscious control of individual muscles and awareness of position of extremities in order to progress to PLOF and address remaining functional goals. (see flow sheet as applicable)     Details if applicable:     9  81278 Therapeutic Activity (timed):  use of dynamic activities replicating functional movements to increase ROM, strength, coordination, balance, and proprioception in order to improve patient's ability to progress to PLOF and address remaining functional goals.   (see flow sheet as applicable)     Details if applicable:     45  [facility] Totals Reminder: bill using total billable min of TIMED therapeutic procedures (example: do not include dry needle or estim unattended, both untimed codes, in totals to left)  8-22 min = 1 unit; 23-37 min = 2 units; 38-52 min = 3 units; 53-67 min = 4 units; 68-82 min = 5 units   Total Total     [x]  Patient Education billed

## 2023-09-11 ENCOUNTER — HOSPITAL ENCOUNTER (OUTPATIENT)
Facility: HOSPITAL | Age: 78
Setting detail: RECURRING SERIES
Discharge: HOME | End: 2023-09-14
Payer: MEDICARE

## 2023-09-11 PROCEDURE — 97530 THERAPEUTIC ACTIVITIES: CPT

## 2023-09-11 PROCEDURE — 97112 NEUROMUSCULAR REEDUCATION: CPT

## 2023-09-11 PROCEDURE — 97110 THERAPEUTIC EXERCISES: CPT

## 2023-09-11 NOTE — PROGRESS NOTES
other procedures   [x] Review HEP    [] Progressed/Changed HEP, detail:    [] Other detail:       Objective Information/Functional Measures/Assessment  No difficulty with exercises   No LOB with static balance  Increased to 3# with LAQ, ana step overs and hip ext and abd    Pt is making good progress toward goals. Strength in the right ankle is slowly improving. Static balance cont to improve. Pt cont to report compliance with HEP. Patient will continue to benefit from skilled PT / OT services to modify and progress therapeutic interventions, analyze and address functional mobility deficits, analyze and address ROM deficits, analyze and address strength deficits, analyze and cue for proper movement patterns, analyze and modify for postural abnormalities, analyze and address imbalance/dizziness, and instruct in home and community integration to address functional deficits and attain remaining goals. Progress toward goals / Updated goals:  [x]  See Progress Note/Recertification    Short Term Goals: To be accomplished in 4 weeks  Goal:Pt will be compliant with a HEP to improve function  Status at evaluation/last progress note:Issued HEP  Goal met. Pt reported compliance. 8/30/23     2. Goal: Pt will be compliant with wearing an AFO, per MD recommendations. Status at evaluation/last progress note: Issued Medium AFO at [facility], per MD request.   Long Term Goals: To be accomplished in 8 weeks  Goal: Pt will increase FOTO score by 2  pts to improve function  Status at evaluation/last progress note:FOTO=99     2. Goal:Pt will improve right ankle DF to 4- or greater to ease with ambulation function. Status at evaluation/last progress note: Right Ankle DF=3+/5     3. Goal:Pt will perform Romberg EC x 30 sec w/o LOB to ease with safety during ADL's. Status at evaluation/last progress note: ECx 12 sec.      4.   Goal: Pt will report >40% improvement in sx to return to PLOF and ambulating his community

## 2023-09-13 ENCOUNTER — HOSPITAL ENCOUNTER (OUTPATIENT)
Facility: HOSPITAL | Age: 78
Setting detail: RECURRING SERIES
Discharge: HOME | End: 2023-09-16
Payer: MEDICARE

## 2023-09-13 PROCEDURE — 97112 NEUROMUSCULAR REEDUCATION: CPT

## 2023-09-13 PROCEDURE — 97530 THERAPEUTIC ACTIVITIES: CPT

## 2023-09-13 PROCEDURE — 97110 THERAPEUTIC EXERCISES: CPT

## 2023-09-13 NOTE — PROGRESS NOTES
perform Romberg EC x 30 sec w/o LOB to ease with safety during ADL's. Status at evaluation/last progress note: ECx 12 sec. 4.   Goal: Pt will report >40% improvement in sx to return to PLOF and ambulating his community safely.   Status at evaluation/last progress note:       Next PN/ RC due 9/16/23      PLAN  - Continue Plan of Care  - Upgrade activities as tolerated    Seth Leonard, PT    9/13/2023    9:31 AM    Future Appointments   Date Time Provider [address]   9/18/2023  9:20 AM Karin Forde, PTA [id number] [facility]   9/20/2023  9:20 AM Collin Gardner DO [facility] BS AMB   9/22/2023  9:20 AM Tanya Saini Sprung, PT [id number] [facility]   9/25/2023  8:40 AM Tanya Arias, PT [id number] [facility]   9/27/2023  8:40 AM Karin Forde, PTA [id number] [facility]   10/2/2023  8:40 AM Seth Leonard, PT [id number] [facility]   10/6/2023  8:40 AM Karin Forde, PTA [id number] [facility]   11/15/2023  9:00 AM Sivan Cuevas MD [facility] BS AMB

## 2023-09-18 ENCOUNTER — HOSPITAL ENCOUNTER (OUTPATIENT)
Facility: HOSPITAL | Age: 78
Setting detail: RECURRING SERIES
Discharge: HOME | End: 2023-09-21
Payer: MEDICARE

## 2023-09-18 PROCEDURE — 97110 THERAPEUTIC EXERCISES: CPT

## 2023-09-18 PROCEDURE — 97112 NEUROMUSCULAR REEDUCATION: CPT

## 2023-09-18 PROCEDURE — 97530 THERAPEUTIC ACTIVITIES: CPT

## 2023-09-18 NOTE — THERAPY RECERTIFICATION
[facility]  [address] [place]  [phone] fax    Continued Plan of Care/ Re-certification for Physical Therapy Services    Patient Name: Marjie Homans. 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 Pt reported compliance. 23     2. Goal: Pt will be compliant with wearing an AFO, per MD recommendations. Status at evaluation/last progress note: Issued Medium AFO at [facility], per MD request.   Goal  met. Pt reports compliance with wearing AFO     Long Term Goals: To be accomplished in 8 weeks  Goal: Pt will increase FOTO score by 2  pts to improve function  Status at evaluation/last progress note:FOTO=99  Not met. Decreased from 99 to 82/100     2. Goal:Pt will improve right ankle DF to 4- or greater to ease with ambulation function. Status at evaluation/last progress note: Right Ankle DF=3+/5  Goal met. Strength for right ankle DF increased to 4/5     3. Goal:Pt will perform Romberg EC x 30 sec w/o LOB to ease with safety during ADL's. Status at evaluation/last progress note: ECx 12 sec. Goal met. Was able to perform Romberg EC for 30 sec without LOB     4.    Goal: Pt will report >40% improvement in sx to

## 2023-09-18 NOTE — PROGRESS NOTES
safely. Status at evaluation/last progress note:  Goal met.  Pt reports 50% improvement in overall sx's since eval    PLAN  - Continue Plan of Care  - Upgrade activities as tolerated    Zuleyka Overton PTA    9/18/2023    6:30 AM    Future Appointments   Date Time Provider [address]   9/18/2023  9:20 AM Zuleyka Overton PTA [id number] [facility]   9/20/2023  9:20 AM Jeanne Jeronimo, DO [facility] BS AMB   9/22/2023  9:20 AM Tanya Cano, PT [id number] [facility]   9/25/2023  8:40 AM Tanya Cano, PT [id number] [facility]   9/27/2023  8:40 AM Zuleyka Overton PTA [id number] [facility]   10/2/2023  8:40 AM Edin Rose, PT [id number] [facility]   10/6/2023  8:40 AM Zuleyka Overton PTA [id number] [facility]   11/15/2023  9:00 AM Yonny Trevino MD [facility] BS AMB

## 2023-09-20 ENCOUNTER — OFFICE VISIT (OUTPATIENT)
Age: 78
End: 2023-09-20
Payer: MEDICARE

## 2023-09-20 VITALS — WEIGHT: 182.6 LBS | HEIGHT: 66 IN | BODY MASS INDEX: 29.35 KG/M2

## 2023-09-20 DIAGNOSIS — G56.03 BILATERAL CARPAL TUNNEL SYNDROME: Primary | ICD-10-CM

## 2023-09-20 DIAGNOSIS — G56.01 RIGHT CARPAL TUNNEL SYNDROME: ICD-10-CM

## 2023-09-20 PROCEDURE — G8427 DOCREV CUR MEDS BY ELIG CLIN: HCPCS | Performed by: ORTHOPAEDIC SURGERY

## 2023-09-20 PROCEDURE — 99203 OFFICE O/P NEW LOW 30 MIN: CPT | Performed by: ORTHOPAEDIC SURGERY

## 2023-09-20 PROCEDURE — 1123F ACP DISCUSS/DSCN MKR DOCD: CPT | Performed by: ORTHOPAEDIC SURGERY

## 2023-09-20 PROCEDURE — 4004F PT TOBACCO SCREEN RCVD TLK: CPT | Performed by: ORTHOPAEDIC SURGERY

## 2023-09-20 PROCEDURE — G8419 CALC BMI OUT NRM PARAM NOF/U: HCPCS | Performed by: ORTHOPAEDIC SURGERY

## 2023-09-20 RX ORDER — CLOPIDOGREL BISULFATE 75 MG/1
75 TABLET ORAL DAILY
COMMUNITY
Start: 2023-06-18

## 2023-09-20 RX ORDER — CARVEDILOL 25 MG/1
25 TABLET ORAL 2 TIMES DAILY
COMMUNITY
Start: 2023-06-17

## 2023-09-20 NOTE — PROGRESS NOTES
Nimisha Ng is a 69 y.o. male right handed retiree. Worker's Compensation and legal considerations: none    Chief Complaint   Patient presents with    Hand Pain     Bilateral     Pain Score:   0 - No pain    HPI: Patient presents today with a history of bilateral hand numbness and tingling worse on the right than the left. He recently had a left EMG. Date of onset: Approximately 2015  Injury: No  Prior Treatment:  No    ROS: Review of Systems - General ROS: negative except HPI    Past Medical History:   Diagnosis Date    ACS (acute coronary syndrome) (HCC)     CAD (coronary artery disease), native coronary artery August 2010    s/p non-ST-elevation myocardial infarction, s/p PCI with BMS (Vision 4x18mm) distal RCA with 45% distal LAD  and mild LCx disease. mild-moderate LV systolic dysfunction (96/94). Chronic kidney disease     Dyslipidemia     ED (erectile dysfunction)     Edema of both lower legs 7/12/2021    GERD (gastroesophageal reflux disease)     Heart failure (HCC)     History of BPH     History of echocardiogram 5/18/2011    EF 65%. Mod-marked increased wall thickness. Gr 1 DDfx. No significant valvular heart disease. Hyperlipidemia     Hypertension     Ischemic cardiomyopathy     recovery of LV syst fct  Echo May 2011 LV EF 65%    MI (myocardial infarction) ([address])     Obesity     RBBB (right bundle branch block with left anterior fascicular block)     S/P cardiac cath 8/26/2010    LM patent. dLAD 45%. CX mild. dRCA 100% thrombotic (S/P cath thrombectomy & Vision 4 x 18-mm BMS). EF 40%. Posterobasal, diaphrag, apical hypk.       Shingles 4/2012    Tobacco use disorder, continuous        Past Surgical History:   Procedure Laterality Date    CARDIAC CATHETERIZATION  2019    Stent    CARDIAC CATHETERIZATION  08/26/10    Stent    CATARACT REMOVAL Bilateral     COLONOSCOPY N/A 4/23/2019    COLONOSCOPY performed by Mago Gibson MD at [facility]

## 2023-09-22 ENCOUNTER — HOSPITAL ENCOUNTER (OUTPATIENT)
Facility: HOSPITAL | Age: 78
Setting detail: RECURRING SERIES
Discharge: HOME | End: 2023-09-25
Payer: MEDICARE

## 2023-09-22 PROCEDURE — 97112 NEUROMUSCULAR REEDUCATION: CPT

## 2023-09-22 PROCEDURE — 97110 THERAPEUTIC EXERCISES: CPT

## 2023-09-22 NOTE — PROGRESS NOTES
PHYSICAL / OCCUPATIONAL THERAPY - DAILY TREATMENT NOTE (updated )    Patient Name: Cristel Jerome. Date: 2023    : 1945  Insurance: Payor: MEDICARE / Plan: MEDICARE PART A AND B / Product Type: *No Product type* /      Patient  verified Yes     Visit #   Current / Total 9 24   Time   In / Out 920 1000   Pain   In / Out 0 0   Subjective Functional Status/Changes: Reports he is progressing along. He is wearing his AFO today. He recently got hand gloves from his Neurologist.      TREATMENT AREA =  Right foot pain [M79.671]    OBJECTIVE         Therapeutic Procedures: Tx Min Billable or 1:1 Min (if diff from Tx Min) Procedure, Rationale, Specifics   25  10248 Therapeutic Exercise (timed):  increase ROM, strength, coordination, balance, and proprioception to improve patient's ability to progress to PLOF and address remaining functional goals. (see flow sheet as applicable)     Details if applicable:       15  10812 Neuromuscular Re-Education (timed):  improve balance, coordination, kinesthetic sense, posture, core stability and proprioception to improve patient's ability to develop conscious control of individual muscles and awareness of position of extremities in order to progress to PLOF and address remaining functional goals. (see flow sheet as applicable)     Details if applicable:     36  [facility] Totals Reminder: bill using total billable min of TIMED therapeutic procedures (example: do not include dry needle or estim unattended, both untimed codes, in totals to left)  8-22 min = 1 unit; 23-37 min = 2 units; 38-52 min = 3 units; 53-67 min = 4 units; 68-82 min = 5 units   Total Total     [x]  Patient Education billed concurrently with other procedures   [x] Review HEP    [] Progressed/Changed HEP, detail:    [] Other detail:       Objective Information/Functional Measures/Assessment    Foam EC x 30 sec  Increased to 4# ankle weights. Cone taps with Left/Right LE color taps.   Pt fatigued

## 2023-09-25 ENCOUNTER — HOSPITAL ENCOUNTER (OUTPATIENT)
Facility: HOSPITAL | Age: 78
Setting detail: RECURRING SERIES
Discharge: HOME | End: 2023-09-28
Payer: MEDICARE

## 2023-09-25 PROCEDURE — 97110 THERAPEUTIC EXERCISES: CPT

## 2023-09-25 PROCEDURE — 97112 NEUROMUSCULAR REEDUCATION: CPT

## 2023-09-25 NOTE — PROGRESS NOTES
PHYSICAL / OCCUPATIONAL THERAPY - DAILY TREATMENT NOTE (updated )    Patient Name: José Manuel Toure. Date: 2023    : 1945  Insurance: Payor: MEDICARE / Plan: MEDICARE PART A AND B / Product Type: *No Product type* /      Patient  verified Yes     Visit #   Current / Total 10 24   Time   In / Out 840 919   Pain   In / Out 0 0   Subjective Functional Status/Changes: Reports he has gloves for his hands for pain from Neurologist. He wears at night. It helps with pain. He is wearing his AFO. TREATMENT AREA =  Right foot pain [M79.671]    OBJECTIVE         Therapeutic Procedures: Tx Min Billable or 1:1 Min (if diff from Tx Min) Procedure, Rationale, Specifics   29  02152 Therapeutic Exercise (timed):  increase ROM, strength, coordination, balance, and proprioception to improve patient's ability to progress to PLOF and address remaining functional goals. (see flow sheet as applicable)     Details if applicable:       10  17017 Neuromuscular Re-Education (timed):  improve balance, coordination, kinesthetic sense, posture, core stability and proprioception to improve patient's ability to develop conscious control of individual muscles and awareness of position of extremities in order to progress to PLOF and address remaining functional goals. (see flow sheet as applicable)     Details if applicable:     44  [facility] Totals Reminder: bill using total billable min of TIMED therapeutic procedures (example: do not include dry needle or estim unattended, both untimed codes, in totals to left)  8-22 min = 1 unit; 23-37 min = 2 units; 38-52 min = 3 units; 53-67 min = 4 units; 68-82 min = 5 units   Total Total     [x]  Patient Education billed concurrently with other procedures   [x] Review HEP    [] Progressed/Changed HEP, detail:    [] Other detail:       Objective Information/Functional Measures/Assessment    MSR EO/EC x 30 sec no LOB. Foam EO/EC x30 , no LOB.   Initiated leg press machine DL/SL 50#

## 2023-09-27 ENCOUNTER — HOSPITAL ENCOUNTER (OUTPATIENT)
Facility: HOSPITAL | Age: 78
Setting detail: RECURRING SERIES
Discharge: HOME | End: 2023-09-30
Payer: MEDICARE

## 2023-09-27 PROCEDURE — 97112 NEUROMUSCULAR REEDUCATION: CPT

## 2023-09-27 PROCEDURE — 97110 THERAPEUTIC EXERCISES: CPT

## 2023-09-27 NOTE — PROGRESS NOTES
PHYSICAL / OCCUPATIONAL THERAPY - DAILY TREATMENT NOTE (updated )    Patient Name: Steve Malone. Date: 2023    : 1945  Insurance: Payor: MEDICARE / Plan: MEDICARE PART A AND B / Product Type: *No Product type* /      Patient  verified Yes     Visit #   Current / Total 11 24   Time   In / Out 840 918   Pain   In / Out 0/10 010   Subjective Functional Status/Changes: Pt stated that he woke up with a little discomfort in his stomach this morning, but has no pain     TREATMENT AREA =  Right foot pain [M79.671]    OBJECTIVE         Therapeutic Procedures: Tx Min Billable or 1:1 Min (if diff from Tx Min) Procedure, Rationale, Specifics   28  41879 Therapeutic Exercise (timed):  increase ROM, strength, coordination, balance, and proprioception to improve patient's ability to progress to PLOF and address remaining functional goals. (see flow sheet as applicable)     Details if applicable:       10  25855 Neuromuscular Re-Education (timed):  improve balance, coordination, kinesthetic sense, posture, core stability and proprioception to improve patient's ability to develop conscious control of individual muscles and awareness of position of extremities in order to progress to PLOF and address remaining functional goals.  (see flow sheet as applicable)     Details if applicable:     45  [facility] Totals Reminder: bill using total billable min of TIMED therapeutic procedures (example: do not include dry needle or estim unattended, both untimed codes, in totals to left)  8-22 min = 1 unit; 23-37 min = 2 units; 38-52 min = 3 units; 53-67 min = 4 units; 68-82 min = 5 units   Total Total     [x]  Patient Education billed concurrently with other procedures   [x] Review HEP    [] Progressed/Changed HEP, detail:    [] Other detail:       Objective Information/Functional Measures/Assessment  Was pleased with leg press  No complaint of pain during session   No LOB with static balance  Unable to perform foam

## 2023-10-02 ENCOUNTER — HOSPITAL ENCOUNTER (OUTPATIENT)
Facility: HOSPITAL | Age: 78
Setting detail: RECURRING SERIES
Discharge: HOME | End: 2023-10-05
Payer: MEDICARE

## 2023-10-02 PROCEDURE — 97110 THERAPEUTIC EXERCISES: CPT

## 2023-10-02 PROCEDURE — 97112 NEUROMUSCULAR REEDUCATION: CPT

## 2023-10-02 PROCEDURE — 97530 THERAPEUTIC ACTIVITIES: CPT

## 2023-10-02 NOTE — PROGRESS NOTES
PHYSICAL / OCCUPATIONAL THERAPY - DAILY TREATMENT NOTE (updated )    Patient Name: Marjie Homans. Date: 10/2/2023    : 1945  Insurance: Payor: MEDICARE / Plan: MEDICARE PART A AND B / Product Type: *No Product type* /      Patient  verified Yes     Visit #   Current / Total 12 24   Time   In / Out 841 921   Pain   In / Out 0 0   Subjective Functional Status/Changes: Reports feeling good from his therapy sessions. Continues to use his ankle weights sometimes at home. TREATMENT AREA =  Right foot pain [M79.671]    OBJECTIVE         Therapeutic Procedures: Tx Min Billable or 1:1 Min (if diff from Tx Min) Procedure, Rationale, Specifics   20  95251 Therapeutic Exercise (timed):  increase ROM, strength, coordination, balance, and proprioception to improve patient's ability to progress to PLOF and address remaining functional goals. (see flow sheet as applicable)     Details if applicable:       10  54157 Neuromuscular Re-Education (timed):  improve balance, coordination, kinesthetic sense, posture, core stability and proprioception to improve patient's ability to develop conscious control of individual muscles and awareness of position of extremities in order to progress to PLOF and address remaining functional goals. (see flow sheet as applicable)     Details if applicable:     10  89551 Therapeutic Activity (timed):  use of dynamic activities replicating functional movements to increase ROM, strength, coordination, balance, and proprioception in order to improve patient's ability to progress to PLOF and address remaining functional goals.   (see flow sheet as applicable)     Details if applicable:     36  [facility] Totals Reminder: bill using total billable min of TIMED therapeutic procedures (example: do not include dry needle or estim unattended, both untimed codes, in totals to left)  8-22 min = 1 unit; 23-37 min = 2 units; 38-52 min = 3 units; 53-67 min = 4 units; 68-82 min = 5 units   Total

## 2023-10-06 ENCOUNTER — HOSPITAL ENCOUNTER (OUTPATIENT)
Facility: HOSPITAL | Age: 78
Setting detail: RECURRING SERIES
Discharge: HOME | End: 2023-10-09
Payer: MEDICARE

## 2023-10-06 PROCEDURE — 97530 THERAPEUTIC ACTIVITIES: CPT

## 2023-10-06 PROCEDURE — 97112 NEUROMUSCULAR REEDUCATION: CPT

## 2023-10-06 PROCEDURE — 97110 THERAPEUTIC EXERCISES: CPT

## 2023-10-06 NOTE — PROGRESS NOTES
PHYSICAL / OCCUPATIONAL THERAPY - DAILY TREATMENT NOTE (updated )    Patient Name: Moni Rivera. Date: 10/6/2023    : 1945  Insurance: Payor: MEDICARE / Plan: MEDICARE PART A AND B / Product Type: *No Product type* /      Patient  verified Yes     Visit #   Current / Total 13 24   Time   In / Out 840 918   Pain   In / Out 0/10 0/10   Subjective Functional Status/Changes: Pt stated that he is doing pretty good today and has no pain     TREATMENT AREA =  Right foot pain [M79.671]    OBJECTIVE         Therapeutic Procedures: Tx Min Billable or 1:1 Min (if diff from Tx Min) Procedure, Rationale, Specifics   19  43256 Therapeutic Exercise (timed):  increase ROM, strength, coordination, balance, and proprioception to improve patient's ability to progress to PLOF and address remaining functional goals. (see flow sheet as applicable)     Details if applicable:       10  34108 Neuromuscular Re-Education (timed):  improve balance, coordination, kinesthetic sense, posture, core stability and proprioception to improve patient's ability to develop conscious control of individual muscles and awareness of position of extremities in order to progress to PLOF and address remaining functional goals. (see flow sheet as applicable)     Details if applicable:     9  68590 Therapeutic Activity (timed):  use of dynamic activities replicating functional movements to increase ROM, strength, coordination, balance, and proprioception in order to improve patient's ability to progress to PLOF and address remaining functional goals.   (see flow sheet as applicable)     Details if applicable:     45  [facility] Totals Reminder: bill using total billable min of TIMED therapeutic procedures (example: do not include dry needle or estim unattended, both untimed codes, in totals to left)  8-22 min = 1 unit; 23-37 min = 2 units; 38-52 min = 3 units; 53-67 min = 4 units; 68-82 min = 5 units   Total Total     [x]  Patient Education

## 2023-10-12 ENCOUNTER — OFFICE VISIT (OUTPATIENT)
Age: 78
End: 2023-10-12

## 2023-10-12 VITALS — HEIGHT: 66 IN | BODY MASS INDEX: 29.47 KG/M2

## 2023-10-12 DIAGNOSIS — E08.65 DIABETES MELLITUS DUE TO UNDERLYING CONDITION, UNCONTROLLED, WITH HYPERGLYCEMIA (HCC): ICD-10-CM

## 2023-10-12 DIAGNOSIS — E11.42 DIABETIC POLYNEUROPATHY ASSOCIATED WITH TYPE 2 DIABETES MELLITUS (HCC): ICD-10-CM

## 2023-10-12 DIAGNOSIS — G56.02 SEVERE CARPAL TUNNEL SYNDROME, LEFT: Primary | ICD-10-CM

## 2023-10-12 DIAGNOSIS — G56.01 SEVERE CARPAL TUNNEL SYNDROME, RIGHT: ICD-10-CM

## 2023-10-13 NOTE — PROGRESS NOTES
pulses. Pulmonary:      Effort: Pulmonary effort is normal. No respiratory distress. Musculoskeletal:         General: No swelling, tenderness, deformity or signs of injury. Normal range of motion. Cervical back: Normal range of motion and neck supple. Right lower leg: No edema. Left lower leg: No edema. Skin:     General: Skin is warm and dry. Capillary Refill: Capillary refill takes less than 2 seconds. Findings: No bruising or erythema. Neurological:      General: No focal deficit present. Mental Status: He is alert and oriented to person, place, and time. Psychiatric:         Mood and Affect: Mood normal.         Behavior: Behavior normal.          NEUROVASCULAR    Examination L R Examination L R   Carpal Comp. + ++ Pronator Comp. - -   Carpal Tinel + ++ Pronator Tinel - -   Phalen's - - Pronator Stress - -   Cubital Comp. - - Guyon Comp. - -   Cubital Tinel - - Guyon Tinel - -   Elbow Hyperflexion - - Adson's - -   Spurling's - - SC Comp. - -   PCB Median abn - - SC Tinel - -   Radial Tinel - - IC Comp. - -   Digital Tinel - - IC Tinel - -   Radial 2-Pt WNL WNL Ulnar 2-Pt WNL WNL     Radial Pulse: 2+  Capillary Refill: < 2 sec  Ananda: Not Performed  [employer]: Not Performed    Right upper extremity EMGs significant for peripheral neuropathy as well as severe right carpal tunnel syndrome. Left upper extremity EMG August 2023 is positive for severe left carpal tunnel syndrome. Imaging:     None indicated      Impression     Diagnosis Orders   1. Severe carpal tunnel syndrome, left        2. Severe carpal tunnel syndrome, right        3. Diabetic polyneuropathy associated with type 2 diabetes mellitus ([address])        4.  Diabetes mellitus due to underlying condition, uncontrolled, with hyperglycemia ([address])              Plan:     Discussed the need for surgical intervention for the patient's carpal tunnel syndrome, however given the fact that he has an elevated A1c we

## 2023-10-16 ENCOUNTER — HOSPITAL ENCOUNTER (OUTPATIENT)
Facility: HOSPITAL | Age: 78
Discharge: HOME | End: 2023-10-19
Payer: MEDICARE

## 2023-10-16 DIAGNOSIS — R31.9 HEMATURIA, UNSPECIFIED TYPE: ICD-10-CM

## 2023-10-16 PROCEDURE — 76770 US EXAM ABDO BACK WALL COMP: CPT

## 2023-11-15 ENCOUNTER — OFFICE VISIT (OUTPATIENT)
Age: 78
End: 2023-11-15
Payer: MEDICARE

## 2023-11-15 VITALS
DIASTOLIC BLOOD PRESSURE: 65 MMHG | OXYGEN SATURATION: 99 % | BODY MASS INDEX: 28.93 KG/M2 | HEIGHT: 66 IN | WEIGHT: 180 LBS | SYSTOLIC BLOOD PRESSURE: 160 MMHG | HEART RATE: 60 BPM

## 2023-11-15 DIAGNOSIS — E78.00 HYPERCHOLESTEREMIA: ICD-10-CM

## 2023-11-15 DIAGNOSIS — N52.9 ERECTILE DYSFUNCTION, UNSPECIFIED ERECTILE DYSFUNCTION TYPE: ICD-10-CM

## 2023-11-15 DIAGNOSIS — N18.6 END STAGE RENAL DISEASE (HCC): ICD-10-CM

## 2023-11-15 DIAGNOSIS — Z71.6 TOBACCO ABUSE COUNSELING: ICD-10-CM

## 2023-11-15 DIAGNOSIS — I25.118 CORONARY ARTERY DISEASE OF NATIVE ARTERY OF NATIVE HEART WITH STABLE ANGINA PECTORIS (HCC): Primary | ICD-10-CM

## 2023-11-15 DIAGNOSIS — I10 ESSENTIAL (PRIMARY) HYPERTENSION: ICD-10-CM

## 2023-11-15 PROCEDURE — 4004F PT TOBACCO SCREEN RCVD TLK: CPT | Performed by: INTERNAL MEDICINE

## 2023-11-15 PROCEDURE — 3078F DIAST BP <80 MM HG: CPT | Performed by: INTERNAL MEDICINE

## 2023-11-15 PROCEDURE — G8419 CALC BMI OUT NRM PARAM NOF/U: HCPCS | Performed by: INTERNAL MEDICINE

## 2023-11-15 PROCEDURE — G8427 DOCREV CUR MEDS BY ELIG CLIN: HCPCS | Performed by: INTERNAL MEDICINE

## 2023-11-15 PROCEDURE — G8484 FLU IMMUNIZE NO ADMIN: HCPCS | Performed by: INTERNAL MEDICINE

## 2023-11-15 PROCEDURE — 1123F ACP DISCUSS/DSCN MKR DOCD: CPT | Performed by: INTERNAL MEDICINE

## 2023-11-15 PROCEDURE — 93000 ELECTROCARDIOGRAM COMPLETE: CPT | Performed by: INTERNAL MEDICINE

## 2023-11-15 PROCEDURE — 3077F SYST BP >= 140 MM HG: CPT | Performed by: INTERNAL MEDICINE

## 2023-11-15 PROCEDURE — 99214 OFFICE O/P EST MOD 30 MIN: CPT | Performed by: INTERNAL MEDICINE

## 2023-11-15 RX ORDER — AMLODIPINE BESYLATE 10 MG/1
10 TABLET ORAL DAILY
Qty: 90 TABLET | Refills: 3 | Status: SHIPPED | OUTPATIENT
Start: 2023-11-15

## 2023-11-15 RX ORDER — SILDENAFIL 25 MG/1
25 TABLET, FILM COATED ORAL DAILY PRN
Qty: 10 TABLET | Refills: 0 | Status: SHIPPED | OUTPATIENT
Start: 2023-11-15

## 2023-11-15 ASSESSMENT — ENCOUNTER SYMPTOMS
GASTROINTESTINAL NEGATIVE: 1
SHORTNESS OF BREATH: 0
EYES NEGATIVE: 1
RESPIRATORY NEGATIVE: 1

## 2023-11-15 NOTE — PATIENT INSTRUCTIONS
The medication list included in this document is our record of what you are currently taking, including any changes that were made at today's visit. If you find any differences when compared to your medications at home, or have any questions that were not answered at your visit, please contact the office. After the recommended changes have been made in blood pressure medicines, patient advised to keep BP/HR(pulse rate) chart twice daily and bring us results in next 4 to 5 days. Patient may send the results via \"My Chart\" if desired. Please rest for 5-10 minutes before checking blood pressure. Sit on a comfortable chair without crossing the legs and put your arm on a table. We recommend that you use an upper arm cuff. Check the blood pressure 3 times each time you check the blood pressure and record the lowest reading. If you check the blood pressure in both arms, use the higher reading. Please do not use any nitroglycerin for 24 hours after you have used Viagra. If there is any chest pain, come to emergency room.

## 2023-11-15 NOTE — PROGRESS NOTES
Room 7    Patient did not bring medications and stated he did not have any changes since last visit    1. Have you been to the ER, urgent care clinic since your last visit? Hospitalized since your last visit?     no      2. Where do you normally have your labs drawn? 3. Do you need any refills today?   no    4. Which local [facility] do you use ([facility])? [facility]     5. Which mail order pharmacy do you use (enter pharmacy)? 6. Are you here for surgical clearance and if so who will be doing your     procedure/surgery (care team)?    no

## 2023-11-15 NOTE — PROGRESS NOTES
Marjie Homans. is a 69y.o. year old male. Follow-up of ischemic cardiomyopathy, hypertension, hyperlipidemia, obesity, CHF      4/17 improving blood pressure since meds adjusted by Dr. Tenzin Fay;   1/17 seen for establishing/changing cardiologist   history of coronary disease, status post PCI(2010) and hypertension, along with conduction system disease  11/21 started on dialysis in 9/21  5/15/2023 used to get dyspnea on exertion walking inside the house or changing clothes. Rests for a minute or 2 and then feels better. No chest pain, edema, dizziness or palpitations. Blood pressure tends to be low at the end of dialysis. 11/15/2023 shortness of breath seems to have resolved completely. No chest pain edema dizziness palpitations. Blood pressure does okay in dialysis per patient though he does not know the exact numbers. He is never told that it is too low. Review of Systems   Constitutional: Negative. HENT: Negative. Eyes: Negative. Respiratory: Negative. Negative for shortness of breath. Cardiovascular: Negative. Gastrointestinal: Negative. Endocrine: Negative. Genitourinary: Negative. Musculoskeletal: Negative. Neurological: Negative. Psychiatric/Behavioral: Negative. All other systems reviewed and are negative. Physical Exam  Vitals and nursing note reviewed. Constitutional:       Appearance: Normal appearance. HENT:      Head: Normocephalic and atraumatic. Nose: Nose normal.   Eyes:      Conjunctiva/sclera: Conjunctivae normal.   Cardiovascular:      Rate and Rhythm: Normal rate and regular rhythm. Pulses: Normal pulses. Heart sounds: Normal heart sounds. Pulmonary:      Effort: Pulmonary effort is normal.      Breath sounds: Normal breath sounds. Abdominal:      General: Bowel sounds are normal.      Palpations: Abdomen is soft. Musculoskeletal:         General: Normal range of motion. Right lower leg: No edema.       Left

## 2024-03-20 NOTE — ROUTINE PROCESS
0720: Patient escorted to cath holding from waiting area ambulatory, alert and oriented x 4, voicing no complaints. Changed into gown and placed on monitor. NPO since MN. Lab results, med rec and H&P reviewed on chart. PIV x 1 inserted without difficulty. Family to bedside. 0945: TRANSFER - IN REPORT:    Verbal report received from Kristyn Sorto RN(name) on [facility].  being received from IR Lab(unit) for routine post - op      Report consisted of patients Situation, Background, Assessment and   Recommendations(SBAR). Information from the following report(s) SBAR, Kardex, Procedure Summary, Intake/Output, MAR and Recent Results was reviewed with the receiving nurse. Opportunity for questions and clarification was provided. Assessment completed upon patients arrival to unit and care assumed. 1100: AVS Discharge instructions reviewed with patient and copy given to patient. All questions answered. Patient verbalized understanding to all discharge instructions. PIV removed. Procedural site within normal limits. No hematoma or bleeding noted from procedural and PIV site. No pain noted at discharge. Patient discharged with support person in stable condition. Escorted out to vehicle for transport home. Stool for C. difficile is pending

## 2024-04-01 RX ORDER — ATORVASTATIN CALCIUM 20 MG/1
20 TABLET, FILM COATED ORAL DAILY
Qty: 90 TABLET | Refills: 2 | Status: SHIPPED | OUTPATIENT
Start: 2024-04-01

## 2024-05-29 ENCOUNTER — OFFICE VISIT (OUTPATIENT)
Age: 79
End: 2024-05-29
Payer: MEDICARE

## 2024-05-29 VITALS
DIASTOLIC BLOOD PRESSURE: 63 MMHG | BODY MASS INDEX: 28.8 KG/M2 | WEIGHT: 179.2 LBS | SYSTOLIC BLOOD PRESSURE: 135 MMHG | OXYGEN SATURATION: 98 % | HEIGHT: 66 IN | HEART RATE: 76 BPM

## 2024-05-29 DIAGNOSIS — E78.2 MIXED HYPERLIPIDEMIA: ICD-10-CM

## 2024-05-29 DIAGNOSIS — Z95.5 HISTORY OF CORONARY ARTERY STENT PLACEMENT: ICD-10-CM

## 2024-05-29 DIAGNOSIS — N18.6 END STAGE RENAL DISEASE (HCC): ICD-10-CM

## 2024-05-29 DIAGNOSIS — I25.5 ISCHEMIC CARDIOMYOPATHY: ICD-10-CM

## 2024-05-29 DIAGNOSIS — Z71.6 TOBACCO ABUSE COUNSELING: ICD-10-CM

## 2024-05-29 DIAGNOSIS — I25.118 CORONARY ARTERY DISEASE OF NATIVE ARTERY OF NATIVE HEART WITH STABLE ANGINA PECTORIS (HCC): Primary | ICD-10-CM

## 2024-05-29 DIAGNOSIS — I10 ESSENTIAL (PRIMARY) HYPERTENSION: ICD-10-CM

## 2024-05-29 PROCEDURE — G8427 DOCREV CUR MEDS BY ELIG CLIN: HCPCS | Performed by: INTERNAL MEDICINE

## 2024-05-29 PROCEDURE — 1123F ACP DISCUSS/DSCN MKR DOCD: CPT | Performed by: INTERNAL MEDICINE

## 2024-05-29 PROCEDURE — 99214 OFFICE O/P EST MOD 30 MIN: CPT | Performed by: INTERNAL MEDICINE

## 2024-05-29 PROCEDURE — G8419 CALC BMI OUT NRM PARAM NOF/U: HCPCS | Performed by: INTERNAL MEDICINE

## 2024-05-29 PROCEDURE — 3075F SYST BP GE 130 - 139MM HG: CPT | Performed by: INTERNAL MEDICINE

## 2024-05-29 PROCEDURE — 4004F PT TOBACCO SCREEN RCVD TLK: CPT | Performed by: INTERNAL MEDICINE

## 2024-05-29 PROCEDURE — 3078F DIAST BP <80 MM HG: CPT | Performed by: INTERNAL MEDICINE

## 2024-05-29 RX ORDER — ASPIRIN 81 MG/1
81 TABLET ORAL DAILY
COMMUNITY

## 2024-05-29 ASSESSMENT — ENCOUNTER SYMPTOMS
EYES NEGATIVE: 1
GASTROINTESTINAL NEGATIVE: 1
SHORTNESS OF BREATH: 0
RESPIRATORY NEGATIVE: 1

## 2024-05-29 NOTE — PROGRESS NOTES
Room 6    Patient brought medication list.    1. Have you been to the ER, urgent care clinic since your last visit?  Hospitalized since your last visit? No       2.  Where do you normally have your labs drawn?  [facility]      3. Do you need any refills today? No      4. Which local pharmacy do you use?   [facility]      5. Which mail order pharmacy do you use?   None       6. Are you here for surgical clearance? No,  who will be doing your procedure/surgery (care team)?   N/A  
Palpations: Abdomen is soft.   Musculoskeletal:         General: Normal range of motion.      Right lower leg: No edema.      Left lower leg: No edema.   Skin:     General: Skin is warm and dry.   Neurological:      General: No focal deficit present.      Mental Status: He is alert and oriented to person, place, and time.   Psychiatric:         Mood and Affect: Mood normal.         Behavior: Behavior normal.        No Known Allergies    Past Medical History:   Diagnosis Date    ACS (acute coronary syndrome) ([facility])     CAD (coronary artery disease), native coronary artery August 2010    s/p non-ST-elevation myocardial infarction, s/p PCI with BMS (Vision 4x18mm) distal RCA with 45% distal LAD  and mild LCx disease.mild-moderate LV systolic dysfunction (08/10).    Chronic kidney disease     Dyslipidemia     ED (erectile dysfunction)     Edema of both lower legs 7/12/2021    GERD (gastroesophageal reflux disease)     Heart failure ([facility])     History of BPH     History of echocardiogram 5/18/2011    EF 65%.  Mod-marked increased wall thickness.  Gr 1 DDfx.  No significant valvular heart disease.    Hyperlipidemia     Hypertension     Ischemic cardiomyopathy     recovery of LV syst fct  Echo May 2011 LV EF 65%    MI (myocardial infarction) ([facility])     Obesity     RBBB (right bundle branch block with left anterior fascicular block)     S/P cardiac cath 8/26/2010    LM patent.  dLAD 45%.  CX mild.  dRCA 100% thrombotic (S/P cath thrombectomy & Vision 4 x 18-mm BMS).  EF 40%.  Posterobasal, diaphrag, apical hypk.      Shingles 4/2012    Tobacco use disorder, continuous        Family History   Problem Relation Age of Onset    Diabetes Mother        Social History     Tobacco Use    Smoking status: Every Day     Current packs/day: 0.25     Average packs/day: 0.3 packs/day for 41.4 years (10.4 ttl pk-yrs)     Types: Cigarettes     Start date: 1983     Passive exposure: Current    Smokeless tobacco: Never    Tobacco comments:

## 2024-06-10 ENCOUNTER — HOSPITAL ENCOUNTER (OUTPATIENT)
Facility: HOSPITAL | Age: 79
Discharge: HOME | End: 2024-06-13
Payer: MEDICARE

## 2024-06-10 DIAGNOSIS — E78.2 MIXED HYPERLIPIDEMIA: ICD-10-CM

## 2024-06-10 LAB
ALBUMIN SERPL-MCNC: 3.4 G/DL (ref 3.4–5)
ALBUMIN/GLOB SERPL: 0.8 (ref 0.8–1.7)
ALP SERPL-CCNC: 161 U/L (ref 45–117)
ALT SERPL-CCNC: 13 U/L (ref 16–61)
AST SERPL-CCNC: 8 U/L (ref 10–38)
BILIRUB DIRECT SERPL-MCNC: <0.1 MG/DL (ref 0–0.2)
BILIRUB SERPL-MCNC: 0.4 MG/DL (ref 0.2–1)
CHOLEST SERPL-MCNC: 139 MG/DL
GLOBULIN SER CALC-MCNC: 4.2 G/DL (ref 2–4)
HDLC SERPL-MCNC: 32 MG/DL (ref 40–60)
HDLC SERPL: 4.3 (ref 0–5)
LDLC SERPL CALC-MCNC: 85.4 MG/DL (ref 0–100)
LIPID PANEL: ABNORMAL
PROT SERPL-MCNC: 7.6 G/DL (ref 6.4–8.2)
TRIGL SERPL-MCNC: 108 MG/DL
VLDLC SERPL CALC-MCNC: 21.6 MG/DL

## 2024-06-10 PROCEDURE — 80076 HEPATIC FUNCTION PANEL: CPT

## 2024-06-10 PROCEDURE — 36415 COLL VENOUS BLD VENIPUNCTURE: CPT

## 2024-06-10 PROCEDURE — 80061 LIPID PANEL: CPT

## 2024-06-19 ENCOUNTER — TELEPHONE (OUTPATIENT)
Age: 79
End: 2024-06-19

## 2024-06-19 NOTE — TELEPHONE ENCOUNTER
Left message for pt. to return call regarding results below.     Aman Sanches MD    Please contact patient and do the following asap    Check if compliant with cholesterol meds  Get the names and doses of meds that patient takes and show me asap  Please reinforce diet and exercise.    As a further way to reach patient, will mail lab results with notification.

## 2024-08-03 NOTE — Clinical Note
Multiple views of the right coronary artery obtained using hand injection. Spontaneous, unlabored and symmetrical

## 2024-08-16 NOTE — Clinical Note
Onset: Wednesday  Location/description: Pt had wheezing at end of football practice late Wednesday.Seemed to have a panic attack according to  on field with pt. Lips were gray then. Today pt ran track to get on the field, had some wheezing for about 10 seconds. Received pump of albuterol from aunt who was present which seemed to help  Associated Symptoms: none  What improves/worsens symptoms: rest no wheezing, albuterol lessened wheezing/ running caused wheezing  Symptom specific medications: pump of albuterol  Intake and Output: WNL  Activity level: WNL  Temperature (route and time): no  Weight:   Wt Readings from Last 1 Encounters:   12/12/23 (!) 31 kg (68 lb 6.4 oz) (>99%, Z= 2.35)*     * Growth percentiles are based on CDC (Boys, 2-20 Years) data.        Recent visits (last 3-4 weeks) for same reason or recent surgery:  no    PLAN:  Provider's office  See Provider within 24 hour  Patient/Caller agrees to follow recommendations.  Will take pt to urgent care.  Reason for Disposition   New-onset mild wheezing    Protocols used: Wheezing - Other Than Asthma-P-AH     Sheath #1: Closed using Angio-Seal VIP.

## 2024-11-02 DIAGNOSIS — I10 ESSENTIAL (PRIMARY) HYPERTENSION: ICD-10-CM

## 2024-11-04 RX ORDER — AMLODIPINE BESYLATE 10 MG/1
10 TABLET ORAL DAILY
Qty: 90 TABLET | Refills: 3 | Status: SHIPPED | OUTPATIENT
Start: 2024-11-04

## 2024-12-11 ENCOUNTER — OFFICE VISIT (OUTPATIENT)
Age: 79
End: 2024-12-11
Payer: MEDICARE

## 2024-12-11 VITALS
HEIGHT: 66 IN | OXYGEN SATURATION: 97 % | HEART RATE: 70 BPM | DIASTOLIC BLOOD PRESSURE: 61 MMHG | BODY MASS INDEX: 27.32 KG/M2 | SYSTOLIC BLOOD PRESSURE: 122 MMHG | WEIGHT: 170 LBS

## 2024-12-11 DIAGNOSIS — I45.2 RBBB (RIGHT BUNDLE BRANCH BLOCK WITH LEFT ANTERIOR FASCICULAR BLOCK): ICD-10-CM

## 2024-12-11 DIAGNOSIS — F17.209 TOBACCO USE DISORDER, CONTINUOUS: ICD-10-CM

## 2024-12-11 DIAGNOSIS — E11.69 TYPE 2 DIABETES MELLITUS WITH OTHER SPECIFIED COMPLICATION, WITHOUT LONG-TERM CURRENT USE OF INSULIN (HCC): ICD-10-CM

## 2024-12-11 DIAGNOSIS — N18.6 ESRD (END STAGE RENAL DISEASE) (HCC): ICD-10-CM

## 2024-12-11 DIAGNOSIS — I25.10 CAD S/P PERCUTANEOUS CORONARY ANGIOPLASTY: ICD-10-CM

## 2024-12-11 DIAGNOSIS — I10 ESSENTIAL HYPERTENSION: ICD-10-CM

## 2024-12-11 DIAGNOSIS — I50.32 CHRONIC DIASTOLIC CONGESTIVE HEART FAILURE (HCC): ICD-10-CM

## 2024-12-11 DIAGNOSIS — I10 ESSENTIAL (PRIMARY) HYPERTENSION: Primary | ICD-10-CM

## 2024-12-11 DIAGNOSIS — E78.2 MIXED HYPERLIPIDEMIA: ICD-10-CM

## 2024-12-11 DIAGNOSIS — Z98.61 CAD S/P PERCUTANEOUS CORONARY ANGIOPLASTY: ICD-10-CM

## 2024-12-11 PROBLEM — I24.9 ACUTE CORONARY SYNDROME (HCC): Status: RESOLVED | Noted: 2022-08-06 | Resolved: 2024-12-11

## 2024-12-11 PROBLEM — I50.9 CHF (CONGESTIVE HEART FAILURE) (HCC): Status: RESOLVED | Noted: 2020-06-01 | Resolved: 2024-12-11

## 2024-12-11 PROBLEM — I16.0 HYPERTENSIVE URGENCY: Status: RESOLVED | Noted: 2020-04-20 | Resolved: 2024-12-11

## 2024-12-11 PROBLEM — I21.4 NSTEMI (NON-ST ELEVATED MYOCARDIAL INFARCTION) (HCC): Status: RESOLVED | Noted: 2020-04-20 | Resolved: 2024-12-11

## 2024-12-11 PROBLEM — I24.9 ACS (ACUTE CORONARY SYNDROME) (HCC): Status: RESOLVED | Noted: 2020-04-20 | Resolved: 2024-12-11

## 2024-12-11 PROCEDURE — 1159F MED LIST DOCD IN RCRD: CPT | Performed by: INTERNAL MEDICINE

## 2024-12-11 PROCEDURE — 3078F DIAST BP <80 MM HG: CPT | Performed by: INTERNAL MEDICINE

## 2024-12-11 PROCEDURE — 93000 ELECTROCARDIOGRAM COMPLETE: CPT | Performed by: INTERNAL MEDICINE

## 2024-12-11 PROCEDURE — 3074F SYST BP LT 130 MM HG: CPT | Performed by: INTERNAL MEDICINE

## 2024-12-11 PROCEDURE — 1160F RVW MEDS BY RX/DR IN RCRD: CPT | Performed by: INTERNAL MEDICINE

## 2024-12-11 PROCEDURE — G8484 FLU IMMUNIZE NO ADMIN: HCPCS | Performed by: INTERNAL MEDICINE

## 2024-12-11 PROCEDURE — G8427 DOCREV CUR MEDS BY ELIG CLIN: HCPCS | Performed by: INTERNAL MEDICINE

## 2024-12-11 PROCEDURE — 1126F AMNT PAIN NOTED NONE PRSNT: CPT | Performed by: INTERNAL MEDICINE

## 2024-12-11 PROCEDURE — 4004F PT TOBACCO SCREEN RCVD TLK: CPT | Performed by: INTERNAL MEDICINE

## 2024-12-11 PROCEDURE — G8419 CALC BMI OUT NRM PARAM NOF/U: HCPCS | Performed by: INTERNAL MEDICINE

## 2024-12-11 PROCEDURE — 99214 OFFICE O/P EST MOD 30 MIN: CPT | Performed by: INTERNAL MEDICINE

## 2024-12-11 PROCEDURE — 1123F ACP DISCUSS/DSCN MKR DOCD: CPT | Performed by: INTERNAL MEDICINE

## 2024-12-11 RX ORDER — SEVELAMER CARBONATE 800 MG/1
1 TABLET, FILM COATED ORAL 3 TIMES DAILY
COMMUNITY
Start: 2024-11-21

## 2024-12-11 RX ORDER — ATORVASTATIN CALCIUM 20 MG/1
20 TABLET, FILM COATED ORAL DAILY
Qty: 90 TABLET | Refills: 2 | Status: SHIPPED | OUTPATIENT
Start: 2024-12-11

## 2024-12-11 RX ORDER — CLOPIDOGREL BISULFATE 75 MG/1
75 TABLET ORAL DAILY
COMMUNITY
Start: 2024-09-26

## 2024-12-11 ASSESSMENT — ENCOUNTER SYMPTOMS
RESPIRATORY NEGATIVE: 1
SHORTNESS OF BREATH: 0
EYES NEGATIVE: 1
GASTROINTESTINAL NEGATIVE: 1

## 2024-12-11 NOTE — PROGRESS NOTES
1. Have you been to the ER, urgent care clinic since your last visit?  Hospitalized since your last visit? No    2.  Where do you normally have your labs drawn?  [facility]    3. Do you need any refills today? No    4. Which local pharmacy do you use (enter pharmacy)?   [facility]     5. Which mail order pharmacy do you use (enter pharmacy)?   N/A     6. Are you here for surgical clearance and if so who will be doing your     procedure/surgery (care team)?   No      
70   SpO2: 97%   Weight: 77.1 kg (170 lb)   Height: 1.676 m (5' 6\")          Diagnostic Studies:  I have reviewed the relevant tests done on the patient and show as follows  EKG tracings reviewed by me today.      Mr. Flood has a reminder for a \"due or due soon\" health maintenance. I have asked that he contact his primary care provider for follow-up on this health maintenance.    3/17 Nuc Stress  Conclusion:   Nondiagnostic EKG changes of ischemia due to abnormal baseline EKG at 89% of predicted maximal heart rate.   Normal functional capacity.   Severely hypertensive blood pressure response and appropriate heart rate response.   No ischemic symptoms or arrhythmias seen.   Perfusion image report to follow.   Conclusion:   1. Normal perfusion scan.   2. Normal wall motion and ejection fraction.   3. Low risk scan.  3/17 ECHO  SUMMARY:  Left ventricle: Ejection fraction was estimated to be 65 %. There were no  regional wall motion abnormalities. There was severe concentric  hypertrophy. Features were consistent with a pseudonormal left ventricular  filling pattern, with concomitant abnormal relaxation and increased  filling pressure (grade 2 diastolic dysfunction).  04/20/20   CARDIAC PROCEDURE 04/23/2020 4/23/2020    Narrative Double vessel coronary artery disease.  S/p ptca/stent to mid LAD.  Known BMS to distal RCA with moderate lesion.  Recommend intense risk factor modification.     Signed by: Stepan Rajan MD     06/01/20   ECHO ADULT FOLLOW-UP OR LIMITED 06/03/2020 6/3/2020    Narrative · Normal cavity size and systolic function (ejection fraction normal).   Severe concentric hypertrophy. Estimated left ventricular ejection   fraction is 60 - 65%. Visually measured ejection fraction. No regional   wall motion abnormality noted. Normal left ventricular strain.  · Global longitudinal strain is -20.90%  · Tricuspid regurgitation is inadequate for estimation of right   ventricular systolic pressure.

## 2025-06-23 ENCOUNTER — HOSPITAL ENCOUNTER (OUTPATIENT)
Facility: HOSPITAL | Age: 80
Discharge: HOME | End: 2025-06-26
Payer: MEDICARE

## 2025-06-23 DIAGNOSIS — E78.2 MIXED HYPERLIPIDEMIA: ICD-10-CM

## 2025-06-23 LAB
ALBUMIN SERPL-MCNC: 3.4 G/DL (ref 3.4–5)
ALBUMIN/GLOB SERPL: 0.9 (ref 0.8–1.7)
ALP SERPL-CCNC: 124 U/L (ref 45–117)
ALT SERPL-CCNC: 8 U/L (ref 10–50)
AST SERPL-CCNC: 7 U/L (ref 10–38)
BILIRUB DIRECT SERPL-MCNC: 0.2 MG/DL (ref 0–0.2)
BILIRUB SERPL-MCNC: 0.5 MG/DL (ref 0.2–1)
CHOLEST SERPL-MCNC: 78 MG/DL
GLOBULIN SER CALC-MCNC: 3.9 G/DL (ref 2–4)
HDLC SERPL-MCNC: 32 MG/DL (ref 40–60)
HDLC SERPL: 2.4 (ref 0–5)
LDLC SERPL CALC-MCNC: 34 MG/DL (ref 0–100)
PROT SERPL-MCNC: 7.3 G/DL (ref 6.4–8.2)
TRIGL SERPL-MCNC: 55 MG/DL (ref 0–150)
VLDLC SERPL CALC-MCNC: 11 MG/DL

## 2025-06-23 PROCEDURE — 36415 COLL VENOUS BLD VENIPUNCTURE: CPT

## 2025-06-23 PROCEDURE — 80061 LIPID PANEL: CPT

## 2025-06-23 PROCEDURE — 80076 HEPATIC FUNCTION PANEL: CPT

## 2025-06-25 ENCOUNTER — OFFICE VISIT (OUTPATIENT)
Age: 80
End: 2025-06-25
Payer: MEDICARE

## 2025-06-25 VITALS
SYSTOLIC BLOOD PRESSURE: 127 MMHG | HEIGHT: 66 IN | WEIGHT: 172.4 LBS | HEART RATE: 68 BPM | BODY MASS INDEX: 27.71 KG/M2 | OXYGEN SATURATION: 97 % | DIASTOLIC BLOOD PRESSURE: 52 MMHG

## 2025-06-25 DIAGNOSIS — I45.2 RBBB (RIGHT BUNDLE BRANCH BLOCK WITH LEFT ANTERIOR FASCICULAR BLOCK): ICD-10-CM

## 2025-06-25 DIAGNOSIS — I10 ESSENTIAL HYPERTENSION: ICD-10-CM

## 2025-06-25 DIAGNOSIS — E11.69 TYPE 2 DIABETES MELLITUS WITH OTHER SPECIFIED COMPLICATION, WITHOUT LONG-TERM CURRENT USE OF INSULIN (HCC): ICD-10-CM

## 2025-06-25 DIAGNOSIS — E78.2 MIXED HYPERLIPIDEMIA: ICD-10-CM

## 2025-06-25 DIAGNOSIS — Z95.5 HISTORY OF CORONARY ARTERY STENT PLACEMENT: ICD-10-CM

## 2025-06-25 DIAGNOSIS — I25.118 CORONARY ARTERY DISEASE OF NATIVE ARTERY OF NATIVE HEART WITH STABLE ANGINA PECTORIS: Primary | ICD-10-CM

## 2025-06-25 DIAGNOSIS — Z71.6 TOBACCO ABUSE COUNSELING: ICD-10-CM

## 2025-06-25 DIAGNOSIS — N18.6 ESRD (END STAGE RENAL DISEASE) (HCC): ICD-10-CM

## 2025-06-25 PROCEDURE — 1160F RVW MEDS BY RX/DR IN RCRD: CPT | Performed by: INTERNAL MEDICINE

## 2025-06-25 PROCEDURE — 1126F AMNT PAIN NOTED NONE PRSNT: CPT | Performed by: INTERNAL MEDICINE

## 2025-06-25 PROCEDURE — 4004F PT TOBACCO SCREEN RCVD TLK: CPT | Performed by: INTERNAL MEDICINE

## 2025-06-25 PROCEDURE — G8427 DOCREV CUR MEDS BY ELIG CLIN: HCPCS | Performed by: INTERNAL MEDICINE

## 2025-06-25 PROCEDURE — 99214 OFFICE O/P EST MOD 30 MIN: CPT | Performed by: INTERNAL MEDICINE

## 2025-06-25 PROCEDURE — 1123F ACP DISCUSS/DSCN MKR DOCD: CPT | Performed by: INTERNAL MEDICINE

## 2025-06-25 PROCEDURE — 3078F DIAST BP <80 MM HG: CPT | Performed by: INTERNAL MEDICINE

## 2025-06-25 PROCEDURE — 3074F SYST BP LT 130 MM HG: CPT | Performed by: INTERNAL MEDICINE

## 2025-06-25 PROCEDURE — 1159F MED LIST DOCD IN RCRD: CPT | Performed by: INTERNAL MEDICINE

## 2025-06-25 PROCEDURE — G8419 CALC BMI OUT NRM PARAM NOF/U: HCPCS | Performed by: INTERNAL MEDICINE

## 2025-06-25 ASSESSMENT — PATIENT HEALTH QUESTIONNAIRE - PHQ9
1. LITTLE INTEREST OR PLEASURE IN DOING THINGS: NOT AT ALL
SUM OF ALL RESPONSES TO PHQ QUESTIONS 1-9: 0
2. FEELING DOWN, DEPRESSED OR HOPELESS: NOT AT ALL
SUM OF ALL RESPONSES TO PHQ QUESTIONS 1-9: 0

## 2025-06-25 ASSESSMENT — ENCOUNTER SYMPTOMS
RESPIRATORY NEGATIVE: 1
SHORTNESS OF BREATH: 0
GASTROINTESTINAL NEGATIVE: 1
EYES NEGATIVE: 1

## 2025-06-25 NOTE — PROGRESS NOTES
Claude Vinson Jr. is a 79 y.o. year old male.    Follow-up of ischemic cardiomyopathy, hypertension, hyperlipidemia, obesity, CHF, status post coronary stent,      4/17 improving blood pressure since meds adjusted by Dr. Wall;   1/17 seen for establishing/changing cardiologist   history of coronary disease, status post PCI(2010) and hypertension, along with conduction system disease  11/21 started on dialysis in 9/21  5/15/2023 used to get dyspnea on exertion walking inside the house or changing clothes.  Rests for a minute or 2 and then feels better.  No chest pain, edema, dizziness or palpitations.  Blood pressure tends to be low at the end of dialysis.  11/15/2023 shortness of breath seems to have resolved completely.  No chest pain edema dizziness palpitations.  Blood pressure does okay in dialysis per patient though he does not know the exact numbers.  He is never told that it is too low.  5/29/2024 no chest pain shortness of breath edema dizziness or palpitations.  No regular exercises.  Continues to smoke.  12/11/2024 denies any chest pain shortness of breath dyspnea.  Feels weak after dialysis normally.  Occasionally the blood pressure drops and he has to be kept longer in dialysis.  Somehow statins are missing and his medications.  He does not remember any side effects.  6/25/2025 no significant chest pain dyspnea edema dizziness.  Voices no complaints.  Blood pressure drop in dialysis is rare now.  He is doing exercise regularly and watching his diet.          Review of Systems   Constitutional: Negative.    HENT: Negative.     Eyes: Negative.    Respiratory: Negative.  Negative for shortness of breath.    Cardiovascular: Negative.    Gastrointestinal: Negative.    Endocrine: Negative.    Genitourinary: Negative.    Musculoskeletal: Negative.    Neurological: Negative.    Psychiatric/Behavioral: Negative.     All other systems reviewed and are negative.        Physical Exam  Vitals and nursing note

## 2025-07-15 ENCOUNTER — APPOINTMENT (OUTPATIENT)
Facility: HOSPITAL | Age: 80
End: 2025-07-15
Payer: MEDICARE

## 2025-07-15 ENCOUNTER — HOSPITAL ENCOUNTER (EMERGENCY)
Facility: HOSPITAL | Age: 80
Discharge: HOME | End: 2025-07-15
Attending: EMERGENCY MEDICINE
Payer: MEDICARE

## 2025-07-15 VITALS
DIASTOLIC BLOOD PRESSURE: 64 MMHG | RESPIRATION RATE: 18 BRPM | HEART RATE: 61 BPM | OXYGEN SATURATION: 95 % | TEMPERATURE: 97.8 F | SYSTOLIC BLOOD PRESSURE: 124 MMHG

## 2025-07-15 DIAGNOSIS — G44.219 EPISODIC TENSION-TYPE HEADACHE, NOT INTRACTABLE: Primary | ICD-10-CM

## 2025-07-15 DIAGNOSIS — H53.8 BLURRED VISION, LEFT EYE: ICD-10-CM

## 2025-07-15 DIAGNOSIS — H57.89 SCLERAL INJECTION: ICD-10-CM

## 2025-07-15 LAB
ANION GAP SERPL CALC-SCNC: 14 MMOL/L (ref 3–18)
BASOPHILS # BLD: 0.04 K/UL (ref 0–0.1)
BASOPHILS NFR BLD: 0.4 % (ref 0–2)
BUN SERPL-MCNC: 29 MG/DL (ref 6–23)
BUN/CREAT SERPL: 4 (ref 12–20)
CALCIUM SERPL-MCNC: 9.7 MG/DL (ref 8.5–10.1)
CHLORIDE SERPL-SCNC: 95 MMOL/L (ref 98–107)
CO2 SERPL-SCNC: 33 MMOL/L (ref 21–32)
CREAT SERPL-MCNC: 6.45 MG/DL (ref 0.6–1.3)
DIFFERENTIAL METHOD BLD: ABNORMAL
EOSINOPHIL # BLD: 0.6 K/UL (ref 0–0.4)
EOSINOPHIL NFR BLD: 5.7 % (ref 0–5)
ERYTHROCYTE [DISTWIDTH] IN BLOOD BY AUTOMATED COUNT: 14.4 % (ref 11.6–14.5)
GLUCOSE SERPL-MCNC: 222 MG/DL (ref 74–108)
HCT VFR BLD AUTO: 32.7 % (ref 36–48)
HGB BLD-MCNC: 10.8 G/DL (ref 13–16)
IMM GRANULOCYTES # BLD AUTO: 0.04 K/UL (ref 0–0.04)
IMM GRANULOCYTES NFR BLD AUTO: 0.4 % (ref 0–0.5)
INR PPP: 1 (ref 0.9–1.2)
LYMPHOCYTES # BLD: 0.96 K/UL (ref 0.9–3.6)
LYMPHOCYTES NFR BLD: 9.1 % (ref 21–52)
MCH RBC QN AUTO: 28.3 PG (ref 24–34)
MCHC RBC AUTO-ENTMCNC: 33 G/DL (ref 31–37)
MCV RBC AUTO: 85.8 FL (ref 78–100)
MONOCYTES # BLD: 0.94 K/UL (ref 0.05–1.2)
MONOCYTES NFR BLD: 8.9 % (ref 3–10)
NEUTS SEG # BLD: 8.01 K/UL (ref 1.8–8)
NEUTS SEG NFR BLD: 75.5 % (ref 40–73)
NRBC # BLD: 0 K/UL (ref 0–0.01)
NRBC BLD-RTO: 0 PER 100 WBC
PLATELET # BLD AUTO: 272 K/UL (ref 135–420)
PMV BLD AUTO: 10.3 FL (ref 9.2–11.8)
POTASSIUM SERPL-SCNC: 4.3 MMOL/L (ref 3.5–5.5)
PROTHROMBIN TIME: 13.5 SEC (ref 12–15.1)
RBC # BLD AUTO: 3.81 M/UL (ref 4.35–5.65)
SODIUM SERPL-SCNC: 142 MMOL/L (ref 136–145)
TROPONIN T SERPL HS-MCNC: 130 NG/L (ref 0–22)
WBC # BLD AUTO: 10.6 K/UL (ref 4.6–13.2)

## 2025-07-15 PROCEDURE — 70498 CT ANGIOGRAPHY NECK: CPT

## 2025-07-15 PROCEDURE — 2500000003 HC RX 250 WO HCPCS: Performed by: EMERGENCY MEDICINE

## 2025-07-15 PROCEDURE — 70450 CT HEAD/BRAIN W/O DYE: CPT

## 2025-07-15 PROCEDURE — 85025 COMPLETE CBC W/AUTO DIFF WBC: CPT

## 2025-07-15 PROCEDURE — 84484 ASSAY OF TROPONIN QUANT: CPT

## 2025-07-15 PROCEDURE — 85610 PROTHROMBIN TIME: CPT

## 2025-07-15 PROCEDURE — 99285 EMERGENCY DEPT VISIT HI MDM: CPT

## 2025-07-15 PROCEDURE — 80048 BASIC METABOLIC PNL TOTAL CA: CPT

## 2025-07-15 PROCEDURE — 6360000004 HC RX CONTRAST MEDICATION: Performed by: EMERGENCY MEDICINE

## 2025-07-15 RX ORDER — BUTALBITAL, ACETAMINOPHEN AND CAFFEINE 50; 325; 40 MG/1; MG/1; MG/1
1 TABLET ORAL EVERY 6 HOURS PRN
Qty: 20 TABLET | Refills: 0 | Status: SHIPPED | OUTPATIENT
Start: 2025-07-15

## 2025-07-15 RX ORDER — PROPARACAINE HYDROCHLORIDE 5 MG/ML
2 SOLUTION/ DROPS OPHTHALMIC
Status: COMPLETED | OUTPATIENT
Start: 2025-07-15 | End: 2025-07-15

## 2025-07-15 RX ORDER — MORPHINE SULFATE 2 MG/ML
2 INJECTION, SOLUTION INTRAMUSCULAR; INTRAVENOUS
Status: DISCONTINUED | OUTPATIENT
Start: 2025-07-15 | End: 2025-07-15

## 2025-07-15 RX ORDER — IOPAMIDOL 755 MG/ML
80 INJECTION, SOLUTION INTRAVASCULAR
Status: COMPLETED | OUTPATIENT
Start: 2025-07-15 | End: 2025-07-15

## 2025-07-15 RX ADMIN — IOPAMIDOL 80 ML: 755 INJECTION, SOLUTION INTRAVENOUS at 13:28

## 2025-07-15 RX ADMIN — PROPARACAINE HYDROCHLORIDE 2 DROP: 5 SOLUTION/ DROPS OPHTHALMIC at 14:21

## 2025-07-15 ASSESSMENT — PAIN - FUNCTIONAL ASSESSMENT: PAIN_FUNCTIONAL_ASSESSMENT: NONE - DENIES PAIN

## 2025-07-15 NOTE — ED TRIAGE NOTES
Pt arrives via EMS for c/o headache that has since subsided. EMS report that initially pt called for headache that onset once he got back home from dialysis. While EMS was waiting for pt he took a BM and states that his headache subsided. EMS insisted he still come to be seen. Om arrival to ED pt with no complaints. [id number]

## 2025-07-15 NOTE — ED NOTES
*ATTENTION:  This note has been created by a medical student for educational purposes only.  Please do not refer to the content of this note for clinical decision-making, billing, or other purposes.  Please see attending physician’s note to obtain clinical information on this patient.*         EMERGENCY DEPARTMENT HISTORY AND PHYSICAL EXAM      Date: 7/15/2025  Patient Name: Claude Vinson Jr.    History of Presenting Illness     Chief Complaint   Patient presents with    Headache       History (Context): Claude Vinson Jr. is a 79 y.o. male  presents to the ED today with chief complaint of L frontoparietal headache with a prior hx of HTN, end stage renal disease, stable angina with stent placement, L eye cataract surgery 6 months ago, and DM2. This morning, pt had similar headache following dialysis. Pt took tylenol which caused headache to cease. EMS urged pt to still come in, and headache returned once pt was in ED. Headache has occurred 3 times in the past 6 months, with tylenol resolving headache each time. Rated as 7/10 in pain and has vision difficulties in R eye. Denies trauma, recent infection, fever, numbness/tingling, or weakness.       PCP: Rashmi Wall MD    No current facility-administered medications for this encounter.     Current Outpatient Medications   Medication Sig Dispense Refill    butalbital-acetaminophen-caffeine (FIORICET, ESGIC) -40 MG per tablet Take 1 tablet by mouth every 6 hours as needed for Migraine or Headaches (Second line of ibuprofen, Tylenol not effective) 20 tablet 0    clopidogrel (PLAVIX) 75 MG tablet Take 1 tablet by mouth daily      sevelamer (RENVELA) 800 MG tablet Take 1 tablet by mouth 3 times daily      atorvastatin (LIPITOR) 20 MG tablet Take 1 tablet by mouth daily 90 tablet 2    amLODIPine (NORVASC) 10 MG tablet take 1 tablet by mouth once daily 90 tablet 3    aspirin 81 MG EC tablet Take 1 tablet by mouth daily      carvedilol (COREG) 25 MG tablet Take 1

## 2025-07-16 NOTE — ED PROVIDER NOTES
EMERGENCY DEPARTMENT HISTORY AND PHYSICAL EXAM        Date: 7/15/2025  Patient Name: Claude Vinson Jr.     History of Presenting Illness          Chief Complaint   Patient presents with    Headache         History (Context): Claude Vinson Jr. is a 79 y.o. male  presents to the ED today with chief complaint of L frontoparietal headache with a prior hx of HTN, end stage renal disease, stable angina with stent placement, L eye cataract surgery 6 months ago, and DM2. This morning, pt had similar headache following dialysis. Pt took tylenol which caused headache to cease. EMS urged pt to still come in, and headache returned once pt was in ED. Headache has occurred 3 times in the past 6 months, with tylenol resolving headache each time. Rated as 7/10 in pain and has vision difficulties in R eye. Denies trauma, recent infection, fever, numbness/tingling, or weakness. -RA (medical student)    HPI above noted. See ED course below for my history and physical exam summary. -         PCP: Rashmi Wall MD     Current Facility-Administered Medications   No current facility-administered medications for this encounter.             Current Outpatient Medications   Medication Sig Dispense Refill    butalbital-acetaminophen-caffeine (FIORICET, ESGIC) -40 MG per tablet Take 1 tablet by mouth every 6 hours as needed for Migraine or Headaches (Second line of ibuprofen, Tylenol not effective) 20 tablet 0    clopidogrel (PLAVIX) 75 MG tablet Take 1 tablet by mouth daily        sevelamer (RENVELA) 800 MG tablet Take 1 tablet by mouth 3 times daily        atorvastatin (LIPITOR) 20 MG tablet Take 1 tablet by mouth daily 90 tablet 2    amLODIPine (NORVASC) 10 MG tablet take 1 tablet by mouth once daily 90 tablet 3    aspirin 81 MG EC tablet Take 1 tablet by mouth daily        carvedilol (COREG) 25 MG tablet Take 1 tablet by mouth 2 times daily        vitamin D (CHOLECALCIFEROL) 25 MCG (1000 UT) TABS tablet Take 1 tablet by mouth

## 2025-08-19 ENCOUNTER — HOSPITAL ENCOUNTER (EMERGENCY)
Facility: HOSPITAL | Age: 80
Discharge: HOME | End: 2025-08-19
Attending: EMERGENCY MEDICINE
Payer: MEDICARE

## 2025-08-19 ENCOUNTER — APPOINTMENT (OUTPATIENT)
Facility: HOSPITAL | Age: 80
End: 2025-08-19
Payer: MEDICARE

## 2025-08-19 VITALS
OXYGEN SATURATION: 97 % | RESPIRATION RATE: 17 BRPM | HEIGHT: 66 IN | HEART RATE: 62 BPM | BODY MASS INDEX: 27.64 KG/M2 | TEMPERATURE: 98 F | WEIGHT: 172 LBS

## 2025-08-19 DIAGNOSIS — R51.9 ACUTE NONINTRACTABLE HEADACHE, UNSPECIFIED HEADACHE TYPE: Primary | ICD-10-CM

## 2025-08-19 LAB
ALBUMIN SERPL-MCNC: 3.2 G/DL (ref 3.4–5)
ALBUMIN/GLOB SERPL: 0.7 (ref 0.8–1.7)
ALP SERPL-CCNC: 129 U/L (ref 45–117)
ALT SERPL-CCNC: 8 U/L (ref 10–50)
ANION GAP SERPL CALC-SCNC: 11 MMOL/L (ref 3–18)
AST SERPL-CCNC: 14 U/L (ref 10–38)
BASOPHILS # BLD: 0.04 K/UL (ref 0–0.1)
BASOPHILS NFR BLD: 0.4 % (ref 0–2)
BILIRUB SERPL-MCNC: 0.3 MG/DL (ref 0.2–1)
BUN SERPL-MCNC: 23 MG/DL (ref 6–23)
BUN/CREAT SERPL: 4 (ref 12–20)
CALCIUM SERPL-MCNC: 9.4 MG/DL (ref 8.5–10.1)
CHLORIDE SERPL-SCNC: 96 MMOL/L (ref 98–107)
CO2 SERPL-SCNC: 32 MMOL/L (ref 21–32)
CREAT SERPL-MCNC: 5.85 MG/DL (ref 0.6–1.3)
DIFFERENTIAL METHOD BLD: ABNORMAL
EKG ATRIAL RATE: 60 BPM
EKG DIAGNOSIS: NORMAL
EKG P AXIS: -12 DEGREES
EKG P-R INTERVAL: 380 MS
EKG Q-T INTERVAL: 438 MS
EKG QRS DURATION: 190 MS
EKG QTC CALCULATION (BAZETT): 438 MS
EKG R AXIS: 259 DEGREES
EKG T AXIS: 39 DEGREES
EKG VENTRICULAR RATE: 60 BPM
EOSINOPHIL # BLD: 0.55 K/UL (ref 0–0.4)
EOSINOPHIL NFR BLD: 5.6 % (ref 0–5)
ERYTHROCYTE [DISTWIDTH] IN BLOOD BY AUTOMATED COUNT: 14.1 % (ref 11.6–14.5)
GLOBULIN SER CALC-MCNC: 4.5 G/DL (ref 2–4)
GLUCOSE BLD STRIP.AUTO-MCNC: 183 MG/DL (ref 70–110)
GLUCOSE SERPL-MCNC: 187 MG/DL (ref 74–108)
HCT VFR BLD AUTO: 32.1 % (ref 36–48)
HGB BLD-MCNC: 10.8 G/DL (ref 13–16)
IMM GRANULOCYTES # BLD AUTO: 0.03 K/UL (ref 0–0.04)
IMM GRANULOCYTES NFR BLD AUTO: 0.3 % (ref 0–0.5)
LYMPHOCYTES # BLD: 1.1 K/UL (ref 0.9–3.6)
LYMPHOCYTES NFR BLD: 11.2 % (ref 21–52)
MCH RBC QN AUTO: 27.6 PG (ref 24–34)
MCHC RBC AUTO-ENTMCNC: 33.6 G/DL (ref 31–37)
MCV RBC AUTO: 82.1 FL (ref 78–100)
MONOCYTES # BLD: 0.72 K/UL (ref 0.05–1.2)
MONOCYTES NFR BLD: 7.3 % (ref 3–10)
NEUTS SEG # BLD: 7.36 K/UL (ref 1.8–8)
NEUTS SEG NFR BLD: 75.2 % (ref 40–73)
NRBC # BLD: 0 K/UL (ref 0–0.01)
NRBC BLD-RTO: 0 PER 100 WBC
PLATELET # BLD AUTO: 197 K/UL (ref 135–420)
PMV BLD AUTO: 10.5 FL (ref 9.2–11.8)
POTASSIUM SERPL-SCNC: 3.9 MMOL/L (ref 3.5–5.5)
PROT SERPL-MCNC: 7.7 G/DL (ref 6.4–8.2)
RBC # BLD AUTO: 3.91 M/UL (ref 4.35–5.65)
SODIUM SERPL-SCNC: 139 MMOL/L (ref 136–145)
WBC # BLD AUTO: 9.8 K/UL (ref 4.6–13.2)

## 2025-08-19 PROCEDURE — 99285 EMERGENCY DEPT VISIT HI MDM: CPT

## 2025-08-19 PROCEDURE — 6360000004 HC RX CONTRAST MEDICATION: Performed by: HEALTH CARE PROVIDER

## 2025-08-19 PROCEDURE — 82962 GLUCOSE BLOOD TEST: CPT

## 2025-08-19 PROCEDURE — 96375 TX/PRO/DX INJ NEW DRUG ADDON: CPT

## 2025-08-19 PROCEDURE — 6370000000 HC RX 637 (ALT 250 FOR IP): Performed by: HEALTH CARE PROVIDER

## 2025-08-19 PROCEDURE — 93010 ELECTROCARDIOGRAM REPORT: CPT | Performed by: INTERNAL MEDICINE

## 2025-08-19 PROCEDURE — 6360000002 HC RX W HCPCS: Performed by: HEALTH CARE PROVIDER

## 2025-08-19 PROCEDURE — 94761 N-INVAS EAR/PLS OXIMETRY MLT: CPT

## 2025-08-19 PROCEDURE — 96374 THER/PROPH/DIAG INJ IV PUSH: CPT

## 2025-08-19 PROCEDURE — 85025 COMPLETE CBC W/AUTO DIFF WBC: CPT

## 2025-08-19 PROCEDURE — 80053 COMPREHEN METABOLIC PANEL: CPT

## 2025-08-19 PROCEDURE — 93005 ELECTROCARDIOGRAM TRACING: CPT | Performed by: EMERGENCY MEDICINE

## 2025-08-19 PROCEDURE — 70450 CT HEAD/BRAIN W/O DYE: CPT

## 2025-08-19 PROCEDURE — 70498 CT ANGIOGRAPHY NECK: CPT

## 2025-08-19 RX ORDER — IOPAMIDOL 755 MG/ML
80 INJECTION, SOLUTION INTRAVASCULAR
Status: COMPLETED | OUTPATIENT
Start: 2025-08-19 | End: 2025-08-19

## 2025-08-19 RX ORDER — DIPHENHYDRAMINE HYDROCHLORIDE 50 MG/ML
25 INJECTION, SOLUTION INTRAMUSCULAR; INTRAVENOUS
Status: COMPLETED | OUTPATIENT
Start: 2025-08-19 | End: 2025-08-19

## 2025-08-19 RX ORDER — ACETAMINOPHEN 325 MG/1
650 TABLET ORAL
Status: COMPLETED | OUTPATIENT
Start: 2025-08-19 | End: 2025-08-19

## 2025-08-19 RX ORDER — METOCLOPRAMIDE HYDROCHLORIDE 5 MG/ML
5 INJECTION INTRAMUSCULAR; INTRAVENOUS
Status: COMPLETED | OUTPATIENT
Start: 2025-08-19 | End: 2025-08-19

## 2025-08-19 RX ADMIN — ACETAMINOPHEN 650 MG: 325 TABLET ORAL at 11:40

## 2025-08-19 RX ADMIN — METOCLOPRAMIDE HYDROCHLORIDE 5 MG: 5 INJECTION INTRAMUSCULAR; INTRAVENOUS at 11:42

## 2025-08-19 RX ADMIN — DIPHENHYDRAMINE HYDROCHLORIDE 25 MG: 50 INJECTION INTRAMUSCULAR; INTRAVENOUS at 11:43

## 2025-08-19 RX ADMIN — IOPAMIDOL 80 ML: 755 INJECTION, SOLUTION INTRAVENOUS at 12:18

## 2025-08-19 ASSESSMENT — PAIN DESCRIPTION - LOCATION: LOCATION: HEAD

## 2025-08-19 ASSESSMENT — PAIN DESCRIPTION - DESCRIPTORS: DESCRIPTORS: ACHING

## 2025-08-19 ASSESSMENT — PAIN SCALES - GENERAL
PAINLEVEL_OUTOF10: 0
PAINLEVEL_OUTOF10: 10

## 2025-08-19 ASSESSMENT — PAIN - FUNCTIONAL ASSESSMENT: PAIN_FUNCTIONAL_ASSESSMENT: 0-10

## 2025-08-26 ENCOUNTER — HOSPITAL ENCOUNTER (EMERGENCY)
Facility: HOSPITAL | Age: 80
Discharge: HOME | End: 2025-08-26
Payer: MEDICARE

## 2025-08-26 VITALS
HEART RATE: 62 BPM | RESPIRATION RATE: 16 BRPM | BODY MASS INDEX: 26.36 KG/M2 | TEMPERATURE: 98 F | OXYGEN SATURATION: 96 % | HEIGHT: 66 IN | SYSTOLIC BLOOD PRESSURE: 123 MMHG | DIASTOLIC BLOOD PRESSURE: 93 MMHG | WEIGHT: 164 LBS

## 2025-08-26 DIAGNOSIS — G44.019 EPISODIC CLUSTER HEADACHE, NOT INTRACTABLE: Primary | ICD-10-CM

## 2025-08-26 PROCEDURE — 6360000002 HC RX W HCPCS

## 2025-08-26 PROCEDURE — 99284 EMERGENCY DEPT VISIT MOD MDM: CPT

## 2025-08-26 PROCEDURE — 93005 ELECTROCARDIOGRAM TRACING: CPT

## 2025-08-26 PROCEDURE — 96374 THER/PROPH/DIAG INJ IV PUSH: CPT

## 2025-08-26 PROCEDURE — 6370000000 HC RX 637 (ALT 250 FOR IP)

## 2025-08-26 PROCEDURE — 96375 TX/PRO/DX INJ NEW DRUG ADDON: CPT

## 2025-08-26 RX ORDER — DIPHENHYDRAMINE HYDROCHLORIDE 50 MG/ML
25 INJECTION, SOLUTION INTRAMUSCULAR; INTRAVENOUS
Status: DISCONTINUED | OUTPATIENT
Start: 2025-08-26 | End: 2025-08-26

## 2025-08-26 RX ORDER — METOCLOPRAMIDE HYDROCHLORIDE 5 MG/ML
5 INJECTION INTRAMUSCULAR; INTRAVENOUS
Status: DISCONTINUED | OUTPATIENT
Start: 2025-08-26 | End: 2025-08-26

## 2025-08-26 RX ORDER — ACETAMINOPHEN 325 MG/1
650 TABLET ORAL
Status: COMPLETED | OUTPATIENT
Start: 2025-08-26 | End: 2025-08-26

## 2025-08-26 RX ORDER — DIPHENHYDRAMINE HYDROCHLORIDE 50 MG/ML
12.5 INJECTION, SOLUTION INTRAMUSCULAR; INTRAVENOUS
Status: COMPLETED | OUTPATIENT
Start: 2025-08-26 | End: 2025-08-26

## 2025-08-26 RX ORDER — METOCLOPRAMIDE HYDROCHLORIDE 5 MG/ML
10 INJECTION INTRAMUSCULAR; INTRAVENOUS
Status: COMPLETED | OUTPATIENT
Start: 2025-08-26 | End: 2025-08-26

## 2025-08-26 RX ORDER — ZOLMITRIPTAN 5 MG/1
2.5 TABLET, FILM COATED ORAL PRN
Qty: 6 TABLET | Refills: 3 | Status: SHIPPED | OUTPATIENT
Start: 2025-08-26

## 2025-08-26 RX ORDER — KETOROLAC TROMETHAMINE 15 MG/ML
15 INJECTION, SOLUTION INTRAMUSCULAR; INTRAVENOUS
Status: COMPLETED | OUTPATIENT
Start: 2025-08-26 | End: 2025-08-26

## 2025-08-26 RX ADMIN — ACETAMINOPHEN 650 MG: 325 TABLET ORAL at 18:29

## 2025-08-26 RX ADMIN — DIPHENHYDRAMINE HYDROCHLORIDE 12.5 MG: 50 INJECTION, SOLUTION INTRAMUSCULAR; INTRAVENOUS at 18:29

## 2025-08-26 RX ADMIN — KETOROLAC TROMETHAMINE 15 MG: 15 INJECTION, SOLUTION INTRAMUSCULAR; INTRAVENOUS at 19:35

## 2025-08-26 RX ADMIN — METOCLOPRAMIDE 10 MG: 5 INJECTION, SOLUTION INTRAMUSCULAR; INTRAVENOUS at 18:27

## 2025-08-26 ASSESSMENT — PAIN - FUNCTIONAL ASSESSMENT
PAIN_FUNCTIONAL_ASSESSMENT: PREVENTS OR INTERFERES SOME ACTIVE ACTIVITIES AND ADLS
PAIN_FUNCTIONAL_ASSESSMENT: 0-10

## 2025-08-26 ASSESSMENT — PAIN SCALES - GENERAL
PAINLEVEL_OUTOF10: 9
PAINLEVEL_OUTOF10: 8
PAINLEVEL_OUTOF10: 10

## 2025-08-26 ASSESSMENT — PAIN DESCRIPTION - DESCRIPTORS
DESCRIPTORS: ACHING;THROBBING
DESCRIPTORS: ACHING
DESCRIPTORS: THROBBING

## 2025-08-26 ASSESSMENT — PAIN DESCRIPTION - LOCATION
LOCATION: HEAD

## 2025-08-26 ASSESSMENT — PAIN DESCRIPTION - ORIENTATION: ORIENTATION: LEFT

## 2025-08-27 LAB
EKG ATRIAL RATE: 56 BPM
EKG DIAGNOSIS: NORMAL
EKG P AXIS: 91 DEGREES
EKG P-R INTERVAL: 358 MS
EKG Q-T INTERVAL: 462 MS
EKG QRS DURATION: 188 MS
EKG QTC CALCULATION (BAZETT): 445 MS
EKG R AXIS: -83 DEGREES
EKG T AXIS: 95 DEGREES
EKG VENTRICULAR RATE: 56 BPM

## 2025-08-27 PROCEDURE — 93010 ELECTROCARDIOGRAM REPORT: CPT | Performed by: INTERNAL MEDICINE

## 2025-08-29 ENCOUNTER — TRANSCRIBE ORDERS (OUTPATIENT)
Facility: HOSPITAL | Age: 80
End: 2025-08-29

## 2025-08-29 ENCOUNTER — HOSPITAL ENCOUNTER (OUTPATIENT)
Facility: HOSPITAL | Age: 80
Discharge: HOME | End: 2025-09-01
Payer: MEDICARE

## 2025-08-29 DIAGNOSIS — M26.649 ARTHRITIS OF TEMPOROMANDIBULAR JOINT, UNSPECIFIED LATERALITY: Primary | ICD-10-CM

## 2025-08-29 DIAGNOSIS — M31.6 TEMPORAL ARTERITIS (HCC): ICD-10-CM

## 2025-08-29 LAB
BASOPHILS # BLD: 0.01 K/UL (ref 0–0.1)
BASOPHILS NFR BLD: 0.1 % (ref 0–2)
CRP SERPL-MCNC: <0.3 MG/DL (ref 0–5)
DIFFERENTIAL METHOD BLD: ABNORMAL
EOSINOPHIL # BLD: 0.66 K/UL (ref 0–0.4)
EOSINOPHIL NFR BLD: 8.5 % (ref 0–5)
ERYTHROCYTE [DISTWIDTH] IN BLOOD BY AUTOMATED COUNT: 14.5 % (ref 11.6–14.5)
ERYTHROCYTE [SEDIMENTATION RATE] IN BLOOD: 47 MM/HR (ref 0–20)
HCT VFR BLD AUTO: 34.5 % (ref 36–48)
HGB BLD-MCNC: 11.3 G/DL (ref 13–16)
IMM GRANULOCYTES # BLD AUTO: 0.02 K/UL (ref 0–0.04)
IMM GRANULOCYTES NFR BLD AUTO: 0.3 % (ref 0–0.5)
LYMPHOCYTES # BLD: 1.44 K/UL (ref 0.9–3.6)
LYMPHOCYTES NFR BLD: 18.7 % (ref 21–52)
MCH RBC QN AUTO: 28.1 PG (ref 24–34)
MCHC RBC AUTO-ENTMCNC: 32.8 G/DL (ref 31–37)
MCV RBC AUTO: 85.8 FL (ref 78–100)
MONOCYTES # BLD: 0.77 K/UL (ref 0.05–1.2)
MONOCYTES NFR BLD: 10 % (ref 3–10)
NEUTS SEG # BLD: 4.82 K/UL (ref 1.8–8)
NEUTS SEG NFR BLD: 62.4 % (ref 40–73)
NRBC # BLD: 0 K/UL (ref 0–0.01)
NRBC BLD-RTO: 0 PER 100 WBC
PLATELET # BLD AUTO: 231 K/UL (ref 135–420)
PMV BLD AUTO: 11.2 FL (ref 9.2–11.8)
RBC # BLD AUTO: 4.02 M/UL (ref 4.35–5.65)
WBC # BLD AUTO: 7.7 K/UL (ref 4.6–13.2)

## 2025-08-29 PROCEDURE — 85652 RBC SED RATE AUTOMATED: CPT

## 2025-08-29 PROCEDURE — 36415 COLL VENOUS BLD VENIPUNCTURE: CPT

## 2025-08-29 PROCEDURE — 86140 C-REACTIVE PROTEIN: CPT

## 2025-08-29 PROCEDURE — 85025 COMPLETE CBC W/AUTO DIFF WBC: CPT

## (undated) DEVICE — CATH IV SAFE STR 22GX1IN BLU -- PROTECTIV PLUS

## (undated) DEVICE — PROCEDURE KIT FLUID MGMT 10 FR CUST MAINFOLD

## (undated) DEVICE — CATHETER GUID 6FR L100CM PTFE XBLAD4 TRUELUMEN HYBRID BRAID

## (undated) DEVICE — CATH 5F 100CM JR40 -- DXTERITY

## (undated) DEVICE — SUTURE MCRYL SZ 3-0 L27IN ABSRB UD L26MM SH 1/2 CIR Y416H

## (undated) DEVICE — FCPS RAD JAW 4LC 240CM W/NDL -- BX/20 RADIAL JAW 4

## (undated) DEVICE — DRESSING FOAM DISK DIA1IN H 7MM HYDRPHLC CHG IMPREG IN SL

## (undated) DEVICE — KIT CLN UP BON SECOURS MARYV

## (undated) DEVICE — ANGIO-SEAL VIP VASCULAR CLOSURE DEVICE: Brand: ANGIO-SEAL

## (undated) DEVICE — GAUZE SPONGES,8 PLY: Brand: CURITY

## (undated) DEVICE — COVER US PRB W15XL120CM W/ GEL RUBBERBAND TAPE STRP FLD GEN

## (undated) DEVICE — STERILE POLYISOPRENE POWDER-FREE SURGICAL GLOVES: Brand: PROTEXIS

## (undated) DEVICE — MEDI-VAC NON-CONDUCTIVE SUCTION TUBING: Brand: CARDINAL HEALTH

## (undated) DEVICE — SET FLD ADMIN 3 W STPCOCK FIX FEM L BOR 1IN

## (undated) DEVICE — PRESSURE MONITORING SET: Brand: TRUWAVE

## (undated) DEVICE — BITE BLOCK ENDOSCP UNIV AD 6 TO 9.4 MM

## (undated) DEVICE — TREK CORONARY DILATATION CATHETER 2.50 MM X 15 MM / RAPID-EXCHANGE: Brand: TREK

## (undated) DEVICE — MEDI-VAC SUCTION HIGH CAPACITY: Brand: CARDINAL HEALTH

## (undated) DEVICE — SUTURE PROL SZ 2-0 L36IN NONABSORBABLE BLU V-7 L26MM 1/2 8977H

## (undated) DEVICE — BASIN EMESIS 500CC ROSE 250/CS 60/PLT: Brand: MEDEGEN MEDICAL PRODUCTS, LLC

## (undated) DEVICE — INTRODUCER SHTH 6FR CANN L11CM DIL TIP 35MM GRN TUNGSTEN

## (undated) DEVICE — SUTURE MCRYL SZ 4-0 L18IN ABSRB UD P-3 L13MM 3/8 CIR PRIM Y494G

## (undated) DEVICE — PACK PROCEDURE SURG VASC CATH 161 MMC LF

## (undated) DEVICE — RUNTHROUGH NS EXTRA FLOPPY PTCA GUIDEWIRE: Brand: RUNTHROUGH

## (undated) DEVICE — NC TREK CORONARY DILATATION CATHETER 2.5 MM X 15 MM / RAPID-EXCHANGE: Brand: NC TREK

## (undated) DEVICE — GLOVE SURG SZ 7.5 L11.73IN FNGR THK9.8MIL STRW LTX POLYMER

## (undated) DEVICE — DRAPE XR C ARM 41X74IN LF --

## (undated) DEVICE — DRESSING HEMSTAT W4XL4IN 4 PLY WHT IMPREG KAOLIN HYDRPHLC

## (undated) DEVICE — SYR 50ML SLIP TIP NSAF LF STRL --

## (undated) DEVICE — CATH 6F 100CM JL40 -- DXTERITY

## (undated) DEVICE — INTRODUCER SHTH 4FR CANN L11CM DIL TIP 25MM RED TUNGSTEN

## (undated) DEVICE — 48" PROBE COVER W/GEL, ULTRASOUND, STERILE: Brand: SITE-RITE

## (undated) DEVICE — CATHETER DLYS PALINDROME

## (undated) DEVICE — ADHESIVE SKN CLSR HI VISC 2-O --

## (undated) DEVICE — PROBE VASC 8MHZ WTRPRF

## (undated) DEVICE — PREP SKN CHLRAPRP APL 26ML STR --

## (undated) DEVICE — FLEX ADVANTAGE 3000CC: Brand: FLEX ADVANTAGE

## (undated) DEVICE — CATHETER DIAG AD 4FR L100CM STD NYL JUDKINS R 4 TRULUMEN

## (undated) DEVICE — COPILOT BLEEDBACK CONTROL VALVE: Brand: COPILOT

## (undated) DEVICE — INTENDED FOR TISSUE SEPARATION, AND OTHER PROCEDURES THAT REQUIRE A SHARP SURGICAL BLADE TO PUNCTURE OR CUT.: Brand: BARD-PARKER ® STAINLESS STEEL BLADES

## (undated) DEVICE — ENDOSCOPY PUMP TUBING/ CAP SET: Brand: ERBE

## (undated) DEVICE — SUTURE MCRYL SZ 2-0 L36IN ABSRB UD L36MM CT-1 1/2 CIR Y945H

## (undated) DEVICE — (D)GLOVE EXAM LG NITRL NS -- DISC BY MFR NO SUB

## (undated) DEVICE — DECANTER BAG 9": Brand: MEDLINE INDUSTRIES, INC.

## (undated) DEVICE — GAUZE SPONGES,16 PLY: Brand: CURITY

## (undated) DEVICE — REM POLYHESIVE ADULT PATIENT RETURN ELECTRODE: Brand: VALLEYLAB

## (undated) DEVICE — Device

## (undated) DEVICE — GLOVE SURG SZ 7 L11.33IN FNGR THK9.8MIL STRW LTX POLYMER

## (undated) DEVICE — Z DISCONTINUED SURGICAL PROCEDURE PACK PERM CUST MARYVIEW PERMCATH

## (undated) DEVICE — TAPE,CLOTH/SILK,CURAD,3"X10YD,LF,40/CS: Brand: CURAD

## (undated) DEVICE — AIRLIFE™ NASAL OXYGEN CANNULA CURVED, FLARED TIP WITH 14 FOOT (4.3 M) CRUSH-RESISTANT TUBING, OVER-THE-EAR STYLE: Brand: AIRLIFE™

## (undated) DEVICE — DEVICE INFL W ACCS + HEMSTAS VLV INSRT TOOL AND TORQ BASIX

## (undated) DEVICE — SUTURE PROL SZ 5-0 L36IN NONABSORBABLE BLU L13MM C-1 3/8 8720H

## (undated) DEVICE — CATHETER ANGIO 4FR L100CM S STL NYL JL4 3 SEG BRAID SFT

## (undated) DEVICE — SYRINGE MED 25GA 3ML L5/8IN SUBQ PLAS W/ DETACH NDL SFTY

## (undated) DEVICE — NC TREK CORONARY DILATATION CATHETER 3.25 MM X 15 MM / RAPID-EXCHANGE: Brand: NC TREK

## (undated) DEVICE — SOLUTION IRRIG 1000ML H2O STRL BLT

## (undated) DEVICE — FLUFF AND POLYMER UNDERPAD,EXTRA HEAVY: Brand: WINGS

## (undated) DEVICE — APPLIER CLP L9.38IN M LIG TI DISP STR RNG HNDL LIGACLP

## (undated) DEVICE — CATHETER SUCT TR FL TIP 14FR W/ O CTRL